# Patient Record
Sex: FEMALE | Race: WHITE | NOT HISPANIC OR LATINO | Employment: OTHER | URBAN - METROPOLITAN AREA
[De-identification: names, ages, dates, MRNs, and addresses within clinical notes are randomized per-mention and may not be internally consistent; named-entity substitution may affect disease eponyms.]

---

## 2017-01-04 ENCOUNTER — THERAPY VISIT (OUTPATIENT)
Dept: PHYSICAL THERAPY | Facility: CLINIC | Age: 82
End: 2017-01-04
Payer: MEDICARE

## 2017-01-04 DIAGNOSIS — G57.01 PIRIFORMIS SYNDROME OF RIGHT SIDE: Primary | ICD-10-CM

## 2017-01-04 PROCEDURE — 97112 NEUROMUSCULAR REEDUCATION: CPT | Mod: GP | Performed by: PHYSICAL THERAPIST

## 2017-01-04 PROCEDURE — 97110 THERAPEUTIC EXERCISES: CPT | Mod: GP | Performed by: PHYSICAL THERAPIST

## 2017-01-13 ENCOUNTER — THERAPY VISIT (OUTPATIENT)
Dept: PHYSICAL THERAPY | Facility: CLINIC | Age: 82
End: 2017-01-13
Payer: MEDICARE

## 2017-01-13 DIAGNOSIS — G57.01 PIRIFORMIS SYNDROME OF RIGHT SIDE: Primary | ICD-10-CM

## 2017-01-13 PROCEDURE — 97112 NEUROMUSCULAR REEDUCATION: CPT | Mod: GP | Performed by: PHYSICAL THERAPIST

## 2017-01-13 PROCEDURE — 97110 THERAPEUTIC EXERCISES: CPT | Mod: GP | Performed by: PHYSICAL THERAPIST

## 2017-01-25 ENCOUNTER — THERAPY VISIT (OUTPATIENT)
Dept: PHYSICAL THERAPY | Facility: CLINIC | Age: 82
End: 2017-01-25
Payer: MEDICARE

## 2017-01-25 DIAGNOSIS — G57.01 PIRIFORMIS SYNDROME OF RIGHT SIDE: Primary | ICD-10-CM

## 2017-01-25 PROCEDURE — G8978 MOBILITY CURRENT STATUS: HCPCS | Mod: GP | Performed by: PHYSICAL THERAPIST

## 2017-01-25 PROCEDURE — 97110 THERAPEUTIC EXERCISES: CPT | Mod: GP | Performed by: PHYSICAL THERAPIST

## 2017-01-25 PROCEDURE — 97112 NEUROMUSCULAR REEDUCATION: CPT | Mod: GP | Performed by: PHYSICAL THERAPIST

## 2017-01-25 PROCEDURE — G8979 MOBILITY GOAL STATUS: HCPCS | Mod: GP | Performed by: PHYSICAL THERAPIST

## 2017-01-25 NOTE — PROGRESS NOTES
Subjective:    HPI                    Objective:    System    Physical Exam    General     ROS    Assessment/Plan:      PROGRESS  REPORT    Progress reporting period is from 9/21/16 to 1/25/17.       SUBJECTIVE  Subjective changes noted by patient:  China reports her hip and leg have been doing really well lately. She has some day when she can feel a low grade ache but other days she has no pain.  She can note occasional soreness in her right upper leg whens he walks or when she sleeps at night, but it goes away quickly.  She feels she can try managing her symptoms on her own with her exercises and will contact PT to discuss progress in 3-4 weeks.    Current pain level is 1/10.     Previous pain level was 6/10.   Changes in function:  Yes, see above.  Adverse reaction to treatment or activity: None    OBJECTIVE  Changes noted in objective findings:  Yes  Right hip PROM grossly WNL, mild right hip flexor tightness.    Right hip strength:  flexion 5/5  abduction 5/5  extension 5-/5  ER 5/5    Right hamstring strength 4+/5     ASSESSMENT/PLAN  Updated problem list and treatment plan: Diagnosis 1:  R hip pain    Pain -  home program  Decreased ROM/flexibility - home program  Decreased strength - home program  Decreased proprioception - home program  STG/LTGs have been met or progress has been made towards goals:  Yes (See Goal flow sheet completed today.)  Assessment of Progress: The patient's condition is improving.  Patient is meeting short term goals and is progressing towards long term goals.  Self Management Plans:  Patient has been instructed in a home treatment program.  Patient  has been instructed in self management of symptoms.    China continues to require the following intervention to meet STG and LTG's:  HEP    Recommendations:  China will continue her home exercise program on her own and contact PT in 3-4 weeks to discuss her progress.  If she is doing well at that time she will continue her home program  and likely be discharged from PT. If she is having continued or worsening pain we will consider resuming PT.    Please refer to the daily flowsheet for treatment today, total treatment time and time spent performing 1:1 timed codes.

## 2017-03-01 ENCOUNTER — OFFICE VISIT (OUTPATIENT)
Dept: OPHTHALMOLOGY | Facility: CLINIC | Age: 82
End: 2017-03-01
Payer: COMMERCIAL

## 2017-03-01 DIAGNOSIS — H52.4 PRESBYOPIA: ICD-10-CM

## 2017-03-01 DIAGNOSIS — H40.053 BORDERLINE GLAUCOMA WITH OCULAR HYPERTENSION, BILATERAL: ICD-10-CM

## 2017-03-01 DIAGNOSIS — H35.30 MACULAR DEGENERATION: Primary | ICD-10-CM

## 2017-03-01 DIAGNOSIS — H43.813 POSTERIOR VITREOUS DETACHMENT, BILATERAL: ICD-10-CM

## 2017-03-01 DIAGNOSIS — Z96.1 PSEUDOPHAKIA: ICD-10-CM

## 2017-03-01 PROCEDURE — 92014 COMPRE OPH EXAM EST PT 1/>: CPT | Performed by: OPHTHALMOLOGY

## 2017-03-01 PROCEDURE — 92134 CPTRZ OPH DX IMG PST SGM RTA: CPT | Performed by: OPHTHALMOLOGY

## 2017-03-01 PROCEDURE — 92015 DETERMINE REFRACTIVE STATE: CPT | Mod: GY | Performed by: OPHTHALMOLOGY

## 2017-03-01 ASSESSMENT — SLIT LAMP EXAM - LIDS
COMMENTS: GOOD CREASE AND COSMESIS
COMMENTS: GOOD CREASE AND COSMESIS, 1+ PTOSIS

## 2017-03-01 ASSESSMENT — VISUAL ACUITY
METHOD: SNELLEN - LINEAR
CORRECTION_TYPE: GLASSES
OS_CC: CF
OD_CC: 20/60+2
OS_CC: <J12

## 2017-03-01 ASSESSMENT — TONOMETRY
OD_IOP_MMHG: 19
OS_IOP_MMHG: 22
IOP_METHOD: APPLANATION

## 2017-03-01 ASSESSMENT — REFRACTION_MANIFEST
OS_CYLINDER: SPHERE
OS_SPHERE: -0.50
OD_CYLINDER: +1.00
OD_ADD: +4.00
OD_SPHERE: -1.50
OD_AXIS: 180
OS_ADD: +4.00

## 2017-03-01 ASSESSMENT — REFRACTION_WEARINGRX
OD_CYLINDER: +1.00
OD_ADD: +3.50
SPECS_TYPE: PAL
OD_SPHERE: -0.75
OD_AXIS: 180
OS_SPHERE: -0.50
OS_CYLINDER: SPHERE

## 2017-03-01 ASSESSMENT — CUP TO DISC RATIO
OD_RATIO: 0.3
OS_RATIO: 0.3

## 2017-03-01 ASSESSMENT — PACHYMETRY
OS_CT(UM): .539
OD_CT(UM): .541

## 2017-03-01 ASSESSMENT — EXTERNAL EXAM - RIGHT EYE: OD_EXAM: NORMAL

## 2017-03-01 ASSESSMENT — EXTERNAL EXAM - LEFT EYE: OS_EXAM: NORMAL

## 2017-03-01 ASSESSMENT — CONF VISUAL FIELD
OS_NORMAL: 1
OD_NORMAL: 1

## 2017-03-01 NOTE — PROGRESS NOTES
" Current Eye Medications:  Latanoprost nightly both eyes, last drops at 9am     Subjective:  Comprehensive eye exam.   Pt reports she is seeing ok with her right eye, has not noticed any changes, vision left is just as poor.     Objective:  See Ophthalmology Exam.       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  Intraocular pressure and discs stable.      ICD-10-CM    1. Macular degeneration, dry, mod, ou H35.30 EYE EXAM (SIMPLE-NONBILLABLE)      COMPUTERIZED OPHTHALMIC IMAGING RETINA   2. Pseudophakia, Yag Caps, ou Z96.1    3. 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 Posterior vitreous detachment, bilateral H43.813    5. Presbyopia H52.4 REFRACTIVE STATUS        Plan: Continue Latanoprost at bedtime both eyes  Take a multiple vitamin or \"eye vitamin\" daily.  Protect your eyes outdoors from ultraviolet rays with sunglasses and/or brimmed hat.  Have spinach (cooked or raw), colorful fruits, walnuts, hazelnuts, almonds in your diet.  Monitor the vision in each eye weekly - call if any sudden persistent changes.   Glasses Rx given (optional)  Return visit 6 months for pressure check, HVF, glaucoma OCT    Srikanth Craig M.D.     "

## 2017-03-01 NOTE — PATIENT INSTRUCTIONS
"Continue Latanoprost at bedtime both eyes  Take a multiple vitamin or \"eye vitamin\" daily.  Protect your eyes outdoors from ultraviolet rays with sunglasses and/or brimmed hat.  Have spinach (cooked or raw), colorful fruits, walnuts, hazelnuts, almonds in your diet.  Monitor the vision in each eye weekly - call if any sudden persistent changes.   Glasses Rx given (optional)  Return visit 6 months for pressure check, HVF, glaucoma OCT    Srikanth Craig M.D.   "

## 2017-03-01 NOTE — MR AVS SNAPSHOT
"              After Visit Summary   3/1/2017    China Guzman    MRN: [id number]           Patient Information     Date Of Birth          4/10/1930        Visit Information        Provider Department      3/1/2017 9:00 AM Srikanth Craig MD [facility]        Today's Diagnoses     Macular degeneration, dry, mod, ou    -  1    Pseudophakia, Yag Caps, ou        Borderline glaucoma with ocular hypertension, bilateral - treated        Posterior vitreous detachment, bilateral        Presbyopia          Care Instructions    Continue Latanoprost at bedtime both eyes  Take a multiple vitamin or \"eye vitamin\" daily.  Protect your eyes outdoors from ultraviolet rays with sunglasses and/or brimmed hat.  Have spinach (cooked or raw), colorful fruits, walnuts, hazelnuts, almonds in your diet.  Monitor the vision in each eye weekly - call if any sudden persistent changes.   Glasses Rx given (optional)  Return visit 6 months for pressure check, HVF, glaucoma OCT    Srikanth Craig M.D.         Follow-ups after your visit        Your next 10 appointments already scheduled     Sep 01, 2017  9:15 AM CDT   Return Visit with Srikanth Craig MD   [facility] ([facility]    [address] [place] [address] [phone]              Who to contact     If you have questions or need follow up information about today's clinic visit or your schedule please contact [facility] directly at [phone].  Normal or non-critical lab and imaging results will be communicated to you by MyChart, letter or phone within 4 business days after the clinic has received the results. If you do not hear from us within 7 days, please contact the clinic through Same Day Serveshart or phone. If you have a critical or abnormal lab result, we will notify you by phone as soon as possible.  Submit refill requests through RaySat or call your pharmacy and they will forward the refill request to us. Please " "allow 3 business days for your refill to be completed.          Additional Information About Your Visit        MyChart Information     CambridgeSofthart lets you send messages to your doctor, view your test results, renew your prescriptions, schedule appointments and more. To sign up, go to [email or web address] . Click on \"Log in\" on the left side of the screen, which will take you to the Welcome page. Then click on \"Sign up Now\" on the right side of the page.     You will be asked to enter the access code listed below, as well as some personal information. Please follow the directions to create your username and password.     Your access code is: [email or web address]  Expires: 2017 10:00 AM     Your access code will  in 90 days. If you need help or a new code, please call your [place] clinic or [phone].        Care EveryWhere ID     This is your Care EveryWhere ID. This could be used by other organizations to access your [place] medical records  [id number]        Your Vitals Were     Last Period                   1980            Blood Pressure from Last 3 Encounters:   16 132/64   10/28/16 142/70   16 142/66    Weight from Last 3 Encounters:   16 53.5 kg (118 lb)   10/28/16 53.5 kg (118 lb)   16 53.5 kg (118 lb)              We Performed the Following     EYE EXAM (SIMPLE-NONBILLABLE)      COMPUTERIZED OPHTHALMIC IMAGING RETINA     REFRACTIVE STATUS        Primary Care Provider Office Phone # Fax #    Colby Trimble [phone]       [facility] [address] [facility] [address]        Thank you!     Thank you for choosing [facility]  for your care. Our goal is always to provide you with excellent care. Hearing back from our patients is one way we can continue to improve our services. Please take a few minutes to complete the written survey that you may receive in the mail after your visit with us. Thank you!             Your " Updated Medication List - Protect others around you: Learn how to safely use, store and throw away your medicines at www.disposemymeds.org.          This list is accurate as of: 3/1/17  9:12 PM.  Always use your most recent med list.                   Brand Name Dispense Instructions for use    aspirin 325 MG EC tablet      Take 325 mg by mouth daily.       atorvastatin 10 MG tablet    LIPITOR    90 tablet    Take 1 tablet (10 mg) by mouth At Bedtime       CALCIUM + D PO      2 tablets daily       diltiazem 120 MG 24 hr capsule    DILACOR XR    90 capsule    Take 1 capsule (120 mg) by mouth daily       hydrochlorothiazide 25 MG tablet    HYDRODIURIL    90 tablet    Take 1 tablet (25 mg) by mouth every morning       latanoprost 0.005 % ophthalmic solution    XALATAN    3 Bottle    Place 1 drop into both eyes At Bedtime       MULTI-VITAMIN PO      None Entered       valsartan 320 MG tablet    DIOVAN    90 tablet    Take 1 tablet (320 mg) by mouth daily

## 2017-03-22 ENCOUNTER — OFFICE VISIT (OUTPATIENT)
Dept: OTOLARYNGOLOGY | Facility: CLINIC | Age: 82
End: 2017-03-22
Payer: COMMERCIAL

## 2017-03-22 VITALS — BODY MASS INDEX: 23.8 KG/M2 | RESPIRATION RATE: 16 BRPM | HEIGHT: 60 IN | WEIGHT: 121.2 LBS

## 2017-03-22 DIAGNOSIS — L57.0 ACTINIC KERATOSIS: ICD-10-CM

## 2017-03-22 DIAGNOSIS — Z85.828 HISTORY OF BASAL CELL CARCINOMA: Primary | ICD-10-CM

## 2017-03-22 PROCEDURE — 11101 ZZHC BIOPSY SKIN/SUBQ/MUC MEM, EACH ADDTL LESION: CPT | Performed by: OTOLARYNGOLOGY

## 2017-03-22 PROCEDURE — 99214 OFFICE O/P EST MOD 30 MIN: CPT | Mod: 25 | Performed by: OTOLARYNGOLOGY

## 2017-03-22 PROCEDURE — 88305 TISSUE EXAM BY PATHOLOGIST: CPT | Performed by: OTOLARYNGOLOGY

## 2017-03-22 PROCEDURE — 11100 HC BIOPSY SKIN/SUBQ/MUC MEM, SINGLE LESION: CPT | Performed by: OTOLARYNGOLOGY

## 2017-03-22 ASSESSMENT — PAIN SCALES - GENERAL: PAINLEVEL: NO PAIN (0)

## 2017-03-22 NOTE — PATIENT INSTRUCTIONS
General Scheduling Information  To schedule your CT/MRI scan, please contact [place] [facility] at [phone]   [address] [place] [address]    To schedule your Surgery, please contact our Specialty Schedulers at [phone]    ENT Clinic Locations Clinic Hours Telephone Number     [facility]  [address] [place] [address]   Tuesday:       8:00am -- 4:00pm    Wednesday:  8:00am - 4:00pm   To schedule an appointment with   Dr. Holland,   please contact our   Specialty Scheduling Department at:     [phone]       [facility]  [address] [place] [address]   Friday:          8:00am - 4:00pm         Urgent Care Locations Clinic Hours Telephone Numbers     [facility]  [address] [place] [address]     Monday-Friday:     11:00pm - 9:00pm    Saturday-Sunday:  9:00am - 5:00pm   [phone]     [facility]  [address] [place] [address]     Monday-Friday:      5:00pm - 9:00pm     Saturday-Sunday:  9:00am - 5:00pm   [phone]

## 2017-03-22 NOTE — PROGRESS NOTES
Chief Complaint - skin lesion    History of Present Illness - China Guzman is a 86 year old female returns with lesions on the nose. The patient has noticed for approximately years on the mid dorsum. Had a BCC resected many years ago. I saw her 1.5 years ago and she had some small AK on the nose. Superiorly in the intercanthal area were her glasses cover up is a lesion that she thinks comes and goes. Unsure how long it has been there. I didn't not it last time. No lumps or swollen glands in the neck.     Past Medical History -   Patient Active Problem List   Diagnosis     Osteoporosis     HL (hearing loss)     Gallstones     Malignant basal cell neoplasm of skin     Hyperlipidemia LDL goal <130     Pseudophakia, Yag Caps, ou     Advanced directives, counseling/discussion     Borderline glaucoma with ocular hypertension, bilateral - treated     Family history of colon cancer     Posterior vitreous detachment, bilateral     Macular degeneration, dry, mod, ou     Piriformis syndrome of right side     Essential hypertension with goal blood pressure less than 140/90     10 year risk of MI or stroke 7.5% or greater, 37% in September 2016 on atorvastatin 10 mg       Current Medications -   Current Outpatient Prescriptions:      latanoprost (XALATAN) 0.005 % ophthalmic solution, Place 1 drop into both eyes At Bedtime, Disp: 3 Bottle, Rfl: 4     diltiazem (DILACOR XR) 120 MG 24 hr ER capsule, Take 1 capsule (120 mg) by mouth daily, Disp: 90 capsule, Rfl: 3     hydrochlorothiazide (HYDRODIURIL) 25 MG tablet, Take 1 tablet (25 mg) by mouth every morning, Disp: 90 tablet, Rfl: 0     atorvastatin (LIPITOR) 10 MG tablet, Take 1 tablet (10 mg) by mouth At Bedtime, Disp: 90 tablet, Rfl: 3     valsartan (DIOVAN) 320 MG tablet, Take 1 tablet (320 mg) by mouth daily, Disp: 90 tablet, Rfl: 3     aspirin 325 MG EC tablet, Take 325 mg by mouth daily., Disp: , Rfl:      CALCIUM + D OR, 2 tablets daily, Disp: , Rfl:      MULTI-VITAMIN OR,  None Entered, Disp: , Rfl:     Allergies - No Known Allergies    Social History -   Social History     Social History     Marital status:      Spouse name: N/A     Number of children: 6     Years of education: N/A     Occupational History      Retired     Social History Main Topics     Smoking status: Never Smoker     Smokeless tobacco: Never Used     Alcohol use No     Drug use: No     Sexual activity: No     Other Topics Concern     None     Social History Narrative       Family History -   Family History   Problem Relation Age of Onset     Cancer - colorectal Mother      C.A.D. Brother      CEREBROVASCULAR DISEASE Brother      HEART DISEASE Brother      Asthma Daughter      Breast Cancer Daughter      Thyroid Disease Daughter        Review of Systems - As per HPI and PMHx, otherwise 7 system review of the head and neck negative.    Physical Exam  General - The patient is in no distress.  Alert and oriented x3, answers questions and cooperates with examination appropriately.   Voice and Breathing - The patient was breathing comfortably without the use of accessory muscles. There was no wheezing, stridor, or stertor.  The patients voice was clear and strong.  Skin - see nasal exam below. The rest of the facial skin and ear skin was without significant worrisome lesions.  Eyes - Extraocular movements intact. Sclera were not icteric or injected, conjunctiva were pink and moist.  Neurologic - Cranial nerves II-XII are grossly intact. Specifically, the facial nerve is intact, House-Brackmann grade 1 of 6.   Nose - intercanthus/superior dorsum has a 3-4 mm raised lesion with central ulceration, likely a BCC. Mid dorsum has a scaly and mostly flat lesion, 2-3 mm, likely an AK, both were biopsied. See below.  Nasal mucosa is pink and moist with no abnormal mucus.  The septum was midline, turbinates are of normal size and position.  No polyps, masses, or purulence.  Neck -  Palpation of the occipital, submental,  submandibular, internal jugular chain, and supraclavicular nodes did not demonstrate any abnormal lymph nodes or masses. The parotid glands were without masses. Palpation of the thyroid was soft and smooth, with no nodules or goiter appreciated.  The trachea was midline.    Procedure:    I explained the risk, benefits, and alteratives to skin biopsy. The patient agreed and wished to proceed. I injected the two lesions (intercanthus area, superior nasal dorsum; and mid nasal dorsum) with 2% lidocaine, 1:100,000 epinephrine. I used a 2 mm punch biopsy on the superior lesion, intercanthus and then transected the biopsy at the base with a scissors. I placed the specimen in formalin (#1). I then used a 15 blade as a shave biopsy of the mid dorsun lesion that looked like an actinic keratosis. Hemostasis was achieved with a small amount of silver nitrate cautery superiorly and pressure inferiorly. Bacitracin was used and the wounds were covered with a bandaid.      A/P - China Guzman is a 86 year old female with a lesions on the nose. It is likely a BCC superiorly at the intercanthal area, and likely an AK on the mid dorsum. I biopsied these today in clinic and will call the patient with the results. This may require a larger resection based on pathology, and I explained this to the patient. They should put antibiotic ointment on the lesions 2-3 times a day until the lesion heals.         Nirav Holland MD  Otolaryngology  [place]

## 2017-03-22 NOTE — MR AVS SNAPSHOT
After Visit Summary   3/22/2017    China Guzman    MRN: [id number]           Patient Information     Date Of Birth          4/10/1930        Visit Information        Provider Department      3/22/2017 8:45 AM Nirav Holland MD [facility]        Today's Diagnoses     History of basal cell carcinoma    -  1    Actinic keratosis          Care Instructions    General Scheduling Information  To schedule your CT/MRI scan, please contact [place] [facility] at [phone] [address] [place] [address]    To schedule your Surgery, please contact our Specialty Schedulers at [phone]    ENT Clinic Locations Clinic Hours Telephone Number     [facility]  [address] [place] [address]   Tuesday:       8:00am -- 4:00pm    Wednesday:  8:00am - 4:00pm   To schedule an appointment with   Dr. Holland,   please contact our   Specialty Scheduling Department at:     [phone]       [facility]  [address]   [place] [address]   Friday:          8:00am - 4:00pm         Urgent Care Locations Clinic Hours Telephone Numbers     [facility] [place]  [address]  [place] [address]     Monday-Friday:     11:00pm - 9:00pm    Saturday-Sunday:  9:00am - 5:00pm   [phone]     [facility]  [address]   [place] [address]     Monday-Friday:      5:00pm - 9:00pm     Saturday-Sunday:  9:00am - 5:00pm   [phone]             Follow-ups after your visit        Your next 10 appointments already scheduled     May 19, 2017  9:00 AM CDT   LAB with AN LAB   [facility]    [address] [facility] [address] [phone]           Patient must bring picture ID.  Patient should be prepared to give a urine specimen  Please do not eat 10-12 hours before your appointment if you are coming in fasting for labs on lipids, cholesterol, or glucose (sugar).  Pregnant women should follow their Care Team  "instructions. Water with medications is okay. Do not drink coffee or other fluids.   If you have concerns about taking  your medications, please ask at office or if scheduling via Yerdle, send a message by clicking on Secure Messaging, Message Your Care Team.            May 26, 2017  9:00 AM CDT   PHYSICAL with Colby Trimble MD   [facility] ([facility]    [address] [facility] [address] [phone]            Sep 01, 2017  9:15 AM CDT   Return Visit with Srikanth Craig MD   [facility]    [address] [facility] [address] [phone]              Who to contact     If you have questions or need follow up information about today's clinic visit or your schedule please contact [facility] directly at [phone].  Normal or non-critical lab and imaging results will be communicated to you by VCVhart, letter or phone within 4 business days after the clinic has received the results. If you do not hear from us within 7 days, please contact the clinic through [email or web address] or phone. If you have a critical or abnormal lab result, we will notify you by phone as soon as possible.  Submit refill requests through Yerdle or call your pharmacy and they will forward the refill request to us. Please allow 3 business days for your refill to be completed.          Additional Information About Your Visit        Yerdle Information     Yerdle lets you send messages to your doctor, view your test results, renew your prescriptions, schedule appointments and more. To sign up, go to [email or web address] . Click on \"Log in\" on the left side of the screen, which will take you to the Welcome page. Then click on \"Sign up Now\" on the right side of the page.     You will be asked to enter the access code listed below, as well as some personal information. Please follow the directions to create your username and password.     Your " "access code is: [email or web address]  Expires: 2017 11:00 AM     Your access code will  in 90 days. If you need help or a new code, please call your [place] clinic or [phone].        Care EveryWhere ID     This is your Care EveryWhere ID. This could be used by other organizations to access your [place] medical records  [id number]        Your Vitals Were     Respirations Height Last Period BMI (Body Mass Index)          16 1.511 m (4' 11.5\") 1980 24.07 kg/m2         Blood Pressure from Last 3 Encounters:   16 132/64   10/28/16 142/70   16 142/66    Weight from Last 3 Encounters:   17 55 kg (121 lb 3.2 oz)   16 53.5 kg (118 lb)   10/28/16 53.5 kg (118 lb)              We Performed the Following     Biopsy Skin     Surgical pathology exam        Primary Care Provider Office Phone # Fax #    Colby Trimble [phone]       [facility] [address] [facility] [address]        Thank you!     Thank you for choosing [facility]  for your care. Our goal is always to provide you with excellent care. Hearing back from our patients is one way we can continue to improve our services. Please take a few minutes to complete the written survey that you may receive in the mail after your visit with us. Thank you!             Your Updated Medication List - Protect others around you: Learn how to safely use, store and throw away your medicines at www.disposemymeds.org.          This list is accurate as of: 3/22/17 12:58 PM.  Always use your most recent med list.                   Brand Name Dispense Instructions for use    aspirin 325 MG EC tablet      Take 325 mg by mouth daily.       atorvastatin 10 MG tablet    LIPITOR    90 tablet    Take 1 tablet (10 mg) by mouth At Bedtime       CALCIUM + D PO      2 tablets daily       diltiazem 120 MG 24 hr capsule    DILACOR XR    90 capsule    Take 1 capsule (120 mg) by mouth daily       " hydrochlorothiazide 25 MG tablet    HYDRODIURIL    90 tablet    Take 1 tablet (25 mg) by mouth every morning       latanoprost 0.005 % ophthalmic solution    XALATAN    3 Bottle    Place 1 drop into both eyes At Bedtime       MULTI-VITAMIN PO      None Entered       valsartan 320 MG tablet    DIOVAN    90 tablet    Take 1 tablet (320 mg) by mouth daily

## 2017-03-22 NOTE — NURSING NOTE
"Chief Complaint   Patient presents with     RECHECK     Skin       Initial Resp 16  Ht 1.511 m (4' 11.5\")  Wt 55 kg (121 lb 3.2 oz)  LMP 12/16/1980  BMI 24.07 kg/m2 Estimated body mass index is 24.07 kg/(m^2) as calculated from the following:    Height as of this encounter: 1.511 m (4' 11.5\").    Weight as of this encounter: 55 kg (121 lb 3.2 oz).  Medication Reconciliation: complete     Nicole Chavarria MA    "

## 2017-03-24 LAB — COPATH REPORT: NORMAL

## 2017-03-27 ENCOUNTER — TELEPHONE (OUTPATIENT)
Dept: OTOLARYNGOLOGY | Facility: CLINIC | Age: 82
End: 2017-03-27

## 2017-03-27 NOTE — TELEPHONE ENCOUNTER
Skin biopsy superiorly between the eyes is a basal cell carcinoma. I recommend removal in clinic. She will call for a 45 minute appointment.

## 2017-05-16 ENCOUNTER — DOCUMENTATION ONLY (OUTPATIENT)
Dept: LAB | Facility: CLINIC | Age: 82
End: 2017-05-16

## 2017-05-16 DIAGNOSIS — Z91.89 10 YEAR RISK OF MI OR STROKE 7.5% OR GREATER: ICD-10-CM

## 2017-05-16 DIAGNOSIS — E78.5 HYPERLIPIDEMIA LDL GOAL <130: Primary | ICD-10-CM

## 2017-05-16 DIAGNOSIS — M81.0 OSTEOPOROSIS: ICD-10-CM

## 2017-05-16 DIAGNOSIS — I10 ESSENTIAL HYPERTENSION WITH GOAL BLOOD PRESSURE LESS THAN 140/90: ICD-10-CM

## 2017-05-19 DIAGNOSIS — Z91.89 10 YEAR RISK OF MI OR STROKE 7.5% OR GREATER: ICD-10-CM

## 2017-05-19 DIAGNOSIS — I10 ESSENTIAL HYPERTENSION WITH GOAL BLOOD PRESSURE LESS THAN 140/90: ICD-10-CM

## 2017-05-19 DIAGNOSIS — M81.0 OSTEOPOROSIS: ICD-10-CM

## 2017-05-19 DIAGNOSIS — E78.5 HYPERLIPIDEMIA LDL GOAL <130: ICD-10-CM

## 2017-05-19 LAB
ALBUMIN SERPL-MCNC: 4.1 G/DL (ref 3.4–5)
ALP SERPL-CCNC: 77 U/L (ref 40–150)
ALT SERPL W P-5'-P-CCNC: 28 U/L (ref 0–50)
ANION GAP SERPL CALCULATED.3IONS-SCNC: 10 MMOL/L (ref 3–14)
AST SERPL W P-5'-P-CCNC: 19 U/L (ref 0–45)
BILIRUB SERPL-MCNC: 0.5 MG/DL (ref 0.2–1.3)
BUN SERPL-MCNC: 39 MG/DL (ref 7–30)
CALCIUM SERPL-MCNC: 11 MG/DL (ref 8.5–10.1)
CHLORIDE SERPL-SCNC: 103 MMOL/L (ref 94–109)
CHOLEST SERPL-MCNC: 172 MG/DL
CO2 SERPL-SCNC: 26 MMOL/L (ref 20–32)
CREAT SERPL-MCNC: 0.99 MG/DL (ref 0.52–1.04)
GFR SERPL CREATININE-BSD FRML MDRD: 53 ML/MIN/1.7M2
GLUCOSE SERPL-MCNC: 109 MG/DL (ref 70–99)
HDLC SERPL-MCNC: 62 MG/DL
LDLC SERPL CALC-MCNC: 80 MG/DL
NONHDLC SERPL-MCNC: 110 MG/DL
POTASSIUM SERPL-SCNC: 3.7 MMOL/L (ref 3.4–5.3)
PROT SERPL-MCNC: 8.1 G/DL (ref 6.8–8.8)
SODIUM SERPL-SCNC: 139 MMOL/L (ref 133–144)
TRIGL SERPL-MCNC: 149 MG/DL

## 2017-05-19 PROCEDURE — 36415 COLL VENOUS BLD VENIPUNCTURE: CPT | Performed by: FAMILY MEDICINE

## 2017-05-19 PROCEDURE — 80061 LIPID PANEL: CPT | Performed by: FAMILY MEDICINE

## 2017-05-19 PROCEDURE — 80053 COMPREHEN METABOLIC PANEL: CPT | Performed by: FAMILY MEDICINE

## 2017-05-25 NOTE — PATIENT INSTRUCTIONS
Preventive Health Recommendations    Female Ages 65 +    Yearly exam:     See your health care provider every year in order to  o Review health changes.   o Discuss preventive care.    o Review your medicines if your doctor has prescribed any.      You no longer need a yearly Pap test unless you've had an abnormal Pap test in the past 10 years. If you have vaginal symptoms, such as bleeding or discharge, be sure to talk with your provider about a Pap test.      Every 1 to 2 years, have a mammogram.  If you are over 69, talk with your health care provider about whether or not you want to continue having screening mammograms.      Every 10 years, have a colonoscopy. Or, have a yearly FIT test (stool test). These exams will check for colon cancer.       Have a cholesterol test every 5 years, or more often if your doctor advises it.       Have a diabetes test (fasting glucose) every three years. If you are at risk for diabetes, you should have this test more often.       At age 65, have a bone density scan (DEXA) to check for osteoporosis (brittle bone disease).    Shots:    Get a flu shot each year.    Get a tetanus shot every 10 years.    Talk to your doctor about your pneumonia vaccines. There are now two you should receive - Pneumovax (PPSV 23) and Prevnar (PCV 13).    Talk to your doctor about the shingles vaccine.    Talk to your doctor about the hepatitis B vaccine.    Nutrition:     Eat at least 5 servings of fruits and vegetables each day.      Eat whole-grain bread, whole-wheat pasta and brown rice instead of white grains and rice.      Talk to your provider about Calcium and Vitamin D.     Lifestyle    Exercise at least 150 minutes a week (30 minutes a day, 5 days a week). This will help you control your weight and prevent disease.      Limit alcohol to one drink per day.      No smoking.       Wear sunscreen to prevent skin cancer.       See your dentist twice a year for an exam and cleaning.      See your  eye doctor every 1 to 2 years to screen for conditions such as glaucoma, macular degeneration and cataracts.  Preventive Health Recommendations  Female Ages 65 +    Yearly exam:   See your health care provider every year in order to  Review health changes.   Discuss preventive care.    Review your medicines if your doctor has prescribed any.    You no longer need a yearly Pap test unless you've had an abnormal Pap test in the past 10 years. If you have vaginal symptoms, such as bleeding or discharge, be sure to talk with your provider about a Pap test.    Every 1 to 2 years, have a mammogram.  If you are over 69, talk with your health care provider about whether or not you want to continue having screening mammograms.    Every 10 years, have a colonoscopy. Or, have a yearly FIT test (stool test). These exams will check for colon cancer.     Have a cholesterol test every 5 years, or more often if your doctor advises it.     Have a diabetes test (fasting glucose) every three years. If you are at risk for diabetes, you should have this test more often.     At age 65, have a bone density scan (DEXA) to check for osteoporosis (brittle bone disease).    Shots:  Get a flu shot each year.  Get a tetanus shot every 10 years.  Talk to your doctor about your pneumonia vaccines. There are now two you should receive - Pneumovax (PPSV 23) and Prevnar (PCV 13).  Talk to your doctor about the shingles vaccine.  Talk to your doctor about the hepatitis B vaccine.    Nutrition:   Eat at least 5 servings of fruits and vegetables each day.    Eat whole-grain bread, whole-wheat pasta and brown rice instead of white grains and rice.    Talk to your provider about Calcium and Vitamin D.     Lifestyle  Exercise at least 150 minutes a week (30 minutes a day, 5 days a week). This will help you control your weight and prevent disease.    Limit alcohol to one drink per day.    No smoking.     Wear sunscreen to prevent skin cancer.     See your  dentist twice a year for an exam and cleaning.    See your eye doctor every 1 to 2 years to screen for conditions such as glaucoma, macular degeneration, cataracts, etc       [facility]: [facility] [phone]   Call for an Aud[facility] appointment(s)       Results for orders placed or performed in visit on 05/19/17   Lipid Profile with reflex to direct LDL   Result Value Ref Range    Cholesterol 172 <200 mg/dL    Triglycerides 149 <150 mg/dL    HDL Cholesterol 62 >49 mg/dL    LDL Cholesterol Calculated 80 <100 mg/dL    Non HDL Cholesterol 110 <130 mg/dL   Comprehensive metabolic panel   Result Value Ref Range    Sodium 139 133 - 144 mmol/L    Potassium 3.7 3.4 - 5.3 mmol/L    Chloride 103 94 - 109 mmol/L    Carbon Dioxide 26 20 - 32 mmol/L    Anion Gap 10 3 - 14 mmol/L    Glucose 109 (H) 70 - 99 mg/dL    Urea Nitrogen 39 (H) 7 - 30 mg/dL    Creatinine 0.99 0.52 - 1.04 mg/dL    GFR Estimate 53 (L) >60 mL/min/1.7m2    GFR Estimate If Black 64 >60 mL/min/1.7m2    Calcium 11.0 (H) 8.5 - 10.1 mg/dL    Bilirubin Total 0.5 0.2 - 1.3 mg/dL    Albumin 4.1 3.4 - 5.0 g/dL    Protein Total 8.1 6.8 - 8.8 g/dL    Alkaline Phosphatase 77 40 - 150 U/L    ALT 28 0 - 50 U/L    AST 19 0 - 45 U/L           Please call our [facility] Scheduling Line at [phone] to schedule your:  Dexa Scan  Mammogram

## 2017-05-25 NOTE — PROGRESS NOTES
SUBJECTIVE:                                                            China Guzman is a 87 year old female who presents for Preventive Visit.    Are you in the first 12 months of your Medicare Part B coverage?  No    Healthy Habits:    Do you get at least three servings of calcium containing foods daily (dairy, green leafy vegetables, etc.)? yes    Amount of exercise or daily activities, outside of work: 3-4 day(s) per week    Problems taking medications regularly No    Medication side effects: No    Have you had an eye exam in the past two years? yes    Do you see a dentist twice per year? No -dentures    Do you have sleep apnea, excessive snoring or daytime drowsiness?no        Reviewed and updated as needed this visit by clinical staff  Allergies  Meds         Reviewed and updated as needed this visit by Provider        Social History   Substance Use Topics     Smoking status: Never Smoker     Smokeless tobacco: Never Used     Alcohol use No       The patient does not drink >3 drinks per day nor >7 drinks per week.    Today's PHQ-2 Score:   PHQ-2 ( 1999 Pfizer) 5/26/2017 10/28/2016   Q1: Little interest or pleasure in doing things 0 0   Q2: Feeling down, depressed or hopeless 0 0   PHQ-2 Score 0 0       Do you feel safe in your environment - Yes    Do you have a Health Care Directive?: No: Advance care planning reviewed with patient; information given to patient to review.    Current providers sharing in care for this patient include:   Patient Care Team:  Colby Trimble MD as PCP - General (Family Practice)      Hearing impairment: Yes, Difficulty following a conversation in a noisy restaurant or crowded room.    Need to ask people to speak up or repeat themselves.    Ability to successfully perform activities of daily living: Yes, no assistance needed     Fall risk:       Home safety:  none identified      The following health maintenance items are reviewed in Epic and correct as of today:  Health  "Maintenance   Topic Date Due     ADVANCE DIRECTIVE PLANNING Q5 YRS  01/04/2017     INFLUENZA VACCINE (SYSTEM ASSIGNED)  09/01/2017     FALL RISK ASSESSMENT  09/28/2017     EYE EXAM Q1 YEAR  03/01/2018     COLONOSCOPY Q5 YR  08/17/2021     TETANUS IMMUNIZATION (SYSTEM ASSIGNED)  03/01/2023     PNEUMOCOCCAL  Completed                ASSESSMENT / PLAN:                                                                ICD-10-CM    1. Routine general medical examination at a health care facility Z00.00    2. Encounter for screening mammogram for breast cancer Z12.31 MA Screening Digital Bilateral   3. Hearing difficulty, unspecified laterality H91.90 AUDIOLOGY ADULT REFERRAL   4. Osteoporosis M81.0 DX Hip/Pelvis/Spine   5. Hypercalcemia E83.52 Calcium ionized     Parathyroid Hormone Intact     Renal panel     25 Hydroxyvitamin D2 and D3     Magnesium     TSH with free T4 reflex     CANCELED: Phosphorus   6. Essential hypertension with goal blood pressure less than 140/90 I10 diltiazem (DILACOR XR) 180 MG 24 hr capsule       End of Life Planning:  Patient currently has an advanced directive: No.  I have verified the patient's ablity to prepare an advanced directive/make health care decisions.  Literature was provided to assist patient in preparing an advanced directive.    COUNSELING:  Reviewed preventive health counseling, as reflected in patient instructions       Regular exercise       Vision screening       Dental care       Osteoporosis Prevention/Bone Health       Colon cancer screening        Estimated body mass index is 23.55 kg/(m^2) as calculated from the following:    Height as of this encounter: 4' 11.75\" (1.518 m).    Weight as of this encounter: 119 lb 9.6 oz (54.3 kg).     reports that she has never smoked. She has never used smokeless tobacco.      Appropriate preventive services were discussed with this patient, including applicable screening as appropriate for cardiovascular disease, diabetes, " osteopenia/osteoporosis, and glaucoma.  As appropriate for age/gender, discussed screening for colorectal cancer, prostate cancer, breast cancer, and cervical cancer. Checklist reviewing preventive services available has been given to the patient.    Reviewed patients plan of care and provided an AVS. The Basic Care Plan (routine screening as documented in Health Maintenance) for [place] meets the Care Plan requirement. This Care Plan has been established and reviewed with the Patient.    Counseling Resources:  ATP IV Guidelines  Pooled Cohorts Equation Calculator  Breast Cancer Risk Calculator  FRAX Risk Assessment  ICSI Preventive Guidelines  Dietary Guidelines for Americans, 2010  [employer]'s MyPlate  ASA Prophylaxis  Lung CA Screening    Colby Trimble MD  [facility]  --------------------------------------------------------------------------------------------------------------------------------------  SUBJECTIVE:  China Guzman is a 87 year old female who presents to the clinic today for a routine physical exam.    The patient's last physical was 5 years ago.     Cholesterol   Date Value Ref Range Status   05/19/2017 172 <200 mg/dL Final   03/08/2016 142 <200 mg/dL Final     HDL Cholesterol   Date Value Ref Range Status   05/19/2017 62 >49 mg/dL Final   03/08/2016 50 >49 mg/dL Final     LDL Cholesterol Calculated   Date Value Ref Range Status   05/19/2017 80 <100 mg/dL Final     Comment:     Desirable:       <100 mg/dl   03/08/2016 57 <100 mg/dL Final     Comment:     Desirable:       <100 mg/dl     Triglycerides   Date Value Ref Range Status   05/19/2017 149 <150 mg/dL Final     Comment:     Fasting specimen   03/08/2016 174 (H) <150 mg/dL Final     Comment:     Borderline high:  150-199 mg/dl   High:             200-499 mg/dl   Very high:       >499 mg/dl   Fasting specimen       Cholesterol/HDL Ratio   Date Value Ref Range Status   03/16/2015 2.6 0.0 - 5.0 Final   03/03/2014 3.4 0.0 - 5.0 Final      The patient's last fasting lipid panel was done 1 weeks ago and the results are listed above.      The ASCVD Risk score ([place] ELMER Jr, et al., 2013) failed to calculate for the following reasons:    The 2013 ASCVD risk score is only valid for ages 40 to 79  39.7%  using age 79    The patient reports that she has been treated for high blood pressure.    The patient reports that she does take a daily aspirin.    No results found for: HCVAB      The patient reports that she has not been screened for Hepatitis C    (Screen all baby boomers once per CDC-- the generation born from 1945 through 1965)      Immunization History   Administered Date(s) Administered     Influenza (High Dose) 3 valent vaccine 08/29/2016     Influenza (IIV3) 10/01/2009, 10/12/2010, 10/20/2011, 10/20/2015     Pneumococcal (PCV 13) 03/18/2015     Pneumococcal 23 valent 05/10/2007, 04/06/2012     TD (ADULT, 7+) 05/10/2007     TDAP Vaccine (Adacel) 03/01/2013     Zoster vaccine, live 12/10/2009     The patient has not started the Gardasil vaccination series.  The patient's believes that her last tetanus shot was given 4 year(s) ago.   The patient believes that she has had a Zostavax in the past  The patient believes that she has had a PPSV23 in the past.  The patient believes that she has had a PCV13 in the past.  The patient believes that she has had a seasonal flu vaccination this fall or winter.  The patient would like to have a no vaccinations today      No results found for this or any previous visit.]   The patient reports a family history of colon cancer in her mother at age 84  The patient reports that she has had a colonoscopy. Her  last colonoscopy was in 2016 and she  report that is was normal. The patient was told that she did not have to have  this repeated.      The patient denies a family history diabetes.    The patient reports that she does performs a self breast exam occasionally.  The patient reports a family history of breast  cancer in her daughter.  The patient does want to have a mammogram done.  The patient does not want to have a Pap smear done.  She reports that she has not had an abnormal pap smear..  The patient reports that she eats or drinks 2 servings of dairy products per day. She does take a 600 calcium supplement 2 times daily..  The patient reports that she has had a bone density scan. Her  last scan was in 2013 and she  report that is was abnormal. The patient was told to have this repeated in 3 years.    The patient reports that she does not have dental appointments as she has full dentures  The patient reports that she  has an eye examination approximately every 1.0 year(s).        Do you currently smoke? No  How many years have you smoked? 0  How many packs per day did you smoke on average? N/A  (if more than 30 pack year history and the patient is age 55-80 consider ordering an annual low dose radiation lung CT to screen for cancer)  (Do not order if patient has quit more than 15 years ago or has a health condition that limits life expectancy or could not tolerate curative lung surgery)  Are you interested having a lung CT to screen for lung cancer? 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     Hearing loss      HTN (hypertension)      Hyperlipidemia      MACULAR DEGENERATION (SENILE) OF RETINA, DRY OU 2/11/2011     Osteoporosis        Past Surgical History:   Procedure Laterality Date     BLEPHAROPLASTY BILATERAL  1/2004    both eyes, upper lids     CATARACT IOL, RT/LT       COLONOSCOPY  12/2010    Q 5 years     FRACTURE TX, ANKLE RT/LT  1997    RIGHT ANKLE ORIF     LASER YAG CAPSULOTOMY  12/2005; 1/2006    right eye; left eye     PHACOEMULSIFICATION WITH STANDARD INTRAOCULAR LENS IMPLANT  8/2003; 10/2003    left eye; right eye       Family History   Problem Relation Age of Onset     Cancer - colorectal Mother   "    DRAKE Brother      CEREBROVASCULAR DISEASE Brother      HEART DISEASE Brother      Asthma Daughter      Breast Cancer Daughter      Thyroid Disease Daughter        Social History     Social History     Marital status:      Spouse name: N/A     Number of children: 6     Years of education: N/A     Occupational History      Retired     Social History Main Topics     Smoking status: Never Smoker     Smokeless tobacco: Never Used     Alcohol use No     Drug use: No     Sexual activity: No     Other Topics Concern     Not on file     Social History Narrative       Current Outpatient Prescriptions   Medication Sig Dispense Refill     diltiazem (DILACOR XR) 180 MG 24 hr capsule Take 1 capsule (180 mg) by mouth daily 30 capsule 1     latanoprost (XALATAN) 0.005 % ophthalmic solution Place 1 drop into both eyes At Bedtime 3 Bottle 4     hydrochlorothiazide (HYDRODIURIL) 25 MG tablet Take 1 tablet (25 mg) by mouth every morning 90 tablet 0     atorvastatin (LIPITOR) 10 MG tablet Take 1 tablet (10 mg) by mouth At Bedtime 90 tablet 3     valsartan (DIOVAN) 320 MG tablet Take 1 tablet (320 mg) by mouth daily 90 tablet 3     aspirin 325 MG EC tablet Take 325 mg by mouth daily.       CALCIUM + D OR 2 tablets daily       MULTI-VITAMIN OR None Entered           PHYSICAL EXAMINATION:  Blood pressure 146/60, pulse 74, temperature 97.6  F (36.4  C), temperature source Oral, height 4' 11.75\" (1.518 m), weight 119 lb 9.6 oz (54.3 kg), last menstrual period 12/16/1980, not currently breastfeeding.  General appearance - healthy, alert and no distress  Skin - Skin color, texture, turgor normal. No rashes or lesions.  Head - Normocephalic. No masses, lesions, tenderness or abnormalities  Eyes - conjunctivae/corneas clear. PERRL, EOM's intact. Fundi benign  Ears - External ears normal. Canals clear. TM's normal.  Nose/Sinuses - Nares normal. Septum midline. Mucosa normal. No drainage or sinus tenderness.  Oropharynx - Lips, " mucosa, and tongue normal. Teeth and gums normal.  Neck - Neck supple. No adenopathy. Thyroid symmetric, normal size,  Lungs - Percussion normal. Good diaphragmatic excursion. Lungs clear  Heart - PMI normal. No lifts, heaves, or thrills. RRR. No murmurs, clicks gallops or rub  Breasts - Breasts normal to inspection and palpation. Axillae negative  Abdomen - Abdomen soft, non-tender. BS normal. No masses, organomegaly  Extremities - Extremities normal. No deformities, edema, or skin discoloration.  Musculoskeletal - Spine ROM normal. Muscular strength intact.  Peripheral pulses - radial=4/4, femoral=4/4, popliteal=4/4, dorsalis pedis=4/4,  Neuro - Gait normal. Reflexes normal and symmetric. Sensation grossly WNL.        Orders Only on 05/19/2017   Component Date Value Ref Range Status     Cholesterol 05/19/2017 172  <200 mg/dL Final     Triglycerides 05/19/2017 149  <150 mg/dL Final    Fasting specimen     HDL Cholesterol 05/19/2017 62  >49 mg/dL Final     LDL Cholesterol Calculated 05/19/2017 80  <100 mg/dL Final    Desirable:       <100 mg/dl     Non HDL Cholesterol 05/19/2017 110  <130 mg/dL Final     Sodium 05/19/2017 139  133 - 144 mmol/L Final     Potassium 05/19/2017 3.7  3.4 - 5.3 mmol/L Final     Chloride 05/19/2017 103  94 - 109 mmol/L Final     Carbon Dioxide 05/19/2017 26  20 - 32 mmol/L Final     Anion Gap 05/19/2017 10  3 - 14 mmol/L Final     Glucose 05/19/2017 109* 70 - 99 mg/dL Final    Fasting specimen     Urea Nitrogen 05/19/2017 39* 7 - 30 mg/dL Final     Creatinine 05/19/2017 0.99  0.52 - 1.04 mg/dL Final     GFR Estimate 05/19/2017 53* >60 mL/min/1.7m2 Final    Non  GFR Calc     GFR Estimate If Black 05/19/2017 64  >60 mL/min/1.7m2 Final    African American GFR Calc     Calcium 05/19/2017 11.0* 8.5 - 10.1 mg/dL Final     Bilirubin Total 05/19/2017 0.5  0.2 - 1.3 mg/dL Final     Albumin 05/19/2017 4.1  3.4 - 5.0 g/dL Final     Protein Total 05/19/2017 8.1  6.8 - 8.8 g/dL Final      Alkaline Phosphatase 05/19/2017 77  40 - 150 U/L Final     ALT 05/19/2017 28  0 - 50 U/L Final     AST 05/19/2017 19  0 - 45 U/L Final       ASSESSMENT:    ICD-10-CM    1. Routine general medical examination at a health care facility Z00.00    2. Encounter for screening mammogram for breast cancer Z12.31 MA Screening Digital Bilateral   3. Hearing difficulty, unspecified laterality H91.90 AUDIOLOGY ADULT REFERRAL   4. Osteoporosis M81.0 DX Hip/Pelvis/Spine   5. Hypercalcemia E83.52 Calcium ionized     Parathyroid Hormone Intact     Renal panel     25 Hydroxyvitamin D2 and D3     Magnesium     TSH with free T4 reflex     CANCELED: Phosphorus   6. Essential hypertension with goal blood pressure less than 140/90 I10 diltiazem (DILACOR XR) 180 MG 24 hr capsule       Well-Adult Physical Exam.  Health Maintenance Due   Topic Date Due     ADVANCE DIRECTIVE PLANNING Q5 YRS  01/04/2017     Health Maintenance   Topic Date Due     ADVANCE DIRECTIVE PLANNING Q5 YRS  01/04/2017     INFLUENZA VACCINE (SYSTEM ASSIGNED)  09/01/2017     FALL RISK ASSESSMENT  09/28/2017     EYE EXAM Q1 YEAR  03/01/2018     COLONOSCOPY Q5 YR  08/17/2021     TETANUS IMMUNIZATION (SYSTEM ASSIGNED)  03/01/2023     PNEUMOCOCCAL  Completed         HEALTH CARE MAINTENENCE: The recommended screening tests and vaccinatons for this patient have been discussed as above.  The appropriate tests and vaccinations  have been ordered or declined by the patient. Please see the orders in EPIC.The patient specifically declines: n/a     Immunization Status:  up to date and documented    Patient Active Problem List   Diagnosis     Osteoporosis     HL (hearing loss)     Gallstones     Malignant basal cell neoplasm of skin     Hyperlipidemia LDL goal <130     Pseudophakia, Yag Caps, ou     Advanced directives, counseling/discussion     Borderline glaucoma with ocular hypertension, bilateral - treated     Family history of colon cancer     Posterior vitreous detachment,  bilateral     Macular degeneration, dry, mod, ou     Piriformis syndrome of right side     Essential hypertension with goal blood pressure less than 140/90     10 year risk of MI or stroke 7.5% or greater, 39.7% in September 2017 on atorvastatin 10 mg        ATP III Guidelines  ICSI Preventive Guidelines    PLAN:  I recommended continuing to take a daily aspirin (81 to 325 mg)  Breast self exam demonstrated and recommended  Mammogram recommended  Pap smear was not recommended per the ACOG guidelines  Bone density scan (DEXA) recommended  Sunscreen use was recommended especially in the area of tatoos  Recommended eye exam every 1-2 years  Follow up in 1 year for the next preventative medical visit        Body mass index is 23.55 kg/(m^2).    HYPERTENSION: increase the diltiazem to 180 and recheck in 1 month(s)     IMPAIRED GLUCOSE TOLERANCE discussed weight loss and exercise         (M81.0) Osteoporosis  Comment:   Plan: DX Hip/Pelvis/Spine            (E83.52) Hypercalcemia  Comment:   Plan: Calcium ionized, Parathyroid Hormone Intact,         Renal panel, 25 Hydroxyvitamin D2 and D3,         Magnesium, TSH with free T4 reflex, CANCELED:         Phosphorus

## 2017-05-25 NOTE — PROGRESS NOTES
"  SUBJECTIVE:                                                            China Guzman is a 87 year old female who presents for Preventive Visit.  {PVP to remind patient that this is not necessarily a physical exam; physical exam may or may not be done:608182::\"click delete button to remove this line now\"}  {PVP to inform patient that additional E&M charge may apply, if additional problems addressed:644801::\"click delete button to remove this line now\"}  Are you in the first 12 months of your Medicare Part B coverage?  {No Yes:122907::\"No\"}    Healthy Habits:    Do you get at least three servings of calcium containing foods daily (dairy, green leafy vegetables, etc.)? {YES/NO, DAIRY INTAKE:986111::\"yes\"}    Amount of exercise or daily activities, outside of work: {AMOUNT EXERCISE:327206}    Problems taking medications regularly {Yes /No default:316565::\"No\"}    Medication side effects: {Yes /No default.:664634::\"No\"}    Have you had an eye exam in the past two years? {YESNOBLANK:165181}    Do you see a dentist twice per year? {YESNOBLANK:001773}    Do you have sleep apnea, excessive snoring or daytime drowsiness?{YESNOBLANK:406197}    {AWV Cognitive Screenin}    {Outside tests to abstract? :465507}    {additional problems to add:950248}    Reviewed and updated as needed this visit by clinical staff         Reviewed and updated as needed this visit by Provider        Social History   Substance Use Topics     Smoking status: Never Smoker     Smokeless tobacco: Never Used     Alcohol use No       {ETOH AUDIT:622249}    Today's PHQ-2 Score:   PHQ-2 (  [employer]) 10/28/2016 2016   Q1: Little interest or pleasure in doing things 0 0   Q2: Feeling down, depressed or hopeless 0 0   PHQ-2 Score 0 0     {PHQ-2 LOOK IN ASSESSMENTS :586079}  Do you feel safe in your environment - {YES/NO/NA:933328}    Do you have a Health Care Directive?: {HEALTHCARE DIRECTIVE STATUS:192386}    Current providers sharing in care " "for this patient include:   Patient Care Team:  Colby Trimbel MD as PCP - General (Family Practice)      Hearing impairment: {NO/YES:568604}    Ability to successfully perform activities of daily living: {YES/NO (MEDICARE):568966::\"Yes, no assistance needed\"}     Fall risk:  {Document Fall Risk in the Assessments Section of the Navigator:745694}    Home safety:  {IPPE SAFETY CONCERNS:004838::\"none identified\"}  {If any of the above assessments are answered yes, consider ordering appropriate referrals:502733::\"click delete button to remove this line now\"}    The following health maintenance items are reviewed in Epic and correct as of today:  Health Maintenance   Topic Date Due     ADVANCE DIRECTIVE PLANNING Q5 YRS  2017     INFLUENZA VACCINE (SYSTEM ASSIGNED)  2017     FALL RISK ASSESSMENT  2017     EYE EXAM Q1 YEAR  2018     COLONOSCOPY Q5 YR  2021     TETANUS IMMUNIZATION (SYSTEM ASSIGNED)  2023     PNEUMOCOCCAL  Completed         {Decision Support:167276}     ROS:  {ROS COMP:856675}    {CHRONICPROBDATA:080967}  OBJECTIVE:                                                            LMP 1980 Estimated body mass index is 24.07 kg/(m^2) as calculated from the following:    Height as of 3/22/17: 4' 11.5\" (1.511 m).    Weight as of 3/22/17: 121 lb 3.2 oz (55 kg).  EXAM:   {Exam :388242}    ASSESSMENT / PLAN:                                                            {Diag Picklist:226116}    End of Life Planning:  Patient currently has an advanced directive: { :168733}    COUNSELING:  {Medicare Counselin}    {Blood Pressure - Adult Preventive:130724}    Estimated body mass index is 24.07 kg/(m^2) as calculated from the following:    Height as of 3/22/17: 4' 11.5\" (1.511 m).    Weight as of 3/22/17: 121 lb 3.2 oz (55 kg).  {Weight Management Plan -- Delete if patient has a normal BMI:293353}   reports that she has never smoked. She has never used smokeless " tobacco.  {Tobacco Cessation -- Delete if patient is a non-smoker:802054}    Appropriate preventive services were discussed with this patient, including applicable screening as appropriate for cardiovascular disease, diabetes, osteopenia/osteoporosis, and glaucoma.  As appropriate for age/gender, discussed screening for colorectal cancer, prostate cancer, breast cancer, and cervical cancer. Checklist reviewing preventive services available has been given to the patient.    Reviewed patients plan of care and provided an AVS. The {CarePlan:688021} for China meets the Care Plan requirement. This Care Plan has been established and reviewed with the {PATIENT, FAMILY MEMBER, CAREGIVER:814757}.    Counseling Resources:  ATP IV Guidelines  Pooled Cohorts Equation Calculator  Breast Cancer Risk Calculator  FRAX Risk Assessment  ICSI Preventive Guidelines  Dietary Guidelines for Americans, 2010  USDA's MyPlate  ASA Prophylaxis  Lung CA Screening    Colby Trimble MD  [facility]

## 2017-05-26 ENCOUNTER — OFFICE VISIT (OUTPATIENT)
Dept: FAMILY MEDICINE | Facility: CLINIC | Age: 82
End: 2017-05-26
Payer: COMMERCIAL

## 2017-05-26 VITALS
WEIGHT: 119.6 LBS | BODY MASS INDEX: 23.48 KG/M2 | TEMPERATURE: 97.6 F | DIASTOLIC BLOOD PRESSURE: 60 MMHG | SYSTOLIC BLOOD PRESSURE: 146 MMHG | HEIGHT: 60 IN | HEART RATE: 74 BPM

## 2017-05-26 DIAGNOSIS — Z12.31 ENCOUNTER FOR SCREENING MAMMOGRAM FOR BREAST CANCER: ICD-10-CM

## 2017-05-26 DIAGNOSIS — E83.52 HYPERCALCEMIA: ICD-10-CM

## 2017-05-26 DIAGNOSIS — I10 ESSENTIAL HYPERTENSION WITH GOAL BLOOD PRESSURE LESS THAN 140/90: ICD-10-CM

## 2017-05-26 DIAGNOSIS — Z00.00 ROUTINE GENERAL MEDICAL EXAMINATION AT A HEALTH CARE FACILITY: Primary | ICD-10-CM

## 2017-05-26 DIAGNOSIS — M81.0 OSTEOPOROSIS: ICD-10-CM

## 2017-05-26 DIAGNOSIS — H91.90 HEARING DIFFICULTY, UNSPECIFIED LATERALITY: ICD-10-CM

## 2017-05-26 LAB
ALBUMIN SERPL-MCNC: 4.4 G/DL (ref 3.4–5)
ANION GAP SERPL CALCULATED.3IONS-SCNC: 10 MMOL/L (ref 3–14)
BUN SERPL-MCNC: 30 MG/DL (ref 7–30)
CA-I SERPL ISE-MCNC: 4.9 MG/DL (ref 4.4–5.2)
CALCIUM SERPL-MCNC: 9.8 MG/DL (ref 8.5–10.1)
CHLORIDE SERPL-SCNC: 105 MMOL/L (ref 94–109)
CO2 SERPL-SCNC: 26 MMOL/L (ref 20–32)
CREAT SERPL-MCNC: 1.06 MG/DL (ref 0.52–1.04)
GFR SERPL CREATININE-BSD FRML MDRD: 49 ML/MIN/1.7M2
GLUCOSE SERPL-MCNC: 117 MG/DL (ref 70–99)
MAGNESIUM SERPL-MCNC: 2.2 MG/DL (ref 1.6–2.3)
PHOSPHATE SERPL-MCNC: 2.7 MG/DL (ref 2.5–4.5)
POTASSIUM SERPL-SCNC: 3.8 MMOL/L (ref 3.4–5.3)
PTH-INTACT SERPL-MCNC: 69 PG/ML (ref 12–72)
SODIUM SERPL-SCNC: 141 MMOL/L (ref 133–144)
TSH SERPL DL<=0.005 MIU/L-ACNC: 1.51 MU/L (ref 0.4–4)

## 2017-05-26 PROCEDURE — 83970 ASSAY OF PARATHORMONE: CPT | Performed by: FAMILY MEDICINE

## 2017-05-26 PROCEDURE — 82330 ASSAY OF CALCIUM: CPT | Performed by: FAMILY MEDICINE

## 2017-05-26 PROCEDURE — 82306 VITAMIN D 25 HYDROXY: CPT | Performed by: FAMILY MEDICINE

## 2017-05-26 PROCEDURE — 84443 ASSAY THYROID STIM HORMONE: CPT | Performed by: FAMILY MEDICINE

## 2017-05-26 PROCEDURE — 36415 COLL VENOUS BLD VENIPUNCTURE: CPT | Performed by: FAMILY MEDICINE

## 2017-05-26 PROCEDURE — 80069 RENAL FUNCTION PANEL: CPT | Performed by: FAMILY MEDICINE

## 2017-05-26 PROCEDURE — G0439 PPPS, SUBSEQ VISIT: HCPCS | Performed by: FAMILY MEDICINE

## 2017-05-26 PROCEDURE — 83735 ASSAY OF MAGNESIUM: CPT | Performed by: FAMILY MEDICINE

## 2017-05-26 RX ORDER — DILTIAZEM HYDROCHLORIDE 180 MG/1
180 CAPSULE, EXTENDED RELEASE ORAL DAILY
Qty: 30 CAPSULE | Refills: 1 | Status: SHIPPED | OUTPATIENT
Start: 2017-05-26 | End: 2017-06-09

## 2017-05-26 NOTE — LETTER
[facility]  [address] [facility] [address] [phone]        May 31, 2017    China Guzman  [address] [place] [address]            Dear China,    I have reviewed the results of the laboratory tests that we recently ordered. All of the lab work performed was normal or considered normal for you.   Sincerely,   Colby Trimble MD/wendie    Results for orders placed or performed in visit on 05/26/17   Calcium ionized   Result Value Ref Range    Calcium Ionized 4.9 4.4 - 5.2 mg/dL   Parathyroid Hormone Intact   Result Value Ref Range    Parathyroid Hormone Intact 69 12 - 72 pg/mL   Renal panel   Result Value Ref Range    Sodium 141 133 - 144 mmol/L    Potassium 3.8 3.4 - 5.3 mmol/L    Chloride 105 94 - 109 mmol/L    Carbon Dioxide 26 20 - 32 mmol/L    Anion Gap 10 3 - 14 mmol/L    Glucose 117 (H) 70 - 99 mg/dL    Urea Nitrogen 30 7 - 30 mg/dL    Creatinine 1.06 (H) 0.52 - 1.04 mg/dL    GFR Estimate 49 (L) >60 mL/min/1.7m2    GFR Estimate If Black 59 (L) >60 mL/min/1.7m2    Calcium 9.8 8.5 - 10.1 mg/dL    Phosphorus 2.7 2.5 - 4.5 mg/dL    Albumin 4.4 3.4 - 5.0 g/dL   25 Hydroxyvitamin D2 and D3   Result Value Ref Range    25 OH Vit D2 <5 ug/L    25 OH Vit D3 50 ug/L    25 OH Vit D total  20 - 75 ug/L     <55  Season, race, dietary intake, and treatment affect the concentration of   25-hydroxy-Vitamin D. Values may decrease during winter months and increase   during summer months. Values 20-29 ug/L may indicate Vitamin D insufficiency   and values <20 ug/L may indicate Vitamin D deficiency.   This test was developed and its performance characteristics determined by the   [facility]  [facility]. It has   not been cleared or approved by the FDA. The laboratory is regulated under CLIA   as qualified to perform high-complexity testing. This test is used for clinical   purposes. It should not be regarded as investigational or for research.      Magnesium   Result Value Ref Range    Magnesium 2.2 1.6 - 2.3 mg/dL   TSH with free T4 reflex   Result Value Ref Range    TSH 1.51 0.40 - 4.00 mU/L

## 2017-05-26 NOTE — NURSING NOTE
"Chief Complaint   Patient presents with     Wellness Visit       Initial /70  Pulse 74  Temp 97.6  F (36.4  C) (Oral)  Ht 4' 11.75\" (1.518 m)  Wt 119 lb 9.6 oz (54.3 kg)  LMP 12/16/1980  BMI 23.55 kg/m2 Estimated body mass index is 23.55 kg/(m^2) as calculated from the following:    Height as of this encounter: 4' 11.75\" (1.518 m).    Weight as of this encounter: 119 lb 9.6 oz (54.3 kg).  Medication Reconciliation: complete   Jennfier Felix CMA      "

## 2017-05-26 NOTE — MR AVS SNAPSHOT
After Visit Summary   5/26/2017    China Guzman    MRN: [id number]           Patient Information     Date Of Birth          4/10/1930        Visit Information        Provider Department      5/26/2017 9:00 AM Colby Trimble MD [facility]        Today's Diagnoses     Routine general medical examination at a health care facility    -  1    Encounter for screening mammogram for breast cancer        Hearing difficulty, unspecified laterality        Osteoporosis        Hypercalcemia        Essential hypertension with goal blood pressure less than 140/90          Care Instructions      Preventive Health Recommendations    Female Ages 65 +    Yearly exam:     See your health care provider every year in order to  o Review health changes.   o Discuss preventive care.    o Review your medicines if your doctor has prescribed any.      You no longer need a yearly Pap test unless you've had an abnormal Pap test in the past 10 years. If you have vaginal symptoms, such as bleeding or discharge, be sure to talk with your provider about a Pap test.      Every 1 to 2 years, have a mammogram.  If you are over 69, talk with your health care provider about whether or not you want to continue having screening mammograms.      Every 10 years, have a colonoscopy. Or, have a yearly FIT test (stool test). These exams will check for colon cancer.       Have a cholesterol test every 5 years, or more often if your doctor advises it.       Have a diabetes test (fasting glucose) every three years. If you are at risk for diabetes, you should have this test more often.       At age 65, have a bone density scan (DEXA) to check for osteoporosis (brittle bone disease).    Shots:    Get a flu shot each year.    Get a tetanus shot every 10 years.    Talk to your doctor about your pneumonia vaccines. There are now two you should receive - Pneumovax (PPSV 23) and Prevnar (PCV 13).    Talk to your doctor about the shingles  vaccine.    Talk to your doctor about the hepatitis B vaccine.    Nutrition:     Eat at least 5 servings of fruits and vegetables each day.      Eat whole-grain bread, whole-wheat pasta and brown rice instead of white grains and rice.      Talk to your provider about Calcium and Vitamin D.     Lifestyle    Exercise at least 150 minutes a week (30 minutes a day, 5 days a week). This will help you control your weight and prevent disease.      Limit alcohol to one drink per day.      No smoking.       Wear sunscreen to prevent skin cancer.       See your dentist twice a year for an exam and cleaning.      See your eye doctor every 1 to 2 years to screen for conditions such as glaucoma, macular degeneration and cataracts.  Preventive Health Recommendations  Female Ages 65 +    Yearly exam:   See your health care provider every year in order to  Review health changes.   Discuss preventive care.    Review your medicines if your doctor has prescribed any.    You no longer need a yearly Pap test unless you've had an abnormal Pap test in the past 10 years. If you have vaginal symptoms, such as bleeding or discharge, be sure to talk with your provider about a Pap test.    Every 1 to 2 years, have a mammogram.  If you are over 69, talk with your health care provider about whether or not you want to continue having screening mammograms.    Every 10 years, have a colonoscopy. Or, have a yearly FIT test (stool test). These exams will check for colon cancer.     Have a cholesterol test every 5 years, or more often if your doctor advises it.     Have a diabetes test (fasting glucose) every three years. If you are at risk for diabetes, you should have this test more often.     At age 65, have a bone density scan (DEXA) to check for osteoporosis (brittle bone disease).    Shots:  Get a flu shot each year.  Get a tetanus shot every 10 years.  Talk to your doctor about your pneumonia vaccines. There are now two you should receive -  Pneumovax (PPSV 23) and Prevnar (PCV 13).  Talk to your doctor about the shingles vaccine.  Talk to your doctor about the hepatitis B vaccine.    Nutrition:   Eat at least 5 servings of fruits and vegetables each day.    Eat whole-grain bread, whole-wheat pasta and brown rice instead of white grains and rice.    Talk to your provider about Calcium and Vitamin D.     Lifestyle  Exercise at least 150 minutes a week (30 minutes a day, 5 days a week). This will help you control your weight and prevent disease.    Limit alcohol to one drink per day.    No smoking.     Wear sunscreen to prevent skin cancer.     See your dentist twice a year for an exam and cleaning.    See your eye doctor every 1 to 2 years to screen for conditions such as glaucoma, macular degeneration, cataracts, etc       [facility]: [facility] [phone]   Call for an Audiiology appointment(s)       Results for orders placed or performed in visit on 05/19/17   Lipid Profile with reflex to direct LDL   Result Value Ref Range    Cholesterol 172 <200 mg/dL    Triglycerides 149 <150 mg/dL    HDL Cholesterol 62 >49 mg/dL    LDL Cholesterol Calculated 80 <100 mg/dL    Non HDL Cholesterol 110 <130 mg/dL   Comprehensive metabolic panel   Result Value Ref Range    Sodium 139 133 - 144 mmol/L    Potassium 3.7 3.4 - 5.3 mmol/L    Chloride 103 94 - 109 mmol/L    Carbon Dioxide 26 20 - 32 mmol/L    Anion Gap 10 3 - 14 mmol/L    Glucose 109 (H) 70 - 99 mg/dL    Urea Nitrogen 39 (H) 7 - 30 mg/dL    Creatinine 0.99 0.52 - 1.04 mg/dL    GFR Estimate 53 (L) >60 mL/min/1.7m2    GFR Estimate If Black 64 >60 mL/min/1.7m2    Calcium 11.0 (H) 8.5 - 10.1 mg/dL    Bilirubin Total 0.5 0.2 - 1.3 mg/dL    Albumin 4.1 3.4 - 5.0 g/dL    Protein Total 8.1 6.8 - 8.8 g/dL    Alkaline Phosphatase 77 40 - 150 U/L    ALT 28 0 - 50 U/L    AST 19 0 - 45 U/L           Please call our [facility] Scheduling Line at [phone] to schedule your:  Dexa  "Scan  Mammogram                    Follow-ups after your visit        Additional Services     AUDIOLOGY ADULT REFERRAL       Your provider has referred you to: FM[facility]: [facility] [phone]   [email or web address]    Specialty Testing:  Hearing aid eval                  Follow-up notes from your care team     Return in about 2 weeks (around 6/9/2017) for BP Recheck.      Your next 10 appointments already scheduled     Sep 01, 2017  9:15 AM CDT   [facility] Craig [facility]   [facility]    [address] [facility] [id number] [phone]              Future tests that were ordered for you today     Open Future Orders        Priority Expected Expires Ordered    MA Screening Digital Bilateral Routine  5/27/2018 5/26/2017    DX Hip/Pelvis/Spine Routine  9/23/2017 5/26/2017            Who to contact     If you have questions or need follow up information about today's clinic visit or your schedule please contact [facility] directly at [phone].  Normal or non-critical lab and imaging results will be communicated to you by [employer], letter or phone within 4 business days after the clinic has received the results. If you do not hear from us within 7 days, please contact the clinic through [employer] or phone. If you have a critical or abnormal lab result, we will notify you by phone as soon as possible.  Submit refill requests through [employer] or call your pharmacy and they will forward the refill request to us. Please allow 3 business days for your refill to be completed.          Additional Information About Your Visit        [employer] Information     [employer] lets you send messages to your doctor, view your test results, renew your prescriptions, schedule appointments and more. To sign up, go to [email or web address] . Click on \"Log in\" on the left side of the screen, which will take you to the Welcome " "page. Then click on \"Sign up Now\" on the right side of the page.     You will be asked to enter the access code listed below, as well as some personal information. Please follow the directions to create your username and password.     Your access code is: [email or web address]  Expires: 2017 11:00 AM     Your access code will  in 90 days. If you need help or a new code, please call your [place] clinic or [phone].        Care EveryWhere ID     This is your Care EveryWhere ID. This could be used by other organizations to access your [place] medical records  [id number]        Your Vitals Were     Pulse Temperature Height Last Period BMI (Body Mass Index)       74 97.6  F (36.4  C) (Oral) 4' 11.75\" (1.518 m) 1980 23.55 kg/m2        Blood Pressure from Last 3 Encounters:   17 146/60   16 132/64   10/28/16 142/70    Weight from Last 3 Encounters:   17 119 lb 9.6 oz (54.3 kg)   17 121 lb 3.2 oz (55 kg)   16 118 lb (53.5 kg)              We Performed the Following     25 Hydroxyvitamin D2 and D3     AUDIOLOGY ADULT REFERRAL     Calcium ionized     Magnesium     Parathyroid Hormone Intact     Renal panel     TSH with free T4 reflex          Today's Medication Changes          These changes are accurate as of: 17  9:56 AM.  If you have any questions, ask your nurse or doctor.               These medicines have changed or have updated prescriptions.        Dose/Directions    diltiazem 180 MG 24 hr capsule   Commonly known as:  DILACOR XR   This may have changed:    - medication strength  - how much to take   Used for:  Essential hypertension with goal blood pressure less than 140/90        Dose:  180 mg   Take 1 capsule (180 mg) by mouth daily   Quantity:  30 capsule   Refills:  1            Where to get your medicines      These medications were sent to [id number] - [place] - [address]  [address], [place] [address]    Hours:  Same info as " Jimmie Reynold Us Phone:  [phone]     diltiazem 180 MG 24 hr capsule                Primary Care Provider Office Phone # Fax #    Colby Trimble [phone]       [facility] [address] [facility] [address]        Thank you!     Thank you for choosing [facility]  for your care. Our goal is always to provide you with excellent care. Hearing back from our patients is one way we can continue to improve our services. Please take a few minutes to complete the written survey that you may receive in the mail after your visit with us. Thank you!             Your Updated Medication List - Protect others around you: Learn how to safely use, store and throw away your medicines at www.disposemymeds.org.          This list is accurate as of: 5/26/17  9:56 AM.  Always use your most recent med list.                   Brand Name Dispense Instructions for use    aspirin 325 MG EC tablet      Take 325 mg by mouth daily.       atorvastatin 10 MG tablet    LIPITOR    90 tablet    Take 1 tablet (10 mg) by mouth At Bedtime       CALCIUM + D PO      2 tablets daily       diltiazem 180 MG 24 hr capsule    DILACOR XR    30 capsule    Take 1 capsule (180 mg) by mouth daily       hydrochlorothiazide 25 MG tablet    HYDRODIURIL    90 tablet    Take 1 tablet (25 mg) by mouth every morning       latanoprost 0.005 % ophthalmic solution    XALATAN    3 Bottle    Place 1 drop into both eyes At Bedtime       MULTI-VITAMIN PO      None Entered       valsartan 320 MG tablet    DIOVAN    90 tablet    Take 1 tablet (320 mg) by mouth daily

## 2017-05-30 LAB
DEPRECATED CALCIDIOL+CALCIFEROL SERPL-MC: NORMAL UG/L (ref 20–75)
VITAMIN D2 SERPL-MCNC: <5 UG/L
VITAMIN D3 SERPL-MCNC: 50 UG/L

## 2017-05-30 NOTE — PROGRESS NOTES
Patient is discharged from PT.  Please refer to progress/discharge note dated 1/25/17 for last known functional status as well as final objective/subjective values.

## 2017-05-31 NOTE — PROGRESS NOTES
China,  I have reviewed the results of the laboratory tests that we recently ordered. All of the lab work performed was normal or considered normal for you.  Sincerely,   Colby Trimble

## 2017-06-05 ENCOUNTER — TELEPHONE (OUTPATIENT)
Dept: FAMILY MEDICINE | Facility: CLINIC | Age: 82
End: 2017-06-05

## 2017-06-05 DIAGNOSIS — I10 ESSENTIAL HYPERTENSION WITH GOAL BLOOD PRESSURE LESS THAN 140/90: ICD-10-CM

## 2017-06-05 RX ORDER — HYDROCHLOROTHIAZIDE 25 MG/1
25 TABLET ORAL EVERY MORNING
Qty: 5 TABLET | Refills: 0 | Status: SHIPPED | OUTPATIENT
Start: 2017-06-05 | End: 2017-06-09

## 2017-06-05 NOTE — TELEPHONE ENCOUNTER
Pt is up in [place] and forgot Hydrodiuril 25 mg,wondering if they can send 5 days worth to [place] pharmacy. : [facility] [place] [address]. Ph : [phone]

## 2017-06-05 NOTE — TELEPHONE ENCOUNTER
Medication refilled per RN protocol.  Informed prescription has been sent to the pharmacy.   Verbalized good understanding.   Stacy Durant RN

## 2017-06-09 ENCOUNTER — OFFICE VISIT (OUTPATIENT)
Dept: FAMILY MEDICINE | Facility: CLINIC | Age: 82
End: 2017-06-09
Payer: COMMERCIAL

## 2017-06-09 VITALS
SYSTOLIC BLOOD PRESSURE: 112 MMHG | DIASTOLIC BLOOD PRESSURE: 70 MMHG | WEIGHT: 121 LBS | BODY MASS INDEX: 23.83 KG/M2 | TEMPERATURE: 98.5 F | HEART RATE: 82 BPM

## 2017-06-09 DIAGNOSIS — I10 ESSENTIAL HYPERTENSION WITH GOAL BLOOD PRESSURE LESS THAN 140/90: Primary | ICD-10-CM

## 2017-06-09 LAB
ANION GAP SERPL CALCULATED.3IONS-SCNC: 9 MMOL/L (ref 3–14)
BUN SERPL-MCNC: 26 MG/DL (ref 7–30)
CALCIUM SERPL-MCNC: 9.3 MG/DL (ref 8.5–10.1)
CHLORIDE SERPL-SCNC: 105 MMOL/L (ref 94–109)
CO2 SERPL-SCNC: 27 MMOL/L (ref 20–32)
CREAT SERPL-MCNC: 0.95 MG/DL (ref 0.52–1.04)
GFR SERPL CREATININE-BSD FRML MDRD: 56 ML/MIN/1.7M2
GLUCOSE SERPL-MCNC: 102 MG/DL (ref 70–99)
POTASSIUM SERPL-SCNC: 3.7 MMOL/L (ref 3.4–5.3)
SODIUM SERPL-SCNC: 141 MMOL/L (ref 133–144)

## 2017-06-09 PROCEDURE — 80048 BASIC METABOLIC PNL TOTAL CA: CPT | Performed by: FAMILY MEDICINE

## 2017-06-09 PROCEDURE — 99213 OFFICE O/P EST LOW 20 MIN: CPT | Performed by: FAMILY MEDICINE

## 2017-06-09 PROCEDURE — 36415 COLL VENOUS BLD VENIPUNCTURE: CPT | Performed by: FAMILY MEDICINE

## 2017-06-09 RX ORDER — DILTIAZEM HYDROCHLORIDE 180 MG/1
180 CAPSULE, EXTENDED RELEASE ORAL DAILY
Qty: 90 CAPSULE | Refills: 3 | Status: SHIPPED | OUTPATIENT
Start: 2017-06-09 | End: 2017-12-14

## 2017-06-09 RX ORDER — HYDROCHLOROTHIAZIDE 25 MG/1
25 TABLET ORAL EVERY MORNING
Qty: 90 TABLET | Refills: 1 | Status: SHIPPED | OUTPATIENT
Start: 2017-06-09 | End: 2017-12-14

## 2017-06-09 RX ORDER — VALSARTAN 320 MG/1
320 TABLET ORAL DAILY
Qty: 90 TABLET | Refills: 3 | Status: SHIPPED | OUTPATIENT
Start: 2017-06-09 | End: 2018-01-05

## 2017-06-09 NOTE — MR AVS SNAPSHOT
"              After Visit Summary   6/9/2017    China Guzman    MRN: [id number]           Patient Information     Date Of Birth          4/10/1930        Visit Information        Provider Department      6/9/2017 2:45 PM Colby Trimble MD [facility]        Today's Diagnoses     Essential hypertension with goal blood pressure less than 140/90    -  1       Follow-ups after your visit        Follow-up notes from your care team     Return in about 6 months (around 12/9/2017) for BP Recheck.      Your next 10 appointments already scheduled     Jun 23, 2017  1:30 PM CDT   [facility] with Zhane Mcadams   [facility]    [address] [facility] [address] [phone]            Sep 01, 2017  9:15 AM CDT   Return Visit with Srikanth Craig MD   [facility]    [address] [place] [id number] [phone]              Who to contact     If you have questions or need follow up information about today's clinic visit or your schedule please contact [facility] directly at [phone].  Normal or non-critical lab and imaging results will be communicated to you by MyChart, letter or phone within 4 business days after the clinic has received the results. If you do not hear from us within 7 days, please contact the clinic through MyChart or phone. If you have a critical or abnormal lab result, we will notify you by phone as soon as possible.  Submit refill requests through [employer] or call your pharmacy and they will forward the refill request to us. Please allow 3 business days for your refill to be completed.          Additional Information About Your Visit        MyChart Information     [employer] lets you send messages to your doctor, view your test results, renew your prescriptions, schedule appointments and more. To sign up, go to [email or web address] . Click on \"Log in\" on " "the left side of the screen, which will take you to the Welcome page. Then click on \"Sign up Now\" on the right side of the page.     You will be asked to enter the access code listed below, as well as some personal information. Please follow the directions to create your username and password.     Your access code is: [email or web address]  Expires: 2017  3:02 PM     Your access code will  in 90 days. If you need help or a new code, please call your [facility] or [phone].        Care EveryWhere ID     This is your Care EveryWhere ID. This could be used by other organizations to access your [facility] medical records  [id number]        Your Vitals Were     Pulse Temperature Last Period BMI (Body Mass Index)          82 98.5  F (36.9  C) (Oral) 1980 23.83 kg/m2         Blood Pressure from Last 3 Encounters:   17 112/70   17 146/60   16 132/64    Weight from Last 3 Encounters:   17 121 lb (54.9 kg)   17 119 lb 9.6 oz (54.3 kg)   17 121 lb 3.2 oz (55 kg)              We Performed the Following     Basic metabolic panel          Where to get your medicines      These medications were sent to [id number] - [place] - [address]  [address], [place] [address]    Hours:  Same info as [facility] Phone:  [phone]     diltiazem 180 MG 24 hr capsule    hydrochlorothiazide 25 MG tablet    valsartan 320 MG tablet          Primary Care Provider Office Phone # Fax #    Colby Trimble [phone]       [facility] [address] [facility] [address]        Thank you!     Thank you for choosing [facility]  for your care. Our goal is always to provide you with excellent care. Hearing back from our patients is one way we can continue to improve our services. Please take a few minutes to complete the written survey that you may receive in the mail after your visit with us. Thank you!           "   Your Updated Medication List - Protect others around you: Learn how to safely use, store and throw away your medicines at www.disposemymeds.org.          This list is accurate as of: 6/9/17  3:02 PM.  Always use your most recent med list.                   Brand Name Dispense Instructions for use    aspirin 325 MG EC tablet      Take 325 mg by mouth daily.       atorvastatin 10 MG tablet    LIPITOR    90 tablet    Take 1 tablet (10 mg) by mouth At Bedtime       CALCIUM + D PO      2 tablets daily       diltiazem 180 MG 24 hr capsule    DILACOR XR    90 capsule    Take 1 capsule (180 mg) by mouth daily       hydrochlorothiazide 25 MG tablet    HYDRODIURIL    90 tablet    Take 1 tablet (25 mg) by mouth every morning       latanoprost 0.005 % ophthalmic solution    XALATAN    3 Bottle    Place 1 drop into both eyes At Bedtime       MULTI-VITAMIN PO      None Entered       valsartan 320 MG tablet    DIOVAN    90 tablet    Take 1 tablet (320 mg) by mouth daily

## 2017-06-09 NOTE — LETTER
[facility]  [address] [facility] [address] [phone]        June 12, 2017    China Guzman  [address] [facility] [address]            Dear China,      I have reviewed the results of the laboratory tests that we recently ordered. All of the lab work performed was normal or considered normal for you.  Sincerely,   Colby Trimble    If you have any questions or concerns, please call myself or my nurse at [phone].    Sincerely,    Colby Trimble/ms      Results for orders placed or performed in visit on 06/09/17   Basic metabolic panel   Result Value Ref Range    Sodium 141 133 - 144 mmol/L    Potassium 3.7 3.4 - 5.3 mmol/L    Chloride 105 94 - 109 mmol/L    Carbon Dioxide 27 20 - 32 mmol/L    Anion Gap 9 3 - 14 mmol/L    Glucose 102 (H) 70 - 99 mg/dL    Urea Nitrogen 26 7 - 30 mg/dL    Creatinine 0.95 0.52 - 1.04 mg/dL    GFR Estimate 56 (L) >60 mL/min/1.7m2    GFR Estimate If Black 67 >60 mL/min/1.7m2    Calcium 9.3 8.5 - 10.1 mg/dL

## 2017-06-09 NOTE — NURSING NOTE
"Chief Complaint   Patient presents with     Hypertension     f/u up on increase in medication       Initial /79  Pulse 82  Temp 98.5  F (36.9  C) (Oral)  Wt 121 lb (54.9 kg)  LMP 12/16/1980  BMI 23.83 kg/m2 Estimated body mass index is 23.83 kg/(m^2) as calculated from the following:    Height as of 5/26/17: 4' 11.75\" (1.518 m).    Weight as of this encounter: 121 lb (54.9 kg).  Medication Reconciliation: complete   Felicity Haywood M.A.      "

## 2017-06-09 NOTE — PROGRESS NOTES
SUBJECTIVE:  China Guzman is an 87 year old female who presents for a follow up evaluation of her hypertension.The patient had the dose of diltiazem raised to 180 mg at the last visit. The patient reports that she IS taking the medication as prescribed. She denies side effects of medication.      Patient Active Problem List   Diagnosis     Osteoporosis     HL (hearing loss)     Gallstones     Malignant basal cell neoplasm of skin     Hyperlipidemia LDL goal <130     Pseudophakia, Yag Caps, ou     Advanced directives, counseling/discussion     Borderline glaucoma with ocular hypertension, bilateral - treated     Family history of colon cancer     Posterior vitreous detachment, bilateral     Macular degeneration, dry, mod, ou     Essential hypertension with goal blood pressure less than 140/90     10 year risk of MI or stroke 7.5% or greater, 39.7% in September 2017 on atorvastatin 10 mg       Is the HYPERTENSION goal on the problem list? Yes      Use of agents associated with hypertension: none  Current Outpatient Prescriptions   Medication     hydrochlorothiazide (HYDRODIURIL) 25 MG tablet     diltiazem (DILACOR XR) 180 MG 24 hr capsule     latanoprost (XALATAN) 0.005 % ophthalmic solution     atorvastatin (LIPITOR) 10 MG tablet     valsartan (DIOVAN) 320 MG tablet     aspirin 325 MG EC tablet     CALCIUM + D OR     MULTI-VITAMIN OR     No current facility-administered medications for this visit.          No Known Allergies    Social History   Substance Use Topics     Smoking status: Never Smoker     Smokeless tobacco: Never Used     Alcohol use No       OBJECTIVE:  /70  Pulse 82  Temp 98.5  F (36.9  C) (Oral)  Wt 121 lb (54.9 kg)  LMP 12/16/1980  BMI 23.83 kg/m2    Heart: negative, PMI normal. No lifts, heaves, or thrills. RRR. No murmurs, clicks gallops or rub  Lower Extremities: 0 edema on right and 0 edema on the left        Results for orders placed or performed in visit on 05/26/17   Calcium  ionized   Result Value Ref Range    Calcium Ionized 4.9 4.4 - 5.2 mg/dL   Parathyroid Hormone Intact   Result Value Ref Range    Parathyroid Hormone Intact 69 12 - 72 pg/mL   Renal panel   Result Value Ref Range    Sodium 141 133 - 144 mmol/L    Potassium 3.8 3.4 - 5.3 mmol/L    Chloride 105 94 - 109 mmol/L    Carbon Dioxide 26 20 - 32 mmol/L    Anion Gap 10 3 - 14 mmol/L    Glucose 117 (H) 70 - 99 mg/dL    Urea Nitrogen 30 7 - 30 mg/dL    Creatinine 1.06 (H) 0.52 - 1.04 mg/dL    GFR Estimate 49 (L) >60 mL/min/1.7m2    GFR Estimate If Black 59 (L) >60 mL/min/1.7m2    Calcium 9.8 8.5 - 10.1 mg/dL    Phosphorus 2.7 2.5 - 4.5 mg/dL    Albumin 4.4 3.4 - 5.0 g/dL   25 Hydroxyvitamin D2 and D3   Result Value Ref Range    25 OH Vit D2 <5 ug/L    25 OH Vit D3 50 ug/L    25 OH Vit D total  20 - 75 ug/L     <55  Season, race, dietary intake, and treatment affect the concentration of   25-hydroxy-Vitamin D. Values may decrease during winter months and increase   during summer months. Values 20-29 ug/L may indicate Vitamin D insufficiency   and values <20 ug/L may indicate Vitamin D deficiency.   This test was developed and its performance characteristics determined by the   [facility],  Special Chemistry Laboratory. It has   not been cleared or approved by the FDA. The laboratory is regulated under CLIA   as qualified to perform high-complexity testing. This test is used for clinical   purposes. It should not be regarded as investigational or for research.     Magnesium   Result Value Ref Range    Magnesium 2.2 1.6 - 2.3 mg/dL   TSH with free T4 reflex   Result Value Ref Range    TSH 1.51 0.40 - 4.00 mU/L       The ASCVD Risk score ([place] DC Jr, et al., 2013) failed to calculate for the following reasons:    The 2013 ASCVD risk score is only valid for ages 40 to 79    ASSESSMENT:  Essential hypertension which is very well controlled.       Plan:  - Medication:continue the current doses of  medication.  The patients antihypertensive medication was filled for 6months.    The patient was advised to do the following therapuetic life style changes  - Dietary sodium restriction and increase potassium and Calcium intake  - Regular aerobic exercise  - Weight loss  - Discontinue smoking if applicable  - Avoid regular NSAID use if applicable  - Avoid regular decongestant use if applicable  - Follow up in clinic in 6 months for a recheck  - Check a basic metabolic panel today    Patient Education: Reviewed risks of hypertension and principles of   Treatment.

## 2017-06-11 PROBLEM — N18.30 CKD (CHRONIC KIDNEY DISEASE) STAGE 3, GFR 30-59 ML/MIN (H): Status: ACTIVE | Noted: 2017-06-11

## 2017-06-23 ENCOUNTER — OFFICE VISIT (OUTPATIENT)
Dept: AUDIOLOGY | Facility: CLINIC | Age: 82
End: 2017-06-23
Payer: COMMERCIAL

## 2017-06-23 DIAGNOSIS — H90.3 SENSORINEURAL HEARING LOSS, ASYMMETRICAL: Primary | ICD-10-CM

## 2017-06-23 PROCEDURE — 92550 TYMPANOMETRY & REFLEX THRESH: CPT | Performed by: AUDIOLOGIST

## 2017-06-23 PROCEDURE — 92557 COMPREHENSIVE HEARING TEST: CPT | Performed by: AUDIOLOGIST

## 2017-06-23 NOTE — MR AVS SNAPSHOT
After Visit Summary   6/23/2017    China Guzman    MRN: [id number]           Patient Information     Date Of Birth          4/10/1930        Visit Information        Provider Department      6/23/2017 1:30 PM Sylvie Nolasco AuD [facility]        Today's Diagnoses     Sensorineural hearing loss, asymmetrical    -  1       Follow-ups after your visit        Your next 10 appointments already scheduled     Jun 30, 2017 12:45 PM CDT   MA SCREENING DIGITAL BILATERAL with ANDMA1   [facility]    [address] [facility] [id number] [phone]           Do not use any powder, lotion or deodorant under your arms or on your breast. If you do, we will ask you to remove it before your exam.  Wear comfortable, two-piece clothing.  If you have any allergies, tell your care team.  Bring any previous mammograms from other facilities or have them mailed to the breast center.            Jul 05, 2017  9:00 AM CDT   DX HIP/PELVIS/SPINE with FKDX1   [facility]    [address] [facility] [address] [phone]           Please do not take any of the following 24 hours prior to the day of your exam: vitamins, calcium tablets, antacids.            Sep 01, 2017  9:15 AM CDT   Return Visit with Srikanth Craig MD   [facility]    [address] [place] [phone]              Who to contact     If you have questions or need follow up information about today's clinic visit or your schedule please contact [facility] directly at [phone].  Normal or non-critical lab and imaging results will be communicated to you by MyChart, letter or phone within 4 business days after the clinic has received the results. If you do not hear from us within 7 days, please contact the clinic through MyChart or phone. If you have a critical  "or abnormal lab result, we will notify you by phone as soon as possible.  Submit refill requests through [employer] or call your pharmacy and they will forward the refill request to us. Please allow 3 business days for your refill to be completed.          Additional Information About Your Visit        [employer] Information     [employer] lets you send messages to your doctor, view your test results, renew your prescriptions, schedule appointments and more. To sign up, go to [email or web address] . Click on \"Log in\" on the left side of the screen, which will take you to the Welcome page. Then click on \"Sign up Now\" on the right side of the page.     You will be asked to enter the access code listed below, as well as some personal information. Please follow the directions to create your username and password.     Your access code is: [email or web address]  Expires: 2017  3:02 PM     Your access code will  in 90 days. If you need help or a new code, please call your [facility] clinic or [phone].        Care EveryWhere ID     This is your Care EveryWhere ID. This could be used by other organizations to access your [facility] medical records  [id number]        Your Vitals Were     Last Period                   1980            Blood Pressure from Last 3 Encounters:   17 112/70   17 146/60   16 132/64    Weight from Last 3 Encounters:   17 121 lb (54.9 kg)   17 119 lb 9.6 oz (54.3 kg)   17 121 lb 3.2 oz (55 kg)              We Performed the Following     AUDIOGRAM/TYMPANOGRAM - INTERFACE     COMPREHENSIVE HEARING TEST     TYMPANOMETRY AND REFLEX THRESHOLD MEASUREMENTS        Primary Care Provider Office Phone # Fax #    Colby Trimble [phone]       [facility] [address] [facility] [address]        Equal Access to Services     KAYLIN CARR AH: Hadii aad ku hadasho Soomaali, waaxda luqadaha, qaybta kaalmaángel pichardo, alan marquis " emanuelzainabsadaf tello ah. So [facility] [phone].    ATENCIÓN: Si raghu montez, tiene a mary disposición servicios gratuitos de asistencia lingüística. Joao avila [phone].    We comply with applicable federal civil rights laws and [place] laws. We do not discriminate on the basis of race, color, national origin, age, disability sex, sexual orientation or gender identity.            Thank you!     [facility]  [facility] your [facility]. Our goal is always to provide you with excellent care. Hearing back from our patients is one way we can continue to improve our services. Please take a few minutes to complete the written survey that you may receive in the mail after your visit with us. Thank you!             Your Updated Medication List - Protect others around you: Learn how to safely use, store and throw away your medicines at www.disposemymeds.org.          This list is accurate as of: 6/23/17  2:47 PM.  Always use your most recent med list.                   Brand Name Dispense Instructions for use Diagnosis    aspirin 325 MG EC tablet      Take 325 mg by mouth daily.        atorvastatin 10 MG tablet    LIPITOR    90 tablet    Take 1 tablet (10 mg) by mouth At Bedtime    Hyperlipidemia LDL goal <130       CALCIUM + D PO      2 tablets daily        diltiazem 180 MG 24 hr capsule    DILACOR XR    90 capsule    Take 1 capsule (180 mg) by mouth daily    Essential hypertension with goal blood pressure less than 140/90       hydrochlorothiazide 25 MG tablet    HYDRODIURIL    90 tablet    Take 1 tablet (25 mg) by mouth every morning    Essential hypertension with goal blood pressure less than 140/90       latanoprost 0.005 % ophthalmic solution    XALATAN    3 Bottle    Place 1 drop into both eyes At Bedtime    Borderline glaucoma with ocular hypertension, bilateral       MULTI-VITAMIN PO      None Entered        valsartan 320 MG tablet    DIOVAN    90 tablet    Take 1 tablet (320 mg) by mouth daily     Essential hypertension with goal blood pressure less than 140/90

## 2017-06-30 ENCOUNTER — RADIANT APPOINTMENT (OUTPATIENT)
Dept: MAMMOGRAPHY | Facility: CLINIC | Age: 82
End: 2017-06-30
Attending: FAMILY MEDICINE
Payer: COMMERCIAL

## 2017-06-30 DIAGNOSIS — Z12.31 ENCOUNTER FOR SCREENING MAMMOGRAM FOR BREAST CANCER: ICD-10-CM

## 2017-06-30 PROCEDURE — G0202 SCR MAMMO BI INCL CAD: HCPCS | Mod: TC

## 2017-07-05 ENCOUNTER — RADIANT APPOINTMENT (OUTPATIENT)
Dept: BONE DENSITY | Facility: CLINIC | Age: 82
End: 2017-07-05
Attending: FAMILY MEDICINE
Payer: COMMERCIAL

## 2017-07-05 DIAGNOSIS — M81.0 OSTEOPOROSIS: ICD-10-CM

## 2017-07-05 PROCEDURE — 77085 DXA BONE DENSITY AXL VRT FX: CPT | Performed by: INTERNAL MEDICINE

## 2017-07-05 NOTE — LETTER
[facility]  [address] [facility]  [place] [address]  [phone]        July 10, 2017    China Guzman  [address] [facility] [address]            Dear China,    I have reviewed the results of the laboratory tests that we recently ordered. All of the lab work performed was normal or considered normal for you.   Sincerely,   Colby Trimbel MD/wendie    Results for orders placed or performed in visit on 17   DX Hip/Pelvis/Spine w Lat Fraction Meaghan    Narrative    BONE DENSITOMETRY  [facility]  [address] [place] [address]  2017      PATIENT: China Guzman  CHART: [id number]   :  4/10/1930  AGE:  87 year old  SEX:  female   REFERRING PROVIDER:  Colby Trimble MD     PROCEDURE:  Bone density scanning was performed using DXA technology of   the lumbar spine and hip.  Scanning was performed on a Lunar Prodigy   scanner.  Reporting is completed in the form of a T-score.  The T-score   represents the standard deviation from peak bone mass based on a young   healthy adult.     REFERENCE T-SCORES:       Normal                -1.0 and greater                                 Osteopenia         Between -1.0 and -2.5                                             Osteoporosis     -2.5 and less                                       RISK FACTORS:  Post-menopausal, Fractures of wrist and ankle, follow up   osteoporosis    CURRENT TREATMENT:  Calcium, Vitamin D, previous Fosamax, stopped      FINDINGS:               Lumbar Spine (L1-L4) T-score:  -1.4, degenerative changes   present               Left Femoral Neck T-score:  -1.4               Right Femoral Neck T-score:  -2.9                              Lumbar (L1-L4) BMD: 1.017            Previous:   0.989                                              Total Hip Mean BMD: 0.881            Previous:   0.867     Comparison is made to other DXAs performed on the same Lunar Prodigy    machine on 2/8/10 and  "3/06/13.      LATERAL VERTEBRAL ASSESSMENT  Procedure:  Vertebral fracture assessment was performed in the lateral   decubitus position using a [employer] Prodigy  densitometer.  Indications for VFA: T-score of -1.0 or worse and age (female>69)  Confounding factors for VFA: None.  The LVA scan is interpretable from T7   to L4.    VFA Findings: Using the semi-quantitative analysis of Sally there was   evidence of no spinal deformity  VFA Impression: China Guzman has no vertebral fractures identified on the   VFA.        IMPRESSION  Osteoporosis., Degenerative changes of the lumbar spine which may falsely   elevate results.    There has been no significant change in bone density of the lumbar spine   compared to 2013. There has been significant increase in bone density of   the liumbar spine compared to 2010. There has been no significant change   in bone density of the hip(s).    Recommendations include ensuring adequate Calcium and Vitamin D.    The current NOF Guidelines recommend treatment for patients with prior hip   or vertebral fracture, T-score -2.5 or below, or 10 year risk of any major   osteoporotic fracture >20% or 10 year risk of hip fracture >3%, as   calculated using the FRAX calculator (www.shef.ac.uk/FRAX or you can   google \"FRAX\").      Based on these guidelines, treatment (in addition to calcium and vitamin   D) is recommended for this patient, after ruling out other causes of   osteoporosis.  This is meant as an aid to clinical decision making; one must still use   clinical judgement.      Follow up can be considered in 2 years.   ___________________  Maura Holm M.D.  Electronically signed              "

## 2017-07-10 NOTE — PROGRESS NOTES
China,  I have reviewed the results of the laboratory tests that we recently ordered. All of the lab work performed was normal or considered normal for you.  Sincerely,   Colby Trimble    [address] [address]  [phone]          July 9, 2017    China Guzman  [address] [facility] [address]              Dear China,    The result of your follow up DEXA (bone density) scan is back.     There has been no significant change in bone density of the lumbar spine compared to 2013. There has been significant increase in bone density of the liumbar spine compared to 2010. There has been no significant change in bone density of the hip(s).     Recommendations include ensuring adequate Calcium and Vitamin D.      I recommend that you continue the same therapy that you are doing now. We should recheck your Dexa scan in 2 years.    Sincerely,      [email or web address] MD

## 2017-07-12 ENCOUNTER — OFFICE VISIT (OUTPATIENT)
Dept: OTOLARYNGOLOGY | Facility: CLINIC | Age: 82
End: 2017-07-12
Payer: COMMERCIAL

## 2017-07-12 ENCOUNTER — OFFICE VISIT (OUTPATIENT)
Dept: AUDIOLOGY | Facility: CLINIC | Age: 82
End: 2017-07-12

## 2017-07-12 VITALS — WEIGHT: 120 LBS | BODY MASS INDEX: 24.19 KG/M2 | HEIGHT: 59 IN | RESPIRATION RATE: 12 BRPM

## 2017-07-12 DIAGNOSIS — Z85.828 HISTORY OF BASAL CELL CARCINOMA: ICD-10-CM

## 2017-07-12 DIAGNOSIS — H90.3 SNHL (SENSORY-NEURAL HEARING LOSS), ASYMMETRICAL: Primary | ICD-10-CM

## 2017-07-12 DIAGNOSIS — Z53.9 ERRONEOUS ENCOUNTER--DISREGARD: Primary | ICD-10-CM

## 2017-07-12 PROCEDURE — 99214 OFFICE O/P EST MOD 30 MIN: CPT | Performed by: OTOLARYNGOLOGY

## 2017-07-12 NOTE — MR AVS SNAPSHOT
After Visit Summary   7/12/2017    China Guzman    MRN: [id number]           Patient Information     Date Of Birth          4/10/1930        Visit Information        Provider Department      7/12/2017 11:00 AM Nirav Holland MD [facility]        Today's Diagnoses     SNHL (sensory-neural hearing loss), asymmetrical    -  1    History of basal cell carcinoma          Care Instructions    General Scheduling Information  To schedule your CT/MRI scan, please contact [place] at [phone] [address]  [place] [address]    To schedule your Surgery, please contact our Specialty Schedulers at [phone]    ENT Clinic Locations Clinic Hours Telephone Number     [place]  [address]  [place] [address]   Tuesday:       8:00am -- 4:00pm    Wednesday:  8:00am - 4:00pm   To schedule an appointment with   Dr. Holland,   please contact our   Specialty Scheduling Department at:     [phone]       [place]ne[place]  [address] [facility] [place] [address]   Friday:          8:00am - 4:00pm         Urgent Care Locations Clinic Hours Telephone Numbers     [place] Escobar  [address] [place] [address]     Monday-Friday:     11:00pm - 9:00pm    Saturday-Sunday:  9:00am - 5:00pm   [phone]     [place] Mcneill  [address] [facility] [place] [address]     Monday-Friday:      5:00pm - 9:00pm     Saturday-Sunday:  9:00am - 5:00pm   [phone]               Follow-ups after your visit        Your next 10 appointments already scheduled     Jul 18, 2017 12:30 PM CDT   [facility]   [facility]    [address] [facility]  [place] [id number] [phone]            Sep 01, 2017  9:15 AM CDT   [facility] Visit with [facility]rikanth Craig [facility]   [facility]    [address] [place] [id number] [phone]              Who to  "contact     If you have questions or need follow up information about today's clinic visit or your schedule please contact [facility] directly at [phone].  Normal or non-critical lab and imaging results will be communicated to you by MyChart, letter or phone within 4 business days after the clinic has received the results. If you do not hear from us within 7 days, please contact the clinic through MyChart or phone. If you have a critical or abnormal lab result, we will notify you by phone as soon as possible.  Submit refill requests through [employer] or call your pharmacy and they will forward the refill request to us. Please allow 3 business days for your refill to be completed.          Additional Information About Your Visit        O-film Information     [employer] lets you send messages to your doctor, view your test results, renew your prescriptions, schedule appointments and more. To sign up, go to [email or web address] . Click on \"Log in\" on the left side of the screen, which will take you to the Welcome page. Then click on \"Sign up Now\" on the right side of the page.     You will be asked to enter the access code listed below, as well as some personal information. Please follow the directions to create your username and password.     Your access code is: [email or web address]  Expires: 2017  3:02 PM     Your access code will  in 90 days. If you need help or a new code, please call your [place] clinic or [phone].        Care EveryWhere ID     This is your Care EveryWhere ID. This could be used by other organizations to access your [place] medical records  [id number]        Your Vitals Were     Respirations Height Last Period BMI (Body Mass Index)          12 1.499 m (4' 11\") 1980 24.24 kg/m2         Blood Pressure from Last 3 Encounters:   17 112/70   17 146/60   16 132/64    Weight from Last 3 Encounters:   17 54.4 kg (120 lb)   17 54.9 kg (121 " lb)   05/26/17 54.3 kg (119 lb 9.6 oz)              Today, you had the following     No orders found for display       Primary Care Provider Office Phone # Fax #    Colby Trimble [phone]       [facility] [address] [facility] [address]        Equal Access to Services     [facility] : Hadii aad ku hadasho Soomaali, waaxda luqadaha, qaybta kaalmada adeegyada, waxay idiin hayaan adeeg kharash larhean . So [facility] [phone].    ATENCIÓN: Si habla español, tiene a mary disposición servicios gratuitos de asistencia lingüística. Llame al [phone].    We comply with applicable federal civil rights laws and [place] laws. We do not discriminate on the basis of race, color, national origin, age, disability sex, sexual orientation or gender identity.            Thank you!     Thank you for choosing [facility]  for your care. Our goal is always to provide you with excellent care. Hearing back from our patients is one way we can continue to improve our services. Please take a few minutes to complete the written survey that you may receive in the mail after your visit with us. Thank you!             Your Updated Medication List - Protect others around you: Learn how to safely use, store and throw away your medicines at www.disposemymeds.org.          This list is accurate as of: 7/12/17  4:59 PM.  Always use your most recent med list.                   Brand Name Dispense Instructions for use Diagnosis    aspirin 325 MG EC tablet      Take 325 mg by mouth daily.        atorvastatin 10 MG tablet    LIPITOR    90 tablet    Take 1 tablet (10 mg) by mouth At Bedtime    Hyperlipidemia LDL goal <130       CALCIUM + D PO      2 tablets daily        diltiazem 180 MG 24 hr capsule    DILACOR XR    90 capsule    Take 1 capsule (180 mg) by mouth daily    Essential hypertension with goal blood pressure less than 140/90       hydrochlorothiazide 25 MG tablet    HYDRODIURIL    90  tablet    Take 1 tablet (25 mg) by mouth every morning    Essential hypertension with goal blood pressure less than 140/90       latanoprost 0.005 % ophthalmic solution    XALATAN    3 Bottle    Place 1 drop into both eyes At Bedtime    Borderline glaucoma with ocular hypertension, bilateral       MULTI-VITAMIN PO      None Entered        valsartan 320 MG tablet    DIOVAN    90 tablet    Take 1 tablet (320 mg) by mouth daily    Essential hypertension with goal blood pressure less than 140/90

## 2017-07-12 NOTE — PROGRESS NOTES
Chief Complaint - Hearing loss    History of Present Illness - China Guzman is a 87 year old female who presents to me today with hearing loss in both ears, but right ear is worse.  It has been present and noticeable for approximately many years (10 or more).  It was gradual in onset.  The patient has noticed increased difficulty hearing certain sounds and difficulty in understanding others, especially in noisy or crowded situations.  There is no history of chronic ear disease or ear surgery.  With regards to recreational, , and work-related noise exposure she has lots in the past. Some family history of hearing loss at a young age. The patient denies otorrhea, otalgia.     Had a BCC biopsied intercanthal area. Hasn't changed size, but she didn't have it removed. Has a nasal dorsum Ak as well.    Past Medical History -   Patient Active Problem List   Diagnosis     Osteoporosis     HL (hearing loss)     Gallstones     Malignant basal cell neoplasm of skin     Hyperlipidemia LDL goal <130     Pseudophakia, Yag Caps, ou     Advanced directives, counseling/discussion     Borderline glaucoma with ocular hypertension, bilateral - treated     Family history of colon cancer     Posterior vitreous detachment, bilateral     Macular degeneration, dry, mod, ou     Essential hypertension with goal blood pressure less than 140/90     10 year risk of MI or stroke 7.5% or greater, 39.7% in September 2017 on atorvastatin 10 mg     CKD (chronic kidney disease) stage 3, GFR 30-59 ml/min       Current Medications -   Current Outpatient Prescriptions:      hydrochlorothiazide (HYDRODIURIL) 25 MG tablet, Take 1 tablet (25 mg) by mouth every morning, Disp: 90 tablet, Rfl: 1     diltiazem (DILACOR XR) 180 MG 24 hr capsule, Take 1 capsule (180 mg) by mouth daily, Disp: 90 capsule, Rfl: 3     valsartan (DIOVAN) 320 MG tablet, Take 1 tablet (320 mg) by mouth daily, Disp: 90 tablet, Rfl: 3     latanoprost (XALATAN) 0.005 % ophthalmic  "solution, Place 1 drop into both eyes At Bedtime, Disp: 3 Bottle, Rfl: 4     atorvastatin (LIPITOR) 10 MG tablet, Take 1 tablet (10 mg) by mouth At Bedtime, Disp: 90 tablet, Rfl: 3     aspirin 325 MG EC tablet, Take 325 mg by mouth daily., Disp: , Rfl:      CALCIUM + D OR, 2 tablets daily, Disp: , Rfl:      MULTI-VITAMIN OR, None Entered, Disp: , Rfl:     Allergies - No Known Allergies    Social History -   Social History     Social History     Marital status:      Spouse name: N/A     Number of children: 6     Years of education: N/A     Occupational History      Retired     Social History Main Topics     Smoking status: Never Smoker     Smokeless tobacco: Never Used     Alcohol use No     Drug use: No     Sexual activity: No     Other Topics Concern     None     Social History Narrative       Family History -   Family History   Problem Relation Age of Onset     Cancer - colorectal Mother      C.A.D. Brother      CEREBROVASCULAR DISEASE Brother      HEART DISEASE Brother      Asthma Daughter      Breast Cancer Daughter      Thyroid Disease Daughter        Review of Systems - As per HPI and PMHx, otherwise 7 system review of the head and neck negative.    Physical Exam  Resp 12  Ht 1.499 m (4' 11\")  Wt 54.4 kg (120 lb)  LMP 12/16/1980  BMI 24.24 kg/m2  General - The patient is in no distress.  Alert and oriented to person and place, answers questions and cooperates with examination appropriately.   Voice and Breathing - The patient was breathing comfortably without the use of accessory muscles. There was no wheezing, stridor, or stertor.  The patients voice was clear and strong.  Ears - The auricles are normal. The tympanic membranes are normal in appearance, bony landmarks are intact.  No retraction, perforation, or masses.  No fluid or purulence was seen in the external canal or the middle ear. No evidence of infection of the middle ear or external canal, cerumen was normal in appearance.  Eyes - " Extraocular movements intact, but the pupils were unequal, L>R 4 vs 3 mm. She has macular degeneration left and had cataract removed.  Sclera were not icteric or injected.  Neck - Palpation of the occipital, submental, submandibular, internal jugular chain, and supraclavicular nodes did not demonstrate any abnormal lymph nodes or masses. Parotid glands had no masses. Palpation of the thyroid was soft and smooth, with no nodules or goiter appreciated.  The trachea was mobile and midline.  Neurological - Cranial nerves 2 through 12 were grossly intact. House-Brackmann grade 1 out of 6 bilaterally.   Skin - intercanthal BCC is still about 3-4 mm. Dorsum AK looks like a 2 mm crust.    Audiologic Studies - An audiogram and tympanogram were performed 6/23/2017 as part of the evaluation and personally reviewed. The tympanogram shows a normal Type A curve, with normal canal volume and middle ear pressure.  There is no sign of eustachian tube dysfunction or middle ear effusion.  The audiogram showed a significant down-sloping high frequency sensorineural hearing loss, left and an asymmetric flat right sensorineural hearing loss.     Assessment and Plan - China Guzman is a 87 year old female who presents to me today with hearing loss.  This is most consistent with asymmetric sensorineural hearing loss, unsure the etiology. We discussed possible retrocochlear pathology, but will repeat audiogram in 6 months.     The patient will follow up as necessary for worsening symptoms or changes in symptoms. I have also recommended consideration of a hearing aid evaluation.    She has a intercanthal BCC that I biopsied in 3/2017. She hasn't had it removed. Needs to return for 30 minute appointment.     Nirav Holland MD  Otolaryngology  [facility]

## 2017-07-12 NOTE — MR AVS SNAPSHOT
"              After Visit Summary   7/12/2017    China Guzman    MRN: [id number]           Patient Information     Date Of Birth          4/10/1930        Visit Information        Provider Department      7/12/2017 10:30 AM Merlin Salazar AuD [facility]        Today's Diagnoses     ERRONEOUS ENCOUNTER--DISREGARD    -  1       Follow-ups after your visit        Your next 10 appointments already scheduled     Jul 18, 2017 12:30 PM CDT   New Visit with Zhane Ingram   [facility]    [address] [facility] [address]   [phone]            Sep 01, 2017  9:15 AM CDT   Return Visit with Srikanth Craig MD   [facility]    [address] [facility] [address] [phone]              Who to contact     If you have questions or need follow up information about today's clinic visit or your schedule please contact [facility] directly at [phone].  Normal or non-critical lab and imaging results will be communicated to you by [employer], letter or phone within 4 business days after the clinic has received the results. If you do not hear from us within 7 days, please contact the clinic through [employer] or phone. If you have a critical or abnormal lab result, we will notify you by phone as soon as possible.  Submit refill requests through [employer] or call your pharmacy and they will forward the refill request to us. Please allow 3 business days for your refill to be completed.          Additional Information About Your Visit        Proximal Data Information     [employer] lets you send messages to your doctor, view your test results, renew your prescriptions, schedule appointments and more. To sign up, go to [email or web address] . Click on \"Log in\" on the left side of the screen, which will take you to the Welcome page. Then click on \"Sign up Now\" on the right side of the page. "     You will be asked to enter the access code listed below, as well as some personal information. Please follow the directions to create your username and password.     Your access code is: [email or web address]  Expires: 2017  3:02 PM     Your access code will  in 90 days. If you need help or a new code, please call your [place] clinic or [phone].        Care EveryWhere ID     This is your Care EveryWhere ID. This could be used by other organizations to access your [place] medical records  [id number]        Your Vitals Were     Last Period                   1980            Blood Pressure from Last 3 Encounters:   17 112/70   17 146/60   16 132/64    Weight from Last 3 Encounters:   17 120 lb (54.4 kg)   17 121 lb (54.9 kg)   17 119 lb 9.6 oz (54.3 kg)              Today, you had the following     No orders found for display       Primary Care Provider Office Phone # Fax #    Colby Trimble [phone]       [facility] [address] [place] [address]        Equal Access to Services     KAYLIN CARR : Hadii richie rosa hadasho Soomaali, waaxda luqadaha, qaybta kaalmada adeegyada, alan vegas. So [facility] [phone].    ATENCIÓN: Si habla español, tiene a mayr disposición servicios gratuitos de asistencia lingüística. Llame al [phone].    We comply with applicable federal civil rights laws and [place] laws. We do not discriminate on the basis of race, color, national origin, age, disability sex, sexual orientation or gender identity.            Thank you!     Thank you for choosing [facility] [place]  for your care. Our goal is always to provide you with excellent care. Hearing back from our patients is one way we can continue to improve our services. Please take a few minutes to complete the written survey that you may receive in the mail after your visit with us. Thank you!             Your  Updated Medication List - Protect others around you: Learn how to safely use, store and throw away your medicines at www.disposemymeds.org.          This list is accurate as of: 7/12/17 11:59 PM.  Always use your most recent med list.                   Brand Name Dispense Instructions for use Diagnosis    aspirin 325 MG EC tablet      Take 325 mg by mouth daily.        atorvastatin 10 MG tablet    LIPITOR    90 tablet    Take 1 tablet (10 mg) by mouth At Bedtime    Hyperlipidemia LDL goal <130       CALCIUM + D PO      2 tablets daily        diltiazem 180 MG 24 hr capsule    DILACOR XR    90 capsule    Take 1 capsule (180 mg) by mouth daily    Essential hypertension with goal blood pressure less than 140/90       hydrochlorothiazide 25 MG tablet    HYDRODIURIL    90 tablet    Take 1 tablet (25 mg) by mouth every morning    Essential hypertension with goal blood pressure less than 140/90       latanoprost 0.005 % ophthalmic solution    XALATAN    3 Bottle    Place 1 drop into both eyes At Bedtime    Borderline glaucoma with ocular hypertension, bilateral       MULTI-VITAMIN PO      None Entered        valsartan 320 MG tablet    DIOVAN    90 tablet    Take 1 tablet (320 mg) by mouth daily    Essential hypertension with goal blood pressure less than 140/90

## 2017-07-12 NOTE — NURSING NOTE
"Chief Complaint   Patient presents with     Hearing Problem     Hearing aid clearance       Initial Resp 12  Ht 1.499 m (4' 11\")  Wt 54.4 kg (120 lb)  LMP 12/16/1980  BMI 24.24 kg/m2 Estimated body mass index is 24.24 kg/(m^2) as calculated from the following:    Height as of this encounter: 1.499 m (4' 11\").    Weight as of this encounter: 54.4 kg (120 lb).  Medication Reconciliation: complete     Lio Boyd CMA      "

## 2017-07-12 NOTE — PATIENT INSTRUCTIONS
General Scheduling Information  To schedule your CT/MRI scan, please contact [place] [facility] at [phone]   [address] [place] [address]    To schedule your Surgery, please contact our Specialty Schedulers at [phone]    ENT Clinic Locations Clinic Hours Telephone Number     [facility]  [address] [place] [address]   Tuesday:       8:00am -- 4:00pm    Wednesday:  8:00am - 4:00pm   To schedule an appointment with   Dr. Holland,   please contact our   Specialty Scheduling Department at:     [phone]       Pr[facility] Mcneill  [address] [place] [address]   Friday:          8:00am - 4:00pm         Urgent Care Locations Clinic Hours Telephone Numbers     Princess Escobar  [address] [place] [address]     Monday-Friday:     11:00pm - 9:00pm    Saturday-Sunday:  9:00am - 5:00pm   [phone]     Princess Mcneill  [address] [place] [address]     Monday-Friday:      5:00pm - 9:00pm     Saturday-Sunday:  9:00am - 5:00pm   [phone]

## 2017-07-18 ENCOUNTER — OFFICE VISIT (OUTPATIENT)
Dept: AUDIOLOGY | Facility: CLINIC | Age: 82
End: 2017-07-18
Payer: COMMERCIAL

## 2017-07-18 DIAGNOSIS — H90.3 SENSORINEURAL HEARING LOSS, ASYMMETRICAL: Primary | ICD-10-CM

## 2017-07-18 PROCEDURE — 92591 HC HEARING AID EXAM BINAURAL: CPT | Performed by: AUDIOLOGIST

## 2017-07-18 NOTE — PROGRESS NOTES
AUDIOLOGY REPORT    SUBJECTIVE: China Guzman is a 87 year old female was seen in the Audiology Clinic at  [facility] on 7/18/17 to discuss concerns with hearing and functional communication difficulties. The patient was accompanied by their daughter, Pat. China has been seen previously on 6/23/2017, and results revealed a asymmetric sensorineural hearing loss.  The patient was medically evaluated and determined to be cleared for binaural hearing aids by Dr. Holland. China notes difficulty with communication in a variety of listening situations.    OBJECTIVE:  Patient is a hearing aid candidate. Patient would like to move forward with a hearing aid evaluation today. Therefore, the patient was presented with different options for amplification to help aid in communication. Discussed styles, levels of technology and monaural vs. binaural fitting.     The hearing aid(s) mutually chosen were:  Binaural: Oticon OPN3  COLOR: Silver Grey  BATTERY SIZE: 312  EARMOLD/TIPS: micromolds with canal lock  CANAL/ LENGTH: 3    Otoscopy revealed ears are clear of cerumen bilaterally. Bilateral earmolds were taken without incident.    ASSESSMENT:   Asymmetric sensorineural hearing loss      Reviewed purchase information and warranty information with patient. The 45 day trial period was explained to patient. The patient was given a copy of the [place] Department of Health consumer brochure on purchasing hearing instruments. Patient risk factors have been provided to the patient in writing prior to the sale of the hearing aid per FDA regulation. The risk factors are also available in the User Instructional Booklet to be presented on the day of the hearing aid fitting. Hearing aid(s) ordered. Hearing aid evaluation completed.    PLAN: China is scheduled to return in 2-3 weeks for a hearing aid fitting and programming. Purchase agreement will be completed on that date. Please contact this clinic with any questions  or concerns.      Merlin Cedeño CCC-A  Licensed Audiologist #[id number]  7/18/2017

## 2017-07-18 NOTE — MR AVS SNAPSHOT
"              After Visit Summary   7/18/2017    China Guzman    MRN: [id number]           Patient Information     Date Of Birth          4/10/1930        Visit Information        Provider Department      7/18/2017 12:30 PM Merlin Salazar AuD [facility]        Today's Diagnoses     Sensorineural hearing loss, asymmetrical    -  1       Follow-ups after your visit        Your next 10 appointments already scheduled     Aug 08, 2017  8:00 AM CDT   [facility] Zhane Ingram   [facility]    [address] [address] [phone]            Sep 01, 2017  9:15 AM CDT   Return Visit with Srikanth Craig MD   [facility]    [address] [address] [phone]              Who to contact     If you have questions or need follow up information about today's clinic visit or your schedule please contact [facility] directly at [phone].  Normal or non-critical lab and imaging results will be communicated to you by [employer], letter or phone within 4 business days after the clinic has received the results. If you do not hear from us within 7 days, please contact the clinic through [employer] or phone. If you have a critical or abnormal lab result, we will notify you by phone as soon as possible.  Submit refill requests through ClarityRay or call your pharmacy and they will forward the refill request to us. Please allow 3 business days for your refill to be completed.          Additional Information About Your Visit        ClarityRay Information     ClarityRay lets you send messages to your doctor, view your test results, renew your prescriptions, schedule appointments and more. To sign up, go to [email or web address] . Click on \"Log in\" on the left side of the screen, which will take you to the Welcome page. Then click on \"Sign up Now\" on the right side of the " page.     You will be asked to enter the access code listed below, as well as some personal information. Please follow the directions to create your username and password.     Your access code is: [email or web address]  Expires: 2017  3:02 PM     Your access code will  in 90 days. If you need help or a new code, please call your [facility] clinic or [phone].        Care EveryWhere ID     This is your Care EveryWhere ID. This could be used by other organizations to access your [facility] medical records  [id number]        Your Vitals Were     Last Period                   1980            Blood Pressure from Last 3 Encounters:   17 112/70   17 146/60   16 132/64    Weight from Last 3 Encounters:   17 120 lb (54.4 kg)   17 121 lb (54.9 kg)   17 119 lb 9.6 oz (54.3 kg)              We Performed the Following     HEARING AID EXAM BINAURAL        Primary Care Provider Office Phone # Fax #    Colby Trimble [phone]       [facility] [id number] [place] [address]        Equal Access to Services     KAYLIN CARR : Hadii aad ku hadasho Soomaali, waaxda luqadaha, qaybta kaalmada adeegyada, alan romon kandis vegas. So [facility] [phone].    ATENCIÓN: Si habla español, tiene a mary disposición servicios gratuitos de asistencia lingüística. Llame al [phone].    We comply with applicable federal civil rights laws and [place] laws. We do not discriminate on the basis of race, color, national origin, age, disability sex, sexual orientation or gender identity.            Thank you!     Thank you for choosing [facility]  for your care. Our goal is always to provide you with excellent care. Hearing back from our patients is one way we can continue to improve our services. Please take a few minutes to complete the written survey that you may receive in the mail after your visit with us. Thank you!              Your Updated Medication List - Protect others around you: Learn how to safely use, store and throw away your medicines at www.disposemymeds.org.          This list is accurate as of: 7/18/17  1:06 PM.  Always use your most recent med list.                   Brand Name Dispense Instructions for use Diagnosis    aspirin 325 MG EC tablet      Take 325 mg by mouth daily.        atorvastatin 10 MG tablet    LIPITOR    90 tablet    Take 1 tablet (10 mg) by mouth At Bedtime    Hyperlipidemia LDL goal <130       CALCIUM + D PO      2 tablets daily        diltiazem 180 MG 24 hr capsule    DILACOR XR    90 capsule    Take 1 capsule (180 mg) by mouth daily    Essential hypertension with goal blood pressure less than 140/90       hydrochlorothiazide 25 MG tablet    HYDRODIURIL    90 tablet    Take 1 tablet (25 mg) by mouth every morning    Essential hypertension with goal blood pressure less than 140/90       latanoprost 0.005 % ophthalmic solution    XALATAN    3 Bottle    Place 1 drop into both eyes At Bedtime    Borderline glaucoma with ocular hypertension, bilateral       MULTI-VITAMIN PO      None Entered        valsartan 320 MG tablet    DIOVAN    90 tablet    Take 1 tablet (320 mg) by mouth daily    Essential hypertension with goal blood pressure less than 140/90

## 2017-08-03 ENCOUNTER — RADIANT APPOINTMENT (OUTPATIENT)
Dept: ULTRASOUND IMAGING | Facility: CLINIC | Age: 82
End: 2017-08-03
Attending: FAMILY MEDICINE
Payer: COMMERCIAL

## 2017-08-03 ENCOUNTER — RADIANT APPOINTMENT (OUTPATIENT)
Dept: GENERAL RADIOLOGY | Facility: CLINIC | Age: 82
End: 2017-08-03
Attending: FAMILY MEDICINE
Payer: COMMERCIAL

## 2017-08-03 ENCOUNTER — OFFICE VISIT (OUTPATIENT)
Dept: FAMILY MEDICINE | Facility: CLINIC | Age: 82
End: 2017-08-03
Payer: COMMERCIAL

## 2017-08-03 VITALS
BODY MASS INDEX: 24.24 KG/M2 | HEART RATE: 88 BPM | OXYGEN SATURATION: 97 % | RESPIRATION RATE: 16 BRPM | SYSTOLIC BLOOD PRESSURE: 138 MMHG | DIASTOLIC BLOOD PRESSURE: 68 MMHG | WEIGHT: 120 LBS | TEMPERATURE: 98 F

## 2017-08-03 DIAGNOSIS — M25.562 ACUTE PAIN OF LEFT KNEE: ICD-10-CM

## 2017-08-03 DIAGNOSIS — M71.22 BAKER'S CYST OF KNEE, LEFT: ICD-10-CM

## 2017-08-03 DIAGNOSIS — M79.605 PAIN OF LEFT LOWER EXTREMITY: Primary | ICD-10-CM

## 2017-08-03 PROCEDURE — 93971 EXTREMITY STUDY: CPT | Mod: LT

## 2017-08-03 PROCEDURE — 73562 X-RAY EXAM OF KNEE 3: CPT | Mod: LT

## 2017-08-03 PROCEDURE — 99214 OFFICE O/P EST MOD 30 MIN: CPT | Performed by: FAMILY MEDICINE

## 2017-08-03 NOTE — PROGRESS NOTES
"  SUBJECTIVE:                                                    China Guzman is a 87 year old female who presents to clinic today for the following health issues:      Musculoskeletal problem/pain      Duration: 1 week    Description  Location: left leg and knee    Intensity:  moderate    Accompanying signs and symptoms: none    History  Previous similar problem: no   Previous evaluation:  none    Precipitating or alleviating factors:  Trauma or overuse: no   Aggravating factors include: overuse    Therapies tried and outcome: heat      Started off with a little pain \"kink\" behind her knee  That would be off and on    Past week consistent and last night really worse    Last night though has gotten worse to the point having hard time walking.  This morning seems to be fine    Pain is worse with getting up from a chair.    Patient is usually walk in the [facility] and rides the bike everyday.    No back pain  No loss of control of bowel or bladder, no numbness or weakness down the legs      Problem list and histories reviewed & adjusted, as indicated.  Additional history: as documented    Problem list, Medication list, Allergies, and Medical/Social/Surgical histories reviewed in EPIC and updated as appropriate.    ROS:  Constitutional, HEENT, cardiovascular, pulmonary, gi and gu systems are negative, except as otherwise noted.    OBJECTIVE:                                                    /68  Pulse 88  Temp 98  F (36.7  C) (Oral)  Resp 16  Wt 120 lb (54.4 kg)  LMP 12/16/1980  SpO2 97%  Breastfeeding? No  BMI 24.24 kg/m2  Body mass index is 24.24 kg/(m^2).  GENERAL: healthy, alert and no distress  EYES: Eyes grossly normal to inspection, PERRL and conjunctivae and sclerae normal  RESP: lungs clear to auscultation - no rales, rhonchi or wheezes  CV: regular rate and rhythm, normal S1 S2, no S3 or S4, no murmur, click or rub, no peripheral edema and peripheral pulses strong  MS: Affected extremity " neurovascularly intact, no cyanosis or edema, good pulses and capillary refill.   Swelling of left knee tender lateral joint line  Cruciate and collateral ligamentes intact negative praveena  Tender posterior knee area/popliteal area and some tenderness over the calf  However calf appears well perfused. No numbness or tingling. No pain with passive toe dorsiflexion  SKIN: no suspicious lesions or rashes  NEURO: Normal strength and tone, mentation intact and speech normal  PSYCH: mentation appears normal, affect normal/bright    Diagnostic Test Results:  Results for orders placed or performed in visit on 08/03/17 (from the past 24 hour(s))   US Lower Extremity Venous Duplex Left    Narrative    VENOUS ULTRASOUND LEFT LEG  8/3/2017 12:24 PM     HISTORY: Pain in left leg. Pain in left knee.    COMPARISON: None.    FINDINGS:  Examination of the deep veins with graded compression and  color flow Doppler with spectral wave form analysis shows no evidence  of thrombus in the common femoral vein, femoral vein, popliteal vein  or calf veins. There is a fluid collection in the left popliteal fossa  measuring 3.5 x 1.5 x 3.2 cm, likely a Baker's cyst.       Impression    IMPRESSION:   1. No DVT in the left lower extremity.  2. Anechoic fluid collection in the left popliteal fossa measuring 3.5  x 1.5 x 3.2 cm, likely a Baker's cyst.     ARASELI HARDIN, DO   XR Knee Left 3 Views    Narrative    XR KNEE LT 3 VW 8/3/2017 12:33 PM    HISTORY: Left knee pain.    COMPARISON: None.    FINDINGS: Mild narrowing of medial tibiofemoral compartment joint  space. Complete loss of lateral patellofemoral compartment joint  space. Small marginal osteophytes. No joint effusion. No fracture or  malalignment.      Impression    IMPRESSION: Marked patellofemoral degenerative change.    ARSENIO MASON MD        ASSESSMENT/PLAN:                                                        ICD-10-CM    1. Pain of left lower extremity M79.605 US Lower  Extremity Venous Duplex Left   2. Acute pain of left knee M25.562 US Lower Extremity Venous Duplex Left     XR Knee Left 3 Views     ORTHO  REFERRAL     order for DME   3. Baker's cyst of knee, left M71.22 ORTHO  REFERRAL     order for DME       Doppler US negative for DVT but positive for Baker's cyst  See patient instructions  xrays show degenerative change  Knee sleeve.   Recommend walker with weight bearing as tolerated  Alarm signs or symptoms discussed, if present recommend go to ER   Signs or symptoms of compartment syndrome discussed if concerns go to ER asap.  Recommend follow up with orthopedics.   Adverse reactions of medications discussed.  Over the counter medications discussed.   Aware to come back in if with worsening symptoms or if no relief despite treatment plan  Patient voiced understanding and had no further questions.     MD Radha Nicholas MD  [facility]

## 2017-08-03 NOTE — MR AVS SNAPSHOT
After Visit Summary   8/3/2017    China Guzman    MRN: [id number]           Patient Information     Date Of Birth          4/10/1930        Visit Information        Provider Department      8/3/2017 11:20 AM Radha Bradford MD [facility]        Today's Diagnoses     Pain of left lower extremity    -  1    Acute pain of left knee        Baker's cyst of knee, left          Care Instructions      Gupta s Cyst    You have a Baker s cyst. This is a lump or bulge in the back of your knee. It is caused when extra joint fluid flows into a small sac behind the knee. The extra fluid occurs because arthritis or a torn cartilage irritates the knee joint.  A small Gupta s cyst often has no symptoms. A larger cyst can cause knee pain or a feeling of pressure behind your knee when you try to fully straighten or bend that joint. A Baker s cyst can leak, leading fluid to move down into your lower leg. This causes swelling, pain, and redness.  Treatment involves draining the extra fluid. Or medicine may be injected to reduce redness and swelling. If the extra fluid is caused by a torn cartilage, then surgery to repair the cartilage may be the best treatment option. If arthritis is the cause, and it does not get better with treatment, surgery can be done to remove the cyst.  Home care    If you have knee pain, stay off the affected leg as much as possible until the pain eases.    Apply an ice pack to the painful area for no more than 20 minutes. Do this every 3 to 6 hours for the first 24 to 48 hours. Keep using ice packs 3 to 4 times a day for the next few days, as needed for pain. To make an ice pack, put ice cubes in a sealed zip-lock plastic bag. Wrap the bag in a clean, thin towel or cloth. Never put ice or an ice pack directly on the skin.    If you were given a hook-and-loop knee brace, you may open the brace to apply ice. Unless told otherwise, you may remove the brace to bathe and  sleep.    You may use over-the-counter pain medicine to control pain, unless another medicine was prescribed. Talk with your provider before using these medicines if you have chronic liver or kidney disease, or have ever had a stomach ulcer or GI (gastrointestinal) bleeding.       If crutches or a walker have been recommended, don t bear full weight on your injured leg until you can do so without pain. Check with your provider before returning to sports or full work duties.  Follow-up care  Follow up with your healthcare provider within 1 to 2 weeks, or as advised.  If X-rays were taken, you will be notified of any new findings that may affect your care.  When to seek medical advice  Call your healthcare provider right away if any of these occur:    Toes or foot become swollen, cold, blue, numb or tingly    Pain or swelling increases    Warmth or redness appears over the knee    Redness, swelling or pain occurs in the calf or lower leg  Date Last Reviewed: 11/20/2015 2000-2017 [employer]. [address] [place] [address]. All rights reserved. This information is not intended as a substitute for professional medical care. Always follow your healthcare professional's instructions.        Treatment for Baker s Cyst (Popliteal Cyst)  A Baker s cyst (popliteal cyst) is a fluid-filled sac that forms behind the knee.  Types of treatment  You likely won t need any treatment if you don t have any symptoms from your Baker s cyst. Some Baker s cysts go away without any treatment. If your cyst starts causing symptoms, you might need treatment at that time.  If you do have symptoms, you may be treated depending on the cause of your cyst. For example, you may need medicine for rheumatoid arthritis. Or you may need physical therapy for osteoarthritis.  Other treatments for a Baker s cyst can include:    Over-the-counter pain medicines    Arthrocentesis to remove extra fluid from the joint  space    Steroid injection into the joint to reduce cyst size    Surgery to remove the cyst  Possible complications of a Baker s cyst  In rare cases, a Baker s cyst may cause complications. The cyst may get larger, which may cause redness and swelling. The cyst may also rupture, causing warmth, redness, and pain in your calf.  The symptoms may be the same as a blood clot in the veins of the legs. Your health care provider may need imaging tests of your leg to make sure you don t have a clot. Rupture can also lead to its own complications, such as:    Trapping of a tibial nerve. This cases calf pain and numbness behind the leg. It can be treated with arthrocentesis and steroid injections.    Blockage of the popliteal artery. This causes pain and lack of blood flow to the leg. It can also be treated with arthrocentesis and steroid injections.    Compartment syndrome. This causes intense pain and problems moving the foot or toes. Compartment syndrome is a medical emergency. It needs immediate surgery. It can lead to permanent muscle damage if not treated right away.  When to call the health care provider  If your cyst starts causing mild symptoms, plan to see your health care provider soon. See him or her right away if you have symptoms such as redness and swelling of your leg. These symptoms may mean your Baker s cyst has ruptured.      Date Last Reviewed: 7/21/2015 2000-2017 [employer]. [address] [place] [address]. All rights reserved. This information is not intended as a substitute for professional medical care. Always follow your healthcare professional's instructions.                Follow-ups after your visit        Additional Services     ORTHO  REFERRAL       [facility] is referring you to the Orthopedic  Services at [facility].       The  Representative will assist you in the coordination of your Orthopedic and  Musculoskeletal Care as prescribed by your physician.    The  Representative will call you within 1 business day to help schedule your appointment, or you may contact the  Representative at:    All areas ~ [phone]     Type of Referral : Non Surgical       Timeframe requested: Routine    Coverage of these services is subject to the terms and limitations of your health insurance plan.  Please call member services at your health plan with any benefit or coverage questions.      If X-rays, CT or MRI's have been performed, please contact the facility where they were done to arrange for , prior to your scheduled appointment.  Please bring this referral request to your appointment and present it to your specialist.                  Your next 10 appointments already scheduled     Aug 08, 2017  8:00 AM CDT   New Visit with Zhane Ingram   [facility]    [address] [place] [id number] [phone]            Sep 01, 2017  9:15 AM CDT   Return Visit with Srikanth Craig MD   [facility] ([facility])    [address] [place] [id number] [phone]              Who to contact     If you have questions or need follow up information about today's clinic visit or your schedule please contact [facility] directly at [phone].  Normal or non-critical lab and imaging results will be communicated to you by MyChart, letter or phone within 4 business days after the clinic has received the results. If you do not hear from us within 7 days, please contact the clinic through MyChart or phone. If you have a critical or abnormal lab result, we will notify you by phone as soon as possible.  Submit refill requests through Ahaali or call your pharmacy and they will forward the refill request to us. Please allow 3 business days for your refill to be completed.          Additional  "Information About Your Visit        MyChart Information     YourNextLeap lets you send messages to your doctor, view your test results, renew your prescriptions, schedule appointments and more. To sign up, go to [email or web address] . Click on \"Log in\" on the left side of the screen, which will take you to the Welcome page. Then click on \"Sign up Now\" on the right side of the page.     You will be asked to enter the access code listed below, as well as some personal information. Please follow the directions to create your username and password.     Your access code is: [email or web address]  Expires: 2017  3:02 PM     Your access code will  in 90 days. If you need help or a new code, please call your [facility] clinic or [phone].        Care EveryWhere ID     This is your Care EveryWhere ID. This could be used by other organizations to access your [facility] medical records  [id number]        Your Vitals Were     Pulse Temperature Respirations Last Period Pulse Oximetry Breastfeeding?    88 98  F (36.7  C) (Oral) 16 1980 97% No    BMI (Body Mass Index)                   24.24 kg/m2            Blood Pressure from Last 3 Encounters:   17 138/68   17 112/70   17 146/60    Weight from Last 3 Encounters:   17 120 lb (54.4 kg)   17 120 lb (54.4 kg)   17 121 lb (54.9 kg)              We Performed the Following     ORTHO  REFERRAL     US Lower Extremity Venous Duplex Left     XR Knee Left 3 Views        Primary Care Provider Office Phone # Fax #    Colby Trimble [phone]       [facility] [address] [address] [facility] [address]        Equal Access to Services     KAYLIN CARR : Beth Lang, patrick argueta, yomaira kaalmaángel pichardo, alan vegas. So [facility] [phone].    ATENCIÓN: Si habla español, tiene a mary disposición servicios gratuitos de asistencia lingüística. Llame al " [phone].    We comply with applicable federal civil rights laws and [place] laws. We do not discriminate on the basis of race, color, national origin, age, disability sex, sexual orientation or gender identity.            Thank you!     Thank you for choosing [facility]  for your care. Our goal is always to provide you with excellent care. Hearing back from our patients is one way we can continue to improve our services. Please take a few minutes to complete the written survey that you may receive in the mail after your visit with us. Thank you!             Your Updated Medication List - Protect others around you: Learn how to safely use, store and throw away your medicines at www.disposemymeds.org.          This list is accurate as of: 8/3/17 12:35 PM.  Always use your most recent med list.                   Brand Name Dispense Instructions for use Diagnosis    aspirin 325 MG EC tablet      Take 325 mg by mouth daily.        atorvastatin 10 MG tablet    LIPITOR    90 tablet    Take 1 tablet (10 mg) by mouth At Bedtime    Hyperlipidemia LDL goal <130       CALCIUM + D PO      2 tablets daily        diltiazem 180 MG 24 hr capsule    DILACOR XR    90 capsule    Take 1 capsule (180 mg) by mouth daily    Essential hypertension with goal blood pressure less than 140/90       hydrochlorothiazide 25 MG tablet    HYDRODIURIL    90 tablet    Take 1 tablet (25 mg) by mouth every morning    Essential hypertension with goal blood pressure less than 140/90       latanoprost 0.005 % ophthalmic solution    XALATAN    3 Bottle    Place 1 drop into both eyes At Bedtime    Borderline glaucoma with ocular hypertension, bilateral       MULTI-VITAMIN PO      None Entered        valsartan 320 MG tablet    DIOVAN    90 tablet    Take 1 tablet (320 mg) by mouth daily    Essential hypertension with goal blood pressure less than 140/90

## 2017-08-03 NOTE — PATIENT INSTRUCTIONS
Gupta s Cyst    You have a Baker s cyst. This is a lump or bulge in the back of your knee. It is caused when extra joint fluid flows into a small sac behind the knee. The extra fluid occurs because arthritis or a torn cartilage irritates the knee joint.  A small Gupta s cyst often has no symptoms. A larger cyst can cause knee pain or a feeling of pressure behind your knee when you try to fully straighten or bend that joint. A Baker s cyst can leak, leading fluid to move down into your lower leg. This causes swelling, pain, and redness.  Treatment involves draining the extra fluid. Or medicine may be injected to reduce redness and swelling. If the extra fluid is caused by a torn cartilage, then surgery to repair the cartilage may be the best treatment option. If arthritis is the cause, and it does not get better with treatment, surgery can be done to remove the cyst.  Home care    If you have knee pain, stay off the affected leg as much as possible until the pain eases.    Apply an ice pack to the painful area for no more than 20 minutes. Do this every 3 to 6 hours for the first 24 to 48 hours. Keep using ice packs 3 to 4 times a day for the next few days, as needed for pain. To make an ice pack, put ice cubes in a sealed zip-lock plastic bag. Wrap the bag in a clean, thin towel or cloth. Never put ice or an ice pack directly on the skin.    If you were given a hook-and-loop knee brace, you may open the brace to apply ice. Unless told otherwise, you may remove the brace to bathe and sleep.    You may use over-the-counter pain medicine to control pain, unless another medicine was prescribed. Talk with your provider before using these medicines if you have chronic liver or kidney disease, or have ever had a stomach ulcer or GI (gastrointestinal) bleeding.       If crutches or a walker have been recommended, don t bear full weight on your injured leg until you can do so without pain. Check with your provider before  returning to sports or full work duties.  Follow-up care  Follow up with your healthcare provider within 1 to 2 weeks, or as advised.  If X-rays were taken, you will be notified of any new findings that may affect your care.  When to seek medical advice  Call your healthcare provider right away if any of these occur:    Toes or foot become swollen, cold, blue, numb or tingly    Pain or swelling increases    Warmth or redness appears over the knee    Redness, swelling or pain occurs in the calf or lower leg  Date Last Reviewed: 11/20/2015 2000-2017 [employer]. [address] [place] [address]. All rights reserved. This information is not intended as a substitute for professional medical care. Always follow your healthcare professional's instructions.        Treatment for Baker s Cyst (Popliteal Cyst)  A Baker s cyst (popliteal cyst) is a fluid-filled sac that forms behind the knee.  Types of treatment  You likely won t need any treatment if you don t have any symptoms from your Baker s cyst. Some Baker s cysts go away without any treatment. If your cyst starts causing symptoms, you might need treatment at that time.  If you do have symptoms, you may be treated depending on the cause of your cyst. For example, you may need medicine for rheumatoid arthritis. Or you may need physical therapy for osteoarthritis.  Other treatments for a Baker s cyst can include:    Over-the-counter pain medicines    Arthrocentesis to remove extra fluid from the joint space    Steroid injection into the joint to reduce cyst size    Surgery to remove the cyst  Possible complications of a Baker s cyst  In rare cases, a Baker s cyst may cause complications. The cyst may get larger, which may cause redness and swelling. The cyst may also rupture, causing warmth, redness, and pain in your calf.  The symptoms may be the same as a blood clot in the veins of the legs. Your health care provider may need imaging tests of  your leg to make sure you don t have a clot. Rupture can also lead to its own complications, such as:    Trapping of a tibial nerve. This cases calf pain and numbness behind the leg. It can be treated with arthrocentesis and steroid injections.    Blockage of the popliteal artery. This causes pain and lack of blood flow to the leg. It can also be treated with arthrocentesis and steroid injections.    Compartment syndrome. This causes intense pain and problems moving the foot or toes. Compartment syndrome is a medical emergency. It needs immediate surgery. It can lead to permanent muscle damage if not treated right away.  When to call the health care provider  If your cyst starts causing mild symptoms, plan to see your health care provider soon. See him or her right away if you have symptoms such as redness and swelling of your leg. These symptoms may mean your Baker s cyst has ruptured.      Date Last Reviewed: 7/21/2015 2000-2017 [employer]. [address] [place] [address], [place] [address]. All rights reserved. This information is not intended as a substitute for professional medical care. Always follow your healthcare professional's instructions.

## 2017-08-08 ENCOUNTER — OFFICE VISIT (OUTPATIENT)
Dept: AUDIOLOGY | Facility: CLINIC | Age: 82
End: 2017-08-08
Payer: COMMERCIAL

## 2017-08-08 DIAGNOSIS — H90.3 SENSORINEURAL HEARING LOSS, ASYMMETRICAL: Primary | ICD-10-CM

## 2017-08-08 PROCEDURE — V5020 CONFORMITY EVALUATION: HCPCS | Performed by: AUDIOLOGIST

## 2017-08-08 PROCEDURE — V5264 EAR MOLD/INSERT: HCPCS | Performed by: AUDIOLOGIST

## 2017-08-08 PROCEDURE — V5261 HEARING AID, DIGIT, BIN, BTE: HCPCS | Performed by: AUDIOLOGIST

## 2017-08-08 PROCEDURE — V5160 DISPENSING FEE BINAURAL: HCPCS | Performed by: AUDIOLOGIST

## 2017-08-08 PROCEDURE — V5011 HEARING AID FITTING/CHECKING: HCPCS | Performed by: AUDIOLOGIST

## 2017-08-08 PROCEDURE — 92593 HC HEARING AID CHECK, BINAURAL: CPT | Performed by: AUDIOLOGIST

## 2017-08-08 NOTE — MR AVS SNAPSHOT
After Visit Summary   8/8/2017    China Guzman    MRN: [id number]           Patient Information     Date Of Birth          4/10/1930        Visit Information        Provider Department      8/8/2017 8:00 AM Merlin Salazar [facility]        Today's Diagnoses     Sensorineural hearing loss, asymmetrical    -  1       Follow-ups after your visit        Your next 10 appointments already scheduled     Aug 11, 2017  9:20 AM CDT   New Visit with Terrie Laguerre MD   [facility] [place] ([facility] [place])    [address]  [place] [address]   [phone]            Aug 23, 2017  1:30 PM CDT   [facility] Visit with Zhane Ingram   [facility]    [address] [facility] [address]   [phone]            Sep 01, 2017  9:15 AM CDT   [facility] Visit with Srikanth Craig MD   [facility]    [address] [facility] [address] [phone]              Who to contact     If you have questions or need follow up information about today's clinic visit or your schedule please contact [facility] directly at [phone].  Normal or non-critical lab and imaging results will be communicated to you by "[employer]hart, letter or phone within 4 business days after the clinic has received the results. If you do not hear from us within 7 days, please contact the clinic through "[employer]hart or phone. If you have a critical or abnormal lab result, we will notify you by phone as soon as possible.  Submit refill requests through [employer] or call your pharmacy and they will forward the refill request to us. Please allow 3 business days for your refill to be completed.          Additional Information About Your Visit        "[employer]hart Information     [employer] lets you send messages to your doctor, view your test results, renew your  "prescriptions, schedule appointments and more. To sign up, go to [email or web address] . Click on \"Log in\" on the left side of the screen, which will take you to the Welcome page. Then click on \"Sign up Now\" on the right side of the page.     You will be asked to enter the access code listed below, as well as some personal information. Please follow the directions to create your username and password.     Your access code is: [email or web address]  Expires: 2017  3:02 PM     Your access code will  in 90 days. If you need help or a new code, please call your [facility] clinic or [phone].        Care EveryWhere ID     This is your Care EveryWhere ID. This could be used by other organizations to access your [facility] medical records  [id number]        Your Vitals Were     Last Period                   1980            Blood Pressure from Last 3 Encounters:   17 138/68   17 112/70   17 146/60    Weight from Last 3 Encounters:   17 120 lb (54.4 kg)   17 120 lb (54.4 kg)   17 121 lb (54.9 kg)              We Performed the Following     DISPENSING FEE, BINAURAL HEARING AID     EAR MOLD/INSERT, NONDISPOSABLE, ANY TYPE     HEARING AID BTE DIGITAL, BINAURAL     HEARING AID CHECK, BINAURAL     HEARING AID CONFORMITY EVALUATION     HEARING AID FIT/ORIENTATION/CHECK        Primary Care Provider Office Phone # Fax #    Colby Trimble [phone]       [facility] [address] [facility] [address]        Equal Access to Services     [facility] : Hadii richie Lang, waaxda luqadaha, qaybta radhaalalan brooks. So [facility] [phone].    ATENCIÓN: Si habla español, tiene a mary disposición servicios gratuitos de asistencia lingüística. Joao al [phone].    We comply with applicable federal civil rights laws and [place] laws. We do not discriminate on the basis of race, color, national " origin, age, disability sex, sexual orientation or gender identity.            Thank you!     Thank you for choosing [facility]  for your care. Our goal is always to provide you with excellent care. Hearing back from our patients is one way we can continue to improve our services. Please take a few minutes to complete the written survey that you may receive in the mail after your visit with us. Thank you!             Your Updated Medication List - Protect others around you: Learn how to safely use, store and throw away your medicines at www.disposemymeds.org.          This list is accurate as of: 8/8/17  9:24 AM.  Always use your most recent med list.                   Brand Name Dispense Instructions for use Diagnosis    aspirin 325 MG EC tablet      Take 325 mg by mouth daily.        atorvastatin 10 MG tablet    LIPITOR    90 tablet    Take 1 tablet (10 mg) by mouth At Bedtime    Hyperlipidemia LDL goal <130       CALCIUM + D PO      2 tablets daily        diltiazem 180 MG 24 hr capsule    DILACOR XR    90 capsule    Take 1 capsule (180 mg) by mouth daily    Essential hypertension with goal blood pressure less than 140/90       hydrochlorothiazide 25 MG tablet    HYDRODIURIL    90 tablet    Take 1 tablet (25 mg) by mouth every morning    Essential hypertension with goal blood pressure less than 140/90       latanoprost 0.005 % ophthalmic solution    XALATAN    3 Bottle    Place 1 drop into both eyes At Bedtime    Borderline glaucoma with ocular hypertension, bilateral       MULTI-VITAMIN PO      None Entered        order for DME     1 Device    Walker. Expected duration of use: 3 months.    Baker's cyst of knee, left, Acute pain of left knee       valsartan 320 MG tablet    DIOVAN    90 tablet    Take 1 tablet (320 mg) by mouth daily    Essential hypertension with goal blood pressure less than 140/90

## 2017-08-08 NOTE — PROGRESS NOTES
AUDIOLOGY REPORT    SUBJECTIVE: China Guzman is a 87 year old female who was seen in the Audiology Clinic at the [facility] for a fitting of binaural hearing aids. Previous results have revealed a bilateral asymmetric sensorineural hearing loss. The patient was given medical clearance to pursue amplification by  Dr. Skyler MD. Patient was accompanied to today's appointment by her friend.     OBJECTIVE: Prior to fitting, a hearing aid check was performed to ensure devise functionality.The hearing aid conformity evaluation was completed.The hearing aids were placed and they provided a good fit. Real-ear-probe-microphone measurements were completed on the [employer] system and were a good match to NAL-NL2 target with soft sounds audible, moderate sounds comfortable, and loud sounds below discomfort. UCLs are verified through maximum power output measures and demonstrate appropriate limiting of loud inputs. China was oriented to proper hearing aid use, care, cleaning (no water, dry brush), batteries (size 312, insertion/removal, toxicity, low-battery signal), aid insertion/removal, user booklet, warranty information, storage cases, and other hearing aid details. The patient confirmed understanding of hearing aid use and care, and showed proper insertion of hearing aid and batteries while in the office today.China reported good volume and sound quality today.   Hearing aids were programmed as follows:  Program 1: All Around    ASSESSMENT: Binaural hearing aid(s) were fit today. Verification measures were performed.   EAR(S) FIT: Binaural  HEARING AID MODEL NAME:  Oticon OPN 3  HEARING AID STYLE: RITE  EARMOLDS/TIP/ LINK: [employer] with canal locks  SERIAL NUMBERS: Right: [id number]; Left: [id number]  WARRANTY END DATE: 8/24/2020  China signed the Hearing Aid Purchase Agreement and was given a copy, as well as details on her hearing aids.    PLAN:China will return for follow-up in 2-3 weeks for a hearing  aid review appointment. Please call this clinic with questions regarding today s appointment.    CHARGES:    89946 Hearing Aid Check ($81),  Dispensing Fee ($500),  x2 Fit/Orientation ($162),  x2 HA Conformity Eval ($87),  x2 Earmolds ($80),  Digital BTE Hearing Aid ($3521). Total: $4760. Bill to [employer] then balance to patient.     Merlin Cedeño CCC-A  Licensed Audiologist #[id number]  8/8/2017

## 2017-08-11 ENCOUNTER — OFFICE VISIT (OUTPATIENT)
Dept: ORTHOPEDICS | Facility: CLINIC | Age: 82
End: 2017-08-11
Payer: COMMERCIAL

## 2017-08-11 VITALS
BODY MASS INDEX: 24.19 KG/M2 | HEART RATE: 84 BPM | SYSTOLIC BLOOD PRESSURE: 187 MMHG | WEIGHT: 120 LBS | HEIGHT: 59 IN | DIASTOLIC BLOOD PRESSURE: 83 MMHG

## 2017-08-11 DIAGNOSIS — S76.312A LEFT HAMSTRING MUSCLE STRAIN, INITIAL ENCOUNTER: Primary | ICD-10-CM

## 2017-08-11 DIAGNOSIS — G89.29 CHRONIC PAIN OF LEFT KNEE: ICD-10-CM

## 2017-08-11 DIAGNOSIS — M25.562 CHRONIC PAIN OF LEFT KNEE: ICD-10-CM

## 2017-08-11 PROCEDURE — 99203 OFFICE O/P NEW LOW 30 MIN: CPT | Performed by: PHYSICAL MEDICINE & REHABILITATION

## 2017-08-11 NOTE — PATIENT INSTRUCTIONS
Today's Plan of Care:  -Ibuprofen (Advil) maximum of 800mg every 4-6 hours with food as needed for pain.  -Continue using the brace  -Use walker as needed  -Rehab: Home Exercise Program. Please do 5-6 days of exercises per week     Follow Up: as needed

## 2017-08-11 NOTE — PROGRESS NOTES
Sports Medicine Clinic Visit    PCP: Colby Trimble    CC: Patient presents with:  Knee left      HPI:  China Guzman is a 87 year old female who is seen in consultation at the request of Radha Bradford MD. She notes pain that began 1 week ago with insidious onset. She feels better than she did when she saw Dr Bradford.  Pain is located on the left posterior knee and distal hamstring.  She rates the pain at a  3/10 at its worst and a 3/10 currently.  Symptoms are relieved with bracing, ibuprofen, and using a walker and worsened by sitting and getting up from a seated position, extension and walking. She endorses pulling sensation and denies swelling, bruising, popping, grinding, catching, locking, instability, numbness, tingling, weakness, pain in other joints and fever, chills.  Other treatment has included nothing. She notes difficulty with sitting and standing from a seated position.  She says she is doing much better than she was a week ago.        Review of Systems:  Musculoskeletal: as above  Remainder of review of systems is negative including constitutional, eyes, ENT, CV, pulmonary, GI, , endocrine, skin, hematologic, and neurologic except as noted in HPI or medical history.    History reviewed. No pertinent past surgical/medical/family/social history.    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 Brother      CEREBROVASCULAR DISEASE Brother      HEART DISEASE Brother      Asthma Daughter      Breast Cancer Daughter      Thyroid Disease Daughter      Social History     Social History     Marital status:      Spouse name: N/A     Number of children: 6     Years of education: N/A     Occupational History      Retired     Social History Main Topics     Smoking status: Never Smoker     Smokeless tobacco: Never Used     Alcohol use No     Drug use: No     Sexual activity: No     Other Topics Concern     Not on file     Social History Narrative       She is retired and lives with her daughter  At baseline, she does not need assistance with ambulation      Current Outpatient Prescriptions   Medication     order for DME     hydrochlorothiazide (HYDRODIURIL) 25 MG tablet     diltiazem (DILACOR XR) 180 MG 24 hr capsule     valsartan (DIOVAN) 320 MG tablet     latanoprost (XALATAN) 0.005 % ophthalmic solution     atorvastatin (LIPITOR) 10 MG tablet     aspirin 325 MG EC tablet     CALCIUM + D OR     MULTI-VITAMIN OR     No current facility-administered medications for this visit.      No Known Allergies      Objective:  /83  Pulse 84  Ht 4' 11\" (1.499 m)  Wt 120 lb (54.4 kg)  LMP 12/16/1980  BMI 24.24 kg/m2    General: Alert and in no distress    Head: Normocephalic, atraumatic  Eyes: no scleral icterus or conjunctival erythema   Oropharynx:  Mucous membranes moist  Skin: no erythema, ecchymosis, petechiae, or jaundice  CV: regular rhythm by palpation, 2+ distal pulses  Resp: normal respiratory effort without conversational dyspnea   Psych: normal mood and affect    Gait: Antalgic, using a walker for ambulation  Neuro: Motor strength and sensation as noted below    Musculoskeletal:    Bilateral Knee exam    Inspection:  No erythema, ecchymosis, warmth, or edema    Palpation: Mildly tender " over the left posterior knee    Knee ROM: Left knee flexion is reduced and painful     Patellar Motion:      Normal patellar tracking noted through range of motion    Strength:  5/5 Hip flexion/abduction/adduction, knee extension, ankle plantarflexion/dorsiflexion, great toe extension, toe flexion.  Left knee flexion 4/5, right knee flexin 5/5.    Sensation:  Intact to light touch in the bilateral lower extremities      Radiology:  Independent visualization of images performed.    Recent Results (from the past 744 hour(s))   US Lower Extremity Venous Duplex Left    Narrative    VENOUS ULTRASOUND LEFT LEG  8/3/2017 12:24 PM     HISTORY: Pain in left leg. Pain in left knee.    COMPARISON: None.    FINDINGS:  Examination of the deep veins with graded compression and  color flow Doppler with spectral wave form analysis shows no evidence  of thrombus in the common femoral vein, femoral vein, popliteal vein  or calf veins. There is a fluid collection in the left popliteal fossa  measuring 3.5 x 1.5 x 3.2 cm, likely a Baker's cyst.       Impression    IMPRESSION:   1. No DVT in the left lower extremity.  2. Anechoic fluid collection in the left popliteal fossa measuring 3.5  x 1.5 x 3.2 cm, likely a Baker's cyst.     ARASELI HARDIN, DO   XR Knee Left 3 Views    Narrative    XR KNEE LT 3 VW 8/3/2017 12:33 PM    HISTORY: Left knee pain.    COMPARISON: None.    FINDINGS: Mild narrowing of medial tibiofemoral compartment joint  space. Complete loss of lateral patellofemoral compartment joint  space. Small marginal osteophytes. No joint effusion. No fracture or  malalignment.      Impression    IMPRESSION: Marked patellofemoral degenerative change.    ARSENIO MASON MD       Assessment:  1. Left hamstring muscle strain, initial encounter    2. Chronic pain of left knee        Plan:  Discussed the assessment with the patient. We discussed the following treatment options: symptom treatment, activity modification/rest, imaging  and rehab. Following discussion, plan:  -China is doing better now than one week ago.  Recommend continuing the soft knee brace as desired.  Ice for 15-20 minutes as needed for soreness.  For more significant pain, she can take ibuprofen or Tylenol.  Please take ibuprofen with food.  Use the walker as needed for support and safety; wean off when feeling comfortable.  We also provided her with home range of motion and gentle strengthening exercises.  Follow up as needed if symptoms do not continue to improve.  Please call with questions or concerns,        Crissy Laguerre MD, CAQ  [facility]

## 2017-08-11 NOTE — MR AVS SNAPSHOT
After Visit Summary   8/11/2017    China Guzman    MRN: [id number]           Patient Information     Date Of Birth          4/10/1930        Visit Information        Provider Department      8/11/2017 9:20 AM Terrie Laguerre MD [facility]        Care Instructions    Today's Plan of Care:  -Ibuprofen (Advil) maximum of 800mg every 4-6 hours with food as needed for pain.  -Continue using the brace  -Use walker as needed  -Rehab: Home Exercise Program. Please do 5-6 days of exercises per week     Follow Up: as needed            Follow-ups after your visit        Your next 10 appointments already scheduled     Aug 23, 2017  1:30 PM CDT   [facility]    [address] [place] [address]   [phone]            Sep 01, 2017  9:15 AM CDT   [facility] Craig [facility]   [facility]    [address] [place] [id number] [phone]              Who to contact     If you have questions or need follow up information about today's clinic visit or your schedule please contact [facility] directly at [phone].  Normal or non-critical lab and imaging results will be communicated to you by [email or web address], letter or phone within 4 business days after the clinic has received the results. If you do not hear from us within 7 days, please contact the clinic through [email or web address] or phone. If you have a critical or abnormal lab result, we will notify you by phone as soon as possible.  Submit refill requests through [employer]Co or call your pharmacy and they will forward the refill request to us. Please allow 3 business days for your refill to be completed.          Additional Information About Your Visit        True&Co Information     True[employer]Co lets you send messages to your doctor, view your test  "results, renew your prescriptions, schedule appointments and more. To sign up, go to [email or web address] . Click on \"Log in\" on the left side of the screen, which will take you to the Welcome page. Then click on \"Sign up Now\" on the right side of the page.     You will be asked to enter the access code listed below, as well as some personal information. Please follow the directions to create your username and password.     Your access code is: [email or web address]  Expires: 2017  3:02 PM     Your access code will  in 90 days. If you need help or a new code, please call your [place] clinic or [phone].        Care EveryWhere ID     This is your Care EveryWhere ID. This could be used by other organizations to access your [place] medical records  [id number]        Your Vitals Were     Pulse Height Last Period BMI (Body Mass Index)          84 4' 11\" (1.499 m) 1980 24.24 kg/m2         Blood Pressure from Last 3 Encounters:   17 187/83   17 138/68   17 112/70    Weight from Last 3 Encounters:   17 120 lb (54.4 kg)   17 120 lb (54.4 kg)   17 120 lb (54.4 kg)              Today, you had the following     No orders found for display       Primary Care Provider Office Phone # Fax #    Colby Trimble [phone] [address] [facility] [address]        Equal Access to Services     [facility] : Hadii richie rosa hadasho Soomaali, waaxda luqadaha, qaybta kaalmada kylee, alan tello . So [facility] [phone].    ATENCIÓN: Si habla español, tiene a mary disposición servicios gratuitos de asistencia lingüística. Llame al [phone].    We comply with applicable federal civil rights laws and [place] laws. We do not discriminate on the basis of race, color, national origin, age, disability sex, sexual orientation or gender identity.            Thank you!     Thank you for choosing [facility] " [facility]  for your care. Our goal is always to provide you with excellent care. Hearing back from our patients is one way we can continue to improve our services. Please take a few minutes to complete the written survey that you may receive in the mail after your visit with us. Thank you!             Your Updated Medication List - Protect others around you: Learn how to safely use, store and throw away your medicines at www.disposemymeds.org.          This list is accurate as of: 8/11/17  9:48 AM.  Always use your most recent med list.                   Brand Name Dispense Instructions for use Diagnosis    aspirin 325 MG EC tablet      Take 325 mg by mouth daily.        atorvastatin 10 MG tablet    LIPITOR    90 tablet    Take 1 tablet (10 mg) by mouth At Bedtime    Hyperlipidemia LDL goal <130       CALCIUM + D PO      2 tablets daily        diltiazem 180 MG 24 hr capsule    DILACOR XR    90 capsule    Take 1 capsule (180 mg) by mouth daily    Essential hypertension with goal blood pressure less than 140/90       hydrochlorothiazide 25 MG tablet    HYDRODIURIL    90 tablet    Take 1 tablet (25 mg) by mouth every morning    Essential hypertension with goal blood pressure less than 140/90       latanoprost 0.005 % ophthalmic solution    XALATAN    3 Bottle    Place 1 drop into both eyes At Bedtime    Borderline glaucoma with ocular hypertension, bilateral       MULTI-VITAMIN PO      None Entered        order for DME     1 Device    Walker. Expected duration of use: 3 months.    Baker's cyst of knee, left, Acute pain of left knee       valsartan 320 MG tablet    DIOVAN    90 tablet    Take 1 tablet (320 mg) by mouth daily    Essential hypertension with goal blood pressure less than 140/90

## 2017-08-11 NOTE — NURSING NOTE
"Chief Complaint   Patient presents with     Knee left       Initial /83  Pulse 84  Ht 4' 11\" (1.499 m)  Wt 120 lb (54.4 kg)  LMP 12/16/1980  BMI 24.24 kg/m2 Estimated body mass index is 24.24 kg/(m^2) as calculated from the following:    Height as of this encounter: 4' 11\" (1.499 m).    Weight as of this encounter: 120 lb (54.4 kg).  Medication Reconciliation: complete     Kym Aden M.Ed., ATR, ATC      "

## 2017-08-23 ENCOUNTER — OFFICE VISIT (OUTPATIENT)
Dept: AUDIOLOGY | Facility: CLINIC | Age: 82
End: 2017-08-23
Payer: COMMERCIAL

## 2017-08-23 DIAGNOSIS — H90.3 SENSORINEURAL HEARING LOSS, ASYMMETRICAL: Primary | ICD-10-CM

## 2017-08-23 PROCEDURE — V5299 HEARING SERVICE: HCPCS | Performed by: AUDIOLOGIST

## 2017-08-23 PROCEDURE — 99207 ZZC NO CHARGE LOS: CPT | Performed by: AUDIOLOGIST

## 2017-08-23 NOTE — MR AVS SNAPSHOT
"              After Visit Summary   8/23/2017    China Guzman    MRN: [id number]           Patient Information     Date Of Birth          4/10/1930        Visit Information        Provider Department      8/23/2017 1:30 PM Merlin Salazar AuD [facility]        Today's Diagnoses     Sensorineural hearing loss, asymmetrical    -  1       Follow-ups after your visit        Your next 10 appointments already scheduled     Sep 01, 2017  9:15 AM CDT   Return Visit with Srikanth Craig MD   [facility] ([address] [address] [phone]              Who to contact     If you have questions or need follow up information about today's clinic visit or your schedule please contact [facility] directly at [phone].  Normal or non-critical lab and imaging results will be communicated to you by MyChart, letter or phone within 4 business days after the clinic has received the results. If you do not hear from us within 7 days, please contact the clinic through MyChart or phone. If you have a critical or abnormal lab result, we will notify you by phone as soon as possible.  Submit refill requests through [employer] or call your pharmacy and they will forward the refill request to us. Please allow 3 business days for your refill to be completed.          Additional Information About Your Visit        MyChart Information     Spinomix lets you send messages to your doctor, view your test results, renew your prescriptions, schedule appointments and more. To sign up, go to [email or web address] . Click on \"Log in\" on the left side of the screen, which will take you to the Welcome page. Then click on \"Sign up Now\" on the right side of the page.     You will be asked to enter the access code listed below, as well as some personal information. Please follow the directions to create your username and password.     Your access code is: " [email or web address]  Expires: 2017  3:02 PM     Your access code will  in 90 days. If you need help or a new code, please call your [place] clinic or [phone].        Care EveryWhere ID     This is your Care EveryWhere ID. This could be used by other organizations to access your [place] medical records  [id number]        Your Vitals Were     Last Period                   1980            Blood Pressure from Last 3 Encounters:   17 187/83   17 138/68   17 112/70    Weight from Last 3 Encounters:   17 120 lb (54.4 kg)   17 120 lb (54.4 kg)   17 120 lb (54.4 kg)              We Performed the Following     HEARING AID CHECK/NO CHARGE        Primary Care Provider Office Phone # Fax #    Colby Trimble [phone] [address] [facility] [address]        Equal Access to Services     [facility] : Hadii aad ku hadasho Soomaali, waaxda luqadaha, qaybta kaalmada adeegyada, waxay idiin hayaan mendezeg dirk tello . So [facility] [phone].    ATENCIÓN: Si habla español, tiene a mary disposición servicios gratuitos de asistencia lingüística. Llame al [phone].    We comply with applicable federal civil rights laws and [place] laws. We do not discriminate on the basis of race, color, national origin, age, disability sex, sexual orientation or gender identity.            Thank you!     Thank you for choosing [facility]  for your care. Our goal is always to provide you with excellent care. Hearing back from our patients is one way we can continue to improve our services. Please take a few minutes to complete the written survey that you may receive in the mail after your visit with us. Thank you!             Your Updated Medication List - Protect others around you: Learn how to safely use, store and throw away your medicines at www.disposemymeds.org.          This list is accurate as of: 17  1:55 PM.  Always use your most recent  med list.                   Brand Name Dispense Instructions for use Diagnosis    aspirin 325 MG EC tablet      Take 325 mg by mouth daily.        atorvastatin 10 MG tablet    LIPITOR    90 tablet    Take 1 tablet (10 mg) by mouth At Bedtime    Hyperlipidemia LDL goal <130       CALCIUM + D PO      2 tablets daily        diltiazem 180 MG 24 hr capsule    DILACOR XR    90 capsule    Take 1 capsule (180 mg) by mouth daily    Essential hypertension with goal blood pressure less than 140/90       hydrochlorothiazide 25 MG tablet    HYDRODIURIL    90 tablet    Take 1 tablet (25 mg) by mouth every morning    Essential hypertension with goal blood pressure less than 140/90       latanoprost 0.005 % ophthalmic solution    XALATAN    3 Bottle    Place 1 drop into both eyes At Bedtime    Borderline glaucoma with ocular hypertension, bilateral       MULTI-VITAMIN PO      None Entered        order for DME     1 Device    Walker. Expected duration of use: 3 months.    Baker's cyst of knee, left, Acute pain of left knee       valsartan 320 MG tablet    DIOVAN    90 tablet    Take 1 tablet (320 mg) by mouth daily    Essential hypertension with goal blood pressure less than 140/90

## 2017-08-23 NOTE — PROGRESS NOTES
Audiology HA Trial Follow-up    Background/Symptoms: China Guzman  was seen today for follow up of her recent fitting of Binaural Oticon OPN3 RITEs.    Findings/Procedures Performed:  Ms. Guzman has been able to insert and remove the left hearing aid without difficulty, as well as change the batteries. The patient reports good sound quality with the hearing aid(s) and increased wear time with the heaing aids. Today we worked on insertion of the right ear hearing aid and by the end of the appointment she was able to insert it with very little difficulty.       Reviewed general cleaning and changing waxguards, which were performed without difficulty by China's daughter who will be helping China with her hearing aid needs.       Recommendation: Return to clinic in 3 weeks.    NO CHARGE VISIT    Merlin Cedeño CCC-A  Licensed Audiologist #[id number]  8/23/2017

## 2017-09-01 ENCOUNTER — OFFICE VISIT (OUTPATIENT)
Dept: OPHTHALMOLOGY | Facility: CLINIC | Age: 82
End: 2017-09-01
Payer: COMMERCIAL

## 2017-09-01 DIAGNOSIS — H40.053 BORDERLINE GLAUCOMA WITH OCULAR HYPERTENSION, BILATERAL: Primary | ICD-10-CM

## 2017-09-01 PROCEDURE — 92083 EXTENDED VISUAL FIELD XM: CPT | Performed by: OPHTHALMOLOGY

## 2017-09-01 PROCEDURE — 92133 CPTRZD OPH DX IMG PST SGM ON: CPT | Performed by: OPHTHALMOLOGY

## 2017-09-01 PROCEDURE — 92012 INTRM OPH EXAM EST PATIENT: CPT | Performed by: OPHTHALMOLOGY

## 2017-09-01 ASSESSMENT — VISUAL ACUITY
OD_CC: 20/60
OD_CC+: -2
METHOD: SNELLEN - LINEAR
CORRECTION_TYPE: GLASSES

## 2017-09-01 ASSESSMENT — TONOMETRY
IOP_METHOD: APPLANATION
OD_IOP_MMHG: 18
OS_IOP_MMHG: 19

## 2017-09-01 ASSESSMENT — SLIT LAMP EXAM - LIDS
COMMENTS: GOOD CREASE AND COSMESIS, 1+ PTOSIS
COMMENTS: GOOD CREASE AND COSMESIS

## 2017-09-01 ASSESSMENT — EXTERNAL EXAM - LEFT EYE: OS_EXAM: NORMAL

## 2017-09-01 ASSESSMENT — EXTERNAL EXAM - RIGHT EYE: OD_EXAM: NORMAL

## 2017-09-01 NOTE — PROGRESS NOTES
Current Eye Medications:  Latanoprost both eyes every evening.  Last drops:  8pm     Subjective:  Follow up borderline glaucoma with Ocular Hypertension.  Patient is here for a pressure check, OCT, and visual field.  No vision changes or concerns.      Objective:  See Ophthalmology Exam.       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      Plan:  Continue same medication.  Return visit 6 months for a complete exam.      Srikanth Craig M.D.

## 2017-09-03 ASSESSMENT — PACHYMETRY
OD_CT(UM): .541
OS_CT(UM): .539

## 2017-09-14 ENCOUNTER — OFFICE VISIT (OUTPATIENT)
Dept: AUDIOLOGY | Facility: CLINIC | Age: 82
End: 2017-09-14
Payer: COMMERCIAL

## 2017-09-14 DIAGNOSIS — H90.3 SENSORINEURAL HEARING LOSS, ASYMMETRICAL: Primary | ICD-10-CM

## 2017-09-14 PROCEDURE — V5299 HEARING SERVICE: HCPCS | Performed by: AUDIOLOGIST

## 2017-09-14 PROCEDURE — 99207 ZZC NO CHARGE LOS: CPT | Performed by: AUDIOLOGIST

## 2017-09-14 NOTE — PROGRESS NOTES
Audiology HA Trial Follow-up    Background/Symptoms: China Guzman  was seen today for follow up of her recent fitting of Binaural Oticon OPN 3 RITEs. Patient was accompanied to today's appointment by her friend.     Findings/Procedures Performed:  Ms. Guzman has been able to insert and remove the hearing aids without difficulty, as well as change the batteries. The patient reports good sound quality with the hearing aid(s) and increased wear time with the heaing aids.       IOI-HA. Outcomes assessed. Performance improved in most established listening environments.      Hearing aid reprogram and adjustments Right; reduced overall gain to acclimatization level 1 as patient reports the right ear is a bit louder than she would like. Ms. Guzman indicated she was pleased with the resulting sound quality.      Reviewed changing waxguards which was performed without difficulty.      Recommendation: Return to clinic as needed.    NO CHARGE VISIT    Merlin Cedeño CCC-MARY  Licensed Audiologist #[id number]  9/14/2017

## 2017-09-14 NOTE — MR AVS SNAPSHOT
"              After Visit Summary   9/14/2017    China Guzman    MRN: [id number]           Patient Information     Date Of Birth          4/10/1930        Visit Information        Provider Department      9/14/2017 12:00 PM Merlin Salazar AuD [facility]        Today's Diagnoses     Sensorineural hearing loss, asymmetrical    -  1       Follow-ups after your visit        Your next 10 appointments already scheduled     Mar 02, 2018  9:00 AM CST   New Visit with Srikanth Craig MD   [facility] ([address] [address] [phone]              Who to contact     If you have questions or need follow up information about today's clinic visit or your schedule please contact [facility] directly at [phone].  Normal or non-critical lab and imaging results will be communicated to you by MyChart, letter or phone within 4 business days after the clinic has received the results. If you do not hear from us within 7 days, please contact the clinic through MyChart or phone. If you have a critical or abnormal lab result, we will notify you by phone as soon as possible.  Submit refill requests through EdgeSpring or call your pharmacy and they will forward the refill request to us. Please allow 3 business days for your refill to be completed.          Additional Information About Your Visit        MyChart Information     EdgeSpring lets you send messages to your doctor, view your test results, renew your prescriptions, schedule appointments and more. To sign up, go to [email or web address] . Click on \"Log in\" on the left side of the screen, which will take you to the Welcome page. Then click on \"Sign up Now\" on the right side of the page.     You will be asked to enter the access code listed below, as well as some personal information. Please follow the directions to create your username and password.     Your access code is: " [email or web address]  Expires: 2017 12:11 PM     Your access code will  in 90 days. If you need help or a new code, please call your [place] clinic or [phone].        Care EveryWhere ID     This is your Care EveryWhere ID. This could be used by other organizations to access your [place] medical records  [id number]        Your Vitals Were     Last Period                   1980            Blood Pressure from Last 3 Encounters:   17 187/83   17 138/68   17 112/70    Weight from Last 3 Encounters:   17 120 lb (54.4 kg)   17 120 lb (54.4 kg)   17 120 lb (54.4 kg)              We Performed the Following     HEARING AID CHECK/NO CHARGE        Primary Care Provider Office Phone # Fax #    Colby Trimble [phone] [address] [facility] [address]        Equal Access to Services     [facility] : Hadii aad ku hadasho Soomaali, waaxda luqadaha, qaybta kaalmada adeegyada, waxay idiin hayaan mendezeg dirk tello . So [facility] [phone].    ATENCIÓN: Si habla español, tiene a mary disposición servicios gratuitos de asistencia lingüística. Llame al [phone].    We comply with applicable federal civil rights laws and [place] laws. We do not discriminate on the basis of race, color, national origin, age, disability sex, sexual orientation or gender identity.            Thank you!     Thank you for choosing [facility]  for your care. Our goal is always to provide you with excellent care. Hearing back from our patients is one way we can continue to improve our services. Please take a few minutes to complete the written survey that you may receive in the mail after your visit with us. Thank you!             Your Updated Medication List - Protect others around you: Learn how to safely use, store and throw away your medicines at www.disposemymeds.org.          This list is accurate as of: 17 12:11 PM.  Always use your most  recent med list.                   Brand Name Dispense Instructions for use Diagnosis    aspirin 325 MG EC tablet      Take 325 mg by mouth daily.        atorvastatin 10 MG tablet    LIPITOR    90 tablet    Take 1 tablet (10 mg) by mouth At Bedtime    Hyperlipidemia LDL goal <130       CALCIUM + D PO      2 tablets daily        diltiazem 180 MG 24 hr capsule    DILACOR XR    90 capsule    Take 1 capsule (180 mg) by mouth daily    Essential hypertension with goal blood pressure less than 140/90       hydrochlorothiazide 25 MG tablet    HYDRODIURIL    90 tablet    Take 1 tablet (25 mg) by mouth every morning    Essential hypertension with goal blood pressure less than 140/90       latanoprost 0.005 % ophthalmic solution    XALATAN    3 Bottle    Place 1 drop into both eyes At Bedtime    Borderline glaucoma with ocular hypertension, bilateral       MULTI-VITAMIN PO      None Entered        order for DME     1 Device    Walker. Expected duration of use: 3 months.    Baker's cyst of knee, left, Acute pain of left knee       valsartan 320 MG tablet    DIOVAN    90 tablet    Take 1 tablet (320 mg) by mouth daily    Essential hypertension with goal blood pressure less than 140/90

## 2017-11-25 DIAGNOSIS — E78.5 HYPERLIPIDEMIA LDL GOAL <130: ICD-10-CM

## 2017-11-27 RX ORDER — ATORVASTATIN CALCIUM 10 MG/1
TABLET, FILM COATED ORAL
Qty: 90 TABLET | Refills: 1 | Status: SHIPPED | OUTPATIENT
Start: 2017-11-27 | End: 2018-01-05

## 2017-12-14 ENCOUNTER — OFFICE VISIT (OUTPATIENT)
Dept: FAMILY MEDICINE | Facility: CLINIC | Age: 82
End: 2017-12-14
Payer: COMMERCIAL

## 2017-12-14 VITALS
HEART RATE: 95 BPM | WEIGHT: 118 LBS | BODY MASS INDEX: 23.83 KG/M2 | SYSTOLIC BLOOD PRESSURE: 157 MMHG | DIASTOLIC BLOOD PRESSURE: 73 MMHG | OXYGEN SATURATION: 96 % | TEMPERATURE: 98.2 F

## 2017-12-14 DIAGNOSIS — E78.5 HYPERLIPIDEMIA LDL GOAL <130: ICD-10-CM

## 2017-12-14 DIAGNOSIS — Z71.89 ADVANCED DIRECTIVES, COUNSELING/DISCUSSION: ICD-10-CM

## 2017-12-14 DIAGNOSIS — I10 ESSENTIAL HYPERTENSION WITH GOAL BLOOD PRESSURE LESS THAN 140/90: ICD-10-CM

## 2017-12-14 PROCEDURE — 99213 OFFICE O/P EST LOW 20 MIN: CPT | Performed by: FAMILY MEDICINE

## 2017-12-14 RX ORDER — ATORVASTATIN CALCIUM 10 MG/1
10 TABLET, FILM COATED ORAL
Qty: 90 TABLET | Refills: 1 | Status: CANCELLED | OUTPATIENT
Start: 2017-12-14

## 2017-12-14 RX ORDER — DILTIAZEM HYDROCHLORIDE 240 MG/1
240 CAPSULE, EXTENDED RELEASE ORAL DAILY
Qty: 30 CAPSULE | Refills: 1 | Status: SHIPPED | OUTPATIENT
Start: 2017-12-14 | End: 2018-01-05

## 2017-12-14 RX ORDER — HYDROCHLOROTHIAZIDE 25 MG/1
25 TABLET ORAL EVERY MORNING
Qty: 90 TABLET | Refills: 1 | Status: SHIPPED | OUTPATIENT
Start: 2017-12-14 | End: 2018-01-05

## 2017-12-14 RX ORDER — VALSARTAN 320 MG/1
320 TABLET ORAL DAILY
Qty: 90 TABLET | Refills: 3 | Status: CANCELLED | OUTPATIENT
Start: 2017-12-14

## 2017-12-14 NOTE — PROGRESS NOTES
SUBJECTIVE:  China Guzman is an 87 year old female who presents for a follow up evaluation of her hypertension.The patient was kept on the same medications at the last visit. The patient reports that she IS taking the medication as prescribed. She denies side effects of medication.      Patient Active Problem List   Diagnosis     Osteoporosis     HL (hearing loss)     Gallstones     Malignant basal cell neoplasm of skin     Hyperlipidemia LDL goal <130     Pseudophakia, Yag Caps, ou     Advanced directives, counseling/discussion     Borderline glaucoma with ocular hypertension, bilateral - treated     Family history of colon cancer     Posterior vitreous detachment, bilateral     Macular degeneration, dry, mod, ou     Essential hypertension with goal blood pressure less than 140/90     10 year risk of MI or stroke 7.5% or greater, 39.7% in September 2017 on atorvastatin 10 mg     CKD (chronic kidney disease) stage 3, GFR 30-59 ml/min       Is the HYPERTENSION goal on the problem list? Yes      Use of agents associated with hypertension: none  Current Outpatient Prescriptions   Medication     atorvastatin (LIPITOR) 10 MG tablet     order for DME     hydrochlorothiazide (HYDRODIURIL) 25 MG tablet     diltiazem (DILACOR XR) 180 MG 24 hr capsule     valsartan (DIOVAN) 320 MG tablet     latanoprost (XALATAN) 0.005 % ophthalmic solution     aspirin 325 MG EC tablet     CALCIUM + D OR     MULTI-VITAMIN OR     No current facility-administered medications for this visit.          No Known Allergies    Social History   Substance Use Topics     Smoking status: Never Smoker     Smokeless tobacco: Never Used     Alcohol use No       OBJECTIVE:  /73  Pulse 95  Temp 98.2  F (36.8  C) (Oral)  Wt 118 lb (53.5 kg)  LMP 12/16/1980  SpO2 96%  BMI 23.83 kg/m2    Heart: negative, PMI normal. No lifts, heaves, or thrills. RRR. No murmurs, clicks gallops or rub  Lower Extremities: 0 edema on right and 0 edema on the  left        Results for orders placed or performed in visit on 08/03/17   US Lower Extremity Venous Duplex Left    Narrative    VENOUS ULTRASOUND LEFT LEG  8/3/2017 12:24 PM     HISTORY: Pain in left leg. Pain in left knee.    COMPARISON: None.    FINDINGS:  Examination of the deep veins with graded compression and  color flow Doppler with spectral wave form analysis shows no evidence  of thrombus in the common femoral vein, femoral vein, popliteal vein  or calf veins. There is a fluid collection in the left popliteal fossa  measuring 3.5 x 1.5 x 3.2 cm, likely a Baker's cyst.       Impression    IMPRESSION:   1. No DVT in the left lower extremity.  2. Anechoic fluid collection in the left popliteal fossa measuring 3.5  x 1.5 x 3.2 cm, likely a Baker's cyst.     ARASELI HARDIN, DO   XR Knee Left 3 Views    Narrative    XR KNEE LT 3 VW 8/3/2017 12:33 PM    HISTORY: Left knee pain.    COMPARISON: None.    FINDINGS: Mild narrowing of medial tibiofemoral compartment joint  space. Complete loss of lateral patellofemoral compartment joint  space. Small marginal osteophytes. No joint effusion. No fracture or  malalignment.      Impression    IMPRESSION: Marked patellofemoral degenerative change.    ARSENIO MASON MD       The ASCVD Risk score ([facility], et al., 2013) failed to calculate for the following reasons:    The 2013 ASCVD risk score is only valid for ages 40 to 79    ASSESSMENT:  Essential hypertension which is poorly controlled.       Plan:  - Medication:increase the dose of verapamil to 240 mg.    The patient was advised to do the following therapuetic life style changes  - Dietary sodium restriction and increase potassium and Calcium intake  - Regular aerobic exercise  - Weight loss  - Discontinue smoking if applicable  - Avoid regular NSAID use if applicable  - Avoid regular decongestant use if applicable  - Follow up in clinic in 3 weeks for a recheck  - Check a basic metabolic panel today    Patient  Education: Reviewed risks of hypertension and principles of   Treatment.

## 2017-12-14 NOTE — MR AVS SNAPSHOT
"              After Visit Summary   12/14/2017    China Guzman    MRN: [id number]           Patient Information     Date Of Birth          4/10/1930        Visit Information        Provider Department      12/14/2017 2:15 PM Colby Trimble MD [facility]        Today's Diagnoses     Essential hypertension with goal blood pressure less than 140/90        Hyperlipidemia LDL goal <130        Advanced directives, counseling/discussion           Follow-ups after your visit        Follow-up notes from your care team     Return in about 3 weeks (around 1/4/2018) for BP Recheck.      Your next 10 appointments already scheduled     Mar 02, 2018  9:00 AM CST   New Visit with Srikanth Craig MD   [facility]    [address] [address] [phone]              Who to contact     If you have questions or need follow up information about today's clinic visit or your schedule please contact [facility] directly at [phone].  Normal or non-critical lab and imaging results will be communicated to you by BEZ Systemshart, letter or phone within 4 business days after the clinic has received the results. If you do not hear from us within 7 days, please contact the clinic through BEZ Systemshart or phone. If you have a critical or abnormal lab result, we will notify you by phone as soon as possible.  Submit refill requests through Bunkr or call your pharmacy and they will forward the refill request to us. Please allow 3 business days for your refill to be completed.          Additional Information About Your Visit        MyChart Information     Bunkr lets you send messages to your doctor, view your test results, renew your prescriptions, schedule appointments and more. To sign up, go to [email or web address] . Click on \"Log in\" on the left side of the screen, which will take you to the Welcome page. Then click on \"Sign up Now\" on the right side of " the page.     You will be asked to enter the access code listed below, as well as some personal information. Please follow the directions to create your username and password.     Your access code is: [email or web address]  Expires: 3/14/2018  2:50 PM     Your access code will  in 90 days. If you need help or a new code, please call your [place] clinic or [phone].        Care EveryWhere ID     This is your Care EveryWhere ID. This could be used by other organizations to access your [place] medical records  [id number]        Your Vitals Were     Pulse Temperature Last Period Pulse Oximetry BMI (Body Mass Index)       95 98.2  F (36.8  C) (Oral) 1980 96% 23.83 kg/m2        Blood Pressure from Last 3 Encounters:   17 157/73   17 187/83   17 138/68    Weight from Last 3 Encounters:   17 118 lb (53.5 kg)   17 120 lb (54.4 kg)   17 120 lb (54.4 kg)              Today, you had the following     No orders found for display         Today's Medication Changes          These changes are accurate as of: 17  2:50 PM.  If you have any questions, ask your nurse or doctor.               These medicines have changed or have updated prescriptions.        Dose/Directions    diltiazem 240 MG 24 hr capsule   This may have changed:    - medication strength  - how much to take   Used for:  Essential hypertension with goal blood pressure less than 140/90   Changed by:  Colby Trimble MD        Dose:  240 mg   Take 1 capsule (240 mg) by mouth daily   Quantity:  30 capsule   Refills:  1            Where to get your medicines      These medications were sent to [id number] - [place] - [address]  [address] [address]    Hours:  Same info as [facility] - [place] Phone:  [phone]     diltiazem 240 MG 24 hr capsule    hydrochlorothiazide 25 MG tablet                Primary Care Provider Office Phone # Fax #    Colby Trimble [phone]  [phone]       [address] [facility] [address]        Equal Access to Services     KAYLIN CARR : Hadii richie rosa hadfredrick Soalana, waaxda luqadaha, qaybta kaalmada kylee, alan dickin hayaasalome simmscinthya cavazos omer vegas. So [facility] [phone].    ATENCIÓN: Si habla español, tiene a mary disposición servicios gratuitos de asistencia lingüística. Llame al [phone].    We comply with applicable federal civil rights laws and [place] laws. We do not discriminate on the basis of race, color, national origin, age, disability, sex, sexual orientation, or gender identity.            Thank you!     Thank you for choosing [facility]  for your care. Our goal is always to provide you with excellent care. Hearing back from our patients is one way we can continue to improve our services. Please take a few minutes to complete the written survey that you may receive in the mail after your visit with us. Thank you!             Your Updated Medication List - Protect others around you: Learn how to safely use, store and throw away your medicines at www.disposemymeds.org.          This list is accurate as of: 12/14/17  2:50 PM.  Always use your most recent med list.                   Brand Name Dispense Instructions for use Diagnosis    aspirin 325 MG EC tablet      Take 325 mg by mouth daily.        atorvastatin 10 MG tablet    LIPITOR    90 tablet    TAKE ONE TABLET BY MOUTH AT BEDTIME    Hyperlipidemia LDL goal <130       CALCIUM + D PO      2 tablets daily        diltiazem 240 MG 24 hr capsule     30 capsule    Take 1 capsule (240 mg) by mouth daily    Essential hypertension with goal blood pressure less than 140/90       hydrochlorothiazide 25 MG tablet    HYDRODIURIL    90 tablet    Take 1 tablet (25 mg) by mouth every morning    Essential hypertension with goal blood pressure less than 140/90       latanoprost 0.005 % ophthalmic solution    XALATAN    3 Bottle    Place 1 drop into both eyes At Bedtime     Borderline glaucoma with ocular hypertension, bilateral       MULTI-VITAMIN PO      None Entered        order for DME     1 Device    Walker. Expected duration of use: 3 months.    Baker's cyst of knee, left, Acute pain of left knee       valsartan 320 MG tablet    DIOVAN    90 tablet    Take 1 tablet (320 mg) by mouth daily    Essential hypertension with goal blood pressure less than 140/90

## 2018-01-05 ENCOUNTER — OFFICE VISIT (OUTPATIENT)
Dept: FAMILY MEDICINE | Facility: CLINIC | Age: 83
End: 2018-01-05
Payer: COMMERCIAL

## 2018-01-05 VITALS
HEIGHT: 59 IN | OXYGEN SATURATION: 96 % | WEIGHT: 114 LBS | BODY MASS INDEX: 22.98 KG/M2 | DIASTOLIC BLOOD PRESSURE: 58 MMHG | SYSTOLIC BLOOD PRESSURE: 132 MMHG | TEMPERATURE: 98 F | HEART RATE: 100 BPM

## 2018-01-05 DIAGNOSIS — I10 ESSENTIAL HYPERTENSION WITH GOAL BLOOD PRESSURE LESS THAN 140/90: Primary | ICD-10-CM

## 2018-01-05 DIAGNOSIS — E78.5 HYPERLIPIDEMIA LDL GOAL <130: ICD-10-CM

## 2018-01-05 LAB
ANION GAP SERPL CALCULATED.3IONS-SCNC: 11 MMOL/L (ref 3–14)
BUN SERPL-MCNC: 43 MG/DL (ref 7–30)
CALCIUM SERPL-MCNC: 10.9 MG/DL (ref 8.5–10.1)
CHLORIDE SERPL-SCNC: 99 MMOL/L (ref 94–109)
CO2 SERPL-SCNC: 28 MMOL/L (ref 20–32)
CREAT SERPL-MCNC: 1.23 MG/DL (ref 0.52–1.04)
GFR SERPL CREATININE-BSD FRML MDRD: 41 ML/MIN/1.7M2
GLUCOSE SERPL-MCNC: 113 MG/DL (ref 70–99)
POTASSIUM SERPL-SCNC: 4.1 MMOL/L (ref 3.4–5.3)
SODIUM SERPL-SCNC: 138 MMOL/L (ref 133–144)

## 2018-01-05 PROCEDURE — 36415 COLL VENOUS BLD VENIPUNCTURE: CPT | Performed by: FAMILY MEDICINE

## 2018-01-05 PROCEDURE — 99213 OFFICE O/P EST LOW 20 MIN: CPT | Performed by: FAMILY MEDICINE

## 2018-01-05 PROCEDURE — 80048 BASIC METABOLIC PNL TOTAL CA: CPT | Performed by: FAMILY MEDICINE

## 2018-01-05 RX ORDER — VALSARTAN 320 MG/1
320 TABLET ORAL DAILY
Qty: 90 TABLET | Refills: 1 | Status: SHIPPED | OUTPATIENT
Start: 2018-01-05 | End: 2018-08-24

## 2018-01-05 RX ORDER — ATORVASTATIN CALCIUM 10 MG/1
TABLET, FILM COATED ORAL
Qty: 90 TABLET | Refills: 1 | Status: SHIPPED | OUTPATIENT
Start: 2018-01-05 | End: 2018-11-19

## 2018-01-05 RX ORDER — DILTIAZEM HYDROCHLORIDE 240 MG/1
240 CAPSULE, EXTENDED RELEASE ORAL DAILY
Qty: 90 CAPSULE | Refills: 1 | Status: SHIPPED | OUTPATIENT
Start: 2018-01-05 | End: 2018-07-04

## 2018-01-05 RX ORDER — HYDROCHLOROTHIAZIDE 25 MG/1
25 TABLET ORAL EVERY MORNING
Qty: 90 TABLET | Refills: 1 | Status: SHIPPED | OUTPATIENT
Start: 2018-01-05 | End: 2018-07-16

## 2018-01-05 NOTE — PROGRESS NOTES
"  SUBJECTIVE:  China Guzman is an 87 year old female who presents for a follow up evaluation of her hypertension.The patient had the dose of verapamil raised to 240 mg at the last visit. The patient reports that she IS taking the medication as prescribed. She denies side effects of medication.      Patient Active Problem List   Diagnosis     Osteoporosis     HL (hearing loss)     Gallstones     Malignant basal cell neoplasm of skin     Hyperlipidemia LDL goal <130     Pseudophakia, Yag Caps, ou     Advanced directives, counseling/discussion     Borderline glaucoma with ocular hypertension, bilateral - treated     Family history of colon cancer     Posterior vitreous detachment, bilateral     Macular degeneration, dry, mod, ou     Essential hypertension with goal blood pressure less than 140/90     10 year risk of MI or stroke 7.5% or greater, 39.7% in September 2017 on atorvastatin 10 mg     CKD (chronic kidney disease) stage 3, GFR 30-59 ml/min       Is the HYPERTENSION goal on the problem list? Yes      Use of agents associated with hypertension: none  Current Outpatient Prescriptions   Medication     hydrochlorothiazide (HYDRODIURIL) 25 MG tablet     diltiazem 240 MG 24 hr capsule     atorvastatin (LIPITOR) 10 MG tablet     order for DME     valsartan (DIOVAN) 320 MG tablet     latanoprost (XALATAN) 0.005 % ophthalmic solution     aspirin 325 MG EC tablet     CALCIUM + D OR     MULTI-VITAMIN OR     No current facility-administered medications for this visit.          No Known Allergies    Social History   Substance Use Topics     Smoking status: Never Smoker     Smokeless tobacco: Never Used     Alcohol use No       OBJECTIVE:  /58  Pulse 100  Temp 98  F (36.7  C) (Oral)  Ht 4' 11\" (1.499 m)  Wt 114 lb (51.7 kg)  LMP 12/16/1980  SpO2 96%  BMI 23.03 kg/m2    Heart: negative, PMI normal. No lifts, heaves, or thrills. RRR. No murmurs, clicks gallops or rub  Lower Extremities: 0 edema on right and 0 " edema on the left        Results for orders placed or performed in visit on 08/03/17   US Lower Extremity Venous Duplex Left    Narrative    VENOUS ULTRASOUND LEFT LEG  8/3/2017 12:24 PM     HISTORY: Pain in left leg. Pain in left knee.    COMPARISON: None.    FINDINGS:  Examination of the deep veins with graded compression and  color flow Doppler with spectral wave form analysis shows no evidence  of thrombus in the common femoral vein, femoral vein, popliteal vein  or calf veins. There is a fluid collection in the left popliteal fossa  measuring 3.5 x 1.5 x 3.2 cm, likely a Baker's cyst.       Impression    IMPRESSION:   1. No DVT in the left lower extremity.  2. Anechoic fluid collection in the left popliteal fossa measuring 3.5  x 1.5 x 3.2 cm, likely a Baker's cyst.     ARASELI HARDIN, DO   XR Knee Left 3 Views    Narrative    XR KNEE LT 3 VW 8/3/2017 12:33 PM    HISTORY: Left knee pain.    COMPARISON: None.    FINDINGS: Mild narrowing of medial tibiofemoral compartment joint  space. Complete loss of lateral patellofemoral compartment joint  space. Small marginal osteophytes. No joint effusion. No fracture or  malalignment.      Impression    IMPRESSION: Marked patellofemoral degenerative change.    ARSENIO MASON MD       The ASCVD Risk score (Hayden DC Jr, et al., 2013) failed to calculate for the following reasons:    The 2013 ASCVD risk score is only valid for ages 40 to 79    ASSESSMENT:  Essential hypertension which is adequately controlled.       Plan:  - Medication:continue the current doses of medication.  The patients antihypertensive medication was filled for 6 months.    The patient was advised to do the following therapuetic life style changes  - Dietary sodium restriction and increase potassium and Calcium intake  - Regular aerobic exercise  - Weight loss  - Discontinue smoking if applicable  - Avoid regular NSAID use if applicable  - Avoid regular decongestant use if applicable  - Follow up in  clinic in 6 months for a complete physical exam   - Check a basic metabolic panel today    Patient Education: Reviewed risks of hypertension and principles of   Treatment.

## 2018-01-05 NOTE — PATIENT INSTRUCTIONS
I recommended that she return to clinic for an appointment in 6 month(s)  for her yearly complete physical exam and we will get all of her preventative medical needs taken care of at that time. I also recommended that he have a laboratory appointment 1 week prior to that to do his fasting laboratory work.

## 2018-01-05 NOTE — NURSING NOTE
"Chief Complaint   Patient presents with     Hypertension       Initial /65  Pulse 100  Temp 98  F (36.7  C) (Oral)  Ht 4' 11\" (1.499 m)  Wt 114 lb (51.7 kg)  LMP 12/16/1980  SpO2 96%  BMI 23.03 kg/m2 Estimated body mass index is 23.03 kg/(m^2) as calculated from the following:    Height as of this encounter: 4' 11\" (1.499 m).    Weight as of this encounter: 114 lb (51.7 kg).  Medication Reconciliation: complete     Marilu Corona, [facility]    "

## 2018-01-05 NOTE — LETTER
January 8, 2018    China Guzman  [address] [facility] [address]            Dear China,    I have reviewed the results of the laboratory tests that we recently ordered. All of the lab work performed was normal or considered normal for you.  Below is a copy of the results.  It was a pleasure to see you at your last appointment.    If you have any questions or concerns, please call myself or my nurse at [phone].    Sincerely,    Colby Trimble MD / clint    Results for orders placed or performed in visit on 01/05/18   Basic metabolic panel   Result Value Ref Range    Sodium 138 133 - 144 mmol/L    Potassium 4.1 3.4 - 5.3 mmol/L    Chloride 99 94 - 109 mmol/L    Carbon Dioxide 28 20 - 32 mmol/L    Anion Gap 11 3 - 14 mmol/L    Glucose 113 (H) 70 - 99 mg/dL    Urea Nitrogen 43 (H) 7 - 30 mg/dL    Creatinine 1.23 (H) 0.52 - 1.04 mg/dL    GFR Estimate 41 (L) >60 mL/min/1.7m2    GFR Estimate If Black 50 (L) >60 mL/min/1.7m2    Calcium 10.9 (H) 8.5 - 10.1 mg/dL       .

## 2018-01-05 NOTE — MR AVS SNAPSHOT
After Visit Summary   1/5/2018    China Guzman    MRN: [id number]           Patient Information     Date Of Birth          4/10/1930        Visit Information        Provider Department      1/5/2018 9:15 AM Colby Trimble MD [facility]        Today's Diagnoses     Essential hypertension with goal blood pressure less than 140/90    -  1    Hyperlipidemia LDL goal <130          Care Instructions    I recommended that she return to clinic for an appointment in 6 month(s)  for her yearly complete physical exam and we will get all of her preventative medical needs taken care of at that time. I also recommended that he have a laboratory appointment 1 week prior to that to do his fasting laboratory work.            Follow-ups after your visit        Follow-up notes from your care team     Return in about 6 months (around 7/5/2018) for Physical Exam.      Your next 10 appointments already scheduled     Mar 02, 2018  9:00 AM CST   New Visit with Srikanth Craig MD   [facility] ([address] [address] [phone]              Who to contact     If you have questions or need follow up information about today's clinic visit or your schedule please contact [facility] directly at [phone].  Normal or non-critical lab and imaging results will be communicated to you by MyChart, letter or phone within 4 business days after the clinic has received the results. If you do not hear from us within 7 days, please contact the clinic through Sokolinhart or phone. If you have a critical or abnormal lab result, we will notify you by phone as soon as possible.  Submit refill requests through Innofidei or call your pharmacy and they will forward the refill request to us. Please allow 3 business days for your refill to be completed.          Additional Information About Your Visit        MyChart Information     Innofidei lets you send  "messages to your doctor, view your test results, renew your prescriptions, schedule appointments and more. To sign up, go to [email or web address] . Click on \"Log in\" on the left side of the screen, which will take you to the Welcome page. Then click on \"Sign up Now\" on the right side of the page.     You will be asked to enter the access code listed below, as well as some personal information. Please follow the directions to create your username and password.     Your access code is: [email or web address]  Expires: 3/14/2018  2:50 PM     Your access code will  in 90 days. If you need help or a new code, please call your [place] clinic or [phone].        Care EveryWhere ID     This is your Care EveryWhere ID. This could be used by other organizations to access your [place] medical records  [id number]        Your Vitals Were     Pulse Temperature Height Last Period Pulse Oximetry BMI (Body Mass Index)    100 98  F (36.7  C) (Oral) 4' 11\" (1.499 m) 1980 96% 23.03 kg/m2       Blood Pressure from Last 3 Encounters:   18 132/58   17 157/73   17 187/83    Weight from Last 3 Encounters:   18 114 lb (51.7 kg)   17 118 lb (53.5 kg)   17 120 lb (54.4 kg)              We Performed the Following     Basic metabolic panel          Today's Medication Changes          These changes are accurate as of: 18  9:48 AM.  If you have any questions, ask your nurse or doctor.               These medicines have changed or have updated prescriptions.        Dose/Directions    atorvastatin 10 MG tablet   Commonly known as:  LIPITOR   This may have changed:  See the new instructions.   Used for:  Hyperlipidemia LDL goal <130   Changed by:  Colby Trimble MD        TAKE ONE TABLET BY MOUTH AT BEDTIME   Quantity:  90 tablet   Refills:  1            Where to get your medicines      These medications were sent to [id number] - [place], [place] - [address]  [address] [place] [address]    Hours:  Same info as [facility] Phone:  [phone]     atorvastatin 10 MG tablet    diltiazem 240 MG 24 hr capsule    hydrochlorothiazide 25 MG tablet    valsartan 320 MG tablet                Primary Care Provider Office Phone # Fax #    Colby Trimble [phone] [address] [facility] [address]        Equal Access to Services     [facility] : Hadii aad ku hadasho Soomaali, waaxda luqadaha, qaybta kaalmada adeegyada, waxay idiin hayaan adeeg khtripp la'aan . So [facility] [phone].    ATENCIÓN: Si habla español, tiene a mary disposición servicios gratuitos de asistencia lingüística. Llame al [phone].    We comply with applicable federal civil rights laws and [place] laws. We do not discriminate on the basis of race, color, national origin, age, disability, sex, sexual orientation, or gender identity.            Thank you!     Thank you for choosing [facility]  for your care. Our goal is always to provide you with excellent care. Hearing back from our patients is one way we can continue to improve our services. Please take a few minutes to complete the written survey that you may receive in the mail after your visit with us. Thank you!             Your Updated Medication List - Protect others around you: Learn how to safely use, store and throw away your medicines at www.disposemymeds.org.          This list is accurate as of: 1/5/18  9:48 AM.  Always use your most recent med list.                   Brand Name Dispense Instructions for use Diagnosis    aspirin 325 MG EC tablet      Take 325 mg by mouth daily.        atorvastatin 10 MG tablet    LIPITOR    90 tablet    TAKE ONE TABLET BY MOUTH AT BEDTIME    Hyperlipidemia LDL goal <130       CALCIUM + D PO      2 tablets daily        diltiazem 240 MG 24 hr capsule     90 capsule    Take 1 capsule (240 mg) by mouth daily    Essential hypertension with goal blood pressure less than 140/90        hydrochlorothiazide 25 MG tablet    HYDRODIURIL    90 tablet    Take 1 tablet (25 mg) by mouth every morning    Essential hypertension with goal blood pressure less than 140/90       latanoprost 0.005 % ophthalmic solution    XALATAN    3 Bottle    Place 1 drop into both eyes At Bedtime    Borderline glaucoma with ocular hypertension, bilateral       MULTI-VITAMIN PO      None Entered        order for DME     1 Device    Walker. Expected duration of use: 3 months.    Baker's cyst of knee, left, Acute pain of left knee       valsartan 320 MG tablet    DIOVAN    90 tablet    Take 1 tablet (320 mg) by mouth daily    Essential hypertension with goal blood pressure less than 140/90

## 2018-01-09 ENCOUNTER — TELEPHONE (OUTPATIENT)
Dept: AUDIOLOGY | Facility: CLINIC | Age: 83
End: 2018-01-09

## 2018-01-09 NOTE — TELEPHONE ENCOUNTER
Per patient request 2 packs of waxguards were mailed to the patient's home address.     -Merlin RUBALCAVA

## 2018-01-09 NOTE — TELEPHONE ENCOUNTER
Reason for Call:  Requesting for cleaning packets for her hearing aids to be mailed to her.    Detailed comments: Please call    Phone Number Patient can be reached at: Home number on file [phone] (home)    Best Time: any    Can we leave a detailed message on this number? YES    Call taken on 1/9/2018 at 8:35 AM by Kimberlee David

## 2018-01-22 DIAGNOSIS — H40.053 BORDERLINE GLAUCOMA WITH OCULAR HYPERTENSION, BILATERAL: ICD-10-CM

## 2018-01-22 RX ORDER — LATANOPROST 50 UG/ML
1 SOLUTION/ DROPS OPHTHALMIC AT BEDTIME
Qty: 3 BOTTLE | Refills: 4 | Status: SHIPPED | OUTPATIENT
Start: 2018-01-22 | End: 2019-02-11

## 2018-02-07 ENCOUNTER — TELEPHONE (OUTPATIENT)
Dept: AUDIOLOGY | Facility: CLINIC | Age: 83
End: 2018-02-07

## 2018-02-07 NOTE — TELEPHONE ENCOUNTER
Reason for Call:  Other appointment    Detailed comments: Patient is scheduled to be seen on 2-20-18, but would like to know if she can get in at an earlier date. Please call.    Phone Number Patient can be reached at: Home number on file [phone] (home)    Best Time: any    Can we leave a detailed message on this number? YES    Call taken on 2/7/2018 at 3:14 PM by Kimberlee David

## 2018-02-08 NOTE — TELEPHONE ENCOUNTER
This was sent to scheduling to schedule a new sooner appointment. Permission was given to use non-ears ENT spot.     -Merlin Cedeño CCC-A

## 2018-03-02 ENCOUNTER — OFFICE VISIT (OUTPATIENT)
Dept: OPHTHALMOLOGY | Facility: CLINIC | Age: 83
End: 2018-03-02
Payer: COMMERCIAL

## 2018-03-02 DIAGNOSIS — H52.4 PRESBYOPIA: ICD-10-CM

## 2018-03-02 DIAGNOSIS — H40.053 BORDERLINE GLAUCOMA WITH OCULAR HYPERTENSION, BILATERAL: Primary | ICD-10-CM

## 2018-03-02 DIAGNOSIS — H35.30 MACULAR DEGENERATION: ICD-10-CM

## 2018-03-02 DIAGNOSIS — Z96.1 PSEUDOPHAKIA: ICD-10-CM

## 2018-03-02 DIAGNOSIS — H43.813 POSTERIOR VITREOUS DETACHMENT, BILATERAL: ICD-10-CM

## 2018-03-02 PROCEDURE — 92015 DETERMINE REFRACTIVE STATE: CPT | Performed by: OPHTHALMOLOGY

## 2018-03-02 PROCEDURE — 92014 COMPRE OPH EXAM EST PT 1/>: CPT | Performed by: OPHTHALMOLOGY

## 2018-03-02 ASSESSMENT — TONOMETRY
IOP_METHOD: APPLANATION
OS_IOP_MMHG: 20
OD_IOP_MMHG: 18

## 2018-03-02 ASSESSMENT — VISUAL ACUITY
OD_CC: 20/50-2
OD_CC: J2
METHOD: SNELLEN - LINEAR
OS_CC: CF

## 2018-03-02 ASSESSMENT — REFRACTION_MANIFEST
OD_ADD: +3.50
OD_AXIS: 180
OD_CYLINDER: +0.75
OD_SPHERE: -0.75

## 2018-03-02 ASSESSMENT — REFRACTION_WEARINGRX
OD_ADD: +3.50
SPECS_TYPE: PAL
OD_SPHERE: -0.75
OD_AXIS: 170
OS_CYLINDER: SPHERE
OS_SPHERE: -0.50
OD_CYLINDER: +1.00

## 2018-03-02 ASSESSMENT — SLIT LAMP EXAM - LIDS
COMMENTS: GOOD CREASE AND COSMESIS
COMMENTS: GOOD CREASE AND COSMESIS, 1+ PTOSIS

## 2018-03-02 ASSESSMENT — CONF VISUAL FIELD
OD_NORMAL: 1
OS_NORMAL: 1

## 2018-03-02 ASSESSMENT — CUP TO DISC RATIO
OS_RATIO: 0.3
OD_RATIO: 0.3

## 2018-03-02 ASSESSMENT — EXTERNAL EXAM - RIGHT EYE: OD_EXAM: NORMAL

## 2018-03-02 ASSESSMENT — EXTERNAL EXAM - LEFT EYE: OS_EXAM: NORMAL

## 2018-03-02 NOTE — PROGRESS NOTES
" Current Eye Medications:  Latanoprost nightly ou     Subjective:  Comprehensive eye exam.   Pt reports is seeing pretty good, vision may be a little worse.     Objective:  See Ophthalmology Exam.       Assessment:  Stable intraocular pressure and discs in patient with treated ocular hypertension.  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      ICD-10-CM    1. 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 Macular degeneration, dry, mod, ou H35.30    3. Pseudophakia, Yag Caps, ou Z96.1    4. Posterior vitreous detachment, bilateral H43.813    5. Presbyopia H52.4 REFRACTIVE STATUS        Plan:  Continue Latanoprost at bedtime both eyes.  Glasses Rx given - optional.   Take a multiple vitamin or \"eye vitamin\" daily.  Protect your eyes outdoors from ultraviolet rays with sunglasses and/or brimmed hat.  Have spinach (cooked or raw), colorful fruits, walnuts, hazelnuts, almonds in your diet.  Monitor the vision in each eye weekly - call if any sudden persistent changes.   Return visit 6 months for intraocular pressure check, glaucoma OCT and [place] Visual [facility].    Srikanth Craig M.D.         "

## 2018-03-02 NOTE — MR AVS SNAPSHOT
"              After Visit Summary   3/2/2018    China Guzman    MRN: [id number]           Patient Information     Date Of Birth          4/10/1930        Visit Information        Provider Department      3/2/2018 9:00 AM Srikanth Craig MD [facility]        Today's Diagnoses     Borderline glaucoma with ocular hypertension, bilateral - treated    -  1    Macular degeneration, dry, mod, ou        Posterior vitreous detachment, bilateral        Pseudophakia, Yag Caps, ou        Presbyopia          Care Instructions    Continue Latanoprost at bedtime both eyes.  Glasses Rx given - optional.   Take a multiple vitamin or \"eye vitamin\" daily.  Protect your eyes outdoors from ultraviolet rays with sunglasses and/or brimmed hat.  Have spinach (cooked or raw), colorful fruits, walnuts, hazelnuts, almonds in your diet.  Monitor the vision in each eye weekly - call if any sudden persistent changes.   Return visit 6 months for intraocular pressure check, glaucoma OCT and Escoto Visual Field.    Srikanth Craig M.D.            Follow-ups after your visit        Who to contact     If you have questions or need follow up information about today's clinic visit or your schedule please contact [facility] directly at [phone].  Normal or non-critical lab and imaging results will be communicated to you by [employer], letter or phone within 4 business days after the clinic has received the results. If you do not hear from us within 7 days, please contact the clinic through [employer]Ithart or phone. If you have a critical or abnormal lab result, we will notify you by phone as soon as possible.  Submit refill requests through [employer] or call your pharmacy and they will forward the refill request to us. Please allow 3 business days for your refill to be completed.          Additional Information About Your Visit        [employer] Information     [employer] lets you send messages to your doctor, view your test results, " "renew your prescriptions, schedule appointments and more. To sign up, go to [email or web address] . Click on \"Log in\" on the left side of the screen, which will take you to the Welcome page. Then click on \"Sign up Now\" on the right side of the page.     You will be asked to enter the access code listed below, as well as some personal information. Please follow the directions to create your username and password.     Your access code is: [email or web address]  Expires: 3/14/2018  2:50 PM     Your access code will  in 90 days. If you need help or a new code, please call your [place] clinic or [phone].        Care EveryWhere ID     This is your Care EveryWhere ID. This could be used by other organizations to access your [place] medical records  [id number]        Your Vitals Were     Last Period                   1980            Blood Pressure from Last 3 Encounters:   18 132/58   17 157/73   17 187/83    Weight from Last 3 Encounters:   18 51.7 kg (114 lb)   17 53.5 kg (118 lb)   17 54.4 kg (120 lb)              We Performed the Following     EYE EXAM (SIMPLE-NONBILLABLE)     REFRACTIVE STATUS        Primary Care Provider Office Phone # Fax #    Colby Trimble [phone] [address] [facility] [address]        Equal Access to Services     [facility] : Hadii richie ku hadasho Soomaali, waaxda luqadaha, qaybta kaalmada kylee, alan tello . So [facility] [phone].    ATENCIÓN: Si habla melida, tiene a mary disposición servicios gratuitos de asistencia lingüística. Llame al [phone].    We comply with applicable federal civil rights laws and [place] laws. We do not discriminate on the basis of race, color, national origin, age, disability, sex, sexual orientation, or gender identity.            Thank you!     Thank you for choosing [facility]  for your care. Our goal is always to provide you " with excellent care. Hearing back from our patients is one way we can continue to improve our services. Please take a few minutes to complete the written survey that you may receive in the mail after your visit with us. Thank you!             Your Updated Medication List - Protect others around you: Learn how to safely use, store and throw away your medicines at www.disposemymeds.org.          This list is accurate as of 3/2/18  9:32 AM.  Always use your most recent med list.                   Brand Name Dispense Instructions for use Diagnosis    aspirin 325 MG EC tablet      Take 325 mg by mouth daily.        atorvastatin 10 MG tablet    LIPITOR    90 tablet    TAKE ONE TABLET BY MOUTH AT BEDTIME    Hyperlipidemia LDL goal <130       CALCIUM + D PO      2 tablets daily        diltiazem 240 MG 24 hr capsule     90 capsule    Take 1 capsule (240 mg) by mouth daily    Essential hypertension with goal blood pressure less than 140/90       hydrochlorothiazide 25 MG tablet    HYDRODIURIL    90 tablet    Take 1 tablet (25 mg) by mouth every morning    Essential hypertension with goal blood pressure less than 140/90       latanoprost 0.005 % ophthalmic solution    XALATAN    3 Bottle    Place 1 drop into both eyes At Bedtime    Borderline glaucoma with ocular hypertension, bilateral       MULTI-VITAMIN PO      None Entered        order for DME     1 Device    Walker. Expected duration of use: 3 months.    Baker's cyst of knee, left, Acute pain of left knee       valsartan 320 MG tablet    DIOVAN    90 tablet    Take 1 tablet (320 mg) by mouth daily    Essential hypertension with goal blood pressure less than 140/90

## 2018-03-02 NOTE — PATIENT INSTRUCTIONS
"Continue Latanoprost at bedtime both eyes.  Glasses Rx given - optional.   Take a multiple vitamin or \"eye vitamin\" daily.  Protect your eyes outdoors from ultraviolet rays with sunglasses and/or brimmed hat.  Have spinach (cooked or raw), colorful fruits, walnuts, hazelnuts, almonds in your diet.  Monitor the vision in each eye weekly - call if any sudden persistent changes.   Return visit 6 months for intraocular pressure check, glaucoma OCT and Escoto Visual Field.    Srikanth Craig M.D.    "

## 2018-05-18 ENCOUNTER — DOCUMENTATION ONLY (OUTPATIENT)
Dept: LAB | Facility: CLINIC | Age: 83
End: 2018-05-18

## 2018-05-18 DIAGNOSIS — I10 ESSENTIAL HYPERTENSION WITH GOAL BLOOD PRESSURE LESS THAN 140/90: ICD-10-CM

## 2018-05-18 DIAGNOSIS — E78.5 HYPERLIPIDEMIA LDL GOAL <130: Primary | ICD-10-CM

## 2018-05-18 DIAGNOSIS — Z91.89 10 YEAR RISK OF MI OR STROKE 7.5% OR GREATER: ICD-10-CM

## 2018-05-18 DIAGNOSIS — N18.30 CKD (CHRONIC KIDNEY DISEASE) STAGE 3, GFR 30-59 ML/MIN (H): ICD-10-CM

## 2018-05-18 NOTE — PROGRESS NOTES
Please review and sign Pending Pre-visit Labs in [facility].Labs 05/22/18 and physical 05/29/18   Maura CORNELIUS

## 2018-05-18 NOTE — PROGRESS NOTES
Patient has an upcoming previsit appointment on 5/22/2018. Please review pended orders and add additional orders if needed.     Thank you,   Amina Freeman

## 2018-05-22 DIAGNOSIS — N18.30 CKD (CHRONIC KIDNEY DISEASE) STAGE 3, GFR 30-59 ML/MIN (H): ICD-10-CM

## 2018-05-22 DIAGNOSIS — Z91.89 10 YEAR RISK OF MI OR STROKE 7.5% OR GREATER: ICD-10-CM

## 2018-05-22 DIAGNOSIS — I10 ESSENTIAL HYPERTENSION WITH GOAL BLOOD PRESSURE LESS THAN 140/90: ICD-10-CM

## 2018-05-22 DIAGNOSIS — E78.5 HYPERLIPIDEMIA LDL GOAL <130: ICD-10-CM

## 2018-05-22 LAB
ALBUMIN SERPL-MCNC: 4.2 G/DL (ref 3.4–5)
ALP SERPL-CCNC: 66 U/L (ref 40–150)
ALT SERPL W P-5'-P-CCNC: 21 U/L (ref 0–50)
ANION GAP SERPL CALCULATED.3IONS-SCNC: 7 MMOL/L (ref 3–14)
AST SERPL W P-5'-P-CCNC: 14 U/L (ref 0–45)
BILIRUB SERPL-MCNC: 0.4 MG/DL (ref 0.2–1.3)
BUN SERPL-MCNC: 47 MG/DL (ref 7–30)
CALCIUM SERPL-MCNC: 10.5 MG/DL (ref 8.5–10.1)
CHLORIDE SERPL-SCNC: 104 MMOL/L (ref 94–109)
CHOLEST SERPL-MCNC: 185 MG/DL
CO2 SERPL-SCNC: 28 MMOL/L (ref 20–32)
CREAT SERPL-MCNC: 1.35 MG/DL (ref 0.52–1.04)
CREAT UR-MCNC: 83 MG/DL
GFR SERPL CREATININE-BSD FRML MDRD: 37 ML/MIN/1.7M2
GLUCOSE SERPL-MCNC: 111 MG/DL (ref 70–99)
HDLC SERPL-MCNC: 50 MG/DL
HGB BLD-MCNC: 11 G/DL (ref 11.7–15.7)
LDLC SERPL CALC-MCNC: 87 MG/DL
MICROALBUMIN UR-MCNC: 8 MG/L
MICROALBUMIN/CREAT UR: 9.47 MG/G CR (ref 0–25)
NONHDLC SERPL-MCNC: 135 MG/DL
POTASSIUM SERPL-SCNC: 4.2 MMOL/L (ref 3.4–5.3)
PROT SERPL-MCNC: 8.1 G/DL (ref 6.8–8.8)
PTH-INTACT SERPL-MCNC: 60 PG/ML (ref 18–80)
SODIUM SERPL-SCNC: 139 MMOL/L (ref 133–144)
TRIGL SERPL-MCNC: 241 MG/DL

## 2018-05-22 PROCEDURE — 85018 HEMOGLOBIN: CPT | Performed by: FAMILY MEDICINE

## 2018-05-22 PROCEDURE — 36415 COLL VENOUS BLD VENIPUNCTURE: CPT | Performed by: FAMILY MEDICINE

## 2018-05-22 PROCEDURE — 80061 LIPID PANEL: CPT | Performed by: FAMILY MEDICINE

## 2018-05-22 PROCEDURE — 83970 ASSAY OF PARATHORMONE: CPT | Performed by: FAMILY MEDICINE

## 2018-05-22 PROCEDURE — 82043 UR ALBUMIN QUANTITATIVE: CPT | Performed by: FAMILY MEDICINE

## 2018-05-22 PROCEDURE — 80053 COMPREHEN METABOLIC PANEL: CPT | Performed by: FAMILY MEDICINE

## 2018-05-29 ENCOUNTER — OFFICE VISIT (OUTPATIENT)
Dept: FAMILY MEDICINE | Facility: CLINIC | Age: 83
End: 2018-05-29
Payer: COMMERCIAL

## 2018-05-29 VITALS
OXYGEN SATURATION: 97 % | SYSTOLIC BLOOD PRESSURE: 132 MMHG | RESPIRATION RATE: 16 BRPM | HEART RATE: 88 BPM | DIASTOLIC BLOOD PRESSURE: 58 MMHG | WEIGHT: 113 LBS | BODY MASS INDEX: 22.82 KG/M2 | TEMPERATURE: 98.3 F

## 2018-05-29 DIAGNOSIS — I10 ESSENTIAL HYPERTENSION WITH GOAL BLOOD PRESSURE LESS THAN 140/90: ICD-10-CM

## 2018-05-29 DIAGNOSIS — R73.02 IGT (IMPAIRED GLUCOSE TOLERANCE): ICD-10-CM

## 2018-05-29 DIAGNOSIS — E83.52 HYPERCALCEMIA: ICD-10-CM

## 2018-05-29 DIAGNOSIS — N18.30 CKD (CHRONIC KIDNEY DISEASE) STAGE 3, GFR 30-59 ML/MIN (H): ICD-10-CM

## 2018-05-29 DIAGNOSIS — E78.1 HIGH TRIGLYCERIDES: ICD-10-CM

## 2018-05-29 DIAGNOSIS — Z00.00 ROUTINE GENERAL MEDICAL EXAMINATION AT A HEALTH CARE FACILITY: Primary | ICD-10-CM

## 2018-05-29 PROCEDURE — 99397 PER PM REEVAL EST PAT 65+ YR: CPT | Performed by: FAMILY MEDICINE

## 2018-05-29 ASSESSMENT — PAIN SCALES - GENERAL: PAINLEVEL: NO PAIN (0)

## 2018-05-29 NOTE — MR AVS SNAPSHOT
"              After Visit Summary   5/29/2018    China Guzman    MRN: [id number]           Patient Information     Date Of Birth          4/10/1930        Visit Information        Provider Department      5/29/2018 10:00 AM Colby Trimble MD [facility]        Today's Diagnoses     Routine general medical examination at a health care facility    -  1    IGT (impaired glucose tolerance)        Hypercalcemia        CKD (chronic kidney disease) stage 3, GFR 30-59 ml/min          Care Instructions      Triglycerides  Does this test have other names?  Lipid panel, fasting lipoprotein panel  What is this test?  This test measures the amount of triglycerides in your blood.  Triglycerides are one of several types of fats in your blood. Other kinds are LDL (\"bad\") cholesterol and HDL (\"good\") cholesterol.  Knowing your triglyceride level is important, especially if you have diabetes, are overweight or a smoker, or are mostly inactive. High triglyceride levels may put you at greater risk for a heart attack or stroke.    This test is part of a group of cholesterol and blood fat tests called a fasting lipoprotein panel, or lipid panel. This panel is recommended for all adults at least once every 5 years, or as recommended by your healthcare provider.  Knowing your triglyceride level helps your healthcare provider suggest healthy changes to your diet or lifestyle. If you have triglycerides that are high to very high, your provider is more likely to prescribe medicines to lower your triglycerides or your LDL cholesterol.  Why do I need this test?  You may need this test as part of a routine checkup. You may also need this test if you're overweight, drink too much alcohol, rarely exercise, or have other conditions like high blood pressure or diabetes.  If you are on cholesterol-lowering medicines, you may have this test to see how well your treatment is working.  What other tests might I have along with this " test?  Your healthcare provider will order screening tests for LDL, HDL, and total cholesterol.  What do my test results mean?  Test results may vary depending on your age, gender, health history, the method used for the test, and other things. Your test results may not mean you have a problem. Ask your healthcare provider what your test results mean for you.   Results are given in milligrams per deciliter (mg/dL). Normal levels of triglycerides are less than 150 mg/dL.  Here are how higher numbers are classified:    150 to 199 mg/dL: Borderline high    200 to 499 mg/dL: High    500 mg/dL and above: Very high  If you have a high triglyceride level, you have a greater risk for heart attack and stroke.  A triglyceride level above 150 mg/dL also means that you may have an increased risk for metabolic syndrome. This is a cluster of symptoms including high blood pressure, high blood sugar, and high body fat around the waist. These symptoms have been linked to increased risk for diabetes, heart disease, and stroke.  High triglycerides levels can also be caused by certain diseases or inherited conditions.  If your triglyceride level is above 200 mg/dL, your healthcare provider may recommend that you:    Lose weight    Limit high-fat foods containing saturated fats. These are animal fats found in meat, butter, and whole milk.    Limit trans fats, which are found in many processed foods like chips and store-bought cookies    Cut back on drinks with added sugars, such as soda    Limit your alcohol intake    Stop smoking    Control your blood pressure    Exercise for at least 30 minutes a day, 5 days a week    Limit the calories from fat in your diet to 25% to 35% of your total intake  If your triglycerides are extremely high--above 500 mg/dL--you may have an added risk for problems with your pancreas. You will likely need medicine to lower your levels along with recommended changes in diet and lifestyle.  How is this test  done?  The test is done with a blood sample. A needle is used to draw blood from a vein in your arm or hand.   Does this test pose any risks?  Having a blood test with a needle carries some risks. These include bleeding, infection, bruising, and feeling lightheaded. When the needle pricks your arm or hand, you may feel a slight sting or pain. Afterward, the site may be sore.   What might affect my test results?  Not fasting for the required length of time before the test can affect your results. Certain medicines can affect your results, as can drinking alcohol.  How do I prepare for the test?  If you have this test as part of a cholesterol screening, you will need to not eat or drink anything but water for 9 to 14 hours before the test. Be sure your healthcare provider knows about all medicines, herbs, vitamins, and supplements you are taking. This includes medicines that don't need a prescription and any illicit drugs you may use.    7434-7488 [employer]. [address] [place] [address]. All rights reserved. This information is not intended as a substitute for professional medical care. Always follow your healthcare professional's instructions.      Results for orders placed or performed in visit on 05/22/18   Lipid panel reflex to direct LDL Fasting   Result Value Ref Range    Cholesterol 185 <200 mg/dL    Triglycerides 241 (H) <150 mg/dL    HDL Cholesterol 50 >49 mg/dL    LDL Cholesterol Calculated 87 <100 mg/dL    Non HDL Cholesterol 135 (H) <130 mg/dL   Comprehensive metabolic panel   Result Value Ref Range    Sodium 139 133 - 144 mmol/L    Potassium 4.2 3.4 - 5.3 mmol/L    Chloride 104 94 - 109 mmol/L    Carbon Dioxide 28 20 - 32 mmol/L    Anion Gap 7 3 - 14 mmol/L    Glucose 111 (H) 70 - 99 mg/dL    Urea Nitrogen 47 (H) 7 - 30 mg/dL    Creatinine 1.35 (H) 0.52 - 1.04 mg/dL    GFR Estimate 37 (L) >60 mL/min/1.7m2    GFR Estimate If Black 45 (L) >60 mL/min/1.7m2    Calcium 10.5 (H) 8.5  - 10.1 mg/dL    Bilirubin Total 0.4 0.2 - 1.3 mg/dL    Albumin 4.2 3.4 - 5.0 g/dL    Protein Total 8.1 6.8 - 8.8 g/dL    Alkaline Phosphatase 66 40 - 150 U/L    ALT 21 0 - 50 U/L    AST 14 0 - 45 U/L   Hemoglobin   Result Value Ref Range    Hemoglobin 11.0 (L) 11.7 - 15.7 g/dL   Albumin Random Urine Quantitative with Creat Ratio   Result Value Ref Range    Creatinine Urine 83 mg/dL    Albumin Urine mg/L 8 mg/L    Albumin Urine mg/g Cr 9.47 0 - 25 mg/g Cr   Parathyroid Hormone Intact   Result Value Ref Range    Parathyroid Hormone Intact 60 18 - 80 pg/mL       You should call specialty scheduling at [phone] to schedule the kidney specialist appointment.            Follow-ups after your visit        Additional Services     NEPHROLOGY ADULT REFERRAL       Your provider has referred you to: [facility]: Kidney (Nephrology) Clinic - [place] [phone]   [email or web address]    Please be aware that coverage of these services is subject to the terms and limitations of your health insurance plan.  Call member services at your health plan with any benefit or coverage questions.      Reason for referral:  Hypercalcemia and CHRONIC KIDNEY DISEASE 3  Please bring the following to your appointment:    >>   Any x-rays, CTs or MRIs which have been performed.  Contact the facility where they were done to arrange for  prior to your scheduled appointment.   >>   List of current medications   >>   This referral request   >>   Any documents/labs given to you for this referral                  Your next 10 appointments already scheduled     Sep 06, 2018  9:15 AM CDT   Return Visit with MD Jimbo [place] [facility] ([facility] [place])    [address] [facility]  [place] [address] [phone]              Who to contact     If you have questions or need follow up information about today's clinic visit or your schedule please contact [place] [facility] [place]  directly at [phone].  Normal or non-critical lab and imaging results will be communicated to you by MyChart, letter or phone within 4 business days after the clinic has received the results. If you do not hear from us within 7 days, please contact the clinic through MyChart or phone. If you have a critical or abnormal lab result, we will notify you by phone as soon as possible.  Submit refill requests through [employer] or call your pharmacy and they will forward the refill request to us. Please allow 3 business days for your refill to be completed.          Additional Information About Your Visit        Care EveryWhere ID     This is your Care EveryWhere ID. This could be used by other organizations to access your [place] medical records  [id number]        Your Vitals Were     Pulse Temperature Respirations Last Period Pulse Oximetry BMI (Body Mass Index)    88 98.3  F (36.8  C) (Oral) 16 12/16/1980 97% 22.82 kg/m2       Blood Pressure from Last 3 Encounters:   05/29/18 132/58   01/05/18 132/58   12/14/17 157/73    Weight from Last 3 Encounters:   05/29/18 113 lb (51.3 kg)   01/05/18 114 lb (51.7 kg)   12/14/17 118 lb (53.5 kg)              We Performed the Following     NEPHROLOGY ADULT REFERRAL        Primary Care Provider Office Phone # Fax #    Colby Trimble [phone] [address] [facility] [address]        Equal Access to Services     [facility] : Hadii richie ku rocíoo Soalana, waaxda luqadaha, qaybta kaalmada kylee, alan vegas. So [facility] [phone].    ATENCIÓN: Si habla español, tiene a mary disposición servicios gratuitos de asistencia lingüística. Llame al [phone].    We comply with applicable federal civil rights laws and [place] laws. We do not discriminate on the basis of race, color, national origin, age, disability, sex, sexual orientation, or gender identity.            Thank you!     Thank you for choosing [employer] [facility]  for your care. Our goal is always to provide you with excellent care. Hearing back from our patients is one way we can continue to improve our services. Please take a few minutes to complete the written survey that you may receive in the mail after your visit with us. Thank you!             Your Updated Medication List - Protect others around you: Learn how to safely use, store and throw away your medicines at www.disposemymeds.org.          This list is accurate as of 5/29/18 10:47 AM.  Always use your most recent med list.                   Brand Name Dispense Instructions for use Diagnosis    aspirin 325 MG EC tablet      Take 325 mg by mouth daily.        atorvastatin 10 MG tablet    LIPITOR    90 tablet    TAKE ONE TABLET BY MOUTH AT BEDTIME    Hyperlipidemia LDL goal <130       CALCIUM + D PO      1 tablets daily        diltiazem 240 MG 24 hr capsule     90 capsule    Take 1 capsule (240 mg) by mouth daily    Essential hypertension with goal blood pressure less than 140/90       hydrochlorothiazide 25 MG tablet    HYDRODIURIL    90 tablet    Take 1 tablet (25 mg) by mouth every morning    Essential hypertension with goal blood pressure less than 140/90       latanoprost 0.005 % ophthalmic solution    XALATAN    3 Bottle    Place 1 drop into both eyes At Bedtime    Borderline glaucoma with ocular hypertension, bilateral       MULTI-VITAMIN PO      None Entered        order for DME     1 Device    Walker. Expected duration of use: 3 months.    Baker's cyst of knee, left, Acute pain of left knee       valsartan 320 MG tablet    DIOVAN    90 tablet    Take 1 tablet (320 mg) by mouth daily    Essential hypertension with goal blood pressure less than 140/90

## 2018-05-29 NOTE — PATIENT INSTRUCTIONS
"  Triglycerides  Does this test have other names?  Lipid panel, fasting lipoprotein panel  What is this test?  This test measures the amount of triglycerides in your blood.  Triglycerides are one of several types of fats in your blood. Other kinds are LDL (\"bad\") cholesterol and HDL (\"good\") cholesterol.  Knowing your triglyceride level is important, especially if you have diabetes, are overweight or a smoker, or are mostly inactive. High triglyceride levels may put you at greater risk for a heart attack or stroke.    This test is part of a group of cholesterol and blood fat tests called a fasting lipoprotein panel, or lipid panel. This panel is recommended for all adults at least once every 5 years, or as recommended by your healthcare provider.  Knowing your triglyceride level helps your healthcare provider suggest healthy changes to your diet or lifestyle. If you have triglycerides that are high to very high, your provider is more likely to prescribe medicines to lower your triglycerides or your LDL cholesterol.  Why do I need this test?  You may need this test as part of a routine checkup. You may also need this test if you're overweight, drink too much alcohol, rarely exercise, or have other conditions like high blood pressure or diabetes.  If you are on cholesterol-lowering medicines, you may have this test to see how well your treatment is working.  What other tests might I have along with this test?  Your healthcare provider will order screening tests for LDL, HDL, and total cholesterol.  What do my test results mean?  Test results may vary depending on your age, gender, health history, the method used for the test, and other things. Your test results may not mean you have a problem. Ask your healthcare provider what your test results mean for you.   Results are given in milligrams per deciliter (mg/dL). Normal levels of triglycerides are less than 150 mg/dL.  Here are how higher numbers are " classified:    150 to 199 mg/dL: Borderline high    200 to 499 mg/dL: High    500 mg/dL and above: Very high  If you have a high triglyceride level, you have a greater risk for heart attack and stroke.  A triglyceride level above 150 mg/dL also means that you may have an increased risk for metabolic syndrome. This is a cluster of symptoms including high blood pressure, high blood sugar, and high body fat around the waist. These symptoms have been linked to increased risk for diabetes, heart disease, and stroke.  High triglycerides levels can also be caused by certain diseases or inherited conditions.  If your triglyceride level is above 200 mg/dL, your healthcare provider may recommend that you:    Lose weight    Limit high-fat foods containing saturated fats. These are animal fats found in meat, butter, and whole milk.    Limit trans fats, which are found in many processed foods like chips and store-bought cookies    Cut back on drinks with added sugars, such as soda    Limit your alcohol intake    Stop smoking    Control your blood pressure    Exercise for at least 30 minutes a day, 5 days a week    Limit the calories from fat in your diet to 25% to 35% of your total intake  If your triglycerides are extremely high--above 500 mg/dL--you may have an added risk for problems with your pancreas. You will likely need medicine to lower your levels along with recommended changes in diet and lifestyle.  How is this test done?  The test is done with a blood sample. A needle is used to draw blood from a vein in your arm or hand.   Does this test pose any risks?  Having a blood test with a needle carries some risks. These include bleeding, infection, bruising, and feeling lightheaded. When the needle pricks your arm or hand, you may feel a slight sting or pain. Afterward, the site may be sore.   What might affect my test results?  Not fasting for the required length of time before the test can affect your results. Certain  medicines can affect your results, as can drinking alcohol.  How do I prepare for the test?  If you have this test as part of a cholesterol screening, you will need to not eat or drink anything but water for 9 to 14 hours before the test. Be sure your healthcare provider knows about all medicines, herbs, vitamins, and supplements you are taking. This includes medicines that don't need a prescription and any illicit drugs you may use.    8732-9688 [employer]. [address] [place] [address]. All rights reserved. This information is not intended as a substitute for professional medical care. Always follow your healthcare professional's instructions.      Results for orders placed or performed in visit on 05/22/18   Lipid panel reflex to direct LDL Fasting   Result Value Ref Range    Cholesterol 185 <200 mg/dL    Triglycerides 241 (H) <150 mg/dL    HDL Cholesterol 50 >49 mg/dL    LDL Cholesterol Calculated 87 <100 mg/dL    Non HDL Cholesterol 135 (H) <130 mg/dL   Comprehensive metabolic panel   Result Value Ref Range    Sodium 139 133 - 144 mmol/L    Potassium 4.2 3.4 - 5.3 mmol/L    Chloride 104 94 - 109 mmol/L    Carbon Dioxide 28 20 - 32 mmol/L    Anion Gap 7 3 - 14 mmol/L    Glucose 111 (H) 70 - 99 mg/dL    Urea Nitrogen 47 (H) 7 - 30 mg/dL    Creatinine 1.35 (H) 0.52 - 1.04 mg/dL    GFR Estimate 37 (L) >60 mL/min/1.7m2    GFR Estimate If Black 45 (L) >60 mL/min/1.7m2    Calcium 10.5 (H) 8.5 - 10.1 mg/dL    Bilirubin Total 0.4 0.2 - 1.3 mg/dL    Albumin 4.2 3.4 - 5.0 g/dL    Protein Total 8.1 6.8 - 8.8 g/dL    Alkaline Phosphatase 66 40 - 150 U/L    ALT 21 0 - 50 U/L    AST 14 0 - 45 U/L   Hemoglobin   Result Value Ref Range    Hemoglobin 11.0 (L) 11.7 - 15.7 g/dL   Albumin Random Urine Quantitative with Creat Ratio   Result Value Ref Range    Creatinine Urine 83 mg/dL    Albumin Urine mg/L 8 mg/L    Albumin Urine mg/g Cr 9.47 0 - 25 mg/g Cr   Parathyroid Hormone Intact   Result Value Ref  Range    Parathyroid Hormone Intact 60 18 - 80 pg/mL       You should call specialty scheduling at [phone] to schedule the kidney specialist appointment.

## 2018-05-29 NOTE — PROGRESS NOTES
SUBJECTIVE:   China Guzman is a 88 year old female who presents for Preventive Visit.      Are you in the first 12 months of your Medicare Part B coverage?  No    Healthy Habits:    Do you get at least three servings of calcium containing foods daily (dairy, green leafy vegetables, etc.)? yes    Amount of exercise or daily activities, outside of work: 3 day(s) per week    Problems taking medications regularly No    Medication side effects: No    Have you had an eye exam in the past two years? yes    Do you see a dentist twice per year? no    Do you have sleep apnea, excessive snoring or daytime drowsiness?no      Ability to successfully perform activities of daily living: Yes, no assistance needed    Home safety:  none identified     Hearing impairment: Yes, hearing aids    Fall risk:  Fallen 2 or more times in the past year?: No  Any fall with injury in the past year?: No        COGNITIVE SCREEN  1) Repeat 3 items (Banana, Sunrise, Chair)    2) Clock draw: NORMAL  3) 3 item recall: Recalls 3 objects  Results: 3 items recalled: COGNITIVE IMPAIRMENT LESS LIKELY    Mini-CogTM Copyright S [employer]. Licensed by the author for use in [facility]; reprinted with permission (ankur@[facility]). All rights reserved.                Reviewed and updated as needed this visit by clinical staff  Tobacco  Allergies  Meds  Problems  Med Hx  Surg Hx  Fam Hx  Soc Hx          Reviewed and updated as needed this visit by Provider  Allergies  Meds  Problems        Social History   Substance Use Topics     Smoking status: Never Smoker     Smokeless tobacco: Never Used     Alcohol use No       If you drink alcohol do you typically have >3 drinks per day or >7 drinks per week? No                        Today's PHQ-2 Score:   PHQ-2 ( 1999 [employer]) 5/29/2018 1/5/2018   Q1: Little interest or pleasure in doing things 0 0   Q2: Feeling down, depressed or hopeless 0 0   PHQ-2 Score 0 0       Do you feel safe in your  "environment - Yes    Do you have a Health Care Directive?: No: Advance care planning reviewed with patient; information given to patient to review.    Current providers sharing in care for this patient include:   Patient Care Team:  Colby Trimble MD as PCP - General (Family Practice)    The following health maintenance items are reviewed in Epic and correct as of today:  Health Maintenance   Topic Date Due     FALL RISK ASSESSMENT  01/05/2019     EYE EXAM Q1 YEAR  03/02/2019     BMP Q1 YR  05/22/2019     HEMOGLOBIN Q1 YR  05/22/2019     LIPID MONITORING Q1 YEAR  05/22/2019     MICROALBUMIN Q1 YEAR  05/22/2019     DEXA Q2 YR  07/05/2019     COLONOSCOPY Q5 YR  08/17/2021     ADVANCE DIRECTIVE PLANNING Q5 YRS  12/14/2022     TETANUS IMMUNIZATION (SYSTEM ASSIGNED)  03/01/2023     PNEUMOCOCCAL  Completed     INFLUENZA VACCINE  Completed             ROS:      OBJECTIVE:   /58  Pulse 88  Temp 98.3  F (36.8  C) (Oral)  Resp 16  Wt 113 lb (51.3 kg)  LMP 12/16/1980  SpO2 97%  BMI 22.82 kg/m2 Estimated body mass index is 22.82 kg/(m^2) as calculated from the following:    Height as of 1/5/18: 4' 11\" (1.499 m).    Weight as of this encounter: 113 lb (51.3 kg).  EXAM:       ASSESSMENT / PLAN:       ICD-10-CM    1. Routine general medical examination at a health care facility Z00.00    2. IGT (impaired glucose tolerance) R73.02    3. Hypercalcemia E83.52 NEPHROLOGY ADULT REFERRAL   4. CKD (chronic kidney disease) stage 3, GFR 30-59 ml/min N18.3 NEPHROLOGY ADULT REFERRAL   5. Essential hypertension with goal blood pressure less than 140/90 I10    6. High triglycerides E78.1        End of Life Planning:  Patient currently has an advanced directive: No.  I have verified the patient's ablity to prepare an advanced directive/make health care decisions.  Literature was provided to assist patient in preparing an advanced directive.    COUNSELING:  Reviewed preventive health counseling, as reflected in patient " "instructions       Regular exercise       Vision screening       Dental care       Osteoporosis Prevention/Bone Health        Estimated body mass index is 22.82 kg/(m^2) as calculated from the following:    Height as of 1/5/18: 4' 11\" (1.499 m).    Weight as of this encounter: 113 lb (51.3 kg).       reports that she has never smoked. She has never used smokeless tobacco.      Appropriate preventive services were discussed with this patient, including applicable screening as appropriate for cardiovascular disease, diabetes, osteopenia/osteoporosis, and glaucoma.  As appropriate for age/gender, discussed screening for colorectal cancer, prostate cancer, breast cancer, and cervical cancer. Checklist reviewing preventive services available has been given to the patient.    Reviewed patients plan of care and provided an AVS. The Basic Care Plan (routine screening as documented in Health Maintenance) for [place] meets the Care Plan requirement. This Care Plan has been established and reviewed with the Patient.    Counseling Resources:  ATP IV Guidelines  Pooled Cohorts Equation Calculator  Breast Cancer Risk Calculator  FRAX Risk Assessment  ICSI Preventive Guidelines  Dietary Guidelines for Americans, 2010  [employer] AID's MyPlate  ASA Prophylaxis  Lung CA Screening    Colby Trimble MD  [facility]  --------------------------------------------------------------------------------------------------------------------------------------    SUBJECTIVE:  China Guzman is a 88 year old female who presents to the clinic today for a routine physical exam.    The patient's last physical was 1 years ago.     Cholesterol   Date Value Ref Range Status   05/22/2018 185 <200 mg/dL Final   05/19/2017 172 <200 mg/dL Final     HDL Cholesterol   Date Value Ref Range Status   05/22/2018 50 >49 mg/dL Final   05/19/2017 62 >49 mg/dL Final     LDL Cholesterol Calculated   Date Value Ref Range Status   05/22/2018 87 <100 mg/dL Final     " Comment:     Desirable:       <100 mg/dl   05/19/2017 80 <100 mg/dL Final     Comment:     Desirable:       <100 mg/dl     Triglycerides   Date Value Ref Range Status   05/22/2018 241 (H) <150 mg/dL Final     Comment:     Borderline high:  150-199 mg/dl  High:             200-499 mg/dl  Very high:       >499 mg/dl  Fasting specimen     05/19/2017 149 <150 mg/dL Final     Comment:     Fasting specimen     Cholesterol/HDL Ratio   Date Value Ref Range Status   03/16/2015 2.6 0.0 - 5.0 Final   03/03/2014 3.4 0.0 - 5.0 Final     The patient's last fasting lipid panel was done 1 weeks ago and the results are listed above.      The ASCVD Risk score (Bernvilledima PAYNE Jr, et al., 2013) failed to calculate for the following reasons:    The 2013 ASCVD risk score is only valid for ages 40 to 79      The patient reports that she has been treated for high blood pressure.    The patient reports that she does take a daily aspirin.    No results found for: HCVAB      The patient reports that she has not been screened for Hepatitis C    (Screen all baby boomers once per CDC-- the generation born from 1945 through 1965)      Immunization History   Administered Date(s) Administered     Influenza (High Dose) 3 valent vaccine 08/29/2016, 09/26/2017     Influenza (IIV3) PF 10/01/2009, 10/12/2010, 10/20/2011, 10/20/2015     Pneumo Conj 13-V (2010&after) 03/18/2015     Pneumococcal 23 valent 05/10/2007, 04/06/2012     TD (ADULT, 7+) 05/10/2007     TDAP Vaccine (Adacel) 03/01/2013     Zoster vaccine, live 12/10/2009     The patient has not started the Gardasil vaccination series.  The patient's believes that her last tetanus shot was given 5 year(s) ago.   The patient believes that she has not had a Shingrix in the past  The patient believes that she has had a PPSV23 in the past.  The patient believes that she has had a PCV13 in the past.  The patient believes that she has had a seasonal flu vaccination this fall or winter.  The patient would like to  have a Shingrix      No results found for this or any previous visit.]   The patient reports a family history of colon cancer in her mother.  The patient reports that she has had a colonoscopy. Her  last colonoscopy was in 2016 and she  report that is was normal. The patient was told not to have this repeated.    The patient denies a family history diabetes.    The patient reports that she does performs a self breast exam monthly.  The patient reports a family history of breast cancer in her daughter at age 49.  The patient does not want to have a mammogram done. She wants to go every other year.  The patient does not want to have a Pap smear done.  She reports that she has not had an abnormal pap smear in 20  years.    The patient is not interested in hormone replacement therapy.  The patient reports that she eats or drinks 2 servings of dairy products per day. She does take a 600 calcium supplement once daily..  The patient reports that she has had a bone density scan. Her  last scan was in 2017 and she  report that is was abnormal. The patient was told to have this repeated in 2 years.    (screen women 65+ or 50+ with risk factors)     The patient reports that she has dental appointments only as needed as she has full dentures  The patient reports that she  has an eye examination approximately every 0.5 year(s).        Do you currently smoke? No  How many years have you smoked? 0  How many packs per day did you smoke on average? N/A  (if more than 30 pack year history and the patient is age 55-80 consider ordering an annual low dose radiation lung CT to screen for cancer)  (Do not order if patient has quit more than 15 years ago or has a health condition that limits life expectancy or could not tolerate curative lung surgery)  Are you interested having a lung CT to screen for lung cancer? 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     Hearing loss      HTN (hypertension)      Hyperlipidemia      MACULAR DEGENERATION (SENILE) OF RETINA, DRY OU 2/11/2011     Osteoporosis        Past Surgical History:   Procedure Laterality Date     BLEPHAROPLASTY BILATERAL  1/2004    both eyes, upper lids     CATARACT IOL, RT/LT       COLONOSCOPY  12/2010    Q 5 years     FRACTURE TX, ANKLE RT/LT  1997    RIGHT ANKLE ORIF     LASER YAG CAPSULOTOMY  12/2005; 1/2006    right eye; left eye     PHACOEMULSIFICATION WITH STANDARD INTRAOCULAR LENS IMPLANT  8/2003; 10/2003    left eye; right eye       Family History   Problem Relation Age of Onset     Cancer - colorectal Mother      C.A.D. Brother      CEREBROVASCULAR DISEASE Brother      HEART DISEASE Brother      Asthma Daughter      Breast Cancer Daughter      Thyroid Disease Daughter        Social History     Social History     Marital status:      Spouse name: N/A     Number of children: 6     Years of education: N/A     Occupational History      Retired     Social History Main Topics     Smoking status: Never Smoker     Smokeless tobacco: Never Used     Alcohol use No     Drug use: No     Sexual activity: No     Other Topics Concern     Not on file     Social History Narrative       Current Outpatient Prescriptions   Medication Sig Dispense Refill     aspirin 325 MG EC tablet Take 325 mg by mouth daily.       atorvastatin (LIPITOR) 10 MG tablet TAKE ONE TABLET BY MOUTH AT BEDTIME 90 tablet 1     CALCIUM + D OR 1 tablets daily       diltiazem 240 MG 24 hr capsule Take 1 capsule (240 mg) by mouth daily 90 capsule 1     hydrochlorothiazide (HYDRODIURIL) 25 MG tablet Take 1 tablet (25 mg) by mouth every morning 90 tablet 1     latanoprost (XALATAN) 0.005 % ophthalmic solution Place 1 drop into both eyes At Bedtime 3 Bottle 4     MULTI-VITAMIN OR None Entered       order for DME Walker. Expected duration of use: 3 months. 1 Device 0     valsartan (DIOVAN) 320 MG tablet Take 1  tablet (320 mg) by mouth daily 90 tablet 1       PHYSICAL EXAMINATION:  Blood pressure 132/58, pulse 88, temperature 98.3  F (36.8  C), temperature source Oral, resp. rate 16, weight 113 lb (51.3 kg), last menstrual period 12/16/1980, SpO2 97 %, not currently breastfeeding.  General appearance - healthy, alert and no distress  Skin - Skin color, texture, turgor normal. No rashes or lesions.  Head - Normocephalic. No masses, lesions, tenderness or abnormalities  Eyes - conjunctivae/corneas clear. PERRL, EOM's intact. Fundi benign  Ears - External ears normal. Canals clear. TM's normal.  Nose/Sinuses - Nares normal. Septum midline. Mucosa normal. No drainage or sinus tenderness.  Oropharynx - Lips, mucosa, and tongue normal. Teeth and gums normal.  Neck - Neck supple. No adenopathy. Thyroid symmetric, normal size,  Lungs - Percussion normal. Good diaphragmatic excursion. Lungs clear  Heart - PMI normal. No lifts, heaves, or thrills. RRR. No murmurs, clicks gallops or rub  Breasts - Breasts normal to inspection and palpation. Axillae negative  Abdomen - Abdomen soft, non-tender. BS normal. No masses, organomegaly  Extremities - Extremities normal. No deformities, edema, or skin discoloration.  Musculoskeletal - Spine ROM normal. Muscular strength intact.  Peripheral pulses - radial=4/4, femoral=4/4, popliteal=4/4, dorsalis pedis=4/4,  Neuro - Gait normal. Reflexes normal and symmetric. Sensation grossly WNL.          Orders Only on 05/22/2018   Component Date Value Ref Range Status     Cholesterol 05/22/2018 185  <200 mg/dL Final     Triglycerides 05/22/2018 241* <150 mg/dL Final    Comment: Borderline high:  150-199 mg/dl  High:             200-499 mg/dl  Very high:       >499 mg/dl  Fasting specimen       HDL Cholesterol 05/22/2018 50  >49 mg/dL Final     LDL Cholesterol Calculated 05/22/2018 87  <100 mg/dL Final    Desirable:       <100 mg/dl     Non HDL Cholesterol 05/22/2018 135* <130 mg/dL Final    Comment: Above  Desirable:  130-159 mg/dl  Borderline high:  160-189 mg/dl  High:             190-219 mg/dl  Very high:       >219 mg/dl       Sodium 05/22/2018 139  133 - 144 mmol/L Final     Potassium 05/22/2018 4.2  3.4 - 5.3 mmol/L Final     Chloride 05/22/2018 104  94 - 109 mmol/L Final     Carbon Dioxide 05/22/2018 28  20 - 32 mmol/L Final     Anion Gap 05/22/2018 7  3 - 14 mmol/L Final     Glucose 05/22/2018 111* 70 - 99 mg/dL Final    Fasting specimen     Urea Nitrogen 05/22/2018 47* 7 - 30 mg/dL Final     Creatinine 05/22/2018 1.35* 0.52 - 1.04 mg/dL Final     GFR Estimate 05/22/2018 37* >60 mL/min/1.7m2 Final    Non  GFR Calc     GFR Estimate If Black 05/22/2018 45* >60 mL/min/1.7m2 Final    African American GFR Calc     Calcium 05/22/2018 10.5* 8.5 - 10.1 mg/dL Final     Bilirubin Total 05/22/2018 0.4  0.2 - 1.3 mg/dL Final     Albumin 05/22/2018 4.2  3.4 - 5.0 g/dL Final     Protein Total 05/22/2018 8.1  6.8 - 8.8 g/dL Final     Alkaline Phosphatase 05/22/2018 66  40 - 150 U/L Final     ALT 05/22/2018 21  0 - 50 U/L Final     AST 05/22/2018 14  0 - 45 U/L Final     Hemoglobin 05/22/2018 11.0* 11.7 - 15.7 g/dL Final     Creatinine Urine 05/22/2018 83  mg/dL Final     Albumin Urine mg/L 05/22/2018 8  mg/L Final     Albumin Urine mg/g Cr 05/22/2018 9.47  0 - 25 mg/g Cr Final     Parathyroid Hormone Intact 05/22/2018 60  18 - 80 pg/mL Final       ASSESSMENT:    ICD-10-CM    1. Routine general medical examination at a health care facility Z00.00    2. IGT (impaired glucose tolerance) R73.02    3. Hypercalcemia E83.52 NEPHROLOGY ADULT REFERRAL   4. CKD (chronic kidney disease) stage 3, GFR 30-59 ml/min N18.3 NEPHROLOGY ADULT REFERRAL   5. Essential hypertension with goal blood pressure less than 140/90 I10    6. High triglycerides E78.1        Well-Adult Physical Exam.  There are no preventive care reminders to display for this patient.  Health Maintenance   Topic Date Due     FALL RISK ASSESSMENT  01/05/2019      EYE EXAM Q1 YEAR  03/02/2019     BMP Q1 YR  05/22/2019     HEMOGLOBIN Q1 YR  05/22/2019     LIPID MONITORING Q1 YEAR  05/22/2019     MICROALBUMIN Q1 YEAR  05/22/2019     DEXA Q2 YR  07/05/2019     COLONOSCOPY Q5 YR  08/17/2021     ADVANCE DIRECTIVE PLANNING Q5 YRS  12/14/2022     TETANUS IMMUNIZATION (SYSTEM ASSIGNED)  03/01/2023     PNEUMOCOCCAL  Completed     INFLUENZA VACCINE  Completed         HEALTH CARE MAINTENENCE: The recommended screening tests and vaccinatons for this patient have been discussed as above.  The appropriate tests and vaccinations  have been ordered or declined by the patient. Please see the orders in EPIC.The patient specifically declines: n/a     Immunization Status:  up to date and documented except for Shingrix    Patient Active Problem List   Diagnosis     Osteoporosis     HL (hearing loss)     Gallstones     Malignant basal cell neoplasm of skin     Hyperlipidemia LDL goal <130     Pseudophakia, Yag Caps, ou     Advanced directives, counseling/discussion     Borderline glaucoma with ocular hypertension, bilateral - treated     Family history of colon cancer     Posterior vitreous detachment, bilateral     Macular degeneration, dry, mod, ou     Essential hypertension with goal blood pressure less than 140/90     10 year risk of MI or stroke 7.5% or greater, 39.7% in September 2017 on atorvastatin 10 mg     CKD (chronic kidney disease) stage 3, GFR 30-59 ml/min     IGT (impaired glucose tolerance)     High triglycerides        ATP III Guidelines  ICSI Preventive Guidelines    PLAN:  We discussed the elevated triglycerides and an educational reference regarding this subject was printed from [employer] and given to the patient.      I recommended continuing to take a daily aspirin (81 to 325 mg)  Shingrix recommended  Breast self exam demonstrated and recommended  Mammogram recommended  Pap smear was not recommended per the ACOG guidelines  Discussed calcium intake, vitamins and supplements.  Recommended 1800 mg of calcium daily  Bone density scan (DEXA) recommended in 1 year   Sunscreen use was recommended especially in the area of tatoos  Recommended dental exams every 6 months  Recommended eye exam every 1-2 years  Follow up in 1 year for the next preventative medical visit    Osteoporosis [place] indications:  Post menopausal women with a hip or vertebral fracture  Any T score less than or equal to -2.5  Any T score between -1.0 and -2.5 and a 10 year hip fracture probability > or = to 3%  Any T score between -1.0 and -2.5 and a 10 year probability or a major osteoporosis-related fracture  > or = 20% based on FRAX score  [email or web address]      Hypercalcemia with a normal  parathyroid hormone in the setting if CKD 3. I am going to have patient seen by renal.   Body mass index is 22.82 kg/(m^2).

## 2018-05-29 NOTE — NURSING NOTE
"Chief Complaint   Patient presents with     Wellness Visit     Health Maintenance     up to date       Initial /69  Pulse 88  Temp 98.3  F (36.8  C) (Oral)  Resp 16  Wt 113 lb (51.3 kg)  LMP 12/16/1980  SpO2 97%  BMI 22.82 kg/m2 Estimated body mass index is 22.82 kg/(m^2) as calculated from the following:    Height as of 1/5/18: 4' 11\" (1.499 m).    Weight as of this encounter: 113 lb (51.3 kg).  Medication Reconciliation: complete  Marilu Corona, CHRIS  "

## 2018-07-04 DIAGNOSIS — I10 ESSENTIAL HYPERTENSION WITH GOAL BLOOD PRESSURE LESS THAN 140/90: ICD-10-CM

## 2018-07-06 RX ORDER — DILTIAZEM HYDROCHLORIDE 240 MG/1
CAPSULE, EXTENDED RELEASE ORAL
Qty: 90 CAPSULE | Refills: 3 | Status: SHIPPED | OUTPATIENT
Start: 2018-07-06 | End: 2019-04-08

## 2018-07-16 ENCOUNTER — OFFICE VISIT (OUTPATIENT)
Dept: NEPHROLOGY | Facility: CLINIC | Age: 83
End: 2018-07-16
Payer: COMMERCIAL

## 2018-07-16 VITALS
SYSTOLIC BLOOD PRESSURE: 142 MMHG | HEART RATE: 78 BPM | WEIGHT: 112 LBS | DIASTOLIC BLOOD PRESSURE: 62 MMHG | BODY MASS INDEX: 22.62 KG/M2

## 2018-07-16 DIAGNOSIS — E83.52 HYPERCALCEMIA: ICD-10-CM

## 2018-07-16 DIAGNOSIS — R79.89 ELEVATED SERUM CREATININE: ICD-10-CM

## 2018-07-16 DIAGNOSIS — I10 ESSENTIAL HYPERTENSION WITH GOAL BLOOD PRESSURE LESS THAN 140/90: ICD-10-CM

## 2018-07-16 DIAGNOSIS — N18.30 CKD (CHRONIC KIDNEY DISEASE) STAGE 3, GFR 30-59 ML/MIN (H): Primary | ICD-10-CM

## 2018-07-16 DIAGNOSIS — E83.52 HYPERCALCEMIA: Primary | ICD-10-CM

## 2018-07-16 DIAGNOSIS — D64.9 ANEMIA: ICD-10-CM

## 2018-07-16 LAB
ALBUMIN SERPL-MCNC: 4.3 G/DL (ref 3.4–5)
ALBUMIN UR-MCNC: NEGATIVE MG/DL
ANION GAP SERPL CALCULATED.3IONS-SCNC: 9 MMOL/L (ref 3–14)
APPEARANCE UR: CLEAR
BILIRUB UR QL STRIP: NEGATIVE
BUN SERPL-MCNC: 38 MG/DL (ref 7–30)
CALCIUM SERPL-MCNC: 9.7 MG/DL (ref 8.5–10.1)
CHLORIDE SERPL-SCNC: 103 MMOL/L (ref 94–109)
CO2 SERPL-SCNC: 26 MMOL/L (ref 20–32)
COLOR UR AUTO: YELLOW
CREAT SERPL-MCNC: 1.06 MG/DL (ref 0.52–1.04)
CREAT UR-MCNC: 104 MG/DL
ERYTHROCYTE [DISTWIDTH] IN BLOOD BY AUTOMATED COUNT: 13.4 % (ref 10–15)
FERRITIN SERPL-MCNC: 78 NG/ML (ref 8–252)
GFR SERPL CREATININE-BSD FRML MDRD: 49 ML/MIN/1.7M2
GLUCOSE SERPL-MCNC: 97 MG/DL (ref 70–99)
GLUCOSE UR STRIP-MCNC: NEGATIVE MG/DL
HCT VFR BLD AUTO: 32.4 % (ref 35–47)
HGB BLD-MCNC: 10.9 G/DL (ref 11.7–15.7)
HGB UR QL STRIP: NEGATIVE
HYALINE CASTS #/AREA URNS LPF: ABNORMAL /LPF
IRON SATN MFR SERPL: 16 % (ref 15–46)
IRON SERPL-MCNC: 46 UG/DL (ref 35–180)
KETONES UR STRIP-MCNC: NEGATIVE MG/DL
LEUKOCYTE ESTERASE UR QL STRIP: ABNORMAL
MCH RBC QN AUTO: 31.3 PG (ref 26.5–33)
MCHC RBC AUTO-ENTMCNC: 33.6 G/DL (ref 31.5–36.5)
MCV RBC AUTO: 93 FL (ref 78–100)
NITRATE UR QL: NEGATIVE
PH UR STRIP: 6 PH (ref 5–7)
PHOSPHATE SERPL-MCNC: 3.5 MG/DL (ref 2.5–4.5)
PLATELET # BLD AUTO: 298 10E9/L (ref 150–450)
POTASSIUM SERPL-SCNC: 4.1 MMOL/L (ref 3.4–5.3)
PROT UR-MCNC: 0.12 G/L
PROT/CREAT 24H UR: 0.11 G/G CR (ref 0–0.2)
RBC # BLD AUTO: 3.48 10E12/L (ref 3.8–5.2)
RBC #/AREA URNS AUTO: ABNORMAL /HPF
SODIUM SERPL-SCNC: 138 MMOL/L (ref 133–144)
SOURCE: ABNORMAL
SP GR UR STRIP: 1.01 (ref 1–1.03)
TIBC SERPL-MCNC: 281 UG/DL (ref 240–430)
TRANSFERRIN SERPL-MCNC: 226 MG/DL (ref 210–360)
UROBILINOGEN UR STRIP-ACNC: 0.2 EU/DL (ref 0.2–1)
WBC # BLD AUTO: 7.6 10E9/L (ref 4–11)
WBC #/AREA URNS AUTO: ABNORMAL /HPF

## 2018-07-16 PROCEDURE — 82728 ASSAY OF FERRITIN: CPT | Performed by: INTERNAL MEDICINE

## 2018-07-16 PROCEDURE — 86334 IMMUNOFIX E-PHORESIS SERUM: CPT | Performed by: INTERNAL MEDICINE

## 2018-07-16 PROCEDURE — 85027 COMPLETE CBC AUTOMATED: CPT | Performed by: INTERNAL MEDICINE

## 2018-07-16 PROCEDURE — 82784 ASSAY IGA/IGD/IGG/IGM EACH: CPT | Performed by: INTERNAL MEDICINE

## 2018-07-16 PROCEDURE — 80069 RENAL FUNCTION PANEL: CPT | Performed by: INTERNAL MEDICINE

## 2018-07-16 PROCEDURE — 83540 ASSAY OF IRON: CPT | Performed by: INTERNAL MEDICINE

## 2018-07-16 PROCEDURE — 84466 ASSAY OF TRANSFERRIN: CPT | Performed by: INTERNAL MEDICINE

## 2018-07-16 PROCEDURE — 36415 COLL VENOUS BLD VENIPUNCTURE: CPT | Performed by: INTERNAL MEDICINE

## 2018-07-16 PROCEDURE — 84156 ASSAY OF PROTEIN URINE: CPT | Performed by: INTERNAL MEDICINE

## 2018-07-16 PROCEDURE — 81001 URINALYSIS AUTO W/SCOPE: CPT | Performed by: INTERNAL MEDICINE

## 2018-07-16 RX ORDER — HYDROCHLOROTHIAZIDE 12.5 MG/1
12.5 TABLET ORAL EVERY MORNING
Qty: 30 TABLET | Refills: 11 | Status: SHIPPED | OUTPATIENT
Start: 2018-07-16 | End: 2018-09-07

## 2018-07-16 NOTE — PROGRESS NOTES
Assessment and Plan:   88 year old female with hisotry of hypertension, osteoporosis, anemia, who presents for evaluation of hypercalcemia. Her Scr is typically 0.9mg/dL but recently up to 1.3 in setting of hypercalcemia, calcium of 10.4-10.9  1. Hypercalcemia- this has been present in the recent years, being up to 11 last year at tone point. I suspect this is related to HCTZ mediated calcium reabsorption, though mild  volume depletion and other factors possible.Will lower HCTZ, have her hydrate a little better but if serum calcium is still higher than 10, will stop HCTZ and start loop diuretic  She has mild anemia, thus will check serum immunofixation, but suspicion is low  2. CKD stage 3- Her Scr was 0.9 up until the last year or so, with Scr now up to 1.3. Suspect that hypercalcemia is playing a role, thus will try to lower calcium and monitor renal funciton  - hope to see Scr back under 1 in setting of normal calcium  3. Hypertension- on HCTZ 25mg and valsartan. Lower HCTZ to 12.5mg , repeat labs in 6 weeks and if caclcum still high, will switch HCTZ to furosemide 40mg bid  4. Anemia- mild anemia, hgb 11, does not eat a lot of meat, check iron labs, check immunofixation  5. Bone- PTH 60 , in setting of hypercalcemia, but some CKD thus ?appropriate.    - continue vitamin D and calcium 600mg for now    Assessment and plan was discussed with patient and she voiced her understanding and agreement.    Consult:  China Guzman was seen in consultation at the request of Dr. Trimble for hypercalcemia.    Reason for Visit:  China Guzman is a 88 year old female with hypercalcemia and worsening renal function, who presents for evaluation.    HPI:  She is a delightful female with history of hypertension, hyperlipidemia, osteoporosis, who presents for evaluation of high calcium. It has been noted multiple times in the past, and has been up to 11 mg/dL but usually between 10.4-10.9  She denies any symptoms of high calcium as  she overall feels well. She hydrates fairly well with 2-3 water cups a day. She denies muscle weakness, abdominal pain or other symptoms.   She is on HCTZ and valsartan for her blood pressure. She takes calcium and vitamin D and has lowered vitamin to once a day instead of twice a day.  She is also on diltiazem for blood pressure.  She states her blood pressure has been fairly well controlled.   A PTH was done and was normal at 60, but in setting of high calcium.   She denies weight loss though seems to have lost a few pounds in recent months.  She denies family history of kidney disease. Interestingly, her daughter has high calcium but also has sarcoidosis and lupus and has a lot of medical problems.           Kidney Disease and Medical Hx:       h/o HTN: Yes   Usual BP: 140       h/o DM: No       h/o Protein in Urine: No       h/o Blood in Urine: No       h/o Kidney Stones:No       h/o UTI  No       h/o Chronic NSAID Use: No         Previous Transplant Hx:      No         Uremic Symptoms:       No    ROS:   A comprehensive review of systems was obtained and negative, except as noted in the HPI or PMH.    Active Medical Problems:  Patient Active Problem List   Diagnosis     Osteoporosis     HL (hearing loss)     Gallstones     Malignant basal cell neoplasm of skin     Hyperlipidemia LDL goal <130     Pseudophakia, Yag Caps, ou     Advanced directives, counseling/discussion     Borderline glaucoma with ocular hypertension, bilateral - treated     Family history of colon cancer     Posterior vitreous detachment, bilateral     Macular degeneration, dry, mod, ou     Essential hypertension with goal blood pressure less than 140/90     10 year risk of MI or stroke 7.5% or greater, 39.7% in September 2017 on atorvastatin 10 mg     CKD (chronic kidney disease) stage 3, GFR 30-59 ml/min     IGT (impaired glucose tolerance)     High triglycerides     PMH:   Medical record was reviewed and PMH was discussed with patient and  noted below.  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     Hearing loss      HTN (hypertension)      Hyperlipidemia      MACULAR DEGENERATION (SENILE) OF RETINA, DRY OU 2/11/2011     Osteoporosis      PSH:   Past Surgical History:   Procedure Laterality Date     BLEPHAROPLASTY BILATERAL  1/2004    both eyes, upper lids     CATARACT IOL, RT/LT       COLONOSCOPY  12/2010    Q 5 years     FRACTURE TX, ANKLE RT/LT  1997    RIGHT ANKLE ORIF     LASER YAG CAPSULOTOMY  12/2005; 1/2006    right eye; left eye     PHACOEMULSIFICATION WITH STANDARD INTRAOCULAR LENS IMPLANT  8/2003; 10/2003    left eye; right eye       Family Hx:   Family History   Problem Relation Age of Onset     Cancer - colorectal Mother      C.A.D. Brother      Cerebrovascular Disease Brother      HEART DISEASE Brother      Asthma Daughter      Breast Cancer Daughter      Thyroid Disease Daughter      Personal Hx:   Social History     Social History     Marital status:      Spouse name: N/A     Number of children: 6     Years of education: N/A     Occupational History      Retired     Social History Main Topics     Smoking status: Never Smoker     Smokeless tobacco: Never Used     Alcohol use No     Drug use: No     Sexual activity: No     Other Topics Concern     Not on file     Social History Narrative       Allergies:  No Known Allergies    Medications:  Prior to Admission medications    Medication Sig Start Date End Date Taking? Authorizing Provider   aspirin 325 MG EC tablet Take 325 mg by mouth daily.   Yes Reported, Patient   atorvastatin (LIPITOR) 10 MG tablet TAKE ONE TABLET BY MOUTH AT BEDTIME 1/5/18  Yes Colby Trimble MD   CALCIUM + D OR 1 tablets daily   Yes Reported, Patient   DILT- MG 24 hr capsule TAKE ONE CAPSULE BY MOUTH ONE TIME DAILY  7/6/18  Yes Colby Trimble MD   hydrochlorothiazide (HYDRODIURIL) 25 MG tablet Take 1 tablet  (25 mg) by mouth every morning 1/5/18  Yes Colby Trimble MD   latanoprost (XALATAN) 0.005 % ophthalmic solution Place 1 drop into both eyes At Bedtime 1/22/18  Yes Srikanth Craig MD   MULTI-VITAMIN OR None Entered   Yes Reported, Patient   order for DME Walker. Expected duration of use: 3 months. 8/3/17  Yes Radha Bradford MD   valsartan (DIOVAN) 320 MG tablet Take 1 tablet (320 mg) by mouth daily 1/5/18  Yes Colby Trimble MD     Vitals:  /76 (BP Location: Right arm, Patient Position: Sitting)  Pulse 84  Wt 112 lb (50.8 kg)  LMP 12/16/1980  BMI 22.62 kg/m2    Exam:  GENERAL APPEARANCE: alert and no distress  HENT: mouth without ulcers or lesions  LYMPHATICS: no cervical or supraclavicular nodes  RESP: lungs clear to auscultation - no rales, rhonchi or wheezes  CV: regular rhythm, normal rate, no rub, no murmur  EDEMA: no LE edema bilaterally  ABDOMEN: soft, nondistended, nontender, bowel sounds normal  MS: extremities normal - no gross deformities noted, no evidence of inflammation in joints, no muscle tenderness  SKIN: no rash      Results:  No results found for this or any previous visit (from the past 336 hour(s)).

## 2018-07-16 NOTE — MR AVS SNAPSHOT
After Visit Summary   7/16/2018    China Guzman    MRN: [id number]           Patient Information     Date Of Birth          4/10/1930        Visit Information        Provider Department      7/16/2018 12:30 PM Romi Corrigan MD [facility] [place] [facility]        Today's Diagnoses     Essential hypertension with goal blood pressure less than 140/90          Care Instructions    Please change hydrochlorothiazide to 12.5 mg a day (half a pill)  Labs today  Labs in September at time of ophthalmology appointment    appointment in October           Follow-ups after your visit        Follow-up notes from your care team     Return in about 3 months (around 10/16/2018).      Your next 10 appointments already scheduled     Sep 06, 2018  9:15 AM CDT   Return Visit with Srikanth Craig MD   [facility]    [address] [place] [address]  [place] [id number] [phone]              Who to contact     Please call your clinic at [phone] to:    Ask questions about your health    Make or cancel appointments    Discuss your medicines    Learn about your test results    Speak to your doctor            Additional Information About Your Visit        Care EveryWhere ID     This is your Care EveryWhere ID. This could be used by other organizations to access your [place] medical records  [id number]        Your Vitals Were     Pulse Last Period BMI (Body Mass Index)             84 12/16/1980 22.62 kg/m2          Blood Pressure from Last 3 Encounters:   07/16/18 148/76   05/29/18 132/58   01/05/18 132/58    Weight from Last 3 Encounters:   07/16/18 112 lb (50.8 kg)   05/29/18 113 lb (51.3 kg)   01/05/18 114 lb (51.7 kg)              Today, you had the following     No orders found for display         Today's Medication Changes          These changes are accurate as of 7/16/18  1:16 PM.  If you have any questions, ask your nurse or doctor.               These medicines have changed  or have updated prescriptions.        Dose/Directions    hydrochlorothiazide 12.5 MG Tabs tablet   This may have changed:    - medication strength  - how much to take   Used for:  Essential hypertension with goal blood pressure less than 140/90   Changed by:  Romi Corrigan MD        Dose:  12.5 mg   Take 1 tablet (12.5 mg) by mouth every morning   Quantity:  30 tablet   Refills:  11            Where to get your medicines      These medications were sent to [id number] - [place] - [address]  [address] [address]    Hours:  Same info as [facility] [place] Phone:  [phone]     hydrochlorothiazide 12.5 MG Tabs tablet                Primary Care Provider Office Phone # Fax #    Colby Trimble [phone] [address] [facility] [address]        Equal Access to Services     [facility]: Hadii richie rosa hadasho Soomaali, waaxda luqadaha, qaybta kaalmada adeegyada, alan jenningsin hayshawn tello . So [facility] [phone].    ATENCIÓN: Si habla español, tiene a mary disposición servicios gratuitos de asistencia lingüística. Llame al [phone].    We comply with applicable federal civil rights laws and [place] laws. We do not discriminate on the basis of race, color, national origin, age, disability, sex, sexual orientation, or gender identity.            Thank you!     Thank you for choosing [facility][note 3]  for your care. Our goal is always to provide you with excellent care. Hearing back from our patients is one way we can continue to improve our services. Please take a few minutes to complete the written survey that you may receive in the mail after your visit with us. Thank you!             Your Updated Medication List - Protect others around you: Learn how to safely use, store and throw away your medicines at www.disposemymeds.org.          This list is accurate as of 7/16/18  1:16 PM.  Always use your most recent med list.                    Brand Name Dispense Instructions for use Diagnosis    aspirin 325 MG EC tablet      Take 325 mg by mouth daily.        atorvastatin 10 MG tablet    LIPITOR    90 tablet    TAKE ONE TABLET BY MOUTH AT BEDTIME    Hyperlipidemia LDL goal <130       CALCIUM + D PO      1 tablets daily        DILT- MG 24 hr capsule   Generic drug:  diltiazem     90 capsule    TAKE ONE CAPSULE BY MOUTH ONE TIME DAILY    Essential hypertension with goal blood pressure less than 140/90       hydrochlorothiazide 12.5 MG Tabs tablet     30 tablet    Take 1 tablet (12.5 mg) by mouth every morning    Essential hypertension with goal blood pressure less than 140/90       latanoprost 0.005 % ophthalmic solution    XALATAN    3 Bottle    Place 1 drop into both eyes At Bedtime    Borderline glaucoma with ocular hypertension, bilateral       MULTI-VITAMIN PO      None Entered        order for DME     1 Device    Walker. Expected duration of use: 3 months.    Baker's cyst of knee, left, Acute pain of left knee       valsartan 320 MG tablet    DIOVAN    90 tablet    Take 1 tablet (320 mg) by mouth daily    Essential hypertension with goal blood pressure less than 140/90

## 2018-07-16 NOTE — PATIENT INSTRUCTIONS
Please change hydrochlorothiazide to 12.5 mg a day (half a pill)  Labs today  Labs in September at time of ophthalmology appointment    appointment in October

## 2018-07-16 NOTE — LETTER
7/16/2018       RE: China Guzman  [address] [facility] [address]     Dear Colleague,    Thank you for referring your patient, China Guzman, [facility]. Please see a copy of my visit note below.    Assessment and Plan:   88 year old female with hisotry of hypertension, osteoporosis, anemia, who presents for evaluation of hypercalcemia. Her Scr is typically 0.9mg/dL but recently up to 1.3 in setting of hypercalcemia, calcium of 10.4-10.9  1. Hypercalcemia- this has been present in the recent years, being up to 11 last year at tone point. I suspect this is related to HCTZ mediated calcium reabsorption, though mild  volume depletion and other factors possible.Will lower HCTZ, have her hydrate a little better but if serum calcium is still higher than 10, will stop HCTZ and start loop diuretic  She has mild anemia, thus will check serum immunofixation, but suspicion is low  2. CKD stage 3- Her Scr was 0.9 up until the last year or so, with Scr now up to 1.3. Suspect that hypercalcemia is playing a role, thus will try to lower calcium and monitor renal funciton  - hope to see Scr back under 1 in setting of normal calcium  3. Hypertension- on HCTZ 25mg and valsartan. Lower HCTZ to 12.5mg , repeat labs in 6 weeks and if caclcum still high, will switch HCTZ to furosemide 40mg bid  4. Anemia- mild anemia, hgb 11, does not eat a lot of meat, check iron labs, check immunofixation  5. Bone- PTH 60 , in setting of hypercalcemia, but some CKD thus ?appropriate.    - continue vitamin D and calcium 600mg for now    Assessment and plan was discussed with patient and she voiced her understanding and agreement.    Consult:  China Guzman was seen in consultation at the request of Dr. Trimble for hypercalcemia.    Reason for Visit:  China Guzman is a 88 year old female with hypercalcemia and worsening renal function, who presents for evaluation.    HPI:  She is a  delightful female with history of hypertension, hyperlipidemia, osteoporosis, who presents for evaluation of high calcium. It has been noted multiple times in the past, and has been up to 11 mg/dL but usually between 10.4-10.9  She denies any symptoms of high calcium as she overall feels well. She hydrates fairly well with 2-3 water cups a day. She denies muscle weakness, abdominal pain or other symptoms.   She is on HCTZ and valsartan for her blood pressure. She takes calcium and vitamin D and has lowered vitamin to once a day instead of twice a day.  She is also on diltiazem for blood pressure.  She states her blood pressure has been fairly well controlled.   A PTH was done and was normal at 60, but in setting of high calcium.   She denies weight loss though seems to have lost a few pounds in recent months.  She denies family history of kidney disease. Interestingly, her daughter has high calcium but also has sarcoidosis and lupus and has a lot of medical problems.           Kidney Disease and Medical Hx:       h/o HTN: Yes   Usual BP: 140       h/o DM: No       h/o Protein in Urine: No       h/o Blood in Urine: No       h/o Kidney Stones:No       h/o UTI  No       h/o Chronic NSAID Use: No         Previous Transplant Hx:      No         Uremic Symptoms:       No    ROS:   A comprehensive review of systems was obtained and negative, except as noted in the HPI or PMH.    Active Medical Problems:  Patient Active Problem List   Diagnosis     Osteoporosis     HL (hearing loss)     Gallstones     Malignant basal cell neoplasm of skin     Hyperlipidemia LDL goal <130     Pseudophakia, Yag Caps, ou     Advanced directives, counseling/discussion     Borderline glaucoma with ocular hypertension, bilateral - treated     Family history of colon cancer     Posterior vitreous detachment, bilateral     Macular degeneration, dry, mod, ou     Essential hypertension with goal blood pressure less than 140/90     10 year risk of MI  or stroke 7.5% or greater, 39.7% in September 2017 on atorvastatin 10 mg     CKD (chronic kidney disease) stage 3, GFR 30-59 ml/min     IGT (impaired glucose tolerance)     High triglycerides     PMH:   Medical record was reviewed and PMH was discussed with patient and noted below.  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     Hearing loss      HTN (hypertension)      Hyperlipidemia      MACULAR DEGENERATION (SENILE) OF RETINA, DRY OU 2/11/2011     Osteoporosis      PSH:   Past Surgical History:   Procedure Laterality Date     BLEPHAROPLASTY BILATERAL  1/2004    both eyes, upper lids     CATARACT IOL, RT/LT       COLONOSCOPY  12/2010    Q 5 years     FRACTURE TX, ANKLE RT/LT  1997    RIGHT ANKLE ORIF     LASER YAG CAPSULOTOMY  12/2005; 1/2006    right eye; left eye     PHACOEMULSIFICATION WITH STANDARD INTRAOCULAR LENS IMPLANT  8/2003; 10/2003    left eye; right eye       Family Hx:   Family History   Problem Relation Age of Onset     Cancer - colorectal Mother      C.A.D. Brother      Cerebrovascular Disease Brother      HEART DISEASE Brother      Asthma Daughter      Breast Cancer Daughter      Thyroid Disease Daughter      Personal Hx:   Social History     Social History     Marital status:      Spouse name: N/A     Number of children: 6     Years of education: N/A     Occupational History      Retired     Social History Main Topics     Smoking status: Never Smoker     Smokeless tobacco: Never Used     Alcohol use No     Drug use: No     Sexual activity: No     Other Topics Concern     Not on file     Social History Narrative       Allergies:  No Known Allergies    Medications:  Prior to Admission medications    Medication Sig Start Date End Date Taking? Authorizing Provider   aspirin 325 MG EC tablet Take 325 mg by mouth daily.   Yes Reported, Patient   atorvastatin (LIPITOR) 10 MG tablet TAKE ONE TABLET BY MOUTH  AT BEDTIME 1/5/18  Yes Colby Trimble MD   CALCIUM + D OR 1 tablets daily   Yes Reported, Patient   DILT- MG 24 hr capsule TAKE ONE CAPSULE BY MOUTH ONE TIME DAILY  7/6/18  Yes Colby Trimble MD   hydrochlorothiazide (HYDRODIURIL) 25 MG tablet Take 1 tablet (25 mg) by mouth every morning 1/5/18  Yes Colby Trimble MD   latanoprost (XALATAN) 0.005 % ophthalmic solution Place 1 drop into both eyes At Bedtime 1/22/18  Yes Srikanth Craig MD   MULTI-VITAMIN OR None Entered   Yes Reported, Patient   order for DME Walker. Expected duration of use: 3 months. 8/3/17  Yes Radha Bradford MD   valsartan (DIOVAN) 320 MG tablet Take 1 tablet (320 mg) by mouth daily 1/5/18  Yes Colby Trimble MD     Vitals:  /76 (BP Location: Right arm, Patient Position: Sitting)  Pulse 84  Wt 112 lb (50.8 kg)  LMP 12/16/1980  BMI 22.62 kg/m2    Exam:  GENERAL APPEARANCE: alert and no distress  HENT: mouth without ulcers or lesions  LYMPHATICS: no cervical or supraclavicular nodes  RESP: lungs clear to auscultation - no rales, rhonchi or wheezes  CV: regular rhythm, normal rate, no rub, no murmur  EDEMA: no LE edema bilaterally  ABDOMEN: soft, nondistended, nontender, bowel sounds normal  MS: extremities normal - no gross deformities noted, no evidence of inflammation in joints, no muscle tenderness  SKIN: no rash      Results:  No results found for this or any previous visit (from the past 336 hour(s)).      Again, thank you for allowing me to participate in the care of your patient.      Sincerely,    Romi Jose Corrigan MD

## 2018-07-17 LAB
IGA SERPL-MCNC: 146 MG/DL (ref 70–380)
IGG SERPL-MCNC: 1250 MG/DL (ref 695–1620)
IGM SERPL-MCNC: 134 MG/DL (ref 60–265)
PROT PATTERN SERPL IFE-IMP: NORMAL

## 2018-08-24 ENCOUNTER — TELEPHONE (OUTPATIENT)
Dept: FAMILY MEDICINE | Facility: CLINIC | Age: 83
End: 2018-08-24

## 2018-08-24 DIAGNOSIS — I10 ESSENTIAL HYPERTENSION WITH GOAL BLOOD PRESSURE LESS THAN 140/90: ICD-10-CM

## 2018-08-24 RX ORDER — LOSARTAN POTASSIUM 100 MG/1
100 TABLET ORAL DAILY
Qty: 90 TABLET | Refills: 3 | Status: SHIPPED | OUTPATIENT
Start: 2018-08-24 | End: 2019-04-08

## 2018-08-24 NOTE — TELEPHONE ENCOUNTER
Pharmacy stating need a alternative for Valsartan, do to the recall. Please advise.  [facility] [address]

## 2018-08-28 ENCOUNTER — TELEPHONE (OUTPATIENT)
Dept: FAMILY MEDICINE | Facility: CLINIC | Age: 83
End: 2018-08-28

## 2018-08-28 NOTE — TELEPHONE ENCOUNTER
Due to a recall pharmacy has given patient a different med called Lasantan Potassium. Is this ok to take with her other meds? Ok to leave a message.

## 2018-08-28 NOTE — TELEPHONE ENCOUNTER
Information below is reviewed with Dr Trimble, Verbal Orders yes, this was an intended change of medication.   Verbalized good understanding.     Per protocol, will route encounter to be cosigned by provider for Verbal Orders.  Stacy Durant RN

## 2018-08-30 NOTE — TELEPHONE ENCOUNTER
I have discussed this patient's issue with the RN involved and we have come up with this plan.  Colby Trimble MD

## 2018-09-06 ENCOUNTER — OFFICE VISIT (OUTPATIENT)
Dept: OPHTHALMOLOGY | Facility: CLINIC | Age: 83
End: 2018-09-06
Payer: COMMERCIAL

## 2018-09-06 DIAGNOSIS — N18.30 CKD (CHRONIC KIDNEY DISEASE) STAGE 3, GFR 30-59 ML/MIN (H): ICD-10-CM

## 2018-09-06 DIAGNOSIS — E83.52 HYPERCALCEMIA: ICD-10-CM

## 2018-09-06 DIAGNOSIS — H40.053 BORDERLINE GLAUCOMA WITH OCULAR HYPERTENSION, BILATERAL: Primary | ICD-10-CM

## 2018-09-06 DIAGNOSIS — I10 ESSENTIAL HYPERTENSION WITH GOAL BLOOD PRESSURE LESS THAN 140/90: ICD-10-CM

## 2018-09-06 LAB
ALBUMIN SERPL-MCNC: 4.3 G/DL (ref 3.4–5)
ANION GAP SERPL CALCULATED.3IONS-SCNC: 7 MMOL/L (ref 3–14)
BUN SERPL-MCNC: 31 MG/DL (ref 7–30)
CALCIUM SERPL-MCNC: 10 MG/DL (ref 8.5–10.1)
CHLORIDE SERPL-SCNC: 97 MMOL/L (ref 94–109)
CO2 SERPL-SCNC: 27 MMOL/L (ref 20–32)
CREAT SERPL-MCNC: 0.97 MG/DL (ref 0.52–1.04)
GFR SERPL CREATININE-BSD FRML MDRD: 54 ML/MIN/1.7M2
GLUCOSE SERPL-MCNC: 106 MG/DL (ref 70–99)
PHOSPHATE SERPL-MCNC: 3.6 MG/DL (ref 2.5–4.5)
POTASSIUM SERPL-SCNC: 4.2 MMOL/L (ref 3.4–5.3)
SODIUM SERPL-SCNC: 131 MMOL/L (ref 133–144)

## 2018-09-06 PROCEDURE — 80069 RENAL FUNCTION PANEL: CPT | Performed by: INTERNAL MEDICINE

## 2018-09-06 PROCEDURE — 36415 COLL VENOUS BLD VENIPUNCTURE: CPT | Performed by: INTERNAL MEDICINE

## 2018-09-06 PROCEDURE — 92083 EXTENDED VISUAL FIELD XM: CPT | Performed by: OPHTHALMOLOGY

## 2018-09-06 PROCEDURE — 92133 CPTRZD OPH DX IMG PST SGM ON: CPT | Performed by: OPHTHALMOLOGY

## 2018-09-06 PROCEDURE — 92012 INTRM OPH EXAM EST PATIENT: CPT | Performed by: OPHTHALMOLOGY

## 2018-09-06 RX ORDER — TIMOLOL MALEATE 5 MG/ML
1 SOLUTION/ DROPS OPHTHALMIC DAILY
Qty: 1 BOTTLE | Refills: 12 | Status: SHIPPED | OUTPATIENT
Start: 2018-09-06 | End: 2019-09-19

## 2018-09-06 ASSESSMENT — VISUAL ACUITY
OD_CC+: -1+2
METHOD: SNELLEN - LINEAR
CORRECTION_TYPE: GLASSES
OD_CC: 20/60
OS_CC: CF@2

## 2018-09-06 ASSESSMENT — TONOMETRY
IOP_METHOD: APPLANATION
OD_IOP_MMHG: 21
OS_IOP_MMHG: 24

## 2018-09-06 ASSESSMENT — EXTERNAL EXAM - LEFT EYE: OS_EXAM: NORMAL

## 2018-09-06 ASSESSMENT — EXTERNAL EXAM - RIGHT EYE: OD_EXAM: NORMAL

## 2018-09-06 ASSESSMENT — SLIT LAMP EXAM - LIDS
COMMENTS: GOOD CREASE AND COSMESIS, 1+ PTOSIS
COMMENTS: GOOD CREASE AND COSMESIS

## 2018-09-06 NOTE — PROGRESS NOTES
Current Eye Medications: Latanoprost at night both eyes     Subjective: here for IOP HVF and OCT today. Vision hasn't changed too much.  Getting the eye drops in     Objective:  See Ophthalmology Exam.       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      Plan:  Continue using Latanoprost (green top) both eyes at bedtime.   Start Timolol (yellow cap) both eyes daily in the morning.   Return visit in 6 weeks for intraocular pressure check.     Srikanth Craig M.D.  [phone]

## 2018-09-06 NOTE — LETTER
9/6/2018         RE: China Guzman  [address] [facility] [address]        Dear Colleague,    Thank you for referring your patient, China Guzman, to the [facility]. Please see a copy of my visit note below.     Current Eye Medications: Latanoprost at night both eyes     Subjective: here for IOP HVF and OCT today. Vision hasn't changed too much.  Getting the eye drops in     Objective:  See Ophthalmology Exam.       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      Plan:  Continue using Latanoprost (green top) both eyes at bedtime.   Start Timolol (yellow cap) both eyes daily in the morning.   Return visit in 6 weeks for intraocular pressure check.     Srikanth Craig M.D.  [phone]         Again, thank you for allowing me to participate in the care of your patient.        Sincerely,        Srikanth Craig MD

## 2018-09-06 NOTE — PATIENT INSTRUCTIONS
Continue using Latanoprost (green top) both eyes at bedtime.   Start Timolol (yellow cap) both eyes daily in the morning.   Return visit in 6 weeks for intraocular pressure check.     Srikanth Craig M.D.  [phone]

## 2018-09-06 NOTE — MR AVS SNAPSHOT
After Visit Summary   9/6/2018    China Guzman    MRN: [id number]           Patient Information     Date Of Birth          4/10/1930        Visit Information        Provider Department      9/6/2018 9:15 AM Srikanth Craig MD [facility]        Today's Diagnoses     Borderline glaucoma with ocular hypertension, bilateral - treated    -  1      Care Instructions    Continue using Latanoprost (green top) both eyes at bedtime.   Start Timolol (yellow cap) both eyes daily in the morning.   Return visit in 6 weeks for intraocular pressure check.     Srikanth Craig M.D.  [phone]            Follow-ups after your visit        Your next 10 appointments already scheduled     Oct 08, 2018  1:30 PM CDT   Return Visit with Romi Corrigan MD   [facility] ([facility])    [address] [place] [id number] [phone]              Who to contact     If you have questions or need follow up information about today's clinic visit or your schedule please contact [facility] directly at [phone].  Normal or non-critical lab and imaging results will be communicated to you by MyChart, letter or phone within 4 business days after the clinic has received the results. If you do not hear from us within 7 days, please contact the clinic through MyChart or phone. If you have a critical or abnormal lab result, we will notify you by phone as soon as possible.  Submit refill requests through [email or web address] or call your pharmacy and they will forward the refill request to us. Please allow 3 business days for your refill to be completed.          Additional Information About Your Visit        Care EveryWhere ID     This is your Care EveryWhere ID. This could be used by other organizations to access your [facility] medical records  [id number]        Your Vitals Were     Last Period                   12/16/1980            Blood Pressure from Last 3 Encounters:    07/16/18 142/62   05/29/18 132/58   01/05/18 132/58    Weight from Last 3 Encounters:   07/16/18 50.8 kg (112 lb)   05/29/18 51.3 kg (113 lb)   01/05/18 51.7 kg (114 lb)              We Performed the Following     HC COMPUTERIZED OPHTHALMIC IMAGING OPTIC NERVE     VISUAL FIELDS EXAM (EXTENDED)          Today's Medication Changes          These changes are accurate as of 9/6/18 10:10 AM.  If you have any questions, ask your nurse or doctor.               Start taking these medicines.        Dose/Directions    timolol 0.5 % ophthalmic solution   Commonly known as:  TIMOPTIC   Used for:  Borderline glaucoma with ocular hypertension, bilateral   Started by:  Srikanth Craig MD        Dose:  1 drop   Place 1 drop into both eyes daily   Quantity:  1 Bottle   Refills:  12            Where to get your medicines      These medications were sent to [id number] - [place] - [address]  [address], [place] [address]    Hours:  Same info as [facility] Phone:  [phone]     timolol 0.5 % ophthalmic solution                Primary Care Provider Office Phone # Fax #    Colby Trimble [phone] [address] [address] [facility] [address]        Equal Access to Services     KAYLIN CARR AH: Hadii richie rosa hadasho Soomaali, waaxda luqadaha, qaybta kaalmada adeegyada, waxay dickin hayshawn vegas. So [facility] [phone].    ATENCIÓN: Si habla español, tiene a mary disposición servicios gratuitos de asistencia lingüística. Llame al [phone].    We comply with applicable federal civil rights laws and [place] laws. We do not discriminate on the basis of race, color, national origin, age, disability, sex, sexual orientation, or gender identity.            Thank you!     Thank you for choosing [facility]  for your care. Our goal is always to provide you with excellent care. Hearing back from our patients is one way we can continue to improve our  services. Please take a few minutes to complete the written survey that you may receive in the mail after your visit with us. Thank you!             Your Updated Medication List - Protect others around you: Learn how to safely use, store and throw away your medicines at www.disposemymeds.org.          This list is accurate as of 9/6/18 10:10 AM.  Always use your most recent med list.                   Brand Name Dispense Instructions for use Diagnosis    aspirin 325 MG EC tablet      Take 325 mg by mouth daily.        atorvastatin 10 MG tablet    LIPITOR    90 tablet    TAKE ONE TABLET BY MOUTH AT BEDTIME    Hyperlipidemia LDL goal <130       CALCIUM + D PO      1 tablets daily        DILT- MG 24 hr capsule   Generic drug:  diltiazem     90 capsule    TAKE ONE CAPSULE BY MOUTH ONE TIME DAILY    Essential hypertension with goal blood pressure less than 140/90       hydrochlorothiazide 12.5 MG Tabs tablet     30 tablet    Take 1 tablet (12.5 mg) by mouth every morning    Essential hypertension with goal blood pressure less than 140/90       latanoprost 0.005 % ophthalmic solution    XALATAN    3 Bottle    Place 1 drop into both eyes At Bedtime    Borderline glaucoma with ocular hypertension, bilateral       losartan 100 MG tablet    COZAAR    90 tablet    Take 1 tablet (100 mg) by mouth daily    Essential hypertension with goal blood pressure less than 140/90       MULTI-VITAMIN PO      None Entered        order for DME     1 Device    Walker. Expected duration of use: 3 months.    Baker's cyst of knee, left, Acute pain of left knee       timolol 0.5 % ophthalmic solution    TIMOPTIC    1 Bottle    Place 1 drop into both eyes daily    Borderline glaucoma with ocular hypertension, bilateral

## 2018-09-07 RX ORDER — FUROSEMIDE 40 MG
40 TABLET ORAL 2 TIMES DAILY
Qty: 60 TABLET | Refills: 3 | Status: SHIPPED | OUTPATIENT
Start: 2018-09-07 | End: 2018-11-28

## 2018-09-10 ASSESSMENT — PACHYMETRY
OD_CT(UM): .541
OS_CT(UM): .539

## 2018-09-12 ENCOUNTER — TELEPHONE (OUTPATIENT)
Dept: NEPHROLOGY | Facility: CLINIC | Age: 83
End: 2018-09-12

## 2018-09-12 DIAGNOSIS — N18.30 CKD (CHRONIC KIDNEY DISEASE) STAGE 3, GFR 30-59 ML/MIN (H): Primary | ICD-10-CM

## 2018-09-12 NOTE — TELEPHONE ENCOUNTER
Spoke to patient regarding result note per Dr. Corrigan. Patient verbalized an understanding and will stop hydrochlorothiazide and start lasix 40 mg PO BID. Patient aware that Dr. Corrigan wanted to repeat renal panel in 6 weeks as well and will call clinic with any questions or concerns.  Karol Whaley LPN  Nephrology  [phone]

## 2018-10-08 ENCOUNTER — OFFICE VISIT (OUTPATIENT)
Dept: NEPHROLOGY | Facility: CLINIC | Age: 83
End: 2018-10-08
Payer: COMMERCIAL

## 2018-10-08 VITALS
DIASTOLIC BLOOD PRESSURE: 68 MMHG | SYSTOLIC BLOOD PRESSURE: 142 MMHG | HEART RATE: 78 BPM | BODY MASS INDEX: 21.61 KG/M2 | WEIGHT: 107 LBS

## 2018-10-08 DIAGNOSIS — E83.52 HYPERCALCEMIA: ICD-10-CM

## 2018-10-08 DIAGNOSIS — I10 ESSENTIAL HYPERTENSION WITH GOAL BLOOD PRESSURE LESS THAN 140/90: Primary | ICD-10-CM

## 2018-10-08 DIAGNOSIS — N18.30 CKD (CHRONIC KIDNEY DISEASE) STAGE 3, GFR 30-59 ML/MIN (H): Primary | ICD-10-CM

## 2018-10-08 DIAGNOSIS — I10 ESSENTIAL HYPERTENSION WITH GOAL BLOOD PRESSURE LESS THAN 140/90: ICD-10-CM

## 2018-10-08 DIAGNOSIS — E87.0 HYPERNATREMIA: ICD-10-CM

## 2018-10-08 DIAGNOSIS — N18.30 CKD (CHRONIC KIDNEY DISEASE) STAGE 3, GFR 30-59 ML/MIN (H): ICD-10-CM

## 2018-10-08 DIAGNOSIS — E87.1 HYPONATREMIA: ICD-10-CM

## 2018-10-08 LAB
ALBUMIN SERPL-MCNC: 4.1 G/DL (ref 3.4–5)
ANION GAP SERPL CALCULATED.3IONS-SCNC: 12 MMOL/L (ref 3–14)
BUN SERPL-MCNC: 54 MG/DL (ref 7–30)
CALCIUM SERPL-MCNC: 9.9 MG/DL (ref 8.5–10.1)
CHLORIDE SERPL-SCNC: 100 MMOL/L (ref 94–109)
CO2 SERPL-SCNC: 28 MMOL/L (ref 20–32)
CREAT SERPL-MCNC: 1.14 MG/DL (ref 0.52–1.04)
GFR SERPL CREATININE-BSD FRML MDRD: 45 ML/MIN/1.7M2
GLUCOSE SERPL-MCNC: 113 MG/DL (ref 70–99)
OSMOLALITY SERPL: 307 MMOL/KG (ref 280–301)
OSMOLALITY UR: 366 MMOL/KG (ref 100–1200)
PHOSPHATE SERPL-MCNC: 4.3 MG/DL (ref 2.5–4.5)
POTASSIUM SERPL-SCNC: 3.7 MMOL/L (ref 3.4–5.3)
PTH-INTACT SERPL-MCNC: 90 PG/ML (ref 18–80)
SODIUM SERPL-SCNC: 140 MMOL/L (ref 133–144)
SODIUM UR-SCNC: 53 MMOL/L
URATE SERPL-MCNC: 7.9 MG/DL (ref 2.6–6)

## 2018-10-08 PROCEDURE — 80069 RENAL FUNCTION PANEL: CPT | Performed by: INTERNAL MEDICINE

## 2018-10-08 PROCEDURE — 36415 COLL VENOUS BLD VENIPUNCTURE: CPT | Performed by: INTERNAL MEDICINE

## 2018-10-08 PROCEDURE — 84300 ASSAY OF URINE SODIUM: CPT | Performed by: INTERNAL MEDICINE

## 2018-10-08 PROCEDURE — 83930 ASSAY OF BLOOD OSMOLALITY: CPT | Performed by: INTERNAL MEDICINE

## 2018-10-08 PROCEDURE — 83970 ASSAY OF PARATHORMONE: CPT | Performed by: INTERNAL MEDICINE

## 2018-10-08 PROCEDURE — 83935 ASSAY OF URINE OSMOLALITY: CPT | Performed by: INTERNAL MEDICINE

## 2018-10-08 PROCEDURE — 84550 ASSAY OF BLOOD/URIC ACID: CPT | Performed by: INTERNAL MEDICINE

## 2018-10-08 NOTE — MR AVS SNAPSHOT
After Visit Summary   10/8/2018    China Guzman    MRN: [id number]           Patient Information     Date Of Birth          4/10/1930        Visit Information        Provider Department      10/8/2018 1:30 PM Romi Corrigan MD [facility]        Care Instructions    Monitor blood pressure and call if higher 150-155 for top number (Karol [phone])            Follow-ups after your visit        Follow-up notes from your care team     Return in about 6 months (around 4/8/2019).      Your next 10 appointments already scheduled     Oct 18, 2018  8:45 AM CDT   Return Visit with Srikanth Craig MD   [facility] ([facility])    [address] [place] [address] [phone]              Who to contact     Please call your clinic at [phone] to:    Ask questions about your health    Make or cancel appointments    Discuss your medicines    Learn about your test results    Speak to your doctor            Additional Information About Your Visit        Care EveryWhere ID     This is your Care EveryWhere ID. This could be used by other organizations to access your [place] medical records  [id number]        Your Vitals Were     Pulse Last Period BMI (Body Mass Index)             78 12/16/1980 21.61 kg/m2          Blood Pressure from Last 3 Encounters:   10/08/18 142/68   07/16/18 142/62   05/29/18 132/58    Weight from Last 3 Encounters:   10/08/18 48.5 kg (107 lb)   07/16/18 50.8 kg (112 lb)   05/29/18 51.3 kg (113 lb)              Today, you had the following     No orders found for display       Primary Care Provider Office Phone # Fax #    Colby Trimble [phone] [address] [facility] [address]        Equal Access to Services     KAYLIN CARR : Beth Lang, patrick argueta, alan bautista. So [facility] [phone].    ATENCIÓN: Kelsey krishnamurthy  español, tiene a mary disposición servicios gratuitos de asistencia lingüística. Joao avila [phone].    We comply with applicable federal civil rights laws and [place] laws. We do not discriminate on the basis of race, color, national origin, age, disability, sex, sexual orientation, or gender identity.            Thank you!     Thank you for choosing [facility]  for your care. Our goal is always to provide you with excellent care. Hearing back from our patients is one way we can continue to improve our services. Please take a few minutes to complete the written survey that you may receive in the mail after your visit with us. Thank you!             Your Updated Medication List - Protect others around you: Learn how to safely use, store and throw away your medicines at www.disposemymeds.org.          This list is accurate as of 10/8/18  2:07 PM.  Always use your most recent med list.                   Brand Name Dispense Instructions for use Diagnosis    aspirin 325 MG EC tablet      Take 325 mg by mouth daily.        atorvastatin 10 MG tablet    LIPITOR    90 tablet    TAKE ONE TABLET BY MOUTH AT BEDTIME    Hyperlipidemia LDL goal <130       CALCIUM + D PO      1 tablets daily        DILT- MG 24 hr capsule   Generic drug:  diltiazem     90 capsule    TAKE ONE CAPSULE BY MOUTH ONE TIME DAILY    Essential hypertension with goal blood pressure less than 140/90       furosemide 40 MG tablet    LASIX    60 tablet    Take 1 tablet (40 mg) by mouth 2 times daily    CKD (chronic kidney disease) stage 3, GFR 30-59 ml/min (H), Hypercalcemia, Essential hypertension with goal blood pressure less than 140/90       latanoprost 0.005 % ophthalmic solution    XALATAN    3 Bottle    Place 1 drop into both eyes At Bedtime    Borderline glaucoma with ocular hypertension, bilateral       losartan 100 MG tablet    COZAAR    90 tablet    Take 1 tablet (100 mg) by mouth daily    Essential hypertension with goal blood  pressure less than 140/90       MULTI-VITAMIN PO      None Entered        order for DME     1 Device    Walker. Expected duration of use: 3 months.    Baker's cyst of knee, left, Acute pain of left knee       timolol 0.5 % ophthalmic solution    TIMOPTIC    1 Bottle    Place 1 drop into both eyes daily    Borderline glaucoma with ocular hypertension, bilateral

## 2018-10-08 NOTE — PROGRESS NOTES
Assessment and Plan:   88 year old female with hisotry of hypertension, osteoporosis, anemia, who presents for followup of hypercalcemia and increased creatinine. Her Scr is typically 0.9mg/dL but was up to 1.3 in setting of hypercalcemia, calcium of 10.4-10.9  1. Hypercalcemia- improved, after better hydration and stopping hydrochlorothide.  It had been present in the recent years, being up to 11 last year at one point.   - given hypercalcemia and hyponatremia, I elected to stop her hydrochlorothiazide  - furosemide 40mg bid started  - labs today  2. CKD stage 3- Her Scr was 0.9 up until the last year or so, with Scr up to 1.3 but now improved back to 1.0 in September, with normal / high normal calcium.  3. Hypertension- now on furosemide (stopped hydrochlorothiazide), losartan and diltiazem  4. Anemia- mild anemia, hgb 10.9, iron sat 16% and ferritin 78  check immunofixation- negative  - recheck iron in early 2019  5. Bone- PTH 60 , in setting of hypercalcemia, but some CKD thus ?appropriate.    - continue vitamin D and calcium 600mg for now  - recheck in early 2019    Assessment and plan was discussed with patient and she voiced her understanding and agreement.    Reason for Visit:  China Guzman is a 88 year old female with hypercalcemia and worsening renal function, who presents for followup    HPI:  She is a delightful female with history of hypertension, hyperlipidemia, osteoporosis, who presented in July 2018 for evaluation of high calcium and worsened kidney function.  Her calcium was noted to be elevated on multiple occasions, up to 11 mg/dL but usually between 10.4-10.9  She denies any symptoms of high calcium as she overall feels well.     Her PTH was within normal, which is mildly inappropriately high in setting of hypercalcemia. She was on hydrochlorothiazide which at first we lowered, but with hyponatremia noted on recheck, I switched her to furosemide in early September.  She is also on losartan and  diltiazem for her blood pressure. She takes calcium and vitamin D and has lowered vitamin to once a day instead of twice a day  On followup today, she feels well. Her blood pressure is a little higher than goal, and she is tolerating furosemide. She is drinking about 5 glasses of water a day at this time.      ROS:   A comprehensive review of systems was obtained and negative, except as noted in the HPI or PMH.    Active Medical Problems:  Patient Active Problem List   Diagnosis     Osteoporosis     HL (hearing loss)     Gallstones     Malignant basal cell neoplasm of skin     Hyperlipidemia LDL goal <130     Pseudophakia, Yag Caps, ou     Advanced directives, counseling/discussion     Borderline glaucoma with ocular hypertension, bilateral - treated     Family history of colon cancer     Posterior vitreous detachment, bilateral     Macular degeneration, dry, mod, ou     Essential hypertension with goal blood pressure less than 140/90     10 year risk of MI or stroke 7.5% or greater, 39.7% in September 2017 on atorvastatin 10 mg     CKD (chronic kidney disease) stage 3, GFR 30-59 ml/min (H)     IGT (impaired glucose tolerance)     High triglycerides     PMH:   Medical record was reviewed and PMH was discussed with patient and noted below.  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     Hearing loss      HTN (hypertension)      Hyperlipidemia      MACULAR DEGENERATION (SENILE) OF RETINA, DRY OU 2/11/2011     Osteoporosis      PSH:   Past Surgical History:   Procedure Laterality Date     BLEPHAROPLASTY BILATERAL  1/2004    both eyes, upper lids     CATARACT IOL, RT/LT       COLONOSCOPY  12/2010    Q 5 years     FRACTURE TX, ANKLE RT/LT  1997    RIGHT ANKLE ORIF     LASER YAG CAPSULOTOMY  12/2005; 1/2006    right eye; left eye     PHACOEMULSIFICATION WITH STANDARD INTRAOCULAR LENS IMPLANT  8/2003; 10/2003    left eye; right eye      REPAIR PTOSIS         Family Hx:   Family History   Problem Relation Age of Onset     Cancer - colorectal Mother      C.A.D. Brother      Cerebrovascular Disease Brother      HEART DISEASE Brother      Asthma Daughter      Breast Cancer Daughter      Thyroid Disease Daughter      Personal Hx:   Social History     Social History     Marital status:      Spouse name: N/A     Number of children: 6     Years of education: N/A     Occupational History      Retired     Social History Main Topics     Smoking status: Never Smoker     Smokeless tobacco: Never Used     Alcohol use No     Drug use: No     Sexual activity: No     Other Topics Concern     Not on file     Social History Narrative       Allergies:  No Known Allergies    Medications:  Prior to Admission medications    Medication Sig Start Date End Date Taking? Authorizing Provider   aspirin 325 MG EC tablet Take 325 mg by mouth daily.   Yes Reported, Patient   atorvastatin (LIPITOR) 10 MG tablet TAKE ONE TABLET BY MOUTH AT BEDTIME 1/5/18  Yes Colby Trimble MD   CALCIUM + D OR 1 tablets daily   Yes Reported, Patient   DILT- MG 24 hr capsule TAKE ONE CAPSULE BY MOUTH ONE TIME DAILY  7/6/18  Yes Colby Trimble MD   hydrochlorothiazide (HYDRODIURIL) 25 MG tablet Take 1 tablet (25 mg) by mouth every morning 1/5/18  Yes Colby Trimble MD   latanoprost (XALATAN) 0.005 % ophthalmic solution Place 1 drop into both eyes At Bedtime 1/22/18  Yes Srikanth Craig MD   MULTI-VITAMIN OR None Entered   Yes Reported, Patient   order for DME Walker. Expected duration of use: 3 months. 8/3/17  Yes Radha Bradford MD   valsartan (DIOVAN) 320 MG tablet Take 1 tablet (320 mg) by mouth daily 1/5/18  Yes Colby Trimble MD     Vitals:  /88 (BP Location: Left arm)  Pulse 90  Wt 48.5 kg (107 lb)  LMP 12/16/1980  BMI 21.61 kg/m2    Exam:  GENERAL APPEARANCE: alert and no distress  HENT: mouth without ulcers or lesions  LYMPHATICS: no  cervical or supraclavicular nodes  RESP: lungs clear to auscultation - no rales, rhonchi or wheezes  CV: regular rhythm, normal rate, no rub, no murmur  EDEMA: no LE edema bilaterally  ABDOMEN: soft, nondistended, nontender, bowel sounds normal  MS: extremities normal - no gross deformities noted, no evidence of inflammation in joints, no muscle tenderness  SKIN: no rash      Results:  No results found for this or any previous visit (from the past 336 hour(s)).  Last Comprehensive Metabolic Panel:  Sodium   Date Value Ref Range Status   09/06/2018 131 (L) 133 - 144 mmol/L Final     Potassium   Date Value Ref Range Status   09/06/2018 4.2 3.4 - 5.3 mmol/L Final     Chloride   Date Value Ref Range Status   09/06/2018 97 94 - 109 mmol/L Final     Carbon Dioxide   Date Value Ref Range Status   09/06/2018 27 20 - 32 mmol/L Final     Anion Gap   Date Value Ref Range Status   09/06/2018 7 3 - 14 mmol/L Final     Glucose   Date Value Ref Range Status   09/06/2018 106 (H) 70 - 99 mg/dL Final     Urea Nitrogen   Date Value Ref Range Status   09/06/2018 31 (H) 7 - 30 mg/dL Final     Creatinine   Date Value Ref Range Status   09/06/2018 0.97 0.52 - 1.04 mg/dL Final     GFR Estimate   Date Value Ref Range Status   09/06/2018 54 (L) >60 mL/min/1.7m2 Final     Comment:     Non  GFR Calc     Calcium   Date Value Ref Range Status   09/06/2018 10.0 8.5 - 10.1 mg/dL Final

## 2018-10-08 NOTE — LETTER
10/8/2018       RE: China Guzman  [address] [facility] [address]     Dear Colleague,    Thank you for referring your patient, China Guzman, to the [facility] at [facility]. Please see a copy of my visit note below.    Assessment and Plan:   88 year old female with hisotry of hypertension, osteoporosis, anemia, who presents for followup of hypercalcemia and increased creatinine. Her Scr is typically 0.9mg/dL but was up to 1.3 in setting of hypercalcemia, calcium of 10.4-10.9  1. Hypercalcemia- improved, after better hydration and stopping hydrochlorothide.  It had been present in the recent years, being up to 11 last year at one point.   - given hypercalcemia and hyponatremia, I elected to stop her hydrochlorothiazide  - furosemide 40mg bid started  - labs today  2. CKD stage 3- Her Scr was 0.9 up until the last year or so, with Scr up to 1.3 but now improved back to 1.0 in September, with normal / high normal calcium.  3. Hypertension- now on furosemide (stopped hydrochlorothiazide), losartan and diltiazem  4. Anemia- mild anemia, hgb 10.9, iron sat 16% and ferritin 78  check immunofixation- negative  - recheck iron in early 2019  5. Bone- PTH 60 , in setting of hypercalcemia, but some CKD thus ?appropriate.    - continue vitamin D and calcium 600mg for now  - recheck in early 2019    Assessment and plan was discussed with patient and she voiced her understanding and agreement.    Reason for Visit:  China Guzman is a 88 year old female with hypercalcemia and worsening renal function, who presents for followup    HPI:  She is a delightful female with history of hypertension, hyperlipidemia, osteoporosis, who presented in July 2018 for evaluation of high calcium and worsened kidney function.  Her calcium was noted to be elevated on multiple occasions, up to 11 mg/dL but usually between 10.4-10.9  She denies any symptoms of high calcium as she overall feels well.      Her PTH was within normal, which is mildly inappropriately high in setting of hypercalcemia. She was on hydrochlorothiazide which at first we lowered, but with hyponatremia noted on recheck, I switched her to furosemide in early September.  She is also on losartan and diltiazem for her blood pressure. She takes calcium and vitamin D and has lowered vitamin to once a day instead of twice a day  On followup today, she feels well. Her blood pressure is a little higher than goal, and she is tolerating furosemide. She is drinking about 5 glasses of water a day at this time.      ROS:   A comprehensive review of systems was obtained and negative, except as noted in the HPI or PMH.    Active Medical Problems:  Patient Active Problem List   Diagnosis     Osteoporosis     HL (hearing loss)     Gallstones     Malignant basal cell neoplasm of skin     Hyperlipidemia LDL goal <130     Pseudophakia, Yag Caps, ou     Advanced directives, counseling/discussion     Borderline glaucoma with ocular hypertension, bilateral - treated     Family history of colon cancer     Posterior vitreous detachment, bilateral     Macular degeneration, dry, mod, ou     Essential hypertension with goal blood pressure less than 140/90     10 year risk of MI or stroke 7.5% or greater, 39.7% in September 2017 on atorvastatin 10 mg     CKD (chronic kidney disease) stage 3, GFR 30-59 ml/min (H)     IGT (impaired glucose tolerance)     High triglycerides     PMH:   Medical record was reviewed and PMH was discussed with patient and noted below.  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     Hearing loss      HTN (hypertension)      Hyperlipidemia      MACULAR DEGENERATION (SENILE) OF RETINA, DRY OU 2/11/2011     Osteoporosis      PSH:   Past Surgical History:   Procedure Laterality Date     BLEPHAROPLASTY BILATERAL  1/2004    both eyes, upper lids     CATARACT IOL,  RT/LT       COLONOSCOPY  12/2010    Q 5 years     FRACTURE TX, ANKLE RT/LT  1997    RIGHT ANKLE ORIF     LASER YAG CAPSULOTOMY  12/2005; 1/2006    right eye; left eye     PHACOEMULSIFICATION WITH STANDARD INTRAOCULAR LENS IMPLANT  8/2003; 10/2003    left eye; right eye     REPAIR PTOSIS         Family Hx:   Family History   Problem Relation Age of Onset     Cancer - colorectal Mother      C.A.D. Brother      Cerebrovascular Disease Brother      HEART DISEASE Brother      Asthma Daughter      Breast Cancer Daughter      Thyroid Disease Daughter      Personal Hx:   Social History     Social History     Marital status:      Spouse name: N/A     Number of children: 6     Years of education: N/A     Occupational History      Retired     Social History Main Topics     Smoking status: Never Smoker     Smokeless tobacco: Never Used     Alcohol use No     Drug use: No     Sexual activity: No     Other Topics Concern     Not on file     Social History Narrative       Allergies:  No Known Allergies    Medications:  Prior to Admission medications    Medication Sig Start Date End Date Taking? Authorizing Provider   aspirin 325 MG EC tablet Take 325 mg by mouth daily.   Yes Reported, Patient   atorvastatin (LIPITOR) 10 MG tablet TAKE ONE TABLET BY MOUTH AT BEDTIME 1/5/18  Yes Colby Trimble MD   CALCIUM + D OR 1 tablets daily   Yes Reported, Patient   DILT- MG 24 hr capsule TAKE ONE CAPSULE BY MOUTH ONE TIME DAILY  7/6/18  Yes Colby Trimble MD   hydrochlorothiazide (HYDRODIURIL) 25 MG tablet Take 1 tablet (25 mg) by mouth every morning 1/5/18  Yes Colby Trimble MD   latanoprost (XALATAN) 0.005 % ophthalmic solution Place 1 drop into both eyes At Bedtime 1/22/18  Yes Srikanth Craig MD   MULTI-VITAMIN OR None Entered   Yes Reported, Patient   order for DME Walker. Expected duration of use: 3 months. 8/3/17  Yes Radha Bradford MD   valsartan (DIOVAN) 320 MG tablet Take 1 tablet (320 mg) by  mouth daily 1/5/18  Yes Colby Trimble MD     Vitals:  /88 (BP Location: Left arm)  Pulse 90  Wt 48.5 kg (107 lb)  LMP 12/16/1980  BMI 21.61 kg/m2    Exam:  GENERAL APPEARANCE: alert and no distress  HENT: mouth without ulcers or lesions  LYMPHATICS: no cervical or supraclavicular nodes  RESP: lungs clear to auscultation - no rales, rhonchi or wheezes  CV: regular rhythm, normal rate, no rub, no murmur  EDEMA: no LE edema bilaterally  ABDOMEN: soft, nondistended, nontender, bowel sounds normal  MS: extremities normal - no gross deformities noted, no evidence of inflammation in joints, no muscle tenderness  SKIN: no rash      Results:  No results found for this or any previous visit (from the past 336 hour(s)).  Last Comprehensive Metabolic Panel:  Sodium   Date Value Ref Range Status   09/06/2018 131 (L) 133 - 144 mmol/L Final     Potassium   Date Value Ref Range Status   09/06/2018 4.2 3.4 - 5.3 mmol/L Final     Chloride   Date Value Ref Range Status   09/06/2018 97 94 - 109 mmol/L Final     Carbon Dioxide   Date Value Ref Range Status   09/06/2018 27 20 - 32 mmol/L Final     Anion Gap   Date Value Ref Range Status   09/06/2018 7 3 - 14 mmol/L Final     Glucose   Date Value Ref Range Status   09/06/2018 106 (H) 70 - 99 mg/dL Final     Urea Nitrogen   Date Value Ref Range Status   09/06/2018 31 (H) 7 - 30 mg/dL Final     Creatinine   Date Value Ref Range Status   09/06/2018 0.97 0.52 - 1.04 mg/dL Final     GFR Estimate   Date Value Ref Range Status   09/06/2018 54 (L) >60 mL/min/1.7m2 Final     Comment:     Non  GFR Calc     Calcium   Date Value Ref Range Status   09/06/2018 10.0 8.5 - 10.1 mg/dL Final       Again, thank you for allowing me to participate in the care of your patient.      Sincerely,    Romi Jose Corrigan MD

## 2018-10-18 ENCOUNTER — OFFICE VISIT (OUTPATIENT)
Dept: OPHTHALMOLOGY | Facility: CLINIC | Age: 83
End: 2018-10-18
Payer: COMMERCIAL

## 2018-10-18 DIAGNOSIS — H40.053 BORDERLINE GLAUCOMA WITH OCULAR HYPERTENSION, BILATERAL: Primary | ICD-10-CM

## 2018-10-18 PROCEDURE — 92012 INTRM OPH EXAM EST PATIENT: CPT | Performed by: OPHTHALMOLOGY

## 2018-10-18 ASSESSMENT — TONOMETRY
IOP_METHOD: APPLANATION
OS_IOP_MMHG: 16
IOP_METHOD: APPLANATION
OD_IOP_MMHG: 17
OD_IOP_MMHG: 17
OS_IOP_MMHG: 17

## 2018-10-18 ASSESSMENT — VISUAL ACUITY
OS_CC: CF@4
METHOD: SNELLEN - LINEAR
OD_CC+: -1
CORRECTION_TYPE: GLASSES
OD_CC: 20/60

## 2018-10-18 NOTE — LETTER
10/18/2018         RE: China Guzman  [address] [facility] [address]        Dear Colleague,    Thank you for referring your patient, China Guzman, to the [facility]. Please see a copy of my visit note below.     Current Eye Medications:  Latanoprost (green top) both eyes at bedtime, Timolol (yellow cap) both eyes daily in the morning.      Subjective: here for IOP check today.  Slight sting, but otherwise doing ok with the new drops.      Objective:  See Ophthalmology Exam.       Assessment:  Intraocular pressure improved both eyes with addition of Timolol.      Plan:  Continue using Timolol (yellow top) both eyes daily in the morning,  And Latanoprost (green top) both eyes at bedtime.   Return visit in 5 months for complete exam.     Srikanth Craig M.D.  [phone]           Again, thank you for allowing me to participate in the care of your patient.        Sincerely,        Srikanth Craig MD

## 2018-10-18 NOTE — MR AVS SNAPSHOT
After Visit Summary   10/18/2018    China Guzman    MRN: [id number]           Patient Information     Date Of Birth          4/10/1930        Visit Information        Provider Department      10/18/2018 8:45 AM Srikanth Craig MD [facility]        Today's Diagnoses     Borderline glaucoma with ocular hypertension, bilateral - treated    -  1      Care Instructions    Continue using Timolol (yellow top) both eyes daily in the morning,  And Latanoprost (green top) both eyes at bedtime.   Return visit in 5 months for complete exam.     Srikanth Craig M.D.  [phone]            Follow-ups after your visit        Your next 10 appointments already scheduled     Nov 28, 2018  9:00 AM CST   Office Visit with Colby Trimble MD   [facility] ([facility])    [address] [facility] [address] [phone]           Bring a current list of meds and any records pertaining to this visit. For Physicals, please bring immunization records and any forms needing to be filled out. Please arrive 10 minutes early to complete paperwork.              Who to contact     If you have questions or need follow up information about today's clinic visit or your schedule please contact [facility] directly at [phone].  Normal or non-critical lab and imaging results will be communicated to you by MyChart, letter or phone within 4 business days after the clinic has received the results. If you do not hear from us within 7 days, please contact the clinic through MyChart or phone. If you have a critical or abnormal lab result, we will notify you by phone as soon as possible.  Submit refill requests through [employer] or call your pharmacy and they will forward the refill request to us. Please allow 3 business days for your refill to be completed.          Additional Information About Your Visit        Care EveryWhere ID     This is your Care EveryWhere ID.  This could be used by other organizations to access your [place] medical records  [id number]        Your Vitals Were     Last Period                   12/16/1980            Blood Pressure from Last 3 Encounters:   10/08/18 142/68   07/16/18 142/62   05/29/18 132/58    Weight from Last 3 Encounters:   10/08/18 48.5 kg (107 lb)   07/16/18 50.8 kg (112 lb)   05/29/18 51.3 kg (113 lb)              Today, you had the following     No orders found for display       Primary Care Provider Office Phone # Fax #    Colby Trimble [phone] [address] [place] [place] [address]        Equal Access to Services     KAYLIN CARR : Hadii richie alfordo Rickey, waaxda luqadaha, qaybta kaalmada adecinthyayaángel, alan vegas. So [facility] [phone].    ATENCIÓN: Si habla español, tiene a mary disposición servicios gratuitos de asistencia lingüística. LlSelect [facility], [facility] [phone].    We comply with applicable federal civil rights laws and [place] laws. We do not discriminate on the basis of race, color, national origin, age, disability, sex, sexual orientation, or gender identity.            Thank you!     Thank you for choosing [facility]  for your care. Our goal is always to provide you with excellent care. Hearing back from our patients is one way we can continue to improve our services. Please take a few minutes to complete the written survey that you may receive in the mail after your visit with us. Thank you!             Your Updated Medication List - Protect others around you: Learn how to safely use, store and throw away your medicines at www.disposemymeds.org.          This list is accurate as of 10/18/18  9:38 AM.  Always use your most recent med list.                   Brand Name Dispense Instructions for use Diagnosis    aspirin 325 MG EC tablet      Take 325 mg by mouth daily.        atorvastatin 10 MG tablet    LIPITOR    90 tablet    TAKE ONE TABLET BY MOUTH  AT BEDTIME    Hyperlipidemia LDL goal <130       CALCIUM + D PO      1 tablets daily        DILT- MG 24 hr capsule   Generic drug:  diltiazem     90 capsule    TAKE ONE CAPSULE BY MOUTH ONE TIME DAILY    Essential hypertension with goal blood pressure less than 140/90       furosemide 40 MG tablet    LASIX    60 tablet    Take 1 tablet (40 mg) by mouth 2 times daily    CKD (chronic kidney disease) stage 3, GFR 30-59 ml/min (H), Hypercalcemia, Essential hypertension with goal blood pressure less than 140/90       latanoprost 0.005 % ophthalmic solution    XALATAN    3 Bottle    Place 1 drop into both eyes At Bedtime    Borderline glaucoma with ocular hypertension, bilateral       losartan 100 MG tablet    COZAAR    90 tablet    Take 1 tablet (100 mg) by mouth daily    Essential hypertension with goal blood pressure less than 140/90       MULTI-VITAMIN PO      None Entered        order for DME     1 Device    Walker. Expected duration of use: 3 months.    Baker's cyst of knee, left, Acute pain of left knee       timolol 0.5 % ophthalmic solution    TIMOPTIC    1 Bottle    Place 1 drop into both eyes daily    Borderline glaucoma with ocular hypertension, bilateral

## 2018-10-18 NOTE — PROGRESS NOTES
Current Eye Medications:  Latanoprost (green top) both eyes at bedtime, Timolol (yellow cap) both eyes daily in the morning.      Subjective: here for IOP check today.  Slight sting, but otherwise doing ok with the new drops.      Objective:  See Ophthalmology Exam.       Assessment:  Intraocular pressure improved both eyes with addition of Timolol.      Plan:  Continue using Timolol (yellow top) both eyes daily in the morning,  And Latanoprost (green top) both eyes at bedtime.  Wants [facility] again - will refer.   Return visit in 5 months for complete exam.     Srikanth Craig M.D.  [phone]

## 2018-10-18 NOTE — PATIENT INSTRUCTIONS
Continue using Timolol (yellow top) both eyes daily in the morning,  And Latanoprost (green top) both eyes at bedtime.   Return visit in 5 months for complete exam.     Srikanth Craig M.D.  [phone]

## 2018-10-19 ASSESSMENT — EXTERNAL EXAM - LEFT EYE: OS_EXAM: NORMAL

## 2018-10-19 ASSESSMENT — SLIT LAMP EXAM - LIDS
COMMENTS: GOOD CREASE AND COSMESIS
COMMENTS: GOOD CREASE AND COSMESIS, 1+ PTOSIS

## 2018-10-19 ASSESSMENT — EXTERNAL EXAM - RIGHT EYE: OD_EXAM: NORMAL

## 2018-11-19 DIAGNOSIS — E78.5 HYPERLIPIDEMIA LDL GOAL <130: ICD-10-CM

## 2018-11-19 RX ORDER — ATORVASTATIN CALCIUM 10 MG/1
TABLET, FILM COATED ORAL
Qty: 90 TABLET | Refills: 1 | Status: SHIPPED | OUTPATIENT
Start: 2018-11-19 | End: 2019-04-08

## 2018-11-28 ENCOUNTER — OFFICE VISIT (OUTPATIENT)
Dept: FAMILY MEDICINE | Facility: CLINIC | Age: 83
End: 2018-11-28
Payer: COMMERCIAL

## 2018-11-28 VITALS
SYSTOLIC BLOOD PRESSURE: 125 MMHG | WEIGHT: 105 LBS | BODY MASS INDEX: 21.21 KG/M2 | HEART RATE: 74 BPM | TEMPERATURE: 97.8 F | OXYGEN SATURATION: 99 % | DIASTOLIC BLOOD PRESSURE: 66 MMHG

## 2018-11-28 DIAGNOSIS — E83.52 HYPERCALCEMIA: ICD-10-CM

## 2018-11-28 DIAGNOSIS — I10 ESSENTIAL HYPERTENSION WITH GOAL BLOOD PRESSURE LESS THAN 140/90: ICD-10-CM

## 2018-11-28 DIAGNOSIS — N18.30 CKD (CHRONIC KIDNEY DISEASE) STAGE 3, GFR 30-59 ML/MIN (H): ICD-10-CM

## 2018-11-28 LAB
ALBUMIN SERPL-MCNC: 4.4 G/DL (ref 3.4–5)
ANION GAP SERPL CALCULATED.3IONS-SCNC: 6 MMOL/L (ref 3–14)
BUN SERPL-MCNC: 46 MG/DL (ref 7–30)
CALCIUM SERPL-MCNC: 10.4 MG/DL (ref 8.5–10.1)
CHLORIDE SERPL-SCNC: 100 MMOL/L (ref 94–109)
CO2 SERPL-SCNC: 32 MMOL/L (ref 20–32)
CREAT SERPL-MCNC: 1.15 MG/DL (ref 0.52–1.04)
GFR SERPL CREATININE-BSD FRML MDRD: 44 ML/MIN/1.7M2
GLUCOSE SERPL-MCNC: 102 MG/DL (ref 70–99)
PHOSPHATE SERPL-MCNC: 3.9 MG/DL (ref 2.5–4.5)
POTASSIUM SERPL-SCNC: 4 MMOL/L (ref 3.4–5.3)
PTH-INTACT SERPL-MCNC: 76 PG/ML (ref 18–80)
SODIUM SERPL-SCNC: 138 MMOL/L (ref 133–144)

## 2018-11-28 PROCEDURE — 83970 ASSAY OF PARATHORMONE: CPT | Performed by: FAMILY MEDICINE

## 2018-11-28 PROCEDURE — 82306 VITAMIN D 25 HYDROXY: CPT | Performed by: FAMILY MEDICINE

## 2018-11-28 PROCEDURE — 80069 RENAL FUNCTION PANEL: CPT | Performed by: FAMILY MEDICINE

## 2018-11-28 PROCEDURE — 36415 COLL VENOUS BLD VENIPUNCTURE: CPT | Performed by: FAMILY MEDICINE

## 2018-11-28 PROCEDURE — 99213 OFFICE O/P EST LOW 20 MIN: CPT | Performed by: FAMILY MEDICINE

## 2018-11-28 RX ORDER — FUROSEMIDE 40 MG
40 TABLET ORAL 2 TIMES DAILY
Qty: 180 TABLET | Refills: 1 | Status: SHIPPED | OUTPATIENT
Start: 2018-11-28 | End: 2019-04-08

## 2018-11-28 NOTE — PROGRESS NOTES
SUBJECTIVE:  China Guzman is an 88 year old female who presents for a follow up evaluation of her hypertension.The patient was started on  40 mg of lasix bid at the last visit. and had  the HCTZ discontinued by dr Corrigan. The patient reports that she IS taking the medication as prescribed. She denies side effects of medication.        She denies ever having a gout     Patient Active Problem List   Diagnosis     Osteoporosis     HL (hearing loss)     Gallstones     Malignant basal cell neoplasm of skin     Hyperlipidemia LDL goal <130     Pseudophakia, Yag Caps, ou     Advanced directives, counseling/discussion     Borderline glaucoma with ocular hypertension, bilateral - treated     Family history of colon cancer     Posterior vitreous detachment, bilateral     Macular degeneration, dry, mod, ou     Essential hypertension with goal blood pressure less than 140/90     10 year risk of MI or stroke 7.5% or greater, 39.7% in September 2017 on atorvastatin 10 mg     CKD (chronic kidney disease) stage 3, GFR 30-59 ml/min (H)     IGT (impaired glucose tolerance)     High triglycerides       Is the HYPERTENSION goal on the problem list? Yes      Use of agents associated with hypertension: none  Current Outpatient Prescriptions   Medication     aspirin 325 MG EC tablet     atorvastatin (LIPITOR) 10 MG tablet     CALCIUM + D OR     DILT- MG 24 hr capsule     furosemide (LASIX) 40 MG tablet     latanoprost (XALATAN) 0.005 % ophthalmic solution     losartan (COZAAR) 100 MG tablet     MULTI-VITAMIN OR     timolol (TIMOPTIC) 0.5 % ophthalmic solution     No current facility-administered medications for this visit.          No Known Allergies    Social History   Substance Use Topics     Smoking status: Never Smoker     Smokeless tobacco: Never Used     Alcohol use No       OBJECTIVE:  /66  Pulse 74  Temp 97.8  F (36.6  C) (Oral)  Wt 105 lb (47.6 kg)  LMP 12/16/1980  SpO2 99%  BMI 21.21 kg/m2    Heart:  negative, PMI normal. No lifts, heaves, or thrills. RRR. No murmurs, clicks gallops or rub  Lower Extremities:No edema on right and No  edema on the left        Results for orders placed or performed in visit on 10/08/18   Renal panel   Result Value Ref Range    Sodium 140 133 - 144 mmol/L    Potassium 3.7 3.4 - 5.3 mmol/L    Chloride 100 94 - 109 mmol/L    Carbon Dioxide 28 20 - 32 mmol/L    Anion Gap 12 3 - 14 mmol/L    Glucose 113 (H) 70 - 99 mg/dL    Urea Nitrogen 54 (H) 7 - 30 mg/dL    Creatinine 1.14 (H) 0.52 - 1.04 mg/dL    GFR Estimate 45 (L) >60 mL/min/1.7m2    GFR Estimate If Black 54 (L) >60 mL/min/1.7m2    Calcium 9.9 8.5 - 10.1 mg/dL    Phosphorus 4.3 2.5 - 4.5 mg/dL    Albumin 4.1 3.4 - 5.0 g/dL   Uric acid   Result Value Ref Range    Uric Acid 7.9 (H) 2.6 - 6.0 mg/dL   Parathyroid Hormone Intact   Result Value Ref Range    Parathyroid Hormone Intact 90 (H) 18 - 80 pg/mL   Sodium random urine   Result Value Ref Range    Sodium Urine mmol/L 53 mmol/L   Osmolality urine   Result Value Ref Range    Urine Osmolality 366 100 - 1200 mmol/kg   Osmolality   Result Value Ref Range    Osmolality 307 (H) 280 - 301 mmol/kg       The ASCVD Risk score ([place] DC Jr, et al., 2013) failed to calculate for the following reasons:    The 2013 ASCVD risk score is only valid for ages 40 to 79    ASSESSMENT:  Essential hypertension which is well controlled.       Plan:  - Medication:continue the current doses of medication.  The patients antihypertensive medication was filled for 6 months.  The patient was advised to do the following therapuetic life style changes  - Dietary sodium restriction and increase potassium and Calcium intake  - Regular aerobic exercise  - Weight loss  - Discontinue smoking if applicable  - Avoid regular NSAID use if applicable  - Avoid regular decongestant use if applicable  - Follow up in clinic in 6 months for a recheck and complete physical exam   - Check a basic metabolic panel today    Patient  Education: Reviewed risks of hypertension and principles of   Treatment.

## 2018-11-28 NOTE — LETTER
November 30, 2018    China Guzman  [address] [facility] [address]            China,  I have reviewed the results of the laboratory tests that we recently ordered. All of the lab work performed was normal or considered normal for you. Please follow up with Dr Corrigan as planned.     If you have any questions or concerns, please call myself or my nurse at [phone].    Sincerely,   Colby Trimble/michi    Results for orders placed or performed in visit on 11/28/18   Renal panel   Result Value Ref Range    Sodium 138 133 - 144 mmol/L    Potassium 4.0 3.4 - 5.3 mmol/L    Chloride 100 94 - 109 mmol/L    Carbon Dioxide 32 20 - 32 mmol/L    Anion Gap 6 3 - 14 mmol/L    Glucose 102 (H) 70 - 99 mg/dL    Urea Nitrogen 46 (H) 7 - 30 mg/dL    Creatinine 1.15 (H) 0.52 - 1.04 mg/dL    GFR Estimate 44 (L) >60 mL/min/1.7m2    GFR Estimate If Black 54 (L) >60 mL/min/1.7m2    Calcium 10.4 (H) 8.5 - 10.1 mg/dL    Phosphorus 3.9 2.5 - 4.5 mg/dL    Albumin 4.4 3.4 - 5.0 g/dL   Parathyroid Hormone Intact   Result Value Ref Range    Parathyroid Hormone Intact 76 18 - 80 pg/mL   25 Hydroxyvitamin D2 and D3   Result Value Ref Range    25 OH Vit D2 <5 ug/L    25 OH Vit D3 55 ug/L    25 OH Vit D total <60 20 - 75 ug/L

## 2018-11-28 NOTE — MR AVS SNAPSHOT
After Visit Summary   11/28/2018    China Guzman    MRN: [id number]           Patient Information     Date Of Birth          4/10/1930        Visit Information        Provider Department      11/28/2018 9:00 AM Colby Trimble MD [facility]        Today's Diagnoses     CKD (chronic kidney disease) stage 3, GFR 30-59 ml/min (H)        Hypercalcemia        Essential hypertension with goal blood pressure less than 140/90           Follow-ups after your visit        Follow-up notes from your care team     Return in about 6 months (around 5/28/2019) for Physical Exam.      Your next 10 appointments already scheduled     Mar 04, 2019  9:00 AM CST   [facility] S[facility]anth Craig MD   [facility]    [address] [place] [address] [phone]            Apr 08, 2019  2:15 PM CDT   Return Visit with Romi Corrigan MD   [facility] ([facility])    [address] [facility] [address] [phone]              Who to contact     If you have questions or need follow up information about today's clinic visit or your schedule please contact [facility] directly at [phone].  Normal or non-critical lab and imaging results will be communicated to you by MyChart, letter or phone within 4 business days after the clinic has received the results. If you do not hear from us within 7 days, please contact the clinic through MyChart or phone. If you have a critical or abnormal lab result, we will notify you by phone as soon as possible.  Submit refill requests through [employer] or call your pharmacy and they will forward the refill request to us. Please allow 3 business days for your refill to be completed.          Additional Information About Your Visit        Care EveryWhere ID     This is your Care EveryWhere ID. This could be used by other organizations to access your [facility]  records  [id number]        Your Vitals Were     Pulse Temperature Last Period Pulse Oximetry BMI (Body Mass Index)       74 97.8  F (36.6  C) (Oral) 12/16/1980 99% 21.21 kg/m2        Blood Pressure from Last 3 Encounters:   11/28/18 125/66   10/08/18 142/68   07/16/18 142/62    Weight from Last 3 Encounters:   11/28/18 105 lb (47.6 kg)   10/08/18 107 lb (48.5 kg)   07/16/18 112 lb (50.8 kg)              We Performed the Following     25 Hydroxyvitamin D2 and D3     Parathyroid Hormone Intact     Renal panel          Where to get your medicines      These medications were sent to [id number] - [place] - [address]  [address] [place] [address]    Hours:  Same info as [facility] Reynold Saenzka Phone:  [phone]     furosemide 40 MG tablet          Primary Care Provider Office Phone # Fax #    Colby Trimble [phone] [address] [address] [facility] [address]        Equal Access to Services     [facility]: Hadii aad ku hadasho Soomaali, waaxda luqadaha, qaybta kaalmada adediomedes, alan tello . [facility] [phone].    ATENCIÓN: Si habla español, tiene a mary disposición servicios gratuitos de asistencia lingüística. [facility] [phone].    We comply with applicable federal civil rights laws and [place] laws. We do not discriminate on the basis of race, color, national origin, age, disability, sex, sexual orientation, or gender identity.            Thank you!     Thank you for choosing [facility]  for your care. Our goal is always to provide you with excellent care. Hearing back from our patients is one way we can continue to improve our services. Please take a few minutes to complete the written survey that you may receive in the mail after your visit with us. Thank you!             Your Updated Medication List - Protect others around you: Learn how to safely use, store and throw away your medicines at  www.disposemymeds.org.          This list is accurate as of 11/28/18  9:21 AM.  Always use your most recent med list.                   Brand Name Dispense Instructions for use Diagnosis    aspirin 325 MG EC tablet    ASA     Take 325 mg by mouth daily.        atorvastatin 10 MG tablet    LIPITOR    90 tablet    TAKE ONE TABLET BY MOUTH AT BEDTIME    Hyperlipidemia LDL goal <130       CALCIUM + D PO      1 tablets daily        DILT- MG 24 hr capsule   Generic drug:  diltiazem     90 capsule    TAKE ONE CAPSULE BY MOUTH ONE TIME DAILY    Essential hypertension with goal blood pressure less than 140/90       furosemide 40 MG tablet    LASIX    180 tablet    Take 1 tablet (40 mg) by mouth 2 times daily    CKD (chronic kidney disease) stage 3, GFR 30-59 ml/min (H), Hypercalcemia, Essential hypertension with goal blood pressure less than 140/90       latanoprost 0.005 % ophthalmic solution    XALATAN    3 Bottle    Place 1 drop into both eyes At Bedtime    Borderline glaucoma with ocular hypertension, bilateral       losartan 100 MG tablet    COZAAR    90 tablet    Take 1 tablet (100 mg) by mouth daily    Essential hypertension with goal blood pressure less than 140/90       MULTI-VITAMIN PO      None Entered        timolol maleate 0.5 % ophthalmic solution    TIMOPTIC    1 Bottle    Place 1 drop into both eyes daily    Borderline glaucoma with ocular hypertension, bilateral

## 2018-11-29 LAB
DEPRECATED CALCIDIOL+CALCIFEROL SERPL-MC: <60 UG/L (ref 20–75)
VITAMIN D2 SERPL-MCNC: <5 UG/L
VITAMIN D3 SERPL-MCNC: 55 UG/L

## 2018-11-30 NOTE — PROGRESS NOTES
China,  I have reviewed the results of the laboratory tests that we recently ordered. All of the lab work performed was normal or considered normal for you. Please follow up with Dr Corrigan as planned.   Sincerely,   Colby Trimble

## 2019-02-11 DIAGNOSIS — H40.053 BORDERLINE GLAUCOMA WITH OCULAR HYPERTENSION, BILATERAL: ICD-10-CM

## 2019-02-11 RX ORDER — LATANOPROST 50 UG/ML
1 SOLUTION/ DROPS OPHTHALMIC AT BEDTIME
Qty: 3 BOTTLE | Refills: 4 | Status: SHIPPED | OUTPATIENT
Start: 2019-02-11 | End: 2019-02-13

## 2019-02-11 NOTE — TELEPHONE ENCOUNTER
Fax received from pharmacy requesting refill of  latanoprost (XALATAN) 0.005 % ophthalmic solution.

## 2019-02-12 DIAGNOSIS — H40.053 BORDERLINE GLAUCOMA WITH OCULAR HYPERTENSION, BILATERAL: ICD-10-CM

## 2019-02-13 RX ORDER — LATANOPROST 50 UG/ML
1 SOLUTION/ DROPS OPHTHALMIC AT BEDTIME
Qty: 3 BOTTLE | Refills: 4 | Status: SHIPPED | OUTPATIENT
Start: 2019-02-13 | End: 2020-03-05

## 2019-03-04 ENCOUNTER — OFFICE VISIT (OUTPATIENT)
Dept: OPHTHALMOLOGY | Facility: CLINIC | Age: 84
End: 2019-03-04
Payer: MEDICARE

## 2019-03-04 DIAGNOSIS — H40.053 BORDERLINE GLAUCOMA WITH OCULAR HYPERTENSION, BILATERAL: ICD-10-CM

## 2019-03-04 DIAGNOSIS — Z96.1 PSEUDOPHAKIA: Primary | ICD-10-CM

## 2019-03-04 DIAGNOSIS — H43.813 POSTERIOR VITREOUS DETACHMENT, BILATERAL: ICD-10-CM

## 2019-03-04 DIAGNOSIS — H35.3134 ADVANCED ATROPHIC NONEXUDATIVE AGE-RELATED MACULAR DEGENERATION OF BOTH EYES WITH SUBFOVEAL INVOLVEMENT: ICD-10-CM

## 2019-03-04 DIAGNOSIS — H52.4 PRESBYOPIA: ICD-10-CM

## 2019-03-04 PROCEDURE — 92015 DETERMINE REFRACTIVE STATE: CPT | Mod: GY | Performed by: OPHTHALMOLOGY

## 2019-03-04 PROCEDURE — 92014 COMPRE OPH EXAM EST PT 1/>: CPT | Performed by: OPHTHALMOLOGY

## 2019-03-04 ASSESSMENT — CUP TO DISC RATIO
OD_RATIO: 0.3
OS_RATIO: 0.3

## 2019-03-04 ASSESSMENT — REFRACTION_MANIFEST
OD_AXIS: 005
OS_SPHERE: -0.50
OS_CYLINDER: SPHERE
OD_CYLINDER: +0.75
OD_SPHERE: -0.75
OS_ADD: +3.00
OD_ADD: +3.00

## 2019-03-04 ASSESSMENT — VISUAL ACUITY
OD_CC+: -2
OD_CC: 20/60
CORRECTION_TYPE: GLASSES
OS_CC: CF@2'
METHOD: SNELLEN - LINEAR

## 2019-03-04 ASSESSMENT — CONF VISUAL FIELD
METHOD: COUNTING FINGERS
OD_NORMAL: 1
OS_NORMAL: 1

## 2019-03-04 ASSESSMENT — REFRACTION_WEARINGRX
OD_CYLINDER: +1.00
SPECS_TYPE: PAL
OD_SPHERE: -0.75
OD_ADD: +3.50
OS_SPHERE: -0.50
OD_AXIS: 170
OS_CYLINDER: SPHERE

## 2019-03-04 ASSESSMENT — TONOMETRY
OD_IOP_MMHG: 15
OS_IOP_MMHG: 15
IOP_METHOD: TONOPEN

## 2019-03-04 ASSESSMENT — EXTERNAL EXAM - LEFT EYE: OS_EXAM: NORMAL

## 2019-03-04 ASSESSMENT — SLIT LAMP EXAM - LIDS
COMMENTS: GOOD CREASE AND COSMESIS, 1+ PTOSIS
COMMENTS: GOOD CREASE AND COSMESIS

## 2019-03-04 ASSESSMENT — EXTERNAL EXAM - RIGHT EYE: OD_EXAM: NORMAL

## 2019-03-04 NOTE — PROGRESS NOTES
" Current Eye Medications:  Timolol every morning both eyes, last took at 7 am. Latanoprost at bedtime both eyes, last took at 8 pm.     Subjective:  Complete eye exam. Vision is blurry in distance and near both eyes. No eye pain or discomfort in either eye.      Objective:  See Ophthalmology Exam.       Assessment:  Stable intraocular pressure and discs in patient who is a treated glaucoma suspect.  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      ICD-10-CM    1. Pseudophakia, Yag Caps, ou Z96.1 EYE EXAM (SIMPLE-NONBILLABLE)   2. 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 Posterior vitreous detachment, bilateral H43.813    5. Presbyopia H52.4 REFRACTION        Plan:  Glasses Rx given - optional.  Continue using Timolol (yellow top) both eyes daily in the morning,   And Latanoprost (green top) both eyes at bedtime.   Take a multiple vitamin or \"eye vitamin\" daily (AREDS2).  Protect your eyes outdoors from ultraviolet rays with sunglasses and/or brimmed hat.  Have spinach (cooked or raw), colorful fruits, walnuts, hazelnuts, almonds in your diet.  Monitor the vision in each eye weekly - call if any sudden persistent changes.  Return visit in 6 months for intraocular pressure check, glaucoma OCT and Escoto Visual Field.     Srikanth Craig M.D.  [phone]             "

## 2019-03-04 NOTE — PATIENT INSTRUCTIONS
"Glasses Rx given - optional.  Continue using Timolol (yellow top) both eyes daily in the morning,   And Latanoprost (green top) both eyes at bedtime.   Take a multiple vitamin or \"eye vitamin\" daily (AREDS2).  Protect your eyes outdoors from ultraviolet rays with sunglasses and/or brimmed hat.  Have spinach (cooked or raw), colorful fruits, walnuts, hazelnuts, almonds in your diet.  Monitor the vision in each eye weekly - call if any sudden persistent changes.  Return visit in 6 months for intraocular pressure check, glaucoma OCT and Escoto Visual Field.     Srikanth Craig M.D.  [phone]    "

## 2019-03-04 NOTE — LETTER
"    3/4/2019         RE: China Guzman  [address] [facility] [address]        Dear Colleague,    Thank you for referring your patient, China Guzman, to the [facility]. Please see a copy of my visit note below.     Current Eye Medications:  Timolol every morning both eyes, last took at 7 am. Latanoprost at bedtime both eyes, last took at 8 pm.     Subjective:  Complete eye exam. Vision is blurry in distance and near both eyes. No eye pain or discomfort in either eye.      Objective:  See Ophthalmology Exam.       Assessment:  Stable intraocular pressure and discs in patient who is a treated glaucoma suspect.  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      ICD-10-CM    1. Pseudophakia, Yag Caps, ou Z96.1 EYE EXAM (SIMPLE-NONBILLABLE)   2. 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 Posterior vitreous detachment, bilateral H43.813    5. Presbyopia H52.4 REFRACTION        Plan:  Glasses Rx given - optional.  Continue using Timolol (yellow top) both eyes daily in the morning,   And Latanoprost (green top) both eyes at bedtime.   Take a multiple vitamin or \"eye vitamin\" daily (AREDS2).  Protect your eyes outdoors from ultraviolet rays with sunglasses and/or brimmed hat.  Have spinach (cooked or raw), colorful fruits, walnuts, hazelnuts, almonds in your diet.  Monitor the vision in each eye weekly - call if any sudden persistent changes.  Return visit in 6 months for intraocular pressure check, glaucoma OCT and Escoto Visual Field.     Srikanth Craig M.D.  [phone]               Again, thank you for allowing me to participate in the care of your patient.        Sincerely,        Srikanth Craig MD    "

## 2019-03-09 PROBLEM — H35.3134 ADVANCED ATROPHIC NONEXUDATIVE AGE-RELATED MACULAR DEGENERATION OF BOTH EYES WITH SUBFOVEAL INVOLVEMENT: Status: ACTIVE | Noted: 2019-03-09

## 2019-04-08 ENCOUNTER — OFFICE VISIT (OUTPATIENT)
Dept: NEPHROLOGY | Facility: CLINIC | Age: 84
End: 2019-04-08
Payer: MEDICARE

## 2019-04-08 VITALS
WEIGHT: 102 LBS | DIASTOLIC BLOOD PRESSURE: 62 MMHG | HEART RATE: 74 BPM | BODY MASS INDEX: 20.6 KG/M2 | SYSTOLIC BLOOD PRESSURE: 148 MMHG

## 2019-04-08 DIAGNOSIS — I10 ESSENTIAL HYPERTENSION WITH GOAL BLOOD PRESSURE LESS THAN 140/90: ICD-10-CM

## 2019-04-08 DIAGNOSIS — D64.9 ANEMIA: ICD-10-CM

## 2019-04-08 DIAGNOSIS — E78.5 HYPERLIPIDEMIA LDL GOAL <130: ICD-10-CM

## 2019-04-08 DIAGNOSIS — N18.30 CKD (CHRONIC KIDNEY DISEASE) STAGE 3, GFR 30-59 ML/MIN (H): Primary | ICD-10-CM

## 2019-04-08 DIAGNOSIS — E83.52 HYPERCALCEMIA: ICD-10-CM

## 2019-04-08 DIAGNOSIS — N18.30 CKD (CHRONIC KIDNEY DISEASE) STAGE 3, GFR 30-59 ML/MIN (H): ICD-10-CM

## 2019-04-08 LAB
ALBUMIN SERPL-MCNC: 4.2 G/DL (ref 3.4–5)
ANION GAP SERPL CALCULATED.3IONS-SCNC: 11 MMOL/L (ref 3–14)
BUN SERPL-MCNC: 54 MG/DL (ref 7–30)
CA-I SERPL ISE-MCNC: 4.8 MG/DL (ref 4.4–5.2)
CALCIUM SERPL-MCNC: 9.7 MG/DL (ref 8.5–10.1)
CHLORIDE SERPL-SCNC: 100 MMOL/L (ref 94–109)
CO2 SERPL-SCNC: 25 MMOL/L (ref 20–32)
CREAT SERPL-MCNC: 1 MG/DL (ref 0.52–1.04)
ERYTHROCYTE [DISTWIDTH] IN BLOOD BY AUTOMATED COUNT: 13 % (ref 10–15)
FERRITIN SERPL-MCNC: 67 NG/ML (ref 8–252)
GFR SERPL CREATININE-BSD FRML MDRD: 50 ML/MIN/{1.73_M2}
GLUCOSE SERPL-MCNC: 93 MG/DL (ref 70–99)
HCT VFR BLD AUTO: 32.4 % (ref 35–47)
HGB BLD-MCNC: 10.8 G/DL (ref 11.7–15.7)
IRON SATN MFR SERPL: 27 % (ref 15–46)
IRON SERPL-MCNC: 81 UG/DL (ref 35–180)
MCH RBC QN AUTO: 30.9 PG (ref 26.5–33)
MCHC RBC AUTO-ENTMCNC: 33.3 G/DL (ref 31.5–36.5)
MCV RBC AUTO: 93 FL (ref 78–100)
PHOSPHATE SERPL-MCNC: 3.7 MG/DL (ref 2.5–4.5)
PLATELET # BLD AUTO: 285 10E9/L (ref 150–450)
POTASSIUM SERPL-SCNC: 4.2 MMOL/L (ref 3.4–5.3)
PTH-INTACT SERPL-MCNC: 107 PG/ML (ref 18–80)
RBC # BLD AUTO: 3.49 10E12/L (ref 3.8–5.2)
SODIUM SERPL-SCNC: 136 MMOL/L (ref 133–144)
TIBC SERPL-MCNC: 303 UG/DL (ref 240–430)
WBC # BLD AUTO: 7 10E9/L (ref 4–11)

## 2019-04-08 PROCEDURE — 80069 RENAL FUNCTION PANEL: CPT | Performed by: INTERNAL MEDICINE

## 2019-04-08 PROCEDURE — 82728 ASSAY OF FERRITIN: CPT | Performed by: INTERNAL MEDICINE

## 2019-04-08 PROCEDURE — 83540 ASSAY OF IRON: CPT | Performed by: INTERNAL MEDICINE

## 2019-04-08 PROCEDURE — 83970 ASSAY OF PARATHORMONE: CPT | Performed by: INTERNAL MEDICINE

## 2019-04-08 PROCEDURE — 36415 COLL VENOUS BLD VENIPUNCTURE: CPT | Performed by: INTERNAL MEDICINE

## 2019-04-08 PROCEDURE — 82330 ASSAY OF CALCIUM: CPT | Performed by: INTERNAL MEDICINE

## 2019-04-08 PROCEDURE — 85027 COMPLETE CBC AUTOMATED: CPT | Performed by: INTERNAL MEDICINE

## 2019-04-08 PROCEDURE — 83550 IRON BINDING TEST: CPT | Performed by: INTERNAL MEDICINE

## 2019-04-08 RX ORDER — FUROSEMIDE 40 MG
40 TABLET ORAL 2 TIMES DAILY
Qty: 180 TABLET | Refills: 3 | Status: SHIPPED | OUTPATIENT
Start: 2019-04-08 | End: 2020-04-06

## 2019-04-08 RX ORDER — DILTIAZEM HYDROCHLORIDE 240 MG/1
240 CAPSULE, EXTENDED RELEASE ORAL DAILY
Qty: 90 CAPSULE | Refills: 3 | Status: SHIPPED | OUTPATIENT
Start: 2019-04-08 | End: 2019-06-29

## 2019-04-08 RX ORDER — ATORVASTATIN CALCIUM 10 MG/1
TABLET, FILM COATED ORAL
Qty: 90 TABLET | Refills: 3 | Status: SHIPPED | OUTPATIENT
Start: 2019-04-08 | End: 2020-04-06

## 2019-04-08 RX ORDER — LOSARTAN POTASSIUM 100 MG/1
100 TABLET ORAL DAILY
Qty: 90 TABLET | Refills: 3 | Status: SHIPPED | OUTPATIENT
Start: 2019-04-08 | End: 2020-04-06

## 2019-04-08 NOTE — PROGRESS NOTES
Assessment and Plan:   88 year old female with history of hypertension, osteoporosis, anemia, who presents for followup of hypercalcemia and increased creatinine. Her Scr is typically 0.9mg/dL but was up to 1.3 in setting of hypercalcemia, calcium of 10.4-10.9 in 2018  1. Hypercalcemia- improved, after better hydration and stopping hydrochlorothiazide and starting furosemide instead.  It had been present in the recent years, being up to 11at one point.   - given hypercalcemia and hyponatremia, I elected to stop her hydrochlorothiazide  - furosemide 40mg bid started  - labs today  2. CKD stage 3- Her Scr was 0.9 up until the last year or so, with Scr up to 1.3 but improved back to 1.0 in September, 1.15 in November with normal / high normal calcium.  - recheck today, monitor every 4-6 months   3. Hypertension- now on furosemide (stopped hydrochlorothiazide), losartan and diltiazem  - refilled meds today  - BP in office elevated,repeat, she will monitor at home  4. Anemia- mild anemia, hgb 10.9, iron sat 16% and ferritin 78 last year  - recheck   - immunofixation- negative    5. Bone- PTH 60 , in setting of hypercalcemia, but some CKD thus ?appropriate.    - continue vitamin D and calcium 600mg for now  - recheck today    Assessment and plan was discussed with patient and she voiced her understanding and agreement.    Reason for Visit:  China Guzman is a 88 year old female with hypercalcemia and worsening renal function, who presents for followup    HPI:  She is a delightful female with history of hypertension, hyperlipidemia, osteoporosis, who presented in July 2018 for evaluation of high calcium and worsened kidney function.  Her calcium was noted to be elevated on multiple occasions, up to 11 mg/dL but usually between 10.4-10.9  She denies any symptoms of high calcium as she overall feels well.     Her PTH was within normal, which is mildly inappropriately high in setting of hypercalcemia. She was on  hydrochlorothiazide which at first we lowered, but with hyponatremia noted on recheck, I switched her to furosemide in early September.  She is also on losartan and diltiazem for her blood pressure. She takes calcium and vitamin D and has lowered vitamin to once a day instead of twice a day  On followup today, she feels well. Her blood pressure is a little higher than goal, and she is tolerating furosemide. She is drinking about 5 glasses of water a day at this time. She wakes up once at night, typically.  She lives with her daughter who is a  and has a special needs daughter (her granddaughter).  She has no complaints and feels wen that she has had good health and continues to do well  She denies urinary or GI symptoms.      ROS:   A comprehensive review of systems was obtained and negative, except as noted in the HPI or PMH.    Active Medical Problems:  Patient Active Problem List   Diagnosis     Osteoporosis     HL (hearing loss)     Gallstones     Malignant basal cell neoplasm of skin     Hyperlipidemia LDL goal <130     Pseudophakia, Yag Caps, ou     Advanced directives, counseling/discussion     Borderline glaucoma with ocular hypertension, bilateral - treated     Family history of colon cancer     Posterior vitreous detachment, bilateral     Essential hypertension with goal blood pressure less than 140/90     10 year risk of MI or stroke 7.5% or greater, 39.7% in September 2017 on atorvastatin 10 mg     CKD (chronic kidney disease) stage 3, GFR 30-59 ml/min (H)     IGT (impaired glucose tolerance)     High triglycerides     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     Hearing loss      HTN (hypertension)       Hyperlipidemia      MACULAR DEGENERATION (SENILE) OF RETINA, DRY OU 2/11/2011     Macular degeneration, dry, mod, ou 8/31/2016     Osteoporosis      PSH:   Past Surgical History:   Procedure Laterality Date     BLEPHAROPLASTY BILATERAL  1/2004    both eyes, upper lids     CATARACT IOL, RT/LT       COLONOSCOPY  12/2010    Q 5 years     FRACTURE TX, ANKLE RT/LT  1997    RIGHT ANKLE ORIF     LASER YAG CAPSULOTOMY  12/2005; 1/2006    right eye; left eye     PHACOEMULSIFICATION WITH STANDARD INTRAOCULAR LENS IMPLANT  8/2003; 10/2003    left eye; right eye     REPAIR PTOSIS         Family Hx:   Family History   Problem Relation Age of Onset     Cancer - colorectal Mother      C.A.D. Brother      Cerebrovascular Disease Brother      Heart Disease Brother      Asthma Daughter      Breast Cancer Daughter      Thyroid Disease Daughter      Glaucoma No family hx of      Macular Degeneration No family hx of      Personal Hx:   Social History     Socioeconomic History     Marital status:      Spouse name: Not on file     Number of children: 6     Years of education: Not on file     Highest education level: Not on file   Occupational History     Employer: RETIRED   Social Needs     Financial resource strain: Not on file     Food insecurity:     Worry: Not on file     Inability: Not on file     Transportation needs:     Medical: Not on file     Non-medical: Not on file   Tobacco Use     Smoking status: Never Smoker     Smokeless tobacco: Never Used   Substance and Sexual Activity     Alcohol use: No     Alcohol/week: 0.0 oz     Drug use: No     Sexual activity: Never   Lifestyle     Physical activity:     Days per week: Not on file     Minutes per session: Not on file     Stress: Not on file   Relationships     Social connections:     Talks on phone: Not on file     Gets together: Not on file     Attends Sabianism service: Not on file     Active member of club or organization: Not on file     Attends meetings of clubs or  organizations: Not on file     Relationship status: Not on file     Intimate partner violence:     Fear of current or ex partner: Not on file     Emotionally abused: Not on file     Physically abused: Not on file     Forced sexual activity: Not on file   Other Topics Concern     Parent/sibling w/ CABG, MI or angioplasty before 65F 55M? Not Asked   Social History Narrative     Not on file       Current Outpatient Medications   Medication     aspirin 325 MG EC tablet     atorvastatin (LIPITOR) 10 MG tablet     CALCIUM + D OR     diltiazem ER (DILT-XR) 240 MG 24 hr ER beaded capsule     furosemide (LASIX) 40 MG tablet     latanoprost (XALATAN) 0.005 % ophthalmic solution     losartan (COZAAR) 100 MG tablet     MULTI-VITAMIN OR     timolol (TIMOPTIC) 0.5 % ophthalmic solution     No current facility-administered medications for this visit.        Allergies:  No Known Allergies  Vitals:  /74 (BP Location: Right arm)   Pulse 76   Wt 46.3 kg (102 lb)   LMP 12/16/1980   BMI 20.60 kg/m      Exam:  GENERAL APPEARANCE: alert and no distress  HENT: mouth without ulcers or lesions  LYMPHATICS: no cervical or supraclavicular nodes  RESP: lungs clear to auscultation - no rales, rhonchi or wheezes  CV: regular rhythm, normal rate, no rub, no murmur  EDEMA: no LE edema bilaterally  ABDOMEN: soft, nondistended, nontender, bowel sounds normal  MS: extremities normal - no gross deformities noted, no evidence of inflammation in joints, no muscle tenderness  SKIN: no rash      Results:  No results found for this or any previous visit (from the past 336 hour(s)).  Last Comprehensive Metabolic Panel:  Sodium   Date Value Ref Range Status   11/28/2018 138 133 - 144 mmol/L Final     Potassium   Date Value Ref Range Status   11/28/2018 4.0 3.4 - 5.3 mmol/L Final     Chloride   Date Value Ref Range Status   11/28/2018 100 94 - 109 mmol/L Final     Carbon Dioxide   Date Value Ref Range Status   11/28/2018 32 20 - 32 mmol/L Final      Anion Gap   Date Value Ref Range Status   11/28/2018 6 3 - 14 mmol/L Final     Glucose   Date Value Ref Range Status   11/28/2018 102 (H) 70 - 99 mg/dL Final     Comment:     Non Fasting     Urea Nitrogen   Date Value Ref Range Status   11/28/2018 46 (H) 7 - 30 mg/dL Final     Creatinine   Date Value Ref Range Status   11/28/2018 1.15 (H) 0.52 - 1.04 mg/dL Final     GFR Estimate   Date Value Ref Range Status   11/28/2018 44 (L) >60 mL/min/1.7m2 Final     Comment:     Non  GFR Calc     Calcium   Date Value Ref Range Status   11/28/2018 10.4 (H) 8.5 - 10.1 mg/dL Final     Romi MD [facility]   of Medicine  Department of Nephrology  [facility]

## 2019-04-08 NOTE — LETTER
4/8/2019      RE: China Guzman  [address] [facility] [address]       Assessment and Plan:   88 year old female with history of hypertension, osteoporosis, anemia, who presents for followup of hypercalcemia and increased creatinine. Her Scr is typically 0.9mg/dL but was up to 1.3 in setting of hypercalcemia, calcium of 10.4-10.9 in 2018  1. Hypercalcemia- improved, after better hydration and stopping hydrochlorothiazide and starting furosemide instead.  It had been present in the recent years, being up to 11at one point.   - given hypercalcemia and hyponatremia, I elected to stop her hydrochlorothiazide  - furosemide 40mg bid started  - labs today  2. CKD stage 3- Her Scr was 0.9 up until the last year or so, with Scr up to 1.3 but improved back to 1.0 in September, 1.15 in November with normal / high normal calcium.  - recheck today, monitor every 4-6 months   3. Hypertension- now on furosemide (stopped hydrochlorothiazide), losartan and diltiazem  - refilled meds today  - BP in office elevated,repeat, she will monitor at home  4. Anemia- mild anemia, hgb 10.9, iron sat 16% and ferritin 78 last year  - recheck   - immunofixation- negative    5. Bone- PTH 60 , in setting of hypercalcemia, but some CKD thus ?appropriate.    - continue vitamin D and calcium 600mg for now  - recheck today    Assessment and plan was discussed with patient and she voiced her understanding and agreement.    Reason for Visit:  China Guzman is a 88 year old female with hypercalcemia and worsening renal function, who presents for followup    HPI:  She is a delightful female with history of hypertension, hyperlipidemia, osteoporosis, who presented in July 2018 for evaluation of high calcium and worsened kidney function.  Her calcium was noted to be elevated on multiple occasions, up to 11 mg/dL but usually between 10.4-10.9  She denies any symptoms of high calcium as she overall feels well.     Her PTH was within normal, which is  mildly inappropriately high in setting of hypercalcemia. She was on hydrochlorothiazide which at first we lowered, but with hyponatremia noted on recheck, I switched her to furosemide in early September.  She is also on losartan and diltiazem for her blood pressure. She takes calcium and vitamin D and has lowered vitamin to once a day instead of twice a day  On followup today, she feels well. Her blood pressure is a little higher than goal, and she is tolerating furosemide. She is drinking about 5 glasses of water a day at this time. She wakes up once at night, typically.  She lives with her daughter who is a  and has a special needs daughter (her granddaughter).  She has no complaints and feels wen that she has had good health and continues to do well  She denies urinary or GI symptoms.      ROS:   A comprehensive review of systems was obtained and negative, except as noted in the HPI or PMH.    Active Medical Problems:  Patient Active Problem List   Diagnosis     Osteoporosis     HL (hearing loss)     Gallstones     Malignant basal cell neoplasm of skin     Hyperlipidemia LDL goal <130     Pseudophakia, Yag Caps, ou     Advanced directives, counseling/discussion     Borderline glaucoma with ocular hypertension, bilateral - treated     Family history of colon cancer     Posterior vitreous detachment, bilateral     Essential hypertension with goal blood pressure less than 140/90     10 year risk of MI or stroke 7.5% or greater, 39.7% in September 2017 on atorvastatin 10 mg     CKD (chronic kidney disease) stage 3, GFR 30-59 ml/min (H)     IGT (impaired glucose tolerance)     High triglycerides     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  Past Medical History:   Diagnosis Date     Basal cell cancer 2000    NOSE     Borderline glaucoma with ocular  hypertension 2/11/2011     Hearing loss      HTN (hypertension)      Hyperlipidemia      MACULAR DEGENERATION (SENILE) OF RETINA, DRY OU 2/11/2011     Macular degeneration, dry, mod, ou 8/31/2016     Osteoporosis      PSH:   Past Surgical History:   Procedure Laterality Date     BLEPHAROPLASTY BILATERAL  1/2004    both eyes, upper lids     CATARACT IOL, RT/LT       COLONOSCOPY  12/2010    Q 5 years     FRACTURE TX, ANKLE RT/LT  1997    RIGHT ANKLE ORIF     LASER YAG CAPSULOTOMY  12/2005; 1/2006    right eye; left eye     PHACOEMULSIFICATION WITH STANDARD INTRAOCULAR LENS IMPLANT  8/2003; 10/2003    left eye; right eye     REPAIR PTOSIS         Family Hx:   Family History   Problem Relation Age of Onset     Cancer - colorectal Mother      C.A.D. Brother      Cerebrovascular Disease Brother      Heart Disease Brother      Asthma Daughter      Breast Cancer Daughter      Thyroid Disease Daughter      Glaucoma No family hx of      Macular Degeneration No family hx of      Personal Hx:   Social History     Socioeconomic History     Marital status:      Spouse name: Not on file     Number of children: 6     Years of education: Not on file     Highest education level: Not on file   Occupational History     Employer: RETIRED   Social Needs     Financial resource strain: Not on file     Food insecurity:     Worry: Not on file     Inability: Not on file     Transportation needs:     Medical: Not on file     Non-medical: Not on file   Tobacco Use     Smoking status: Never Smoker     Smokeless tobacco: Never Used   Substance and Sexual Activity     Alcohol use: No     Alcohol/week: 0.0 oz     Drug use: No     Sexual activity: Never   Lifestyle     Physical activity:     Days per week: Not on file     Minutes per session: Not on file     Stress: Not on file   Relationships     Social connections:     Talks on phone: Not on file     Gets together: Not on file     Attends Rest[employer] service: Not on file     Active member of  club or organization: Not on file     Attends meetings of clubs or organizations: Not on file     Relationship status: Not on file     Intimate partner violence:     Fear of current or ex partner: Not on file     Emotionally abused: Not on file     Physically abused: Not on file     Forced sexual activity: Not on file   Other Topics Concern     Parent/sibling w/ CABG, MI or angioplasty before 65F 55M? Not Asked   Social History Narrative     Not on file       Current Outpatient Medications   Medication     aspirin 325 MG EC tablet     atorvastatin (LIPITOR) 10 MG tablet     CALCIUM + D OR     diltiazem ER (DILT-XR) 240 MG 24 hr ER beaded capsule     furosemide (LASIX) 40 MG tablet     latanoprost (XALATAN) 0.005 % ophthalmic solution     losartan (COZAAR) 100 MG tablet     MULTI-VITAMIN OR     timolol (TIMOPTIC) 0.5 % ophthalmic solution     No current facility-administered medications for this visit.        Allergies:  No Known Allergies  Vitals:  /74 (BP Location: Right arm)   Pulse 76   Wt 46.3 kg (102 lb)   LMP 12/16/1980   BMI 20.60 kg/m       Exam:  GENERAL APPEARANCE: alert and no distress  HENT: mouth without ulcers or lesions  LYMPHATICS: no cervical or supraclavicular nodes  RESP: lungs clear to auscultation - no rales, rhonchi or wheezes  CV: regular rhythm, normal rate, no rub, no murmur  EDEMA: no LE edema bilaterally  ABDOMEN: soft, nondistended, nontender, bowel sounds normal  MS: extremities normal - no gross deformities noted, no evidence of inflammation in joints, no muscle tenderness  SKIN: no rash      Results:  No results found for this or any previous visit (from the past 336 hour(s)).  Last Comprehensive Metabolic Panel:  Sodium   Date Value Ref Range Status   11/28/2018 138 133 - 144 mmol/L Final     Potassium   Date Value Ref Range Status   11/28/2018 4.0 3.4 - 5.3 mmol/L Final     Chloride   Date Value Ref Range Status   11/28/2018 100 94 - 109 mmol/L Final     Carbon Dioxide    Date Value Ref Range Status   11/28/2018 32 20 - 32 mmol/L Final     Anion Gap   Date Value Ref Range Status   11/28/2018 6 3 - 14 mmol/L Final     Glucose   Date Value Ref Range Status   11/28/2018 102 (H) 70 - 99 mg/dL Final     Comment:     Non Fasting     Urea Nitrogen   Date Value Ref Range Status   11/28/2018 46 (H) 7 - 30 mg/dL Final     Creatinine   Date Value Ref Range Status   11/28/2018 1.15 (H) 0.52 - 1.04 mg/dL Final     GFR Estimate   Date Value Ref Range Status   11/28/2018 44 (L) >60 mL/min/1.7m2 Final     Comment:     Non  GFR Calc     Calcium   Date Value Ref Range Status   11/28/2018 10.4 (H) 8.5 - 10.1 mg/dL Final     Romi MD [facility]  Department of Neph[facility]

## 2019-05-16 ENCOUNTER — DOCUMENTATION ONLY (OUTPATIENT)
Dept: FAMILY MEDICINE | Facility: CLINIC | Age: 84
End: 2019-05-16

## 2019-05-16 DIAGNOSIS — E78.5 HYPERLIPIDEMIA LDL GOAL <130: Primary | ICD-10-CM

## 2019-05-16 DIAGNOSIS — R73.02 IGT (IMPAIRED GLUCOSE TOLERANCE): ICD-10-CM

## 2019-05-16 DIAGNOSIS — Z91.89 10 YEAR RISK OF MI OR STROKE 7.5% OR GREATER: ICD-10-CM

## 2019-05-16 DIAGNOSIS — I10 ESSENTIAL HYPERTENSION WITH GOAL BLOOD PRESSURE LESS THAN 140/90: ICD-10-CM

## 2019-05-16 DIAGNOSIS — N18.30 CKD (CHRONIC KIDNEY DISEASE) STAGE 3, GFR 30-59 ML/MIN (H): ICD-10-CM

## 2019-05-16 DIAGNOSIS — E78.1 HIGH TRIGLYCERIDES: ICD-10-CM

## 2019-05-24 ENCOUNTER — DOCUMENTATION ONLY (OUTPATIENT)
Dept: LAB | Facility: CLINIC | Age: 84
End: 2019-05-24

## 2019-05-24 DIAGNOSIS — Z91.89 10 YEAR RISK OF MI OR STROKE 7.5% OR GREATER: ICD-10-CM

## 2019-05-24 DIAGNOSIS — N18.30 CKD (CHRONIC KIDNEY DISEASE) STAGE 3, GFR 30-59 ML/MIN (H): Primary | ICD-10-CM

## 2019-05-24 DIAGNOSIS — I10 ESSENTIAL HYPERTENSION WITH GOAL BLOOD PRESSURE LESS THAN 140/90: ICD-10-CM

## 2019-05-24 DIAGNOSIS — E21.3 HYPERPARATHYROIDISM (H): Primary | ICD-10-CM

## 2019-05-24 DIAGNOSIS — E78.5 HYPERLIPIDEMIA LDL GOAL <130: ICD-10-CM

## 2019-05-24 DIAGNOSIS — N18.30 CKD (CHRONIC KIDNEY DISEASE) STAGE 3, GFR 30-59 ML/MIN (H): ICD-10-CM

## 2019-05-24 DIAGNOSIS — R73.02 IGT (IMPAIRED GLUCOSE TOLERANCE): ICD-10-CM

## 2019-05-24 DIAGNOSIS — E78.1 HIGH TRIGLYCERIDES: ICD-10-CM

## 2019-05-24 DIAGNOSIS — E21.3 HYPERPARATHYROIDISM (H): ICD-10-CM

## 2019-05-24 LAB
ALBUMIN SERPL-MCNC: 4.5 G/DL (ref 3.4–5)
ALP SERPL-CCNC: 83 U/L (ref 40–150)
ALT SERPL W P-5'-P-CCNC: 25 U/L (ref 0–50)
ANION GAP SERPL CALCULATED.3IONS-SCNC: 6 MMOL/L (ref 3–14)
AST SERPL W P-5'-P-CCNC: 15 U/L (ref 0–45)
BILIRUB SERPL-MCNC: 0.5 MG/DL (ref 0.2–1.3)
BUN SERPL-MCNC: 42 MG/DL (ref 7–30)
CALCIUM SERPL-MCNC: 9.8 MG/DL (ref 8.5–10.1)
CHLORIDE SERPL-SCNC: 101 MMOL/L (ref 94–109)
CHOLEST SERPL-MCNC: 176 MG/DL
CO2 SERPL-SCNC: 30 MMOL/L (ref 20–32)
CREAT SERPL-MCNC: 1.06 MG/DL (ref 0.52–1.04)
CREAT UR-MCNC: 18 MG/DL
GFR SERPL CREATININE-BSD FRML MDRD: 46 ML/MIN/{1.73_M2}
GLUCOSE SERPL-MCNC: 107 MG/DL (ref 70–99)
HDLC SERPL-MCNC: 71 MG/DL
HGB BLD-MCNC: 12.2 G/DL (ref 11.7–15.7)
LDLC SERPL CALC-MCNC: 68 MG/DL
MICROALBUMIN UR-MCNC: <5 MG/L
MICROALBUMIN/CREAT UR: NORMAL MG/G CR (ref 0–25)
NONHDLC SERPL-MCNC: 105 MG/DL
POTASSIUM SERPL-SCNC: 3.5 MMOL/L (ref 3.4–5.3)
PROT SERPL-MCNC: 8.5 G/DL (ref 6.8–8.8)
PTH-INTACT SERPL-MCNC: 89 PG/ML (ref 18–80)
SODIUM SERPL-SCNC: 137 MMOL/L (ref 133–144)
TRIGL SERPL-MCNC: 185 MG/DL

## 2019-05-24 PROCEDURE — 80061 LIPID PANEL: CPT | Performed by: FAMILY MEDICINE

## 2019-05-24 PROCEDURE — 85018 HEMOGLOBIN: CPT | Performed by: FAMILY MEDICINE

## 2019-05-24 PROCEDURE — 82306 VITAMIN D 25 HYDROXY: CPT | Performed by: FAMILY MEDICINE

## 2019-05-24 PROCEDURE — 83970 ASSAY OF PARATHORMONE: CPT | Performed by: FAMILY MEDICINE

## 2019-05-24 PROCEDURE — 82043 UR ALBUMIN QUANTITATIVE: CPT | Performed by: FAMILY MEDICINE

## 2019-05-24 PROCEDURE — 80053 COMPREHEN METABOLIC PANEL: CPT | Performed by: FAMILY MEDICINE

## 2019-05-24 PROCEDURE — 36415 COLL VENOUS BLD VENIPUNCTURE: CPT | Performed by: FAMILY MEDICINE

## 2019-05-24 NOTE — PROGRESS NOTES
Patient had blood work done for you on 5.24.2019 that included a JIC vitamin D. This patient's PTHI is elevated, would you like to order the JI vitamin D?     Please advise.     Thank you,   Carolina Blancas MLT ([facility])

## 2019-05-24 NOTE — RESULT ENCOUNTER NOTE
I have reviewed the schedule of future appointments and this patient has an appointment scheduled for 6-3-2019.    Visit date not found

## 2019-06-03 ENCOUNTER — OFFICE VISIT (OUTPATIENT)
Dept: FAMILY MEDICINE | Facility: CLINIC | Age: 84
End: 2019-06-03
Payer: MEDICARE

## 2019-06-03 VITALS
BODY MASS INDEX: 20.16 KG/M2 | SYSTOLIC BLOOD PRESSURE: 126 MMHG | TEMPERATURE: 97.6 F | OXYGEN SATURATION: 98 % | RESPIRATION RATE: 16 BRPM | HEIGHT: 59 IN | HEART RATE: 70 BPM | DIASTOLIC BLOOD PRESSURE: 66 MMHG | WEIGHT: 100 LBS

## 2019-06-03 DIAGNOSIS — M81.0 OSTEOPOROSIS, UNSPECIFIED OSTEOPOROSIS TYPE, UNSPECIFIED PATHOLOGICAL FRACTURE PRESENCE: Primary | ICD-10-CM

## 2019-06-03 DIAGNOSIS — Z12.31 ENCOUNTER FOR SCREENING MAMMOGRAM FOR BREAST CANCER: ICD-10-CM

## 2019-06-03 PROCEDURE — G0439 PPPS, SUBSEQ VISIT: HCPCS | Performed by: FAMILY MEDICINE

## 2019-06-03 ASSESSMENT — PAIN SCALES - GENERAL: PAINLEVEL: NO PAIN (0)

## 2019-06-03 ASSESSMENT — ACTIVITIES OF DAILY LIVING (ADL): CURRENT_FUNCTION: NO ASSISTANCE NEEDED

## 2019-06-03 ASSESSMENT — MIFFLIN-ST. JEOR: SCORE: 791.35

## 2019-06-03 NOTE — PATIENT INSTRUCTIONS
Patient Education   Personalized Prevention Plan  You are due for the preventive services outlined below.  Your care team is available to assist you in scheduling these services.  If you have already completed any of these items, please share that information with your care team to update in your medical record.  Health Maintenance Due   Topic Date Due     Zoster (Shingles) Vaccine (2 of 3) 02/04/2010     PHQ-2  01/01/2019     FALL RISK ASSESSMENT  05/29/2019     Annual Wellness Visit  05/29/2019         Stop taking your daily aspirin         Patient Education     Prediabetes  You have been diagnosed with prediabetes. This means that the level of sugar (glucose) in your blood is too high. If you have prediabetes, you are at risk for developing type 2 diabetes. Type 2 diabetes is diagnosed when the level of glucose in the blood reaches a certain high level. With prediabetes, it hasn t reached this point yet, but it is higher than normal. It is vital to make lifestyle changes to lower your blood sugar, improve your health, and prevent diabetes. This sheet will tell you more.      Why worry about prediabetes?  Prediabetes is a disease where the body s cells have trouble using glucose in the blood for energy. As a result, too much glucose stays in the blood and can affect how your heart and blood vessels work. Without changes in diet and lifestyle, the problem can get worse. Once you have type 2 diabetes, it is chronic (ongoing) and needs to be managed for the rest of your life. Diabetes can harm the body and your health by damaging organs, such as your eyes and kidneys. It makes you more likely to have heart disease. And it can damage nerves and blood vessels.  Who is a risk for prediabetes?  The exact cause of prediabetes is not clear. But certain risk factors make a person more likely to have it. These include:    A family history of type 2 diabetes    Being overweight    Being over age 45    Have hypertension or  elevated cholesterol     Having had gestational diabetes    Not being physically active    Being ,  American, , , , or   Diagnosing prediabetes  Prediabetes may have no symptoms or you may have some of the symptoms of diabetes. The diagnosis is made with a blood test. You may have one or more of these blood tests:     Fasting glucose test. Blood is taken and tested after you have fasted (not eaten) for at least 8 hours. A normal test result is 99 milligrams per deciliter (mg/dL) or lower. Prediabetes is 100 mg/dL to 125 mg/dL. Diabetes is 126 mg/dL or higher.    Glucose tolerance test. Your blood sugar is measured before and after you drink a very sugary liquid. A normal test result is 139 milligrams per deciliter (mg/dL) or lower. Prediabetes is 140 mg/dL to 199 mg/dL. Diabetes is 200 mg/dL or higher.    Hemoglobin A1c (HbA1c). Your HbA1c is normal if it is below 5.7%. Prediabetes is 5.7% to 6.4%. Diabetes is 6.5% or higher.   Treating prediabetes  The best way to treat prediabetes is to lose at least 5% to 7% of your current weight and be more physically active by getting at least 150 minutes a week of physical activity. When sitting for long periods of time, get up for short sessions of light activity every 30 minutes. These changes help the body s cells use blood sugar better. Even a small amount of weight loss can help. Work with your healthcare provider to make a plan to eat well and be more active. Keep in mind that small changes can add up. Other changes in your lifestyle (or even taking certain medicines, such as metformin) may make you less likely to develop diabetes. Your healthcare provider can talk with you about these.  Follow-up  If it is untreated, prediabetes can turn into diabetes. This is a serious health condition. Take steps to stop this from happening. Follow the treatment plan you have been given. You may have your blood  glucose tested again in about 12 to 18 months.  Symptoms of diabetes  Let your healthcare provider know if you have any of the following:    Always feel very tired    Feel very thirsty or hungry much of the time    Have to urinate often    Lose weight for no reason    Feel numbness or tingling in your fingers or toes    Have cuts or bruises that don t seem to heal    Have blurry vision   Date Last Reviewed: 5/1/2016 2000-2018 [employer]. [address] [place] [address], [place] [address]. All rights reserved. This information is not intended as a substitute for professional medical care. Always follow your healthcare professional's instructions.         Please call our "[employer] Imaging Scheduling Line at [phone] to schedule your:  Mammogram  Dexa Scan          Patient Education     Preventing Osteoporosis: Meeting Your Calcium Needs    Your body needs calcium to build and repair bones. But it can't make calcium on its own. That's why it's important to eat calcium-rich foods. Some foods are naturally rich in calcium. Others have calcium added (fortified). It's best to get calcium from the foods you eat. But if you can't get enough, you may want to take calcium supplements. To meet your daily calcium needs, try the foods listed below.  Dairy Fish & beans Other sources   Source   Calcium (mg) per serving   Source   Calcium (mg) per serving   Source   Calcium (mg) per serving   Low-fat yogurt, plain   415 mg/8 oz.   Sardines, Atlantic, canned, with bones   351 mg/3 oz.   Oatmeal, instant, fortified   215 mg/1 cup   Nonfat milk   302 mg/1 cup   [place], sockeye, canned, with bones   239 mg/3 oz.   Tofu made with calcium sulfate   204 mg/3 oz.   Low-fat milk   297 mg/1 cup   Soybeans, fresh, boiled   131 mg/1/2 cup   Collards   179 mg/1/2 cup   Swiss cheese   272 mg/1 oz.   White beans, cooked   81 mg/1/2 cup   English muffin, whole wheat   175 mg/1 muffin   Cheddar cheese   205 mg/1 oz.   Navy beans, cooked    79 mg/1/2 cup   Kale   90 mg/1/2 cup   Ice cream strawberry   79 mg/1/2 cup           Orange, navel   56 mg/1 medium   Note: Calcium levels may vary depending on brand and size.  Daily calcium needs  14 to 18 years old: 1,300 mg  19 to 30 years old: 1,000 mg  31 to 50 years old: 1,000 mg  51 to 70 years old, women: 1,200 mg  51 to 70 years old, men: 1,000 mg  Pregnant or nursin to 18 years old: 1,300 mg, 19 to 50 years old: 1,000 mg  Older than 70 (women and men): 1,200 mg   Date Last Reviewed: 2018-2018 [employer]. [address] [place] [address], [place] [address]. All rights reserved. This information is not intended as a substitute for professional medical care. Always follow your healthcare professional's instructions.

## 2019-06-03 NOTE — PROGRESS NOTES
"SUBJECTIVE:   China Guzman is a 89 year old female who presents for Preventive Visit.    Are you in the first 12 months of your Medicare coverage?  No    Healthy Habits:    In general, how would you rate your overall health?  Very good    Frequency of exercise:  2-3 days/week    Duration of exercise:  15-30 minutes    Do you usually eat at least 4 servings of fruit and vegetables a day, include whole grains    & fiber and avoid regularly eating high fat or \"junk\" foods?  Yes    Taking medications regularly:  Yes    Barriers to taking medications:  None    Medication side effects:  None    Ability to successfully perform activities of daily living:  No assistance needed    Home Safety:  No safety concerns identified    Hearing Impairment:  No hearing concerns (wears hearing aids)    In the past 6 months, have you been bothered by leaking of urine?  No    In general, how would you rate your overall mental or emotional health?  Very good      PHQ-2 Total Score:    Additional concerns today:  No    Do you feel safe in your environment? Yes    Do you have a Health Care Directive? Yes: Advance Directive has been received and scanned.      Fall risk  Fallen 2 or more times in the past year?: No  Any fall with injury in the past year?: No  click delete button to remove this line now  Cognitive Screening   1) Repeat 3 items (Leader, Season, Table)    2) Clock draw: NORMAL  3) 3 item recall: Recalls 2 objects   Results: NORMAL clock, 1-2 items recalled: COGNITIVE IMPAIRMENT LESS LIKELY    Mini-CogTM Copyright S [employer]. Licensed by the author for use in [facility]; reprinted with permission (ankur@.[facility]). All rights reserved.      Do you have sleep apnea, excessive snoring or daytime drowsiness?: no    Reviewed and updated as needed this visit by clinical staff  Tobacco  Meds  Med Hx  Surg Hx  Fam Hx  Soc Hx        Reviewed and updated as needed this visit by Provider  Tobacco        Social History " "    Tobacco Use     Smoking status: Never Smoker     Smokeless tobacco: Never Used   Substance Use Topics     Alcohol use: No     Alcohol/week: 0.0 oz     If you drink alcohol do you typically have >3 drinks per day or >7 drinks per week? No    No flowsheet data found.            Current providers sharing in care for this patient include:   Patient Care Team:  Colby Trimble MD as PCP - General (Family Practice)  Colby Trimble MD as Assigned PCP    The following health maintenance items are reviewed in Epic and correct as of today:  Health Maintenance   Topic Date Due     ZOSTER IMMUNIZATION (2 of 3) 02/04/2010     PHQ-2  01/01/2019     FALL RISK ASSESSMENT  05/29/2019     MEDICARE ANNUAL WELLNESS VISIT  05/29/2019     DEXA  07/05/2019     DIABETIC EYE EXAM  03/04/2020     BMP  05/24/2020     LIPID  05/24/2020     MICROALBUMIN  05/24/2020     COLONOSCOPY  08/17/2021     ADVANCED DIRECTIVE PLANNING  12/14/2022     DTAP/TDAP/TD IMMUNIZATION (3 - Td) 03/01/2023     INFLUENZA VACCINE  Completed     IPV IMMUNIZATION  Aged Out     MENINGITIS IMMUNIZATION  Aged Out           Review of Systems      OBJECTIVE:   /66   Pulse 70   Temp 97.6  F (36.4  C) (Oral)   Resp 16   Ht 1.51 m (4' 11.45\")   Wt 45.4 kg (100 lb)   LMP 12/16/1980   SpO2 98%   Breastfeeding? No   BMI 19.89 kg/m   Estimated body mass index is 19.89 kg/m  as calculated from the following:    Height as of this encounter: 1.51 m (4' 11.45\").    Weight as of this encounter: 45.4 kg (100 lb).  Physical Exam      Diagnostic Test Results:  Labs reviewed in Epic    ASSESSMENT / PLAN:       ICD-10-CM    1. Osteoporosis, unspecified osteoporosis type, unspecified pathological fracture presence M81.0 DX Hip/Pelvis/Spine   2. Encounter for screening mammogram for breast cancer Z12.31 MA Screening Digital Bilateral       End of Life Planning:  Patient currently has an advanced directive: Yes.  Practitioner is supportive of " "decision.    COUNSELING:  Reviewed preventive health counseling, as reflected in patient instructions       Regular exercise       Healthy diet/nutrition       Vision screening       Bladder control       Aspirin Prophylaxsis       Osteoporosis Prevention/Bone Health    Estimated body mass index is 19.89 kg/m  as calculated from the following:    Height as of this encounter: 1.51 m (4' 11.45\").    Weight as of this encounter: 45.4 kg (100 lb).         reports that she has never smoked. She has never used smokeless tobacco.      Appropriate preventive services were discussed with this patient, including applicable screening as appropriate for cardiovascular disease, diabetes, osteopenia/osteoporosis, and glaucoma.  As appropriate for age/gender, discussed screening for colorectal cancer, prostate cancer, breast cancer, and cervical cancer. Checklist reviewing preventive services available has been given to the patient.    Reviewed patients plan of care and provided an AVS. The Basic Care Plan (routine screening as documented in Health Maintenance) for [place] meets the Care Plan requirement. This Care Plan has been established and reviewed with the Patient.    Counseling Resources:  ATP IV Guidelines  Pooled Cohorts Equation Calculator  Breast Cancer Risk Calculator  FRAX Risk Assessment  ICSI Preventive Guidelines  Dietary Guidelines for Americans, 2010  [employer]'s MyPlate  ASA Prophylaxis  Lung CA Screening    Colby Trimble MD  [facility]    Identified Health Risks:  --------------------------------------------------------------------------------------------------------------------------------------  SUBJECTIVE:  China Guzman is a 89 year old female who presents to the clinic today for a routine physical exam.    The patient's last physical was 1 years ago.     Cholesterol   Date Value Ref Range Status   05/24/2019 176 <200 mg/dL Final   05/22/2018 185 <200 mg/dL Final     HDL Cholesterol   Date Value " Ref Range Status   05/24/2019 71 >49 mg/dL Final   05/22/2018 50 >49 mg/dL Final     LDL Cholesterol Calculated   Date Value Ref Range Status   05/24/2019 68 <100 mg/dL Final     Comment:     Desirable:       <100 mg/dl   05/22/2018 87 <100 mg/dL Final     Comment:     Desirable:       <100 mg/dl     Triglycerides   Date Value Ref Range Status   05/24/2019 185 (H) <150 mg/dL Final     Comment:     Borderline high:  150-199 mg/dl  High:             200-499 mg/dl  Very high:       >499 mg/dl  Fasting specimen     05/22/2018 241 (H) <150 mg/dL Final     Comment:     Borderline high:  150-199 mg/dl  High:             200-499 mg/dl  Very high:       >499 mg/dl  Fasting specimen       Cholesterol/HDL Ratio   Date Value Ref Range Status   03/16/2015 2.6 0.0 - 5.0 Final   03/03/2014 3.4 0.0 - 5.0 Final     The patient's last fasting lipid panel was done 1 weeks ago and the results are listed above.    The ASCVD Risk score (Miguel ELMER Jr., et al., 2013) failed to calculate for the following reasons:    The 2013 ASCVD risk score is only valid for ages 40 to 79      The patient reports that she has been treated for high blood pressure.    The patient reports that she does take a daily aspirin.        No results found for: HCVAB  The patient reports that she has not been screened for Hepatitis C    (Screen all baby boomers once per CDC-- the generation born from 1945 through 1965)  She would not like to have an Hepatitis C test today            Immunization History   Administered Date(s) Administered     Influenza (High Dose) 3 valent vaccine 08/29/2016, 09/26/2017     Influenza (IIV3) PF 10/01/2009, 10/12/2010, 10/20/2011, 10/20/2015     Pneumo Conj 13-V (2010&after) 03/18/2015     Pneumococcal 23 valent 12/28/1999, 05/10/2007, 04/06/2012     TD (ADULT, 7+) 05/10/2007     TDAP Vaccine (Adacel) 03/01/2013     Td (Adult), Adsorbed 12/28/1999     Zoster vaccine, live 12/10/2009     The patient has not started the Gardasil  vaccination series.  The patient's believes that her last tetanus shot was given 6 year(s) ago.   The patient believes that she has not had a Shingrix in the past  The patient believes that she has had a PPSV23 in the past.  The patient believes that she has had a PCV13 in the past.  The patient believes that she has had a seasonal flu vaccination this fall or winter.  The patient would like to have a Shingrix      No results found for this or any previous visit.]   The patient reports a family history of colon cancer at age 84.  The patient reports that she has had a colonoscopy. Her  last colonoscopy was in 2016 and she  report that is was normal. The patient was not told to have this repeated.    The patient denies a family history diabetes.    The patient reports that she does performs a self breast exam monthly.  The patient reports a family history of breast cancer in her daughter.  The patient does want to have a mammogram done.  The patient does not want to have a Pap smear done.  She reports that she has not had an abnormal pap smear.    The patient is not interested in hormone replacement therapy.  The patient reports that she eats or drinks 2 servings of dairy products per day. She does take a 600 calcium supplement once daily..  The patient reports that she has had a bone density scan. Her  last scan was in 2017 and she  report that is was abnormal. The patient was told to have this repeated in 2 years.    (screen women 65+ or 50+ with risk factors)     The patient reports that she does not have dental appointments as she has full dentures.  The patient reports that she  has an eye examination approximately every 0.5 year(s).        Do you currently smoke? No  How many years have you smoked? 0  How many packs per day did you smoke on average? N/A  (if more than 30 pack year history and the patient is age 55-80 consider ordering an annual low dose radiation lung CT to screen for cancer)  (Do not order if  patient has quit more than 15 years ago or has a health condition that limits life expectancy or could not tolerate curative lung surgery)  Are you interested having a lung CT to screen for lung cancer? N/A      She reports that she had her nephrology appointment(s) in April and they discussed the elevated parathyroid hormone.        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     Hearing loss      HTN (hypertension)      Hyperlipidemia      MACULAR DEGENERATION (SENILE) OF RETINA, DRY OU 2/11/2011     Macular degeneration, dry, mod, ou 8/31/2016     Osteoporosis        Past Surgical History:   Procedure Laterality Date     BLEPHAROPLASTY BILATERAL  1/2004    both eyes, upper lids     CATARACT IOL, RT/LT       COLONOSCOPY  12/2010    Q 5 years     FRACTURE TX, ANKLE RT/LT  1997    RIGHT ANKLE ORIF     LASER YAG CAPSULOTOMY  12/2005; 1/2006    right eye; left eye     PHACOEMULSIFICATION WITH STANDARD INTRAOCULAR LENS IMPLANT  8/2003; 10/2003    left eye; right eye     REPAIR PTOSIS         Family History   Problem Relation Age of Onset     Cancer - colorectal Mother      C.A.D. Brother      Cerebrovascular Disease Brother      Heart Disease Brother      Asthma Daughter      Breast Cancer Daughter      Thyroid Disease Daughter      Glaucoma No family hx of      Macular Degeneration No family hx of        Social History     Socioeconomic History     Marital status:      Spouse name: Not on file     Number of children: 6     Years of education: Not on file     Highest education level: Not on file   Occupational History     Employer: RETIRED   Social Needs     Financial resource strain: Not on file     Food insecurity:     Worry: Not on file     Inability: Not on file     Transportation needs:     Medical: Not on file     Non-medical: Not on file   Tobacco Use     Smoking status: Never Smoker     Smokeless tobacco: Never Used  "  Substance and Sexual Activity     Alcohol use: No     Alcohol/week: 0.0 oz     Drug use: No     Sexual activity: Never   Lifestyle     Physical activity:     Days per week: Not on file     Minutes per session: Not on file     Stress: Not on file   Relationships     Social connections:     Talks on phone: Not on file     Gets together: Not on file     Attends Christianity service: Not on file     Active member of club or organization: Not on file     Attends meetings of clubs or organizations: Not on file     Relationship status: Not on file     Intimate partner violence:     Fear of current or ex partner: Not on file     Emotionally abused: Not on file     Physically abused: Not on file     Forced sexual activity: Not on file   Other Topics Concern     Parent/sibling w/ CABG, MI or angioplasty before 65F 55M? Not Asked   Social History Narrative     Not on file       Current Outpatient Medications   Medication Sig Dispense Refill     atorvastatin (LIPITOR) 10 MG tablet TAKE ONE TABLET BY MOUTH AT BEDTIME 90 tablet 3     CALCIUM + D OR 1 tablets daily       diltiazem ER (DILT-XR) 240 MG 24 hr ER beaded capsule Take 1 capsule (240 mg) by mouth daily 90 capsule 3     furosemide (LASIX) 40 MG tablet Take 1 tablet (40 mg) by mouth 2 times daily 180 tablet 3     latanoprost (XALATAN) 0.005 % ophthalmic solution Place 1 drop into both eyes At Bedtime 3 Bottle 4     losartan (COZAAR) 100 MG tablet Take 1 tablet (100 mg) by mouth daily 90 tablet 3     MULTI-VITAMIN OR None Entered       timolol (TIMOPTIC) 0.5 % ophthalmic solution Place 1 drop into both eyes daily 1 Bottle 12           PHYSICAL EXAMINATION:  Blood pressure 126/66, pulse 70, temperature 97.6  F (36.4  C), temperature source Oral, resp. rate 16, height 1.51 m (4' 11.45\"), weight 45.4 kg (100 lb), last menstrual period 12/16/1980, SpO2 98 %, not currently breastfeeding.  General appearance - healthy, alert and no distress  Skin - Skin color, texture, turgor " normal. No rashes or lesions.  Head - Normocephalic. No masses, lesions, tenderness or abnormalities  Eyes - conjunctivae/corneas clear. PERRL, EOM's intact. Fundi benign  Ears - External ears normal. Canals clear. TM's normal.  Nose/Sinuses - Nares normal. Septum midline. Mucosa normal. No drainage or sinus tenderness.  Oropharynx - Lips, mucosa, and tongue normal. Teeth and gums normal.  Neck - Neck supple. No adenopathy. Thyroid symmetric, normal size,  Lungs - Percussion normal. Good diaphragmatic excursion. Lungs clear  Heart - PMI normal. No lifts, heaves, or thrills. RRR. No murmurs, clicks gallops or rub  Breasts - Breasts normal to inspection and palpation. Axillae negative  Abdomen - Abdomen soft, non-tender. BS normal. No masses, organomegaly  Extremities - Extremities normal. No deformities, edema, or skin discoloration.  Musculoskeletal - Spine ROM normal. Muscular strength intact.  Peripheral pulses - radial=4/4, femoral=4/4, popliteal=4/4, dorsalis pedis=4/4,  Neuro - Gait normal. Reflexes normal and symmetric. Sensation grossly WNL.          Orders Only on 05/24/2019   Component Date Value Ref Range Status     Parathyroid Hormone Intact 05/24/2019 89* 18 - 80 pg/mL Final     Hemoglobin 05/24/2019 12.2  11.7 - 15.7 g/dL Final     Sodium 05/24/2019 137  133 - 144 mmol/L Final     Potassium 05/24/2019 3.5  3.4 - 5.3 mmol/L Final     Chloride 05/24/2019 101  94 - 109 mmol/L Final     Carbon Dioxide 05/24/2019 30  20 - 32 mmol/L Final     Anion Gap 05/24/2019 6  3 - 14 mmol/L Final     Glucose 05/24/2019 107* 70 - 99 mg/dL Final    Fasting specimen     Urea Nitrogen 05/24/2019 42* 7 - 30 mg/dL Final     Creatinine 05/24/2019 1.06* 0.52 - 1.04 mg/dL Final     GFR Estimate 05/24/2019 46* >60 mL/min/[1.73_m2] Final    Comment: Non  GFR Calc  Starting 12/18/2018, serum creatinine based estimated GFR (eGFR) will be   calculated using the Chronic Kidney Disease Epidemiology Collaboration    (CKD-EPI) equation.       GFR Estimate If Black 05/24/2019 54* >60 mL/min/[1.73_m2] Final    Comment:  GFR Calc  Starting 12/18/2018, serum creatinine based estimated GFR (eGFR) will be   calculated using the Chronic Kidney Disease Epidemiology Collaboration   (CKD-EPI) equation.       Calcium 05/24/2019 9.8  8.5 - 10.1 mg/dL Final     Bilirubin Total 05/24/2019 0.5  0.2 - 1.3 mg/dL Final     Albumin 05/24/2019 4.5  3.4 - 5.0 g/dL Final     Protein Total 05/24/2019 8.5  6.8 - 8.8 g/dL Final     Alkaline Phosphatase 05/24/2019 83  40 - 150 U/L Final     ALT 05/24/2019 25  0 - 50 U/L Final     AST 05/24/2019 15  0 - 45 U/L Final     Creatinine Urine 05/24/2019 18  mg/dL Final     Albumin Urine mg/L 05/24/2019 <5  mg/L Final     Albumin Urine mg/g Cr 05/24/2019 Unable to calculate due to low value  0 - 25 mg/g Cr Final     Cholesterol 05/24/2019 176  <200 mg/dL Final     Triglycerides 05/24/2019 185* <150 mg/dL Final    Comment: Borderline high:  150-199 mg/dl  High:             200-499 mg/dl  Very high:       >499 mg/dl  Fasting specimen       HDL Cholesterol 05/24/2019 71  >49 mg/dL Final     LDL Cholesterol Calculated 05/24/2019 68  <100 mg/dL Final    Desirable:       <100 mg/dl     Non HDL Cholesterol 05/24/2019 105  <130 mg/dL Final       ASSESSMENT:  No diagnosis found.    Well-Adult Physical Exam.  Health Maintenance Due   Topic Date Due     ZOSTER IMMUNIZATION (2 of 3) 02/04/2010     PHQ-2  01/01/2019     FALL RISK ASSESSMENT  05/29/2019     MEDICARE ANNUAL WELLNESS VISIT  05/29/2019     Health Maintenance   Topic Date Due     ZOSTER IMMUNIZATION (2 of 3) 02/04/2010     PHQ-2  01/01/2019     FALL RISK ASSESSMENT  05/29/2019     MEDICARE ANNUAL WELLNESS VISIT  05/29/2019     DEXA  07/05/2019     DIABETIC EYE EXAM  03/04/2020     BMP  05/24/2020     LIPID  05/24/2020     MICROALBUMIN  05/24/2020     COLONOSCOPY  08/17/2021     ADVANCED DIRECTIVE PLANNING  12/14/2022     DTAP/TDAP/TD  IMMUNIZATION (3 - Td) 03/01/2023     INFLUENZA VACCINE  Completed     IPV IMMUNIZATION  Aged Out     MENINGITIS IMMUNIZATION  Aged Out         HEALTH CARE MAINTENENCE: The recommended screening tests and vaccinatons for this patient have been discussed as above.  The appropriate tests and vaccinations  have been ordered or declined by the patient. Please see the orders in EPIC.The patient specifically declines: n/a     Immunization Status:  up to date and documented except for Shingrix    Patient Active Problem List   Diagnosis     Osteoporosis     HL (hearing loss)     Gallstones     Malignant basal cell neoplasm of skin     Hyperlipidemia LDL goal <130     Pseudophakia, Yag Caps, ou     Advanced directives, counseling/discussion     Borderline glaucoma with ocular hypertension, bilateral - treated     Family history of colon cancer     Posterior vitreous detachment, bilateral     Essential hypertension with goal blood pressure less than 140/90     10 year risk of MI or stroke 7.5% or greater, 39.7% in September 2017 on atorvastatin 10 mg     CKD (chronic kidney disease) stage 3, GFR 30-59 ml/min (H)     IGT (impaired glucose tolerance)     High triglycerides     Advanced atrophic nonexudative age-related macular degeneration of both eyes with subfoveal involvement        ATP III Guidelines  ICSI Preventive Guidelines    PLAN:  I recommended discontinuing to take a daily aspirin (81 to 325 mg)  Shingrix recommended  Breast self exam demonstrated and recommended  Mammogram recommended  Pap smear was not recommended per the ACOG guidelines  Discussed calcium intake, vitamins and supplements. Recommended 1800 mg of calcium daily  Bone density scan (DEXA) recommended  Sunscreen use was recommended especially in the area of tatoos  Recommended eye exam every 1-2 years  Follow up in 1 year for the next preventative medical visit      Osteoporosis TX indications:  Post menopausal women with a hip or vertebral  fracture  Any T score less than or equal to -2.5  Any T score between -1.0 and -2.5 and a 10 year hip fracture probability > or = to 3%  Any T score between -1.0 and -2.5 and a 10 year probability or a major osteoporosis-related fracture  > or = 20% based on FRAX score  [email or web address]            Body mass index is 19.89 kg/m .

## 2019-06-06 ENCOUNTER — TELEPHONE (OUTPATIENT)
Dept: FAMILY MEDICINE | Facility: CLINIC | Age: 84
End: 2019-06-06

## 2019-06-06 NOTE — LETTER
June 7, 2019    China Guzman  [address] [facility] [address]            Dear China,    As you requested, here are your recent tests that we discussed in the clinic.  Below is a copy of the results.  It was a pleasure to see you at your last appointment.    Contact the clinic if you want to sign up for MyChart for future needs.  Your results can be sent to you directly through your MyChart.    If you have any questions or concerns, please call myself or my nurse at [phone].    Sincerely,    Colby Trimble MD/michi    Results for orders placed or performed in visit on 05/24/19   Parathyroid Hormone Intact   Result Value Ref Range    Parathyroid Hormone Intact 89 (H) 18 - 80 pg/mL   Hemoglobin   Result Value Ref Range    Hemoglobin 12.2 11.7 - 15.7 g/dL   **Comprehensive metabolic panel FUTURE anytime   Result Value Ref Range    Sodium 137 133 - 144 mmol/L    Potassium 3.5 3.4 - 5.3 mmol/L    Chloride 101 94 - 109 mmol/L    Carbon Dioxide 30 20 - 32 mmol/L    Anion Gap 6 3 - 14 mmol/L    Glucose 107 (H) 70 - 99 mg/dL    Urea Nitrogen 42 (H) 7 - 30 mg/dL    Creatinine 1.06 (H) 0.52 - 1.04 mg/dL    GFR Estimate 46 (L) >60 mL/min/[1.73_m2]    GFR Estimate If Black 54 (L) >60 mL/min/[1.73_m2]    Calcium 9.8 8.5 - 10.1 mg/dL    Bilirubin Total 0.5 0.2 - 1.3 mg/dL    Albumin 4.5 3.4 - 5.0 g/dL    Protein Total 8.5 6.8 - 8.8 g/dL    Alkaline Phosphatase 83 40 - 150 U/L    ALT 25 0 - 50 U/L    AST 15 0 - 45 U/L   Albumin Random Urine Quantitative with Creat Ratio   Result Value Ref Range    Creatinine Urine 18 mg/dL    Albumin Urine mg/L <5 mg/L    Albumin Urine mg/g Cr Unable to calculate due to low value 0 - 25 mg/g Cr   Lipid panel reflex to direct LDL Fasting   Result Value Ref Range    Cholesterol 176 <200 mg/dL    Triglycerides 185 (H) <150 mg/dL    HDL Cholesterol 71 >49 mg/dL    LDL Cholesterol Calculated 68 <100 mg/dL    Non HDL Cholesterol 105 <130 mg/dL   25 OH Vit D therapy monitoring   Result  Value Ref Range    25 OH Vit D2 <5 ug/L    25 OH Vit D3 55 ug/L    25 OH Vit D total <60 20 - 75 ug/L

## 2019-06-06 NOTE — TELEPHONE ENCOUNTER
"The patients wants her 5/24/19 lab results mailed to her.  Do you want to compose a letter to go with her labs?  Perhaps send it to \"Result Notes\" folder? And then I can print and mail it.  Please advise.  Radha Ennis,     "

## 2019-06-06 NOTE — TELEPHONE ENCOUNTER
Patient calling would like a copy of her recent lab test results mailed to her home address. Forgot to get a copy when she was in recently.

## 2019-06-06 NOTE — TELEPHONE ENCOUNTER
No we went over her labs at her appointment(s).     Ask her if she wants to be on MyChart though.   Colby Trimble MD

## 2019-06-29 DIAGNOSIS — I10 ESSENTIAL HYPERTENSION WITH GOAL BLOOD PRESSURE LESS THAN 140/90: ICD-10-CM

## 2019-07-01 RX ORDER — DILTIAZEM HYDROCHLORIDE 240 MG/1
CAPSULE, EXTENDED RELEASE ORAL
Qty: 90 CAPSULE | Refills: 1 | Status: SHIPPED | OUTPATIENT
Start: 2019-07-01 | End: 2020-01-03

## 2019-07-01 NOTE — TELEPHONE ENCOUNTER
Requested Prescriptions   Pending Prescriptions Disp Refills     DILT- MG 24 hr ER beaded capsule [Pharmacy Med Name: DILT-XR 240MG CP24] 90 capsule 1     Sig: TAKE ONE CAPSULE BY MOUTH ONCE DAILY       Calcium Channel Blockers Protocol  Failed - 6/29/2019 11:22 AM        Failed - Normal serum creatinine on file in past 12 months     Recent Labs   Lab Test 05/24/19  0900   CR 1.06*

## 2019-07-17 ENCOUNTER — ANCILLARY PROCEDURE (OUTPATIENT)
Dept: BONE DENSITY | Facility: CLINIC | Age: 84
End: 2019-07-17
Attending: FAMILY MEDICINE
Payer: MEDICARE

## 2019-07-17 DIAGNOSIS — M81.0 OSTEOPOROSIS, UNSPECIFIED OSTEOPOROSIS TYPE, UNSPECIFIED PATHOLOGICAL FRACTURE PRESENCE: ICD-10-CM

## 2019-07-17 PROCEDURE — 77085 DXA BONE DENSITY AXL VRT FX: CPT | Performed by: INTERNAL MEDICINE

## 2019-07-18 ENCOUNTER — ANCILLARY PROCEDURE (OUTPATIENT)
Dept: MAMMOGRAPHY | Facility: CLINIC | Age: 84
End: 2019-07-18
Attending: FAMILY MEDICINE
Payer: MEDICARE

## 2019-07-18 DIAGNOSIS — Z12.31 ENCOUNTER FOR SCREENING MAMMOGRAM FOR BREAST CANCER: ICD-10-CM

## 2019-07-18 PROCEDURE — 77067 SCR MAMMO BI INCL CAD: CPT | Mod: TC

## 2019-07-18 PROCEDURE — 77063 BREAST TOMOSYNTHESIS BI: CPT | Mod: TC

## 2019-07-19 ENCOUNTER — TELEPHONE (OUTPATIENT)
Dept: FAMILY MEDICINE | Facility: CLINIC | Age: 84
End: 2019-07-19

## 2019-07-20 NOTE — TELEPHONE ENCOUNTER
Please call the patient.  Her dexa scan shows worsening in the hips. I would like to see her in the clinic to discuss further. Please have her bring the bottle of all the supplements that she takes.   Colby Trimble MD

## 2019-07-22 NOTE — TELEPHONE ENCOUNTER
Called patient and gave providers message/ instructions.  Patient states (s)he understands this.   Made appointment:    Next 5 appointments (look out 90 days)    Aug 16, 2019  3:15 PM CDT  [facility] [address] [address] [facility] [address]  [phone]        Earline Mas BSN, RN

## 2019-08-16 ENCOUNTER — OFFICE VISIT (OUTPATIENT)
Dept: FAMILY MEDICINE | Facility: CLINIC | Age: 84
End: 2019-08-16
Payer: MEDICARE

## 2019-08-16 VITALS
WEIGHT: 102 LBS | BODY MASS INDEX: 20.29 KG/M2 | SYSTOLIC BLOOD PRESSURE: 133 MMHG | OXYGEN SATURATION: 99 % | HEART RATE: 75 BPM | RESPIRATION RATE: 20 BRPM | DIASTOLIC BLOOD PRESSURE: 66 MMHG | TEMPERATURE: 98.4 F

## 2019-08-16 DIAGNOSIS — M81.0 OSTEOPOROSIS, UNSPECIFIED OSTEOPOROSIS TYPE, UNSPECIFIED PATHOLOGICAL FRACTURE PRESENCE: Primary | ICD-10-CM

## 2019-08-16 PROCEDURE — 99213 OFFICE O/P EST LOW 20 MIN: CPT | Performed by: FAMILY MEDICINE

## 2019-08-16 ASSESSMENT — PAIN SCALES - GENERAL: PAINLEVEL: NO PAIN (0)

## 2019-08-16 NOTE — NURSING NOTE
"Chief Complaint   Patient presents with     Results     Dexa     Health Maintenance       Initial BP (!) 159/70   Pulse 75   Temp 98.4  F (36.9  C) (Oral)   Resp 20   Wt 46.3 kg (102 lb)   LMP 12/16/1980   SpO2 99%   BMI 20.29 kg/m   Estimated body mass index is 20.29 kg/m  as calculated from the following:    Height as of 6/3/19: 1.51 m (4' 11.45\").    Weight as of this encounter: 46.3 kg (102 lb).  Medication Reconciliation: complete  Marilu Corona, CHRIS  "

## 2019-08-16 NOTE — PROGRESS NOTES
"   (www.shef.ac.uk/FRAX or you can google \"FRAX\").    2019  BONE DENSITOMETRY  [address] [place] [address]  2019      PATIENT: China Guzman  CHART: [id number]   :  4/10/1930  AGE:  89 year old  SEX:  female   REFERRING PROVIDER:  Colby Trimble MD     PROCEDURE:  Bone density scanning was performed using DXA technology of the lumbar spine and hip.  Scanning was performed on a Lunar Prodigy scanner.  Reporting is completed in the form of a T-score.  The T-score represents the standard deviation from peak bone mass based on a young healthy adult.     REFERENCE T-SCORES:       Normal                -1.0 and greater                                 Osteopenia         Between -1.0 and -2.5                                           Osteoporosis     -2.5 and less                                      RISK FACTORS:  Post-menopausal, Fractures of ankle and wrist, follow up osteoporosis     CURRENT TREATMENT:  Calcium, Vitamin D, previous Fosamax, stopped      FINDINGS:               Lumbar Spine (L1-L4)      T-score:  -1.2, degenerative changes present               Left Femoral Neck            T-score:  -1.6               Right Femoral Neck          T-score:  -2.3                           Lumbar (L1-L4) BMD: 1.040  Previous: 1.017                          Total Hip Mean BMD: 0.852  Previous: 0.881     Comparison is made to another DXA performed on the same Lunar Prodigy  machine on 2017.       LATERAL VERTEBRAL ASSESSMENT  Procedure:  Vertebral fracture assessment was performed in the lateral decubitus position using a Lunar Prodigy  densitometer.  Indications for VFA: T-score of -1.0 or worse and age (female>69)  Confounding factors for VFA: Arthritis/degenerative disc disease and rib shadows.  The LVA scan is interpretable from T8 to L5.     VFA Findings: Using the semi-quantitative analysis of Sally there was evidence of no spinal deformity  VFA Impression: " "China Guzman has no vertebral fractures identified on the VFA.         IMPRESSION  Osteopenia., Degenerative changes of the lumbar spine which may falsely elevate results.     There has been no significant change in bone density of the lumbar spine. There has been a trend toward significant decrease in bone density of the hip(s).     Recommendations include ensuring adequate Calcium and Vitamin D.     The current NOF Guidelines recommend treatment for patients with prior hip or vertebral fracture, T-score -2.5 or below, or 10 year risk of any major osteoporotic fracture >20% or 10 year risk of hip fracture >3%, as calculated using the FRAX calculator (www.shef.ac.uk/FRAX or you can google \"FRAX\").       This patient's risks based on available information, with the use of FRAX, are 17 % for major osteoporotic fracture and 5.8 % for hip fracture.   Based on these guidelines, treatment (in addition to calcium and vitamin D) is recommended for this patient, after ruling out other causes of osteoporosis.  This is meant as an aid to clinical decision making; one must still use clinical judgement.        Follow up can be considered in 2 years.   ___________________  Maura Holm M.D.  Electronically signed  --------------------------------------------------------------------------------------------------------------------------------------  [facility]    BONE DENSITOMETRY  [address] [address]  2017      PATIENT: China Guzman  CHART: [id number]   :  4/10/1930  AGE:  87 year old  SEX:  female   REFERRING PROVIDER:  Colby Trimble MD     PROCEDURE:  Bone density scanning was performed using DXA technology of the lumbar spine and hip.  Scanning was performed on a Lunar Prodigy scanner.  Reporting is completed in the form of a T-score.  The T-score represents the standard deviation from peak bone mass based on a young healthy adult.     REFERENCE T-SCORES:       Normal "                -1.0 and greater                                 Osteopenia         Between -1.0 and -2.5                                           Osteoporosis     -2.5 and less                                       RISK FACTORS:  Post-menopausal, Fractures of wrist and ankle, follow up osteoporosis     CURRENT TREATMENT:  Calcium, Vitamin D, previous Fosamax, stopped 2012     FINDINGS:               Lumbar Spine (L1-L4) T-score:  -1.4, degenerative changes present               Left Femoral Neck T-score:  -1.4               Right Femoral Neck T-score:  -2.9                              Lumbar (L1-L4) BMD: 1.017            Previous: 0.989                                              Total Hip Mean BMD: 0.881            Previous: 0.867     Comparison is made to other DXAs performed on the same Lunar Prodigy  machine on 2/8/10 and 3/06/13.       LATERAL VERTEBRAL ASSESSMENT  Procedure:  Vertebral fracture assessment was performed in the lateral decubitus position using a Lunar Prodigy  densitometer.  Indications for VFA: T-score of -1.0 or worse and age (female>69)  Confounding factors for VFA: None.  The LVA scan is interpretable from T7 to L4.     VFA Findings: Using the semi-quantitative analysis of Sally there was evidence of no spinal deformity  VFA Impression: China Guzman has no vertebral fractures identified on the VFA.         IMPRESSION  Osteoporosis., Degenerative changes of the lumbar spine which may falsely elevate results.     There has been no significant change in bone density of the lumbar spine compared to 2013. There has been significant increase in bone density of the liumbar spine compared to 2010. There has been no significant change in bone density of the hip(s).     Recommendations include ensuring adequate Calcium and Vitamin D.     The current NOF Guidelines recommend treatment for patients with prior hip or vertebral fracture, T-score -2.5 or below, or 10 year risk of any major  "osteoporotic fracture >20% or 10 year risk of hip fracture >3%, as calculated using the FRAX calculator (www.shef.ac.uk/FRAX or you can google \"FRAX\").       Based on these guidelines, treatment (in addition to calcium and vitamin D) is recommended for this patient, after ruling out other causes of osteoporosis.  This is meant as an aid to clinical decision making; one must still use clinical judgement.        Follow up can be considered in 2 years.   ___________________  Maura Holm M.D.  Electronically signed    --------------------------------------------------------------------------------------------------------------------------------------      Subjective:  China is a 89 year old female who presents today for follow-up on a recent abnormal bone density scan. The patient has been previously told she has osteoporosis. The patient has not had a hip fracture, vertebral compression fracture or a pathological fx in other bones to her recollection.The patient reports that she eats or drinks 2-3 servings of dairy products per day and she does  takes a  a 600 calcium supplementmg calcium supplement twice daily. The patient does take a Vitamin D supplement. The patient currently does exercise regularly.   She walks in the neighborhood almost daily in the summer.   She rides a [employer] bike in the winter 3 times a weel.             Current Outpatient Medications:      atorvastatin (LIPITOR) 10 MG tablet, TAKE ONE TABLET BY MOUTH AT BEDTIME, Disp: 90 tablet, Rfl: 3     CALCIUM + D OR, 1 tablets daily, Disp: , Rfl:      DILT- MG 24 hr ER beaded capsule, TAKE ONE CAPSULE BY MOUTH ONCE DAILY, Disp: 90 capsule, Rfl: 1     furosemide (LASIX) 40 MG tablet, Take 1 tablet (40 mg) by mouth 2 times daily, Disp: 180 tablet, Rfl: 3     latanoprost (XALATAN) 0.005 % ophthalmic solution, Place 1 drop into both eyes At Bedtime, Disp: 3 Bottle, Rfl: 4     losartan (COZAAR) 100 MG tablet, Take 1 tablet (100 mg) by mouth daily, " Disp: 90 tablet, Rfl: 3     MULTI-VITAMIN OR, None Entered, Disp: , Rfl:      timolol (TIMOPTIC) 0.5 % ophthalmic solution, Place 1 drop into both eyes daily, Disp: 1 Bottle, Rfl: 12    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    ASSESSMENT:  A 89 year old female with a diagnosis of who osteopenia. Her bone density has significant(ly) improved since 2010 and she is now in the osteopenia range. Her left hip bone density is down from -1.4 to -1.6 but otherwise her spine nad right hip are better.     Plan: The patient was advised of the importance of getting 1800mg of calcium daily. We discussed the importance of getting 400-800 IU of Vitamin D daily and I did recommmend obtaining a supplement. We dissussed the importance of weight bearing exercise and how that can stimulate the boned to become stronger. We also discussed the bisphosphonate medications and how these can improve or stabilze the bone density.  We will plan on repeating the DEXA scan in 2 years to further evaluation the progression of the patient's osteopenia.   China  voiced understanding to informations discussed today.

## 2019-09-19 ENCOUNTER — OFFICE VISIT (OUTPATIENT)
Dept: OPHTHALMOLOGY | Facility: CLINIC | Age: 84
End: 2019-09-19
Payer: MEDICARE

## 2019-09-19 DIAGNOSIS — H40.053 BORDERLINE GLAUCOMA WITH OCULAR HYPERTENSION, BILATERAL: ICD-10-CM

## 2019-09-19 PROCEDURE — 92083 EXTENDED VISUAL FIELD XM: CPT | Performed by: OPHTHALMOLOGY

## 2019-09-19 PROCEDURE — 92133 CPTRZD OPH DX IMG PST SGM ON: CPT | Performed by: OPHTHALMOLOGY

## 2019-09-19 PROCEDURE — 92012 INTRM OPH EXAM EST PATIENT: CPT | Performed by: OPHTHALMOLOGY

## 2019-09-19 RX ORDER — TIMOLOL MALEATE 5 MG/ML
1 SOLUTION/ DROPS OPHTHALMIC DAILY
Qty: 3 BOTTLE | Refills: 4 | Status: SHIPPED | OUTPATIENT
Start: 2019-09-19 | End: 2020-03-05

## 2019-09-19 ASSESSMENT — EXTERNAL EXAM - LEFT EYE: OS_EXAM: NORMAL

## 2019-09-19 ASSESSMENT — EXTERNAL EXAM - RIGHT EYE: OD_EXAM: NORMAL

## 2019-09-19 ASSESSMENT — VISUAL ACUITY
CORRECTION_TYPE: GLASSES
OD_CC: 20/100
OS_CC: CF@4
OD_CC+: -1+1
METHOD: SNELLEN - LINEAR

## 2019-09-19 ASSESSMENT — SLIT LAMP EXAM - LIDS
COMMENTS: GOOD CREASE AND COSMESIS
COMMENTS: GOOD CREASE AND COSMESIS, 1+ PTOSIS

## 2019-09-19 ASSESSMENT — TONOMETRY
IOP_METHOD: APPLANATION
OS_IOP_MMHG: 18
OD_IOP_MMHG: 18

## 2019-09-19 NOTE — LETTER
9/19/2019         RE: China Guzman  [address] [facility] [[email or web address]] [id number]        Dear Colleague,    Thank you for referring your patient, China Guzman, to the [facility]. Please see a copy of my visit note below.     Current Eye Medications:  Timolol every morning both eyes, Latanoprost at bedtime both eyes     Subjective: here for HVF and OCT for glaucoma. Needs refills sent today as she would like 90 day rxs. Does not drive     Objective:  See Ophthalmology Exam.       Assessment:  Stable intraocular pressure, glaucoma OCT, and Escoto Visual Field both eyes in patient who is a treated glaucoma suspect.      Plan:  Continue Timolol every morning both eyes and Latanoprost at bedtime both eyes.  Return visit 6 months for a complete exam.    Srikanth Craig M.D.  [phone]           Again, thank you for allowing me to participate in the care of your patient.        Sincerely,        Srikanth Craig MD

## 2019-09-19 NOTE — PROGRESS NOTES
Current Eye Medications:  Timolol every morning both eyes, Latanoprost at bedtime both eyes     Subjective: here for HVF and OCT for glaucoma. Needs refills sent today as she would like 90 day rxs. Does not drive     Objective:  See Ophthalmology Exam.       Assessment:  Stable intraocular pressure, glaucoma OCT, and Escoto Visual Field both eyes in patient who is a treated glaucoma suspect.      Plan:  Continue Timolol every morning both eyes and Latanoprost at bedtime both eyes.  Return visit 6 months for a complete exam.    Srikanth Craig M.D.  [phone]

## 2019-09-19 NOTE — PATIENT INSTRUCTIONS
Continue Timolol every morning both eyes and Latanoprost at bedtime both eyes.  Return visit 6 months for a complete exam.    Srikanth Craig M.D.  [phone]

## 2019-09-24 ENCOUNTER — ALLIED HEALTH/NURSE VISIT (OUTPATIENT)
Dept: NURSING | Facility: CLINIC | Age: 84
End: 2019-09-24
Payer: MEDICARE

## 2019-09-24 DIAGNOSIS — Z23 NEED FOR PROPHYLACTIC VACCINATION AND INOCULATION AGAINST INFLUENZA: Primary | ICD-10-CM

## 2019-09-24 PROCEDURE — G0008 ADMIN INFLUENZA VIRUS VAC: HCPCS

## 2019-09-24 PROCEDURE — 90662 IIV NO PRSV INCREASED AG IM: CPT

## 2019-11-06 ENCOUNTER — DOCUMENTATION ONLY (OUTPATIENT)
Dept: OPHTHALMOLOGY | Facility: CLINIC | Age: 84
End: 2019-11-06

## 2019-11-10 ASSESSMENT — PACHYMETRY
OD_CT(UM): .541
OS_CT(UM): .539

## 2020-01-30 ENCOUNTER — ALLIED HEALTH/NURSE VISIT (OUTPATIENT)
Dept: AUDIOLOGY | Facility: CLINIC | Age: 85
End: 2020-01-30
Payer: MEDICARE

## 2020-01-30 DIAGNOSIS — H90.3 SENSORINEURAL HEARING LOSS, ASYMMETRICAL: Primary | ICD-10-CM

## 2020-01-30 PROCEDURE — V5299 HEARING SERVICE: HCPCS | Performed by: AUDIOLOGIST

## 2020-01-30 NOTE — PROGRESS NOTES
HEARING AID DROP-OFF    Patient Name:  China Guzman    Patient Age:   89 year old    :  4/10/1930    Background:   Patient dropped off their hearing aid with the report of not working.      SIDE: Right    : O[employer]    TYPE: OPN 3 RITE    S/N: [id number]    WARRANTY: 2020    Procedures:   The hearing aid could not be returned to function in the office. It was sent to [facility] for in warranty repair.     Plan:   Patient was contacted in regards to status of hearing aid/s dropped off today. A message was left on China's voicemail detailing the need to send the hearing aid to the  for repair.     NO CHARGE VISIT    Merlin Cedeño CCC-A  Licensed Audiologist  2020

## 2020-02-05 ENCOUNTER — TELEPHONE (OUTPATIENT)
Dept: AUDIOLOGY | Facility: CLINIC | Age: 85
End: 2020-02-05

## 2020-02-05 NOTE — TELEPHONE ENCOUNTER
I informed China that her right ear O[employer]on OPN 3 was back from repair and ready to be picked up from the 2nd floor . The hearing aid was replaced and has a new SN: [id number]. Warranty good through 8/24/2020.    -Merlin ChildersD CCC-A

## 2020-03-05 ENCOUNTER — OFFICE VISIT (OUTPATIENT)
Dept: OPHTHALMOLOGY | Facility: CLINIC | Age: 85
End: 2020-03-05
Payer: MEDICARE

## 2020-03-05 DIAGNOSIS — H40.053 BORDERLINE GLAUCOMA WITH OCULAR HYPERTENSION, BILATERAL: ICD-10-CM

## 2020-03-05 DIAGNOSIS — H43.813 POSTERIOR VITREOUS DETACHMENT, BILATERAL: ICD-10-CM

## 2020-03-05 DIAGNOSIS — H35.3134 ADVANCED ATROPHIC NONEXUDATIVE AGE-RELATED MACULAR DEGENERATION OF BOTH EYES WITH SUBFOVEAL INVOLVEMENT: ICD-10-CM

## 2020-03-05 DIAGNOSIS — Z96.1 PSEUDOPHAKIA: Primary | ICD-10-CM

## 2020-03-05 DIAGNOSIS — H52.4 PRESBYOPIA: ICD-10-CM

## 2020-03-05 DIAGNOSIS — Z01.00 EXAMINATION OF EYES AND VISION: ICD-10-CM

## 2020-03-05 PROCEDURE — 92015 DETERMINE REFRACTIVE STATE: CPT | Mod: GY | Performed by: OPHTHALMOLOGY

## 2020-03-05 PROCEDURE — 92014 COMPRE OPH EXAM EST PT 1/>: CPT | Performed by: OPHTHALMOLOGY

## 2020-03-05 PROCEDURE — 92134 CPTRZ OPH DX IMG PST SGM RTA: CPT | Performed by: OPHTHALMOLOGY

## 2020-03-05 RX ORDER — LATANOPROST 50 UG/ML
1 SOLUTION/ DROPS OPHTHALMIC AT BEDTIME
Qty: 3 BOTTLE | Refills: 4 | Status: SHIPPED | OUTPATIENT
Start: 2020-03-05 | End: 2020-03-31

## 2020-03-05 RX ORDER — TIMOLOL MALEATE 5 MG/ML
1 SOLUTION/ DROPS OPHTHALMIC DAILY
Qty: 3 BOTTLE | Refills: 4 | Status: SHIPPED | OUTPATIENT
Start: 2020-03-05 | End: 2021-04-13

## 2020-03-05 ASSESSMENT — REFRACTION_MANIFEST
OD_CYLINDER: +0.75
OS_SPHERE: -2.00
OS_ADD: +3.50
OD_ADD: +3.50
OS_AXIS: 145
OS_CYLINDER: +1.25
OD_AXIS: 005
OD_SPHERE: -1.50

## 2020-03-05 ASSESSMENT — CONF VISUAL FIELD
OD_SUPERIOR_NASAL_RESTRICTION: 2
OS_INFERIOR_NASAL_RESTRICTION: 2
OS_INFERIOR_TEMPORAL_RESTRICTION: 2
OD_SUPERIOR_TEMPORAL_RESTRICTION: 2
OS_SUPERIOR_NASAL_RESTRICTION: 2
OD_INFERIOR_NASAL_RESTRICTION: 2
OD_INFERIOR_TEMPORAL_RESTRICTION: 2
OS_SUPERIOR_TEMPORAL_RESTRICTION: 2

## 2020-03-05 ASSESSMENT — REFRACTION_WEARINGRX
OD_CYLINDER: +1.00
OD_SPHERE: -0.75
OD_AXIS: 170
OS_CYLINDER: SPHERE
SPECS_TYPE: PAL
OS_SPHERE: -0.50
OD_ADD: +3.50

## 2020-03-05 ASSESSMENT — EXTERNAL EXAM - RIGHT EYE: OD_EXAM: NORMAL

## 2020-03-05 ASSESSMENT — VISUAL ACUITY
OD_CC: 20/125
OD_CC+: -1
OS_CC: CF@4
METHOD: SNELLEN - LINEAR
CORRECTION_TYPE: GLASSES

## 2020-03-05 ASSESSMENT — TONOMETRY
OD_IOP_MMHG: 17
IOP_METHOD: APPLANATION
OS_IOP_MMHG: 17

## 2020-03-05 ASSESSMENT — SLIT LAMP EXAM - LIDS
COMMENTS: GOOD CREASE AND COSMESIS
COMMENTS: GOOD CREASE AND COSMESIS, 1+ PTOSIS

## 2020-03-05 ASSESSMENT — CUP TO DISC RATIO
OD_RATIO: 0.3
OS_RATIO: 0.3

## 2020-03-05 ASSESSMENT — EXTERNAL EXAM - LEFT EYE: OS_EXAM: NORMAL

## 2020-03-05 NOTE — PROGRESS NOTES
" Current Eye Medications:  Timolol every morning both eyes and Latanoprost at bedtime both eyes.     Subjective:  Here for complete today. Needs refills and new glasses. Her coating is coming off the current PG.  Harder to read over the past couple weeks. No flashes or floaters. Has a big bday coming up!     Objective:  See Ophthalmology Exam.       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      ICD-10-CM    1. Pseudophakia, Yag Caps, ou Z96.1 EYE EXAM (SIMPLE-NONBILLABLE)   2. 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 Borderline glaucoma with ocular hypertension, bilateral - treated H40.053 EYE EXAM (SIMPLE-NONBILLABLE)     latanoprost (XALATAN) 0.005 % ophthalmic solution     timolol maleate (TIMOPTIC) 0.5 % ophthalmic solution   4. Posterior vitreous detachment, bilateral H43.813    5. Examination of eyes and vision Z01.00 REFRACTIVE STATUS     EYE EXAM (SIMPLE-NONBILLABLE)   6. Presbyopia H52.4 REFRACTIVE STATUS        Plan:  Glasses Rx given - optional  Continue using Timolol (yellow top) both eyes daily in the morning,  And Latanoprost (green top) both eyes at bedtime.   Use artificial tears up to 4 times daily both eyes as needed.  (Refresh Tears, Systane Ultra/Balance, or Theratears)  Wait at least 5 minutes between drops if using more than one at a time.  Take a multiple vitamin or \"eye vitamin\" daily (AREDS2).  Protect your eyes outdoors from ultraviolet rays with sunglasses and/or brimmed hat.  Have spinach (cooked or raw), colorful fruits, walnuts, hazelnuts, almonds in your diet.  Monitor the vision in each eye weekly - call if any sudden persistent changes.  Return visit in 6 months for intraocular pressure check, glaucoma OCT and Escoto Visual Field.     Srikanth Craig M.D.  [phone]    [facility] consent signed and mailed for " evaluation.

## 2020-03-05 NOTE — PATIENT INSTRUCTIONS
"Glasses Rx given - optional  Continue using Timolol (yellow top) both eyes daily in the morning,  And Latanoprost (green top) both eyes at bedtime.   Use artificial tears up to 4 times daily both eyes as needed.  (Refresh Tears, Systane Ultra/Balance, or Theratears)  Wait at least 5 minutes between drops if using more than one at a time.  Take a multiple vitamin or \"eye vitamin\" daily (AREDS2).  Protect your eyes outdoors from ultraviolet rays with sunglasses and/or brimmed hat.  Have spinach (cooked or raw), colorful fruits, walnuts, hazelnuts, almonds in your diet.  Monitor the vision in each eye weekly - call if any sudden persistent changes.  Return visit in 6 months for intraocular pressure check, glaucoma OCT and Escoto Visual Field.     Srikanth Craig M.D.  [phone]    [facility] consent signed and mailed for evaluation.   "

## 2020-03-05 NOTE — LETTER
"    3/5/2020         RE: China Guzman  [address] [place] [address]        Dear Colleague,    Thank you for referring your patient, China Guzman, to the [facility]. Please see a copy of my visit note below.     Current Eye Medications:  Timolol every morning both eyes and Latanoprost at bedtime both eyes.     Subjective:  Here for complete today. Needs refills and new glasses. Her coating is coming off the current PG.  Harder to read over the past couple weeks. No flashes or floaters. Has a big bday coming up!     Objective:  See Ophthalmology Exam.       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      ICD-10-CM    1. Pseudophakia, Yag Caps, ou Z96.1 EYE EXAM (SIMPLE-NONBILLABLE)   2. 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 Borderline glaucoma with ocular hypertension, bilateral - treated H40.053 EYE EXAM (SIMPLE-NONBILLABLE)     latanoprost (XALATAN) 0.005 % ophthalmic solution     timolol maleate (TIMOPTIC) 0.5 % ophthalmic solution   4. Posterior vitreous detachment, bilateral H43.813    5. Examination of eyes and vision Z01.00 REFRACTIVE STATUS     EYE EXAM (SIMPLE-NONBILLABLE)   6. Presbyopia H52.4 REFRACTIVE STATUS        Plan:  Glasses Rx given - optional  Continue using Timolol (yellow top) both eyes daily in the morning,  And Latanoprost (green top) both eyes at bedtime.   Use artificial tears up to 4 times daily both eyes as needed.  (Refresh Tears, Systane Ultra/Balance, or Theratears)  Wait at least 5 minutes between drops if using more than one at a time.  Take a multiple vitamin or \"eye vitamin\" daily (AREDS2).  Protect your eyes outdoors from ultraviolet rays with sunglasses and/or brimmed hat.  Have spinach (cooked or raw), colorful fruits, walnuts, hazelnuts, almonds in your diet.  Monitor the vision in each eye weekly - call if any " sudden persistent changes.  Return visit in 6 months for intraocular pressure check, glaucoma OCT and Escoto Visual Field.     Srikanth Craig M.D.  [phone]    [facility] consent signed and mailed for evaluation.            Again, thank you for allowing me to participate in the care of your patient.        Sincerely,        Srikanth Craig MD

## 2020-03-07 ASSESSMENT — PACHYMETRY
OD_CT(UM): .541
OS_CT(UM): .539

## 2020-03-19 ENCOUNTER — NURSE TRIAGE (OUTPATIENT)
Dept: FAMILY MEDICINE | Facility: CLINIC | Age: 85
End: 2020-03-19

## 2020-03-19 NOTE — TELEPHONE ENCOUNTER
Reason for Call:  Other prescription Kamila flu     Detailed comments: two adults that live with patient have been diagonisis with influenza and wondering if pcp can send in a script for patient to be pro active if she should get any symptoms     Please call to advice  Thank you     Phone Number Patient can be reached at: Home number on file [phone] (home)    Best Time: any    Can we leave a detailed message on this number? YES    Call taken on 3/19/2020 at 10:57 AM by Adore Cardona

## 2020-03-19 NOTE — TELEPHONE ENCOUNTER
"Routed to provider to review and advise. Pt does not meet RN Influenza Protocol criteria for influenza prophylaxis with Tamiflu, based on having received influenza vaccination this season (9/24/19).    Please review and advise if you would like pt treated with Tamiflu prophylactically based on in-home exposure to influenza.  RN did advise that current CDC guidelines do not advise routine prophylaxis in pt's who received seasonal influenza vaccination, but request will be reviewed by provider.    RN returned call to pt's daughter, Miladis. See Consent to Communicate form giving authorization to discuss PHI with Miladis, scanned on 9/30/2015.  Pt reports has no influenza sx, only exposure, pt has \"a drippy nose only.\" Pt denies cough, fever, or sore throat.    Brii Del Rosario, ALESSIAN, RN    "

## 2020-03-20 NOTE — TELEPHONE ENCOUNTER
Attempted to reach Miladis regarding provider message below regarding pt. There was no answer. LM to return call to RN at [phone].    See Consent to Communicate form giving authorization to discuss PHI with Miladis, scanned on 9/30/2015.    Brii Del Rosario, ALESSIAN, RN

## 2020-03-20 NOTE — TELEPHONE ENCOUNTER
She will need to contact us if she has symptoms and she will be considered for treatment at that time.    Needs to isolate from those in her home.

## 2020-03-20 NOTE — TELEPHONE ENCOUNTER
Miladis returned call to RN and was notified of information in provider note below as written. Miladis indicates understanding of issues and agrees with the plan. She states the 2 individuals with influenza have already been isolated to the lower level of the home, and China is staying in her room. Miladis states she has stressed with China that she should notify her if she develops any new sx.    ALESSIA JamesN, RN

## 2020-03-31 DIAGNOSIS — H40.053 BORDERLINE GLAUCOMA WITH OCULAR HYPERTENSION, BILATERAL: ICD-10-CM

## 2020-03-31 RX ORDER — LATANOPROST 50 UG/ML
1 SOLUTION/ DROPS OPHTHALMIC AT BEDTIME
Qty: 9 ML | Refills: 4 | Status: SHIPPED | OUTPATIENT
Start: 2020-03-31 | End: 2020-04-06

## 2020-04-06 ENCOUNTER — VIRTUAL VISIT (OUTPATIENT)
Dept: NEPHROLOGY | Facility: CLINIC | Age: 85
End: 2020-04-06
Payer: MEDICARE

## 2020-04-06 VITALS
DIASTOLIC BLOOD PRESSURE: 76 MMHG | WEIGHT: 104 LBS | BODY MASS INDEX: 20.69 KG/M2 | HEART RATE: 68 BPM | SYSTOLIC BLOOD PRESSURE: 140 MMHG

## 2020-04-06 DIAGNOSIS — N25.81 SECONDARY RENAL HYPERPARATHYROIDISM (H): ICD-10-CM

## 2020-04-06 DIAGNOSIS — E83.52 HYPERCALCEMIA: ICD-10-CM

## 2020-04-06 DIAGNOSIS — M81.0 OSTEOPOROSIS, UNSPECIFIED OSTEOPOROSIS TYPE, UNSPECIFIED PATHOLOGICAL FRACTURE PRESENCE: ICD-10-CM

## 2020-04-06 DIAGNOSIS — E78.5 HYPERLIPIDEMIA LDL GOAL <130: Primary | ICD-10-CM

## 2020-04-06 DIAGNOSIS — H40.053 BORDERLINE GLAUCOMA WITH OCULAR HYPERTENSION, BILATERAL: ICD-10-CM

## 2020-04-06 DIAGNOSIS — N18.30 CKD (CHRONIC KIDNEY DISEASE) STAGE 3, GFR 30-59 ML/MIN (H): ICD-10-CM

## 2020-04-06 DIAGNOSIS — I10 ESSENTIAL HYPERTENSION WITH GOAL BLOOD PRESSURE LESS THAN 140/90: ICD-10-CM

## 2020-04-06 RX ORDER — LOSARTAN POTASSIUM 100 MG/1
100 TABLET ORAL DAILY
Qty: 90 TABLET | Refills: 3 | Status: SHIPPED | OUTPATIENT
Start: 2020-04-06 | End: 2020-09-02

## 2020-04-06 RX ORDER — DILTIAZEM HYDROCHLORIDE 240 MG/1
CAPSULE, EXTENDED RELEASE ORAL
Qty: 90 CAPSULE | Refills: 3 | Status: SHIPPED | OUTPATIENT
Start: 2020-04-06 | End: 2020-09-02

## 2020-04-06 RX ORDER — LATANOPROST 50 UG/ML
1 SOLUTION/ DROPS OPHTHALMIC AT BEDTIME
Qty: 9 ML | Refills: 4 | Status: SHIPPED | OUTPATIENT
Start: 2020-04-06 | End: 2021-04-13

## 2020-04-06 RX ORDER — ATORVASTATIN CALCIUM 10 MG/1
TABLET, FILM COATED ORAL
Qty: 90 TABLET | Refills: 3 | Status: SHIPPED | OUTPATIENT
Start: 2020-04-06 | End: 2020-09-02

## 2020-04-06 RX ORDER — DILTIAZEM HYDROCHLORIDE 240 MG/1
CAPSULE, EXTENDED RELEASE ORAL
Qty: 90 CAPSULE | Refills: 0 | Status: CANCELLED | OUTPATIENT
Start: 2020-04-06

## 2020-04-06 RX ORDER — FUROSEMIDE 40 MG
40 TABLET ORAL 2 TIMES DAILY
Qty: 180 TABLET | Refills: 3 | Status: SHIPPED | OUTPATIENT
Start: 2020-04-06 | End: 2020-09-02

## 2020-04-06 NOTE — PROGRESS NOTES
"Subjective     China Guzman is a 89 year old female who is being evaluated via a billable telephone visit.      The patient has been notified of following:     \"This telephone visit will be conducted via a call between you and your physician/provider. We have found that certain health care needs can be provided without the need for a physical exam.  This service lets us provide the care you need with a short phone conversation.  If a prescription is necessary we can send it directly to your pharmacy.  If lab work is needed we can place an order for that and you can then stop by our lab to have the test done at a later time.    If during the course of the call the physician/provider feels a telephone visit is not appropriate, you will not be charged for this service.\"     Patient has given verbal consent for Telephone visit?  Yes    China Guzman complains of   Chief Complaint   Patient presents with     RECHECK     Hypertension     has been doing checks at home     Kidney Problem     CKD        ALLERGIES  Patient has no known allergies.    Chronic Kidney Disease Follow-up    She is a delightful female with history of hypertension, hyperlipidemia, osteoporosis, who presented in July 2018 for evaluation of high calcium and worsened kidney function.  Her calcium was noted to be elevated on multiple occasions, up to 11 mg/dL but usually between 10.4-10.9- though overall asymptomatic.    Her PTH was within normal, which is mildly inappropriately high in setting of hypercalcemia. She had been on hydrochlorothiazide which at first we lowered, but with hyponatremia noted on recheck, I switched her to furosemide in September 2018    She is also on losartan and diltiazem for her blood pressure. She takes calcium and vitamin D  twice a day  On followup today, she feels well. Her blood pressure is typically in 130 range. It is occasionally higher but on recheck is in 130s range.  She denies dizziness.  She denies acute illness " or change in health. She is trying to walk outside regularly when the weather is nice.  She hydrates well with water.  She lives with her daughter who is a  and has a special needs daughter (her granddaughter). She has not needed to go out for groceries and has been pretty much on lockdown at home except for walks outside.  She denies urinary issues.        Reviewed and updated as needed this visit by Provider         Review of Systems   A 4 point ROS was reviewed with patient and negative except as noted above       Objective   Reported vitals:  BP (!) 140/76   Pulse 68   Wt 47.2 kg (104 lb)   LMP 12/16/1980   BMI 20.69 kg/m     General: no distress,engaged and appropriate speech  Psych: Alert and oriented times 3; coherent speech, normal   rate and volume, able to articulate logical thoughts     Last labs 5/2019 - within normal    Assessment/Plan:  89 year old female with history of hypertension, osteoporosis, anemia, who presents for followup of hypercalcemia and increased creatinine. Her Scr is typically 0.9mg/dL but was up to 1.3 in setting of hypercalcemia, calcium of 10.4-10.9 in 2018  1. Hypercalcemia- improved, after better hydration and stopping hydrochlorothiazide and starting furosemide  - given hypercalcemia and hyponatremia, I elected to stop her hydrochlorothiazide   - tolerating furosemide and calcium in normal range on last check  - labs when able, June or so  2. CKD stage 3- Her Scr was 0.9 up until the last year or so, with Scr up to 1.3 but now ranging in 1-1.1 range  Monitor every 6 months   3. Hypertension- now on furosemide (stopped hydrochlorothiazide), losartan and diltiazem  - refilled meds today  -  she will monitor at home  -goal 130-140/80 is fine  4. Anemia- mild anemia, hgb 10.9, iron sat 16% and ferritin 78 last year  - recheck   - immunofixation- negative     5. Bone- PTH 60 , in setting of hypercalcemia, but some CKD thus ?appropriate.    - continue vitamin D and calcium  600mg for now  - recheck when able     No follow-ups on file.      Phone call duration:  9 minutes    Romi Corrigan MD

## 2020-04-06 NOTE — TELEPHONE ENCOUNTER
Received another faxed request from [facility] to refill Latanoprost.    30-Nov-2018 23:05

## 2020-04-07 DIAGNOSIS — N18.30 CKD (CHRONIC KIDNEY DISEASE) STAGE 3, GFR 30-59 ML/MIN (H): Primary | ICD-10-CM

## 2020-06-18 ENCOUNTER — TELEPHONE (OUTPATIENT)
Dept: AUDIOLOGY | Facility: CLINIC | Age: 85
End: 2020-06-18

## 2020-06-18 DIAGNOSIS — H90.3 SENSORINEURAL HEARING LOSS, BILATERAL: Primary | ICD-10-CM

## 2020-06-18 PROCEDURE — V5264 EAR MOLD/INSERT: HCPCS | Performed by: AUDIOLOGIST

## 2020-06-18 PROCEDURE — 99207 ZZC NO CHARGE LOS: CPT | Performed by: AUDIOLOGIST

## 2020-06-18 PROCEDURE — V5014 HEARING AID REPAIR/MODIFYING: HCPCS | Performed by: AUDIOLOGIST

## 2020-06-18 NOTE — TELEPHONE ENCOUNTER
I informed China that her replacement hearing aid and earmold were in, programmed, and ready to be picked up at the 2nd floor . The hearing aid and earmold both have new serial numbers. GOFF: [id number] EM: [id number].     CHARGES:    Replacement hearing aid &  Earmold.     -Merlin Cedeño CCC-A  Licensed Audiologist #[id number]  6/18/2020

## 2020-07-04 NOTE — NURSING NOTE
"Chief Complaint   Patient presents with     Hypertension       Initial /70  Pulse 95  Temp 98.2  F (36.8  C) (Oral)  Wt 118 lb (53.5 kg)  LMP 12/16/1980  SpO2 96%  BMI 23.83 kg/m2 Estimated body mass index is 23.83 kg/(m^2) as calculated from the following:    Height as of 8/11/17: 4' 11\" (1.499 m).    Weight as of this encounter: 118 lb (53.5 kg).  Medication Reconciliation: complete     Marilu Corona, olga    "
present

## 2020-07-31 ENCOUNTER — DOCUMENTATION ONLY (OUTPATIENT)
Dept: FAMILY MEDICINE | Facility: CLINIC | Age: 85
End: 2020-07-31

## 2020-07-31 DIAGNOSIS — I10 ESSENTIAL HYPERTENSION WITH GOAL BLOOD PRESSURE LESS THAN 140/90: ICD-10-CM

## 2020-07-31 DIAGNOSIS — E78.5 HYPERLIPIDEMIA LDL GOAL <130: Primary | ICD-10-CM

## 2020-07-31 DIAGNOSIS — E78.1 HIGH TRIGLYCERIDES: ICD-10-CM

## 2020-07-31 DIAGNOSIS — N18.30 CKD (CHRONIC KIDNEY DISEASE) STAGE 3, GFR 30-59 ML/MIN (H): ICD-10-CM

## 2020-07-31 NOTE — PROGRESS NOTES
This patient has a lab only appointment on 8/4/2020 but does not have future orders. Please review, associate diagnosis and sign pending lab orders for the upcoming appointment.  She has an appointment with Dr. Trimble on 9/2/2020.    Thank you,    Bekah Vasquez MLT ([facility])  [facility]

## 2020-08-04 DIAGNOSIS — I10 ESSENTIAL HYPERTENSION WITH GOAL BLOOD PRESSURE LESS THAN 140/90: ICD-10-CM

## 2020-08-04 DIAGNOSIS — E78.5 HYPERLIPIDEMIA LDL GOAL <130: ICD-10-CM

## 2020-08-04 DIAGNOSIS — N18.30 CKD (CHRONIC KIDNEY DISEASE) STAGE 3, GFR 30-59 ML/MIN (H): ICD-10-CM

## 2020-08-04 DIAGNOSIS — E78.1 HIGH TRIGLYCERIDES: ICD-10-CM

## 2020-08-04 LAB
ALBUMIN SERPL-MCNC: 4.3 G/DL (ref 3.4–5)
ANION GAP SERPL CALCULATED.3IONS-SCNC: 5 MMOL/L (ref 3–14)
BUN SERPL-MCNC: 53 MG/DL (ref 7–30)
CALCIUM SERPL-MCNC: 10 MG/DL (ref 8.5–10.1)
CHLORIDE SERPL-SCNC: 103 MMOL/L (ref 94–109)
CO2 SERPL-SCNC: 30 MMOL/L (ref 20–32)
CREAT SERPL-MCNC: 1.24 MG/DL (ref 0.52–1.04)
GFR SERPL CREATININE-BSD FRML MDRD: 38 ML/MIN/{1.73_M2}
GLUCOSE SERPL-MCNC: 118 MG/DL (ref 70–99)
HGB BLD-MCNC: 11.9 G/DL (ref 11.7–15.7)
PHOSPHATE SERPL-MCNC: 4.2 MG/DL (ref 2.5–4.5)
POTASSIUM SERPL-SCNC: 4 MMOL/L (ref 3.4–5.3)
PTH-INTACT SERPL-MCNC: 56 PG/ML (ref 18–80)
SODIUM SERPL-SCNC: 138 MMOL/L (ref 133–144)

## 2020-08-04 PROCEDURE — 80061 LIPID PANEL: CPT | Performed by: FAMILY MEDICINE

## 2020-08-04 PROCEDURE — 85018 HEMOGLOBIN: CPT | Performed by: FAMILY MEDICINE

## 2020-08-04 PROCEDURE — 82043 UR ALBUMIN QUANTITATIVE: CPT | Performed by: FAMILY MEDICINE

## 2020-08-04 PROCEDURE — 36415 COLL VENOUS BLD VENIPUNCTURE: CPT | Performed by: FAMILY MEDICINE

## 2020-08-04 PROCEDURE — 83970 ASSAY OF PARATHORMONE: CPT | Performed by: FAMILY MEDICINE

## 2020-08-04 PROCEDURE — 80069 RENAL FUNCTION PANEL: CPT | Performed by: INTERNAL MEDICINE

## 2020-08-04 NOTE — LETTER
August 7, 2020      China Guzman  [address] [facility] [address]        Dear ,    We are writing to inform you of your test results. Creatinine is a little higher but close to range.  Continue hydrating well and let's see you back in 6 months or so.      Resulted Orders   Renal panel   Result Value Ref Range    Sodium 138 133 - 144 mmol/L    Potassium 4.0 3.4 - 5.3 mmol/L    Chloride 103 94 - 109 mmol/L    Carbon Dioxide 30 20 - 32 mmol/L    Anion Gap 5 3 - 14 mmol/L    Glucose 118 (H) 70 - 99 mg/dL      Comment:      Fasting specimen    Urea Nitrogen 53 (H) 7 - 30 mg/dL    Creatinine 1.24 (H) 0.52 - 1.04 mg/dL    GFR Estimate 38 (L) >60 mL/min/[1.73_m2]      Comment:      Non  GFR Calc  Starting 12/18/2018, serum creatinine based estimated GFR (eGFR) will be   calculated using the Chronic Kidney Disease Epidemiology Collaboration   (CKD-EPI) equation.      GFR Estimate If Black 44 (L) >60 mL/min/[1.73_m2]      Comment:       GFR Calc  Starting 12/18/2018, serum creatinine based estimated GFR (eGFR) will be   calculated using the Chronic Kidney Disease Epidemiology Collaboration   (CKD-EPI) equation.      Calcium 10.0 8.5 - 10.1 mg/dL    Phosphorus 4.2 2.5 - 4.5 mg/dL    Albumin 4.3 3.4 - 5.0 g/dL       It was a pleasure to see you at your recent visit. Please contact us at [phone] if you have any questions or concerns. Thank you, we look forward to seeing you at your next visit.       Sincerely,    Romi Corrigan MD.   of Medicine  Division of Kidney Disease and Hypertension  [address] [address]

## 2020-08-05 LAB
CHOLEST SERPL-MCNC: 169 MG/DL
CREAT UR-MCNC: 23 MG/DL
HDLC SERPL-MCNC: 65 MG/DL
LDLC SERPL CALC-MCNC: 66 MG/DL
MICROALBUMIN UR-MCNC: 18 MG/L
MICROALBUMIN/CREAT UR: 78.76 MG/G CR (ref 0–25)
NONHDLC SERPL-MCNC: 104 MG/DL
TRIGL SERPL-MCNC: 191 MG/DL

## 2020-08-05 NOTE — RESULT ENCOUNTER NOTE
I have reviewed the schedule of future appointments and this patient has an appointment scheduled for 9-2-2020.    Visit date not found

## 2020-09-02 ENCOUNTER — OFFICE VISIT (OUTPATIENT)
Dept: FAMILY MEDICINE | Facility: CLINIC | Age: 85
End: 2020-09-02
Payer: MEDICARE

## 2020-09-02 VITALS
WEIGHT: 107 LBS | TEMPERATURE: 97.1 F | HEIGHT: 59 IN | DIASTOLIC BLOOD PRESSURE: 89 MMHG | OXYGEN SATURATION: 98 % | SYSTOLIC BLOOD PRESSURE: 162 MMHG | HEART RATE: 82 BPM | BODY MASS INDEX: 21.57 KG/M2

## 2020-09-02 DIAGNOSIS — E78.5 HYPERLIPIDEMIA LDL GOAL <130: ICD-10-CM

## 2020-09-02 DIAGNOSIS — E78.1 HIGH TRIGLYCERIDES: ICD-10-CM

## 2020-09-02 DIAGNOSIS — E83.52 HYPERCALCEMIA: ICD-10-CM

## 2020-09-02 DIAGNOSIS — N18.30 CKD (CHRONIC KIDNEY DISEASE) STAGE 3, GFR 30-59 ML/MIN (H): ICD-10-CM

## 2020-09-02 DIAGNOSIS — Z00.00 ENCOUNTER FOR MEDICARE ANNUAL WELLNESS EXAM: Primary | ICD-10-CM

## 2020-09-02 DIAGNOSIS — I10 ESSENTIAL HYPERTENSION WITH GOAL BLOOD PRESSURE LESS THAN 140/90: ICD-10-CM

## 2020-09-02 DIAGNOSIS — Z23 NEED FOR PROPHYLACTIC VACCINATION AND INOCULATION AGAINST INFLUENZA: ICD-10-CM

## 2020-09-02 DIAGNOSIS — R73.02 IGT (IMPAIRED GLUCOSE TOLERANCE): ICD-10-CM

## 2020-09-02 PROCEDURE — G0008 ADMIN INFLUENZA VIRUS VAC: HCPCS | Performed by: FAMILY MEDICINE

## 2020-09-02 PROCEDURE — G0439 PPPS, SUBSEQ VISIT: HCPCS | Performed by: FAMILY MEDICINE

## 2020-09-02 PROCEDURE — 90662 IIV NO PRSV INCREASED AG IM: CPT | Performed by: FAMILY MEDICINE

## 2020-09-02 RX ORDER — LOSARTAN POTASSIUM 100 MG/1
100 TABLET ORAL DAILY
Qty: 90 TABLET | Refills: 3 | Status: SHIPPED | OUTPATIENT
Start: 2020-09-02 | End: 2021-09-03

## 2020-09-02 RX ORDER — FUROSEMIDE 40 MG
40 TABLET ORAL 2 TIMES DAILY
Qty: 180 TABLET | Refills: 3 | Status: SHIPPED | OUTPATIENT
Start: 2020-09-02 | End: 2021-09-03

## 2020-09-02 RX ORDER — ATORVASTATIN CALCIUM 10 MG/1
TABLET, FILM COATED ORAL
Qty: 90 TABLET | Refills: 3 | Status: SHIPPED | OUTPATIENT
Start: 2020-09-02 | End: 2021-09-03

## 2020-09-02 RX ORDER — DILTIAZEM HYDROCHLORIDE 240 MG/1
CAPSULE, EXTENDED RELEASE ORAL
Qty: 90 CAPSULE | Refills: 3 | Status: SHIPPED | OUTPATIENT
Start: 2020-09-02 | End: 2021-04-12

## 2020-09-02 ASSESSMENT — MIFFLIN-ST. JEOR: SCORE: 810.98

## 2020-09-02 ASSESSMENT — PAIN SCALES - GENERAL: PAINLEVEL: NO PAIN (0)

## 2020-09-02 NOTE — PROGRESS NOTES
"  SUBJECTIVE:   China Guzman is a 89 year old female who presents for Preventive Visit.      Are you in the first 12 months of your Medicare Part B coverage?  No    Physical Health:    In general, how would you rate your overall physical health? good    Outside of work, how many days during the week do you exercise? 4-5 days/week    Outside of work, approximately how many minutes a day do you exercise?30-45 minutes    If you drink alcohol do you typically have >3 drinks per day or >7 drinks per week? No    Do you usually eat at least 4 servings of fruit and vegetables a day, include whole grains & fiber and avoid regularly eating high fat or \"junk\" foods? Yes    Do you have any problems taking medications regularly?  No    Do you have any side effects from medications? none    Needs assistance for the following daily activities: daughter helps    Which of the following safety concerns are present in your home?  none identified     Hearing impairment: Yes, Hearing aids    In the past 6 months, have you been bothered by leaking of urine? no    Mental Health:    In general, how would you rate your overall mental or emotional health? excellent  PHQ-2 Score:      Do you feel safe in your environment? Yes    Have you ever done Advance Care Planning? (For example, a Health Directive, POLST, or a discussion with a medical provider or your loved ones about your wishes): Yes, advance care planning is on file.    Additional concerns to address?  No    Fall risk:  Fallen 2 or more times in the past year?: No  Any fall with injury in the past year?: No    Cognitive Screenin) Repeat 3 items (Leader, Season, Table)    2) Clock draw: NORMAL  3) 3 item recall: Recalls 3 objects  Results: 3 items recalled: COGNITIVE IMPAIRMENT LESS LIKELY    Mini-CogTM Copyright WILLIAM Godfrey. Licensed by the author for use in [facility]; reprinted with permission (ankur@.[email or web address]). All rights reserved.      Do you have sleep apnea, " "excessive snoring or daytime drowsiness?: no            Reviewed and updated as needed this visit by clinical staff  Tobacco  Allergies  Meds  Med Hx  Surg Hx  Fam Hx  Soc Hx        Reviewed and updated as needed this visit by Provider        Social History     Tobacco Use     Smoking status: Never Smoker     Smokeless tobacco: Never Used   Substance Use Topics     Alcohol use: No     Alcohol/week: 0.0 standard drinks                           Current providers sharing in care for this patient include:   Patient Care Team:  Colby Trimble MD as PCP - General (Family Practice)  Colby Trimble MD as Assigned PCP    The following health maintenance items are reviewed in Epic and correct as of today:  Health Maintenance   Topic Date Due     PHQ-2  01/01/2020     MEDICARE ANNUAL WELLNESS VISIT  06/03/2020     FALL RISK ASSESSMENT  06/03/2020     INFLUENZA VACCINE (1) 09/01/2020     EYE EXAM  03/05/2021     DEXA  07/17/2021     BMP  08/04/2021     LIPID  08/04/2021     MICROALBUMIN  08/04/2021     COLORECTAL CANCER SCREENING  08/17/2021     ADVANCE CARE PLANNING  12/14/2022     DTAP/TDAP/TD IMMUNIZATION (3 - Td) 03/01/2023     PNEUMOCOCCAL IMMUNIZATION 65+ LOW/MEDIUM RISK  Completed     ZOSTER IMMUNIZATION  Completed     IPV IMMUNIZATION  Aged Out     MENINGITIS IMMUNIZATION  Aged Out     HEPATITIS B IMMUNIZATION  Aged Out           ROS:      OBJECTIVE:   BP (!) 162/89   Pulse 82   Temp 97.1  F (36.2  C) (Tympanic)   Ht 1.499 m (4' 11\")   Wt 48.5 kg (107 lb)   LMP 12/16/1980   SpO2 98%   BMI 21.61 kg/m   Estimated body mass index is 21.61 kg/m  as calculated from the following:    Height as of this encounter: 1.499 m (4' 11\").    Weight as of this encounter: 48.5 kg (107 lb).  EXAM:       Diagnostic Test Results:  Labs reviewed in Epic    ASSESSMENT / PLAN:       ICD-10-CM    1. Encounter for Medicare annual wellness exam  Z00.00    2. Need for prophylactic vaccination and inoculation against " "influenza  Z23 FLUZONE HIGH DOSE 65+  [23423]     Vaccine Administration, Initial [64379]   3. Hyperlipidemia LDL goal <130  E78.5 atorvastatin (LIPITOR) 10 MG tablet   4. Essential hypertension with goal blood pressure less than 140/90  I10 diltiazem ER (DILT-XR) 240 MG 24 hr ER beaded capsule     furosemide (LASIX) 40 MG tablet     losartan (COZAAR) 100 MG tablet   5. CKD (chronic kidney disease) stage 3, GFR 30-59 ml/min (H)  N18.3 furosemide (LASIX) 40 MG tablet   6. Hypercalcemia  E83.52 furosemide (LASIX) 40 MG tablet   7. IGT (impaired glucose tolerance)  R73.02    8. High triglycerides  E78.1        COUNSELING:  Reviewed preventive health counseling, as reflected in patient instructions       Regular exercise       Healthy diet/nutrition       Vision screening       Dental care       Osteoporosis Prevention/Bone Health    Estimated body mass index is 21.61 kg/m  as calculated from the following:    Height as of this encounter: 1.499 m (4' 11\").    Weight as of this encounter: 48.5 kg (107 lb).        She reports that she has never smoked. She has never used smokeless tobacco.    Appropriate preventive services were discussed with this patient, including applicable screening as appropriate for cardiovascular disease, diabetes, osteopenia/osteoporosis, and glaucoma.  As appropriate for age/gender, discussed screening for colorectal cancer, prostate cancer, breast cancer, and cervical cancer. Checklist reviewing preventive services available has been given to the patient.    Reviewed patients plan of care and provided an AVS. The Basic Care Plan (routine screening as documented in Health Maintenance) for [place] meets the Care Plan requirement. This Care Plan has been established and reviewed with the Patient.    Counseling Resources:  ATP IV Guidelines  Pooled Cohorts Equation Calculator  Breast Cancer Risk Calculator  BRCA-Related Cancer Risk Assessment: FHS-7 Tool  FRAX Risk Assessment  ICSI Preventive " Guidelines  Dietary Guidelines for Americans, 2010  [employer] MyPlate  ASA Prophylaxis  Lung CA Screening    Colby Trimble MD  [facility]  --------------------------------------------------------------------------------------------------------------------------------------  SUBJECTIVE:  [place] Guzman is a 89 year old female who presents to the clinic today for a routine physical exam.    The patient's last physical was 6-3-19    Cholesterol   Date Value Ref Range Status   08/04/2020 169 <200 mg/dL Final   05/24/2019 176 <200 mg/dL Final     HDL Cholesterol   Date Value Ref Range Status   08/04/2020 65 >49 mg/dL Final   05/24/2019 71 >49 mg/dL Final     LDL Cholesterol Calculated   Date Value Ref Range Status   08/04/2020 66 <100 mg/dL Final     Comment:     Desirable:       <100 mg/dl   05/24/2019 68 <100 mg/dL Final     Comment:     Desirable:       <100 mg/dl     Triglycerides   Date Value Ref Range Status   08/04/2020 191 (H) <150 mg/dL Final     Comment:     Borderline high:  150-199 mg/dl  High:             200-499 mg/dl  Very high:       >499 mg/dl  Fasting specimen     05/24/2019 185 (H) <150 mg/dL Final     Comment:     Borderline high:  150-199 mg/dl  High:             200-499 mg/dl  Very high:       >499 mg/dl  Fasting specimen       Cholesterol/HDL Ratio   Date Value Ref Range Status   03/16/2015 2.6 0.0 - 5.0 Final   03/03/2014 3.4 0.0 - 5.0 Final     The patient's last fasting lipid panel was done 1 months ago and the results are listed above.    The ASCVD Risk score (Miguel PAYNE Jr., et al., 2013) failed to calculate for the following reasons:    The 2013 ASCVD risk score is only valid for ages 40 to 79            The patient reports that she has been treated for high blood pressure.    The patient reports that she does not take a daily aspirin.        No results found for: HCVAB   The patient reports that she has not been screened for Hepatitis C    (Screen all baby boomers once per CDC--  the generation born from 1945 through 1965 and per USPTF screen age 19 to 79 especially younger people who have used IV drugs)  She would not like to have an Hepatitis C test today          Immunization History   Administered Date(s) Administered     Influenza (High Dose) 3 valent vaccine 08/29/2016, 09/26/2017, 09/24/2019     Influenza (IIV3) PF 10/01/2009, 10/12/2010, 10/20/2011, 10/20/2015     Pneumo Conj 13-V (2010&after) 03/18/2015     Pneumococcal 23 valent 12/28/1999, 05/10/2007, 04/06/2012     TD (ADULT, 7+) 05/10/2007     TDAP Vaccine (Adacel) 03/01/2013     Td (Adult), Adsorbed 12/28/1999     Zoster vaccine recombinant adjuvanted (SHINGRIX) 06/04/2019, 09/24/2019     Zoster vaccine, live 12/10/2009     The patient has not started the Gardasil vaccination series.  The patient's believes that her last tetanus shot was given 7 year(s) ago.   The patient believes that she has had a Shingrix in the past  The patient believes that she has had a PPSV23 in the past.  The patient believes that she has had a PCV13 in the past.  The patient believes that she has not had a seasonal flu vaccination this fall or winter.  The patient would like to have a Influenza      No results found for this or any previous visit.]   The patient reports a family history of colon cancer in her mother at age 84.  The patient reports that she has had a colonoscopy. Her  last colonoscopy was in 2016 and she  report that is was normal. The patient was not told to have this repeated.    The patient denies a family history diabetes.    The patient reports that she does performs a self breast exam monthly.  The patient reports a family history of breast cancer in her daughter at age 47.  The patient does not want to have a mammogram done.  The patient does not want to have a Pap smear done.  She reports that she has not had an abnormal pap smear.  .    The patient is not interested in hormone replacement therapy.  The patient reports that she  eats or drinks 2 servings of dairy products per day. She does take a 600 calcium supplement 2 times daily..  The patient reports that she has had a bone density scan. Her  last scan was in 2019 and she  report that is was abnormal. The patient was told to have this repeated in 2 years.    (screen women 65+ or 50+ with risk factors)     The patient reports that she does not have  dental appointments as she has full dentures  The patient reports that she  has an eye examination approximately every 0.5 year(s).        Do you currently smoke? No  How many years have you smoked? 0  How many packs per day did you smoke on average? N/A  (if more than 30 pack year history and the patient is age 55-80 consider ordering an annual low dose radiation lung CT to screen for cancer)  (Do not order if patient has quit more than 15 years ago or has a health condition that limits life expectancy or could not tolerate curative lung surgery)  Are you interested having a lung CT to screen for lung cancer? 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     Hearing loss      HTN (hypertension)      Hyperlipidemia      MACULAR DEGENERATION (SENILE) OF RETINA, DRY OU 2/11/2011     Macular degeneration, dry, mod, ou 8/31/2016     Osteoporosis        Past Surgical History:   Procedure Laterality Date     BLEPHAROPLASTY BILATERAL  1/2004    both eyes, upper lids     CATARACT IOL, RT/LT       COLONOSCOPY  12/2010    Q 5 years     FRACTURE TX, ANKLE RT/LT  1997    RIGHT ANKLE ORIF     LASER YAG CAPSULOTOMY  12/2005; 1/2006    right eye; left eye     PHACOEMULSIFICATION WITH STANDARD INTRAOCULAR LENS IMPLANT  8/2003; 10/2003    left eye; right eye     REPAIR PTOSIS         Family History   Problem Relation Age of Onset     Cancer - colorectal Mother      C.A.D. Brother      Cerebrovascular Disease Brother      Heart Disease Brother      Asthma  Daughter      Breast Cancer Daughter      Thyroid Disease Daughter      Glaucoma No family hx of      Macular Degeneration No family hx of        Social History     Socioeconomic History     Marital status:      Spouse name: Not on file     Number of children: 6     Years of education: Not on file     Highest education level: Not on file   Occupational History     Employer: RETIRED   Social Needs     Financial resource strain: Not on file     Food insecurity     Worry: Not on file     Inability: Not on file     Transportation needs     Medical: Not on file     Non-medical: Not on file   Tobacco Use     Smoking status: Never Smoker     Smokeless tobacco: Never Used   Substance and Sexual Activity     Alcohol use: No     Alcohol/week: 0.0 standard drinks     Drug use: No     Sexual activity: Never   Lifestyle     Physical activity     Days per week: Not on file     Minutes per session: Not on file     Stress: Not on file   Relationships     Social connections     Talks on phone: Not on file     Gets together: Not on file     Attends Religion service: Not on file     Active member of club or organization: Not on file     Attends meetings of clubs or organizations: Not on file     Relationship status: Not on file     Intimate partner violence     Fear of current or ex partner: Not on file     Emotionally abused: Not on file     Physically abused: Not on file     Forced sexual activity: Not on file   Other Topics Concern     Parent/sibling w/ CABG, MI or angioplasty before 65F 55M? Not Asked   Social History Narrative     Not on file       Current Outpatient Medications   Medication Sig Dispense Refill     atorvastatin (LIPITOR) 10 MG tablet TAKE ONE TABLET BY MOUTH AT BEDTIME 90 tablet 3     calcium carbonate 600 mg-vitamin D 400 units (CALCIUM 600 + D) 600-400 MG-UNIT per tablet Take 1 tablet by mouth 2 times daily 180 tablet 3     diltiazem ER (DILT-XR) 240 MG 24 hr ER beaded capsule TAKE ONE CAPSULE BY MOUTH  "ONCE DAILY. Needs Physical for future refills. 90 capsule 3     furosemide (LASIX) 40 MG tablet Take 1 tablet (40 mg) by mouth 2 times daily 180 tablet 3     latanoprost (XALATAN) 0.005 % ophthalmic solution Place 1 drop into both eyes At Bedtime 9 mL 4     losartan (COZAAR) 100 MG tablet Take 1 tablet (100 mg) by mouth daily 90 tablet 3     MULTI-VITAMIN OR None Entered       timolol maleate (TIMOPTIC) 0.5 % ophthalmic solution Place 1 drop into both eyes daily 3 Bottle 4       PHYSICAL EXAMINATION:  Blood pressure (!) 162/89, pulse 82, temperature 97.1  F (36.2  C), temperature source Tympanic, height 1.499 m (4' 11\"), weight 48.5 kg (107 lb), last menstrual period 12/16/1980, SpO2 98 %, not currently breastfeeding.  General appearance - healthy, alert and no distress  Skin - Skin color, texture, turgor normal. No rashes or lesions.  Head - Normocephalic. No masses, lesions, tenderness or abnormalities  Eyes - conjunctivae/corneas clear. PERRL, EOM's intact. Fundi benign  Ears - External ears normal. Canals clear. TM's normal.  Nose/Sinuses - Nares normal. Septum midline. Mucosa normal. No drainage or sinus tenderness.  Oropharynx - Lips, mucosa, and tongue normal. Teeth and gums normal.  Neck - Neck supple. No adenopathy. Thyroid symmetric, normal size,  Lungs - Percussion normal. Good diaphragmatic excursion. Lungs clear  Heart - PMI normal. No lifts, heaves, or thrills. RRR. No murmurs, clicks gallops or rub  Breasts - Breasts normal to inspection and palpation. Axillae negative  Abdomen - Abdomen soft, non-tender. BS normal. No masses, organomegaly  Extremities - Extremities normal. No deformities, edema, or skin discoloration.  Musculoskeletal - Spine ROM normal. Muscular strength intact.  Peripheral pulses - radial=4/4, femoral=4/4, popliteal=4/4, dorsalis pedis=4/4,  Neuro - Gait normal. Reflexes normal and symmetric. Sensation grossly WNL.        Orders Only on 08/04/2020   Component Date Value Ref Range " Status     Parathyroid Hormone Intact 08/04/2020 56  18 - 80 pg/mL Final     Creatinine Urine 08/04/2020 23  mg/dL Final     Albumin Urine mg/L 08/04/2020 18  mg/L Final     Albumin Urine mg/g Cr 08/04/2020 78.76* 0 - 25 mg/g Cr Final     Hemoglobin 08/04/2020 11.9  11.7 - 15.7 g/dL Final     Cholesterol 08/04/2020 169  <200 mg/dL Final     Triglycerides 08/04/2020 191* <150 mg/dL Final    Comment: Borderline high:  150-199 mg/dl  High:             200-499 mg/dl  Very high:       >499 mg/dl  Fasting specimen       HDL Cholesterol 08/04/2020 65  >49 mg/dL Final     LDL Cholesterol Calculated 08/04/2020 66  <100 mg/dL Final    Desirable:       <100 mg/dl     Non HDL Cholesterol 08/04/2020 104  <130 mg/dL Final     Sodium 08/04/2020 138  133 - 144 mmol/L Final     Potassium 08/04/2020 4.0  3.4 - 5.3 mmol/L Final     Chloride 08/04/2020 103  94 - 109 mmol/L Final     Carbon Dioxide 08/04/2020 30  20 - 32 mmol/L Final     Anion Gap 08/04/2020 5  3 - 14 mmol/L Final     Glucose 08/04/2020 118* 70 - 99 mg/dL Final    Fasting specimen     Urea Nitrogen 08/04/2020 53* 7 - 30 mg/dL Final     Creatinine 08/04/2020 1.24* 0.52 - 1.04 mg/dL Final     GFR Estimate 08/04/2020 38* >60 mL/min/[1.73_m2] Final    Comment: Non  GFR Calc  Starting 12/18/2018, serum creatinine based estimated GFR (eGFR) will be   calculated using the Chronic Kidney Disease Epidemiology Collaboration   (CKD-EPI) equation.       GFR Estimate If Black 08/04/2020 44* >60 mL/min/[1.73_m2] Final    Comment:  GFR Calc  Starting 12/18/2018, serum creatinine based estimated GFR (eGFR) will be   calculated using the Chronic Kidney Disease Epidemiology Collaboration   (CKD-EPI) equation.       Calcium 08/04/2020 10.0  8.5 - 10.1 mg/dL Final     Phosphorus 08/04/2020 4.2  2.5 - 4.5 mg/dL Final     Albumin 08/04/2020 4.3  3.4 - 5.0 g/dL Final       ASSESSMENT:    ICD-10-CM    1. Encounter for Medicare annual wellness exam  Z00.00    2.  Need for prophylactic vaccination and inoculation against influenza  Z23 FLUZONE HIGH DOSE 65+  [59793]     Vaccine Administration, Initial [[id number]]   3. Hyperlipidemia LDL goal <130  E78.5 atorvastatin (LIPITOR) 10 MG tablet   4. Essential hypertension with goal blood pressure less than 140/90  I10 diltiazem ER (DILT-XR) 240 MG 24 hr ER beaded capsule     furosemide (LASIX) 40 MG tablet     losartan (COZAAR) 100 MG tablet   5. CKD (chronic kidney disease) stage 3, GFR 30-59 ml/min (H)  N18.3 furosemide (LASIX) 40 MG tablet   6. Hypercalcemia  E83.52 furosemide (LASIX) 40 MG tablet   7. IGT (impaired glucose tolerance)  R73.02    8. High triglycerides  E78.1        Well-Adult Physical Exam.  Health Maintenance Due   Topic Date Due     PHQ-2  01/01/2020     MEDICARE ANNUAL WELLNESS VISIT  06/03/2020     FALL RISK ASSESSMENT  06/03/2020     INFLUENZA VACCINE (1) 09/01/2020     Health Maintenance   Topic Date Due     PHQ-2  01/01/2020     MEDICARE ANNUAL WELLNESS VISIT  06/03/2020     FALL RISK ASSESSMENT  06/03/2020     INFLUENZA VACCINE (1) 09/01/2020     EYE EXAM  03/05/2021     DEXA  07/17/2021     BMP  08/04/2021     LIPID  08/04/2021     MICROALBUMIN  08/04/2021     COLORECTAL CANCER SCREENING  08/17/2021     ADVANCE CARE PLANNING  12/14/2022     DTAP/TDAP/TD IMMUNIZATION (3 - Td) 03/01/2023     PNEUMOCOCCAL IMMUNIZATION 65+ LOW/MEDIUM RISK  Completed     ZOSTER IMMUNIZATION  Completed     IPV IMMUNIZATION  Aged Out     MENINGITIS IMMUNIZATION  Aged Out     HEPATITIS B IMMUNIZATION  Aged Out         HEALTH CARE MAINTENENCE: The recommended screening tests and vaccinatons for this patient have been discussed as above.  The appropriate tests and vaccinations  have been ordered or declined by the patient. Please see the orders in EPIC.The patient specifically declines: mammogram    Immunization Status:  up to date and documented except for influenza     Patient Active Problem List   Diagnosis     Osteoporosis, now in  osteopenia range repeat bone density scan q 2 yrs     HL (hearing loss)     Gallstones     Malignant basal cell neoplasm of skin     Hyperlipidemia LDL goal <130     Pseudophakia, Yag Caps, ou     Advanced directives, counseling/discussion     Borderline glaucoma with ocular hypertension, bilateral - treated     Family history of colon cancer     Posterior vitreous detachment, bilateral     Essential hypertension with goal blood pressure less than 140/90     10 year risk of MI or stroke 7.5% or greater, 39.7% in September 2017 on atorvastatin 10 mg     CKD (chronic kidney disease) stage 3, GFR 30-59 ml/min (H)     IGT (impaired glucose tolerance)     High triglycerides     Advanced atrophic nonexudative age-related macular degeneration of both eyes with subfoveal involvement        ATP III Guidelines  ICSI Preventive Guidelines    PLAN:  Follow up with Dr Corrigan in 6 month(s)   Flu shot recommended  Breast self exam demonstrated and recommended  Mammogram discussed and the patient declines this year  Pap smear was not recommended per the ACOG guidelines  Discussed calcium intake, vitamins and supplements. Recommended 1800 mg of calcium daily  Bone density scan (DEXA) recommended next year   Sunscreen use was recommended especially in the area of tatoos  Refills on chronic medication given  Recommended dental exams every 6 months  Recommended eye exam every 1-2 years  Follow up in 1 year for the next preventative medical visit      Osteoporosis TX indications:  Post menopausal women with a hip or vertebral fracture  Any T score less than or equal to -2.5  Any T score between -1.0 and -2.5 and a 10 year hip fracture probability > or = to 3%  Any T score between -1.0 and -2.5 and a 10 year probability or a major osteoporosis-related fracture  > or = 20% based on FRAX score  [email or web address]            The patients chronic medication was filled for 12 months.

## 2020-09-02 NOTE — PATIENT INSTRUCTIONS
"  Patient Education   Personalized Prevention Plan  You are due for the preventive services outlined below.  Your care team is available to assist you in scheduling these services.  If you have already completed any of these items, please share that information with your care team to update in your medical record.  Health Maintenance Due   Topic Date Due     PHQ-2  01/01/2020     Annual Wellness Visit  06/03/2020     FALL RISK ASSESSMENT  06/03/2020     Flu Vaccine (1) 09/01/2020           Patient Education     Triglycerides  Does this test have other names?  Lipid panel, fasting lipoprotein panel  What is this test?  This test measures the amount of triglycerides in your blood.  Triglycerides are one of several types of fats in your blood. Other kinds are LDL (\"bad\") cholesterol and HDL (\"good\") cholesterol.  Knowing your triglyceride level is important, especially if you have diabetes, are overweight or a smoker, or are mostly inactive. High triglyceride levels may put you at greater risk for a heart attack or stroke.    This test is part of a group of cholesterol and blood fat tests called a fasting lipoprotein panel, or lipid panel. This panel is recommended for all adults at least once every 5 years, or as recommended by your healthcare provider.  Knowing your triglyceride level helps your healthcare provider suggest healthy changes to your diet or lifestyle. If you have triglycerides that are high to very high, your provider is more likely to prescribe medicines to lower your triglycerides or your LDL cholesterol.  Why do I need this test?  You may need this test as part of a routine checkup. You may also need this test if you're overweight, drink too much alcohol, rarely exercise, or have other conditions like high blood pressure or diabetes.  If you are on cholesterol-lowering medicines, you may have this test to see how well your treatment is working.  What other tests might I have along with this " test?  Your healthcare provider will order screening tests for LDL, HDL, and total cholesterol.  What do my test results mean?  Test results may vary depending on your age, gender, health history, the method used for the test, and other things. Your test results may not mean you have a problem. Ask your healthcare provider what your test results mean for you.   Results are given in milligrams per deciliter (mg/dL). Normal levels of triglycerides are less than 150 mg/dL.  Here are how higher numbers are classified:    150 to 199 mg/dL: Borderline high    200 to 499 mg/dL: High    500 mg/dL and above: Very high  If you have a high triglyceride level, you have a greater risk for heart attack and stroke.  A triglyceride level above 150 mg/dL also means that you may have an increased risk for metabolic syndrome. This is a cluster of symptoms including high blood pressure, high blood sugar, and high body fat around the waist. These symptoms have been linked to increased risk for diabetes, heart disease, and stroke.  High triglycerides levels can also be caused by certain diseases or inherited conditions.  If your triglyceride level is above 200 mg/dL, your healthcare provider may recommend that you:    Lose weight    Limit high-fat foods containing saturated fats. These are animal fats found in meat, butter, and whole milk.    Limit trans fats, which are found in many processed foods like chips and store-bought cookies    Cut back on drinks with added sugars, such as soda    Limit your alcohol intake    Stop smoking    Control your blood pressure    Exercise for at least 30 minutes a day, 5 days a week    Limit the calories from fat in your diet to 25% to 35% of your total intake  If your triglycerides are extremely high--above 500 mg/dL--you may have an added risk for problems with your pancreas. You will likely need medicine to lower your levels along with recommended changes in diet and lifestyle.  How is this test  done?  The test is done with a blood sample. A needle is used to draw blood from a vein in your arm or hand.   Does this test pose any risks?  Having a blood test with a needle carries some risks. These include bleeding, infection, bruising, and feeling lightheaded. When the needle pricks your arm or hand, you may feel a slight sting or pain. Afterward, the site may be sore.   What might affect my test results?  Not fasting for the required length of time before the test can affect your results. Certain medicines can affect your results, as can drinking alcohol.  How do I prepare for the test?  If you have this test as part of a cholesterol screening, you will need to not eat or drink anything but water for 9 to 14 hours before the test. Be sure your healthcare provider knows about all medicines, herbs, vitamins, and supplements you are taking. This includes medicines that don't need a prescription and any illicit drugs you may use.    1461-7715 [employer]. [address] [place] [address], [place] [address]. All rights reserved. This information is not intended as a substitute for professional medical care. Always follow your healthcare professional's instructions.

## 2020-09-03 ENCOUNTER — OFFICE VISIT (OUTPATIENT)
Dept: OPHTHALMOLOGY | Facility: CLINIC | Age: 85
End: 2020-09-03
Payer: MEDICARE

## 2020-09-03 DIAGNOSIS — H35.3134 ADVANCED ATROPHIC NONEXUDATIVE AGE-RELATED MACULAR DEGENERATION OF BOTH EYES WITH SUBFOVEAL INVOLVEMENT: ICD-10-CM

## 2020-09-03 DIAGNOSIS — H40.053 BORDERLINE GLAUCOMA WITH OCULAR HYPERTENSION, BILATERAL: Primary | ICD-10-CM

## 2020-09-03 PROCEDURE — 92012 INTRM OPH EXAM EST PATIENT: CPT | Performed by: OPHTHALMOLOGY

## 2020-09-03 PROCEDURE — 92083 EXTENDED VISUAL FIELD XM: CPT | Performed by: OPHTHALMOLOGY

## 2020-09-03 PROCEDURE — 92133 CPTRZD OPH DX IMG PST SGM ON: CPT | Performed by: OPHTHALMOLOGY

## 2020-09-03 ASSESSMENT — VISUAL ACUITY
CORRECTION_TYPE: GLASSES
METHOD: SNELLEN - LINEAR
OD_CC: 20/400
OS_CC: CF

## 2020-09-03 ASSESSMENT — TONOMETRY
IOP_METHOD: APPLANATION
OS_IOP_MMHG: 16
OD_IOP_MMHG: 16

## 2020-09-03 NOTE — LETTER
"    9/3/2020         RE: China Guzman  [address] [facility] [id number]        Dear Colleague,    Thank you for referring your patient, China Guzman, to the [facility]. Please see a copy of my visit note below.     Current Eye Medications:    Latanoprost at bedtime both eyes,Timolol every morning both eyes, last drops at 7am     Subjective:  6 mo iop with  Oct and hvf.  Pt reports that her reading vision is not as good.     Objective:  See Ophthalmology Exam.       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  Escoto Visual Field shows larger central scotoma both eyes likely due to worsening of macular atrophy.      Plan:  Continue using Timolol (yellow top) both eyes daily in the morning,  And Latanoprost (green top) both eyes at bedtime.   Use artificial tears up to 4 times daily both eyes.  (Refresh Tears, Systane Ultra/Balance, or Theratears)  Take a multiple vitamin or \"eye vitamin\" daily (AREDS2).  Protect your eyes outdoors from ultraviolet rays with sunglasses and/or brimmed hat.  Have spinach (cooked or raw), colorful fruits, walnuts, hazelnuts, almonds in your diet.  Monitor the vision in each eye weekly - call if any sudden persistent changes.  Will get retina opinion to make sure no treatable disease.  Return Visit in 6 months for Complete Exam.     Srikanth Craig M.D.  [phone]           Again, thank you for allowing me to participate in the care of your patient.        Sincerely,        Srikanth Craig MD    "

## 2020-09-03 NOTE — PROGRESS NOTES
" Current Eye Medications:    Latanoprost at bedtime both eyes,Timolol every morning both eyes, last drops at 7am  Vitamins twice daily.     Subjective:  6 mo iop with  Oct and hvf.  Pt reports that her reading vision is not as good.    Large print is now difficult.     Objective:  See Ophthalmology Exam.       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  Escoto Visual Field shows larger central scotoma both eyes likely due to worsening of macular atrophy.      Plan:  Continue using Timolol (yellow top) both eyes daily in the morning,  And Latanoprost (green top) both eyes at bedtime.   Use artificial tears up to 4 times daily both eyes.  (Refresh Tears, Systane Ultra/Balance, or Theratears)  Take a multiple vitamin or \"eye vitamin\" daily (AREDS2).  Protect your eyes outdoors from ultraviolet rays with sunglasses and/or brimmed hat.  Have spinach (cooked or raw), colorful fruits, walnuts, hazelnuts, almonds in your diet.  Monitor the vision in each eye weekly - call if any sudden persistent changes.  Will get retina opinion to make sure no treatable disease.  Return Visit in 6 months for Complete Exam.     Srikanth Craig M.D.  [phone]         "

## 2020-09-03 NOTE — PATIENT INSTRUCTIONS
"Continue using Timolol (yellow top) both eyes daily in the morning,  And Latanoprost (green top) both eyes at bedtime.   Use artificial tears up to 4 times daily both eyes.  (Refresh Tears, Systane Ultra/Balance, or Theratears)  Take a multiple vitamin or \"eye vitamin\" daily (AREDS2).  Protect your eyes outdoors from ultraviolet rays with sunglasses and/or brimmed hat.  Have spinach (cooked or raw), colorful fruits, walnuts, hazelnuts, almonds in your diet.  Monitor the vision in each eye weekly - call if any sudden persistent changes.  Will get retina opinion to make sure no treatable disease.  Return Visit in 6 months for Complete Exam.     Srikanth Craig M.D.  [phone]    "

## 2020-09-06 ASSESSMENT — SLIT LAMP EXAM - LIDS
COMMENTS: GOOD CREASE AND COSMESIS, 1+ PTOSIS
COMMENTS: GOOD CREASE AND COSMESIS

## 2020-09-06 ASSESSMENT — EXTERNAL EXAM - RIGHT EYE: OD_EXAM: NORMAL

## 2020-09-06 ASSESSMENT — EXTERNAL EXAM - LEFT EYE: OS_EXAM: NORMAL

## 2020-09-15 ENCOUNTER — TRANSFERRED RECORDS (OUTPATIENT)
Dept: HEALTH INFORMATION MANAGEMENT | Facility: CLINIC | Age: 85
End: 2020-09-15

## 2020-11-10 ENCOUNTER — TELEPHONE (OUTPATIENT)
Dept: AUDIOLOGY | Facility: CLINIC | Age: 85
End: 2020-11-10

## 2020-11-10 DIAGNOSIS — H90.3 SENSORINEURAL HEARING LOSS, BILATERAL: Primary | ICD-10-CM

## 2020-11-10 PROCEDURE — V5267 HEARING AID SUP/ACCESS/DEV: HCPCS | Performed by: AUDIOLOGIST

## 2020-11-10 PROCEDURE — 99207 PR NO CHARGE LOS: CPT | Performed by: AUDIOLOGIST

## 2020-11-10 NOTE — TELEPHONE ENCOUNTER
Patient requests 2 packs of Oticon ProWax waxguards be mailed to the address on file.     CHARGES:    Supplies     Merlin Cedeño CCC-A  Licensed Audiologist #[id number]  11/10/2020

## 2020-11-10 NOTE — TELEPHONE ENCOUNTER
Patient is requesting cleaning tools for her hearing aids sent to her home. Taty Chavarria TC/Pt Rep

## 2021-01-10 ENCOUNTER — TELEPHONE (OUTPATIENT)
Dept: FAMILY MEDICINE | Facility: CLINIC | Age: 86
End: 2021-01-10

## 2021-01-10 NOTE — TELEPHONE ENCOUNTER
Reason for call:  Other   Patient called regarding (reason for call):   Covid Vaccine interest    Additional comments:   If there is a waiting list please put patient on it.  Patient inquiring into the Covid Vaccine.    Phone number to reach patient:  Home number on file [phone] (home)    Best Time:  any    Can we leave a detailed message on this number?  YES    Travel screening: Not Applicable

## 2021-01-12 NOTE — TELEPHONE ENCOUNTER
Called and informed patient that we have not been given any information on when or where she can get a vaccine.Jennifer Jaimes MA/ASHLI

## 2021-02-03 ENCOUNTER — IMMUNIZATION (OUTPATIENT)
Dept: PEDIATRICS | Facility: CLINIC | Age: 86
End: 2021-02-03
Payer: MEDICARE

## 2021-02-03 PROCEDURE — 91300 PR COVID VAC PFIZER DIL RECON 30 MCG/0.3 ML IM: CPT

## 2021-02-03 PROCEDURE — 0001A PR COVID VAC PFIZER DIL RECON 30 MCG/0.3 ML IM: CPT

## 2021-02-06 ENCOUNTER — VIRTUAL VISIT (OUTPATIENT)
Dept: URGENT CARE | Facility: CLINIC | Age: 86
End: 2021-02-06
Payer: MEDICARE

## 2021-02-06 DIAGNOSIS — Z20.822 EXPOSURE TO COVID-19 VIRUS: Primary | ICD-10-CM

## 2021-02-06 PROCEDURE — 99442 PR PHYSICIAN TELEPHONE EVALUATION 11-20 MIN: CPT | Mod: CS | Performed by: INTERNAL MEDICINE

## 2021-02-06 ASSESSMENT — ENCOUNTER SYMPTOMS
FEVER: 0
COUGH: 0
RHINORRHEA: 0

## 2021-02-06 NOTE — PATIENT INSTRUCTIONS
"    Discharge Instructions for COVID-19 Patients  You have--or may have--COVID-19. Please follow the instructions listed below.   If you have a weakened immune system, discuss with your doctor any other actions you need to take.  How can I protect others?  If you have symptoms (fever, cough, body aches or trouble breathing):    Stay home and away from others (self-isolate) until:  ? Your other symptoms have resolved (gotten better). And   ? You've had no fever--and no medicine that reduces fever--for 1 full day (24 hours). And   ? At least 10 days have passed since your symptoms started. (You may need to wait 20 days. Follow the advice of your care team.)  If you don't show symptoms, but testing showed that you have COVID-19:    Stay home and away from others (self-isolate) until at least 10 days have passed since the date of your first positive COVID-19 test.  During this time    Stay in your own room, even for meals. Use your own bathroom if you can.    Stay away from others in your home. No hugging, kissing or shaking hands. No visitors.    Don't go to work, school or anywhere else.    Clean \"high touch\" surfaces often (doorknobs, counters, handles). Use household cleaning spray or wipes.    You'll find a full list of  on the EPA website: www.epa.gov/pesticide-registration/list-n-disinfectants-use-against-sars-cov-2.    Cover your mouth and nose with a mask or other face covering to avoid spreading germs.    Wash your hands and face often. Use soap and water.    Caregivers in these groups are at risk for severe illness due to COVID-19:  ? People 65 years and older  ? People who live in a nursing home or long-term care facility  ? People with chronic disease (lung, heart, cancer, diabetes, kidney, liver, immunologic)  ? People who have a weakened immune system, including those who:    Are in cancer treatment    Take medicine that weakens the immune system, such as corticosteroids    Had a bone marrow or " organ transplant    Have an immune deficiency    Have poorly controlled HIV or AIDS    Are obese (body mass index of 40 or higher)    Smoke regularly    Caregivers should wear gloves while washing dishes, handling laundry and cleaning bedrooms and bathrooms.    Use caution when washing and drying laundry: Don't shake dirty laundry and use the warmest water setting that you can.    For more tips on managing your health at home, go to www.cdc.gov/coronavirus/2019-ncov/downloads/10Things.pdf.  How can I take care of myself at home?  1. Get lots of rest. Drink extra fluids (unless a doctor has told you not to).  2. Take Tylenol (acetaminophen) for fever or pain. If you have liver or kidney problems, ask your family doctor if it's okay to take Tylenol.   Adults can take either:   ? 650 mg (two 325 mg pills) every 4 to 6 hours, or   ? 1,000 mg (two 500 mg pills) every 8 hours as needed.  ? Note: Don't take more than 3,000 mg in one day. Acetaminophen is found in many medicines (both prescribed and over-the-counter medicines). Read all labels to be sure you don't take too much.   For children, check the Tylenol bottle for the right dose. The dose is based on the child's age or weight.  3. If you have other health problems (like cancer, heart failure, an organ transplant or severe kidney disease): Call your specialty clinic if you don't feel better in the next 2 days.  4. Know when to call 911. Emergency warning signs include:  ? Trouble breathing or shortness of breath  ? Pain or pressure in the chest that doesn't go away  ? Feeling confused like you haven't felt before, or not being able to wake up  ? Bluish-colored lips or face  5. Your doctor may have prescribed a blood thinner medicine. Follow their instructions.  Where can I get more information?     [facility] - About COVID-19:   [email or web address]    CDC - What to Do If You're Sick:  www.cdc.gov/coronavirus/2019-ncov/about/steps-when-sick.html    CDC - Ending Home Isolation: www.cdc.gov/coronavirus/2019-ncov/hcp/disposition-in-home-patients.html    CDC - Caring for Someone: www.cdc.gov/coronavirus/2019-ncov/if-you-are-sick/care-for-someone.html    [facility] - Interim Guidance for Hospital Discharge to Home: www.health.[facility].[place]./diseases/coronavirus/hcp/hospdischarge.pdf    Below are the COVID-19 hotlines at the [place] Department of Health ([facility]). Interpreters are available.  ? For health questions: Call 828-942-3779 or 1-381.481.7402 (7 a.m. to 7 p.m.)  ? For questions about schools and childcare: Call 140-360-0482 or 1-972.438.2541 (7 a.m. to 7 p.m.)    For informational purposes only. Not to replace the advice of your health care provider. Clinically reviewed by Dr. Carlos Perkins.   Copyright   2020 [facility]. All rights reserved. codetag 150335 - REV 01/05/21.

## 2021-02-06 NOTE — PROGRESS NOTES
"China is a 89 year old who is being evaluated via a billable telephone visit.    Phone call duration: 7 minutes  Full visit including charting 11 minutes      ASSESSMENT:      ICD-10-CM    1. Exposure to COVID-19 virus  Z20.822 Asymptomatic COVID-19 Virus (Coronavirus) by PCR      Questions & concerns addressed.    Followup:    If not improving or if condition worsens, follow up with your Primary Care Provider    Patient Instructions       Discharge Instructions for COVID-19 Patients  You have--or may have--COVID-19. Please follow the instructions listed below.   If you have a weakened immune system, discuss with your doctor any other actions you need to take.  How can I protect others?  If you have symptoms (fever, cough, body aches or trouble breathing):    Stay home and away from others (self-isolate) until:  ? Your other symptoms have resolved (gotten better). And   ? You've had no fever--and no medicine that reduces fever--for 1 full day (24 hours). And   ? At least 10 days have passed since your symptoms started. (You may need to wait 20 days. Follow the advice of your care team.)  If you don't show symptoms, but testing showed that you have COVID-19:    Stay home and away from others (self-isolate) until at least 10 days have passed since the date of your first positive COVID-19 test.  During this time    Stay in your own room, even for meals. Use your own bathroom if you can.    Stay away from others in your home. No hugging, kissing or shaking hands. No visitors.    Don't go to work, school or anywhere else.    Clean \"high touch\" surfaces often (doorknobs, counters, handles). Use household cleaning spray or wipes.    You'll find a full list of  on the EPA website: www.epa.gov/pesticide-registration/list-n-disinfectants-use-against-sars-cov-2.    Cover your mouth and nose with a mask or other face covering to avoid spreading germs.    Wash your hands and face often. Use soap and water.    Caregivers " in these groups are at risk for severe illness due to COVID-19:  ? People 65 years and older  ? People who live in a nursing home or long-term care facility  ? People with chronic disease (lung, heart, cancer, diabetes, kidney, liver, immunologic)  ? People who have a weakened immune system, including those who:    Are in cancer treatment    Take medicine that weakens the immune system, such as corticosteroids    Had a bone marrow or organ transplant    Have an immune deficiency    Have poorly controlled HIV or AIDS    Are obese (body mass index of 40 or higher)    Smoke regularly    Caregivers should wear gloves while washing dishes, handling laundry and cleaning bedrooms and bathrooms.    Use caution when washing and drying laundry: Don't shake dirty laundry and use the warmest water setting that you can.    For more tips on managing your health at home, go to www.cdc.gov/coronavirus/2019-ncov/downloads/10Things.pdf.  How can I take care of myself at home?  1. Get lots of rest. Drink extra fluids (unless a doctor has told you not to).  2. Take Tylenol (acetaminophen) for fever or pain. If you have liver or kidney problems, ask your family doctor if it's okay to take Tylenol.   Adults can take either:   ? 650 mg (two 325 mg pills) every 4 to 6 hours, or   ? 1,000 mg (two 500 mg pills) every 8 hours as needed.  ? Note: Don't take more than 3,000 mg in one day. Acetaminophen is found in many medicines (both prescribed and over-the-counter medicines). Read all labels to be sure you don't take too much.   For children, check the Tylenol bottle for the right dose. The dose is based on the child's age or weight.  3. If you have other health problems (like cancer, heart failure, an organ transplant or severe kidney disease): Call your specialty clinic if you don't feel better in the next 2 days.  4. Know when to call 911. Emergency warning signs include:  ? Trouble breathing or shortness of breath  ? Pain or pressure in  the chest that doesn't go away  ? Feeling confused like you haven't felt before, or not being able to wake up  ? Bluish-colored lips or face  5. Your doctor may have prescribed a blood thinner medicine. Follow their instructions.  Where can I get more information?    [facility] - About COVID-19:   [email or web address]    CDC - What to Do If You're Sick: www.cdc.gov/coronavirus/2019-ncov/about/steps-when-sick.html    CDC - Ending Home Isolation: www.cdc.gov/coronavirus/2019-ncov/hcp/disposition-in-home-patients.html    CDC - Caring for Someone: www.cdc.gov/coronavirus/2019-ncov/if-you-are-sick/care-for-someone.html    [facility] - Interim Guidance for Hospital Discharge to Home: www.health.[facility].[place]./diseases/coronavirus/hcp/hospdischarge.pdf    Below are the COVID-19 hotlines at the [place] Department of Health ([facility]). Interpreters are available.  ? For health questions: Call 334-468-9477 or 1-914.124.3502 (7 a.m. to 7 p.m.)  ? For questions about schools and childcare: Call 387-105-7066 or 1-377.808.5011 (7 a.m. to 7 p.m.)    For informational purposes only. Not to replace the advice of your health care provider. Clinically reviewed by Dr. Carlos Perkins.   Copyright   2020 [employer]. All rights reserved. [employer] [id number] - REV 01/05/21.          SUBJECTIVE:   China Gumzan is a 89 year old female  She is an established patient of [facility].      Exposed to Covid 2/3/2020  denies symptoms     First Covid shot 2/3/2020   Later that day she was exposed.    Review of Systems   Constitutional: Negative for fever.   HENT: Negative for rhinorrhea.    Respiratory: Negative for cough.        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 Brother      Cerebrovascular Disease Brother      Heart Disease Brother      Asthma Daughter      Breast Cancer Daughter      Thyroid Disease Daughter      Glaucoma No family hx of      Macular Degeneration No family hx of      Current Outpatient Medications   Medication Sig Dispense Refill     atorvastatin (LIPITOR) 10 MG tablet TAKE ONE TABLET BY MOUTH AT BEDTIME 90 tablet 3     calcium carbonate 600 mg-vitamin D 400 units (CALCIUM 600 + D) 600-400 MG-UNIT per tablet Take 1 tablet by mouth 2 times daily 180 tablet 3     diltiazem ER (DILT-XR) 240 MG 24 hr ER beaded capsule TAKE ONE CAPSULE BY MOUTH ONCE DAILY. Needs Physical for future refills. 90 capsule 3     furosemide (LASIX) 40 MG tablet Take 1 tablet (40 mg) by mouth 2 times daily 180 tablet 3     latanoprost (XALATAN) 0.005 % ophthalmic solution Place 1 drop into both eyes At Bedtime 9 mL 4     losartan (COZAAR) 100 MG tablet Take 1 tablet (100 mg) by mouth daily 90 tablet 3     MULTI-VITAMIN OR None Entered       timolol maleate (TIMOPTIC) 0.5 % ophthalmic solution Place 1 drop into both eyes daily 3 Bottle 4     Social History     Tobacco Use     Smoking status: Never Smoker     Smokeless tobacco: Never Used   Substance Use Topics     Alcohol use: No     Alcohol/week: 0.0 standard drinks       OBJECTIVE  GENERAL: no distress noted over the phone  RESP: No audible wheeze, cough, or increased work of breathing.    NEURO:  Mentation and speech appropriate for age.  PSYCH: Mentation appears normal, affect normal/bright, judgement and insight intact, normal speech

## 2021-02-11 ENCOUNTER — OFFICE VISIT (OUTPATIENT)
Dept: LAB | Facility: CLINIC | Age: 86
End: 2021-02-11
Payer: MEDICARE

## 2021-02-11 DIAGNOSIS — Z20.822 EXPOSURE TO COVID-19 VIRUS: ICD-10-CM

## 2021-02-11 PROCEDURE — U0005 INFEC AGEN DETEC AMPLI PROBE: HCPCS | Performed by: INTERNAL MEDICINE

## 2021-02-11 PROCEDURE — 99207 PR NO CHARGE LOS: CPT

## 2021-02-11 PROCEDURE — U0003 INFECTIOUS AGENT DETECTION BY NUCLEIC ACID (DNA OR RNA); SEVERE ACUTE RESPIRATORY SYNDROME CORONAVIRUS 2 (SARS-COV-2) (CORONAVIRUS DISEASE [COVID-19]), AMPLIFIED PROBE TECHNIQUE, MAKING USE OF HIGH THROUGHPUT TECHNOLOGIES AS DESCRIBED BY CMS-2020-01-R: HCPCS | Performed by: INTERNAL MEDICINE

## 2021-02-12 LAB
LABORATORY COMMENT REPORT: NORMAL
SARS-COV-2 RNA RESP QL NAA+PROBE: NEGATIVE
SARS-COV-2 RNA RESP QL NAA+PROBE: NORMAL
SPECIMEN SOURCE: NORMAL
SPECIMEN SOURCE: NORMAL

## 2021-02-24 ENCOUNTER — IMMUNIZATION (OUTPATIENT)
Dept: PEDIATRICS | Facility: CLINIC | Age: 86
End: 2021-02-24
Attending: INTERNAL MEDICINE
Payer: MEDICARE

## 2021-02-24 PROCEDURE — 91300 PR COVID VAC PFIZER DIL RECON 30 MCG/0.3 ML IM: CPT

## 2021-02-24 PROCEDURE — 0002A PR COVID VAC PFIZER DIL RECON 30 MCG/0.3 ML IM: CPT

## 2021-03-09 ENCOUNTER — OFFICE VISIT (OUTPATIENT)
Dept: OPHTHALMOLOGY | Facility: CLINIC | Age: 86
End: 2021-03-09
Payer: MEDICARE

## 2021-03-09 DIAGNOSIS — Z01.01 ENCOUNTER FOR EXAMINATION OF EYES AND VISION WITH ABNORMAL FINDINGS: ICD-10-CM

## 2021-03-09 DIAGNOSIS — H52.4 PRESBYOPIA: ICD-10-CM

## 2021-03-09 DIAGNOSIS — Z96.1 PSEUDOPHAKIA: ICD-10-CM

## 2021-03-09 DIAGNOSIS — H43.813 POSTERIOR VITREOUS DETACHMENT, BILATERAL: ICD-10-CM

## 2021-03-09 DIAGNOSIS — H35.3134 ADVANCED ATROPHIC NONEXUDATIVE AGE-RELATED MACULAR DEGENERATION OF BOTH EYES WITH SUBFOVEAL INVOLVEMENT: Primary | ICD-10-CM

## 2021-03-09 DIAGNOSIS — H40.053 BORDERLINE GLAUCOMA WITH OCULAR HYPERTENSION, BILATERAL: ICD-10-CM

## 2021-03-09 PROCEDURE — 92014 COMPRE OPH EXAM EST PT 1/>: CPT | Performed by: OPHTHALMOLOGY

## 2021-03-09 ASSESSMENT — TONOMETRY
IOP_METHOD: APPLANATION
OD_IOP_MMHG: 16
OS_IOP_MMHG: 15

## 2021-03-09 ASSESSMENT — CONF VISUAL FIELD
OD_NORMAL: 1
OS_NORMAL: 1

## 2021-03-09 ASSESSMENT — REFRACTION_MANIFEST
OD_SPHERE: -1.50
OD_AXIS: 005
OS_SPHERE: BALANCE
OD_CYLINDER: +1.00
OD_ADD: +4.00
OS_ADD: +4.00

## 2021-03-09 ASSESSMENT — REFRACTION_WEARINGRX
OS_ADD: +3.50
OD_SPHERE: -0.50
OD_AXIS: 010
OD_ADD: +3.50
OS_CYLINDER: +1.00
OS_SPHERE: -0.75
OD_CYLINDER: +0.50
SPECS_TYPE: PAL
OS_AXIS: 180

## 2021-03-09 ASSESSMENT — VISUAL ACUITY
METHOD: SNELLEN - LINEAR
OD_CC: <J12
OS_CC: CF
OD_CC: 20/400

## 2021-03-09 ASSESSMENT — SLIT LAMP EXAM - LIDS
COMMENTS: GOOD CREASE AND COSMESIS, 1+ PTOSIS
COMMENTS: GOOD CREASE AND COSMESIS

## 2021-03-09 ASSESSMENT — CUP TO DISC RATIO
OS_RATIO: 0.3
OD_RATIO: 0.3

## 2021-03-09 ASSESSMENT — EXTERNAL EXAM - LEFT EYE: OS_EXAM: NORMAL

## 2021-03-09 ASSESSMENT — EXTERNAL EXAM - RIGHT EYE: OD_EXAM: NORMAL

## 2021-03-09 NOTE — PATIENT INSTRUCTIONS
"Glasses Rx given - optional   Continue same medication   Take a multiple vitamin or \"eye vitamin\" daily (AREDS2).  Protect your eyes outdoors from ultraviolet rays with sunglasses and/or brimmed hat.  Have spinach (cooked or raw), colorful fruits, walnuts, hazelnuts, almonds in your diet.  Monitor the vision in each eye weekly - call if any sudden persistent changes.   Use artificial tears up to 4 times daily both eyes as needed (Refresh Tears, Systane Ultra/Balance, or Theratears)   Return visit 6 months for intraocular pressure check, glaucoma OCT, retinal OCT and Escoto Visual Field.    Srikanth Craig M.D.  [phone]    "

## 2021-03-09 NOTE — PROGRESS NOTES
" Current Eye Medications: Latanoprost nightly and timolol daily,last drops at 8am     Subjective:  Comprehensive eye exam.   Pt reports that her vision does not seem as good, sees just as  well in the distance without glasses and sometimes her left eye feels dry in the morning.     Objective:  See Ophthalmology Exam.       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      ICD-10-CM    1. 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 Pseudophakia, Yag Caps, ou  Z96.1    3. Borderline glaucoma with ocular hypertension, bilateral - treated  H40.053    4. Posterior vitreous detachment, bilateral  H43.813    5. Encounter for examination of eyes and vision with abnormal findings  Z01.01    6. Presbyopia  H52.4         Plan:  Glasses Rx given - optional   Continue same medication   Take a multiple vitamin or \"eye vitamin\" daily (AREDS2).  Protect your eyes outdoors from ultraviolet rays with sunglasses and/or brimmed hat.  Have spinach (cooked or raw), colorful fruits, walnuts, hazelnuts, almonds in your diet.  Monitor the vision in each eye weekly - call if any sudden persistent changes.   Use artificial tears up to 4 times daily both eyes as needed (Refresh Tears, Systane Ultra/Balance, or Theratears)   Return visit 6 months for intraocular pressure check, glaucoma OCT, retinal OCT and Escoto Visual Field.    Srikanth Craig M.D.  [phone]       "

## 2021-03-09 NOTE — LETTER
"    3/9/2021         RE: China Guzman  [address] [facility] [address]        Dear Colleague,    Thank you for referring your patient, China Guzman, to the [facility]. Please see a copy of my visit note below.     Current Eye Medications: Latanoprost nightly and timolol daily,last drops at 8am     Subjective:  Comprehensive eye exam.   Pt reports that her vision does not seem as good, sees just as  well in the distance without glasses and sometimes her left eye feels dry in the morning.     Objective:  See Ophthalmology Exam.       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      ICD-10-CM    1. 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 Pseudophakia, Yag Caps, ou  Z96.1    3. Borderline glaucoma with ocular hypertension, bilateral - treated  H40.053    4. Posterior vitreous detachment, bilateral  H43.813    5. Encounter for examination of eyes and vision with abnormal findings  Z01.01    6. Presbyopia  H52.4         Plan:  Glasses Rx given - optional   Continue same medication   Take a multiple vitamin or \"eye vitamin\" daily (AREDS2).  Protect your eyes outdoors from ultraviolet rays with sunglasses and/or brimmed hat.  Have spinach (cooked or raw), colorful fruits, walnuts, hazelnuts, almonds in your diet.  Monitor the vision in each eye weekly - call if any sudden persistent changes.   Use artificial tears up to 4 times daily both eyes as needed (Refresh Tears, Systane Ultra/Balance, or Theratears)   Return visit 6 months for intraocular pressure check, glaucoma OCT, retinal OCT and Escoto Visual Field.    Srikanth Craig M.D.  [phone]           Again, thank you for allowing me to participate in the care of your patient.        Sincerely,        Srikanth Craig MD    "

## 2021-04-12 ENCOUNTER — VIRTUAL VISIT (OUTPATIENT)
Dept: NEPHROLOGY | Facility: CLINIC | Age: 86
End: 2021-04-12
Payer: MEDICARE

## 2021-04-12 DIAGNOSIS — N18.32 CHRONIC KIDNEY DISEASE, STAGE 3B (H): Primary | ICD-10-CM

## 2021-04-12 DIAGNOSIS — N25.81 SECONDARY RENAL HYPERPARATHYROIDISM (H): ICD-10-CM

## 2021-04-12 DIAGNOSIS — I10 HYPERTENSION, ESSENTIAL: ICD-10-CM

## 2021-04-12 DIAGNOSIS — E78.5 HYPERLIPIDEMIA, UNSPECIFIED HYPERLIPIDEMIA TYPE: ICD-10-CM

## 2021-04-12 PROCEDURE — 99442 PR PHYSICIAN TELEPHONE EVALUATION 11-20 MIN: CPT | Mod: 95 | Performed by: INTERNAL MEDICINE

## 2021-04-12 RX ORDER — DILTIAZEM HYDROCHLORIDE 240 MG/1
CAPSULE, EXTENDED RELEASE ORAL
COMMUNITY
Start: 2021-02-24 | End: 2021-09-03

## 2021-04-12 NOTE — PROGRESS NOTES
"China is a 89 year old who is being evaluated via a billable telephone visit.      What phone number would you like to be contacted at? [phone]    How would you like to obtain your AVS? Mail a copy  Phone call duration: 18 minutes      Subjective     China Guzman is a 89 year old female who is being evaluated via a billable telephone visit.      The patient has been notified of following:     \"This telephone visit will be conducted via a call between you and your physician/provider. We have found that certain health care needs can be provided without the need for a physical exam.  This service lets us provide the care you need with a short phone conversation.  If a prescription is necessary we can send it directly to your pharmacy.  If lab work is needed we can place an order for that and you can then stop by our lab to have the test done at a later time.    If during the course of the call the physician/provider feels a telephone visit is not appropriate, you will not be charged for this service.\"     Patient has given verbal consent for Telephone visit?  Yes    China Guzman complains of   Chief Complaint   Patient presents with     Follow Up     Kidney Problem     ckd 3       ALLERGIES  Patient has no known allergies.      She is a delightful female with history of hypertension, hyperlipidemia, osteoporosis, who presented in July 2018 for evaluation of high calcium and worsened kidney function.  Her calcium was noted to be elevated on multiple occasions, up to 11 mg/dL but usually between 10.4-10.9- though overall asymptomatic.    Her PTH was within normal, which is mildly inappropriately high in setting of hypercalcemia. She had been on hydrochlorothiazide which at first we lowered, but with hyponatremia noted on recheck, I switched her to furosemide in September 2018    She is also on losartan and diltiazem for her blood pressure. She takes calcium and vitamin D  twice a day  On followup today, she feels " well. Her blood pressure is typically in 130 range or higher but she has been consistently lower in recent months. She denies dizziness.  She denies acute illness or change in health. She is trying to walk outside regularly when the weather is nice.  She hydrates well with water.  She lives with her daughter who is a  and has a special needs daughter (her granddaughter). She has not needed to go out for groceries and has been pretty much on lockdown at home except for walks outside.  She has been using a stationary bike daily.    She denies urinary issues.    Her blood pressure has been 120s the last couple of times she checked it.  Her weight has been around 102 lbs and stable, she is at 99 lbs.     Reviewed and updated as needed this visit by Provider                 Review of Systems   A 4 point ROS was reviewed with patient and negative except as noted above       Objective   Reported vitals:  LMP 12/16/1980    General: no distress,engaged and appropriate speech  Psych: Alert and oriented times 3; coherent speech, normal   rate and volume, able to articulate logical thoughts     Last labs 5/2019 - within normal  Last Comprehensive Metabolic Panel:  Sodium   Date Value Ref Range Status   08/04/2020 138 133 - 144 mmol/L Final     Potassium   Date Value Ref Range Status   08/04/2020 4.0 3.4 - 5.3 mmol/L Final     Chloride   Date Value Ref Range Status   08/04/2020 103 94 - 109 mmol/L Final     Carbon Dioxide   Date Value Ref Range Status   08/04/2020 30 20 - 32 mmol/L Final     Anion Gap   Date Value Ref Range Status   08/04/2020 5 3 - 14 mmol/L Final     Glucose   Date Value Ref Range Status   08/04/2020 118 (H) 70 - 99 mg/dL Final     Comment:     Fasting specimen     Urea Nitrogen   Date Value Ref Range Status   08/04/2020 53 (H) 7 - 30 mg/dL Final     Creatinine   Date Value Ref Range Status   08/04/2020 1.24 (H) 0.52 - 1.04 mg/dL Final     GFR Estimate   Date Value Ref Range Status   08/04/2020 38 (L) >60  mL/min/[1.73_m2] Final     Comment:     Non  GFR Calc  Starting 12/18/2018, serum creatinine based estimated GFR (eGFR) will be   calculated using the Chronic Kidney Disease Epidemiology Collaboration   (CKD-EPI) equation.       Calcium   Date Value Ref Range Status   08/04/2020 10.0 8.5 - 10.1 mg/dL Final       Assessment/Plan:  89 year old female with history of hypertension, osteoporosis, anemia, who presents for followup of hypercalcemia and increased creatinine. Her Scr is typically 0.9mg/dL but was up to 1.3 in setting of hypercalcemia, calcium of 10.4-10.9 in 2018  1. Hypercalcemia- improved, after better hydration and stopping hydrochlorothiazide and starting furosemide  - given hypercalcemia and hyponatremia, I elected to stop her hydrochlorothiazide   - tolerating furosemide and calcium in normal range on last check  - labs when able, June or so  2. CKD stage 3- Her Scr was 0.9 up until the last year or so, with Scr up to 1.3 but now ranging in 1-1.1 range  Monitor every 6 months   3. Hypertension- now on furosemide (stopped hydrochlorothiazide), losartan and diltiazem  - refilled meds today  -  she will monitor at home  -goal 130-140/80 is fine- she is below this so may need to cut down on BP medications    4. Anemia- mild anemia previously but hgb now normal, iron sat 16% on previous check, and ferritin 78   - recheck   - immunofixation- negative     5. Bone- PTH 60 , in setting of hypercalcemia, but some CKD thus ?appropriate.    - continue vitamin D and calcium 600mg for now  - recheck when able     No follow-ups on file.      - labs in next 1-2 months, RTC 6-9 months    Romi Jose Corrigan MD

## 2021-04-12 NOTE — LETTER
"4/12/2021       RE: China Guzman  [address] [facility] [address]     Dear Colleague,    Thank you for referring your patient, China Guzman, to the [facility]. Please see a copy of my visit note below.    China is a 89 year old who is being evaluated via a billable telephone visit.      What phone number would you like to be contacted at? [phone]    How would you like to obtain your AVS? Mail a copy  Phone call duration: 18 minutes      Subjective     China Guzman is a 89 year old female who is being evaluated via a billable telephone visit.      The patient has been notified of following:     \"This telephone visit will be conducted via a call between you and your physician/provider. We have found that certain health care needs can be provided without the need for a physical exam.  This service lets us provide the care you need with a short phone conversation.  If a prescription is necessary we can send it directly to your pharmacy.  If lab work is needed we can place an order for that and you can then stop by our lab to have the test done at a later time.    If during the course of the call the physician/provider feels a telephone visit is not appropriate, you will not be charged for this service.\"     Patient has given verbal consent for Telephone visit?  Yes    China Guzman complains of   Chief Complaint   Patient presents with     Follow Up     Kidney Problem     ckd 3       ALLERGIES  Patient has no known allergies.      She is a delightful female with history of hypertension, hyperlipidemia, osteoporosis, who presented in July 2018 for evaluation of high calcium and worsened kidney function.  Her calcium was noted to be elevated on multiple occasions, up to 11 mg/dL but usually between 10.4-10.9- though overall asymptomatic.    Her PTH was within normal, which is mildly inappropriately high in setting of " hypercalcemia. She had been on hydrochlorothiazide which at first we lowered, but with hyponatremia noted on recheck, I switched her to furosemide in September 2018    She is also on losartan and diltiazem for her blood pressure. She takes calcium and vitamin D  twice a day  On followup today, she feels well. Her blood pressure is typically in 130 range or higher but she has been consistently lower in recent months. She denies dizziness.  She denies acute illness or change in health. She is trying to walk outside regularly when the weather is nice.  She hydrates well with water.  She lives with her daughter who is a  and has a special needs daughter (her granddaughter). She has not needed to go out for groceries and has been pretty much on lockdown at home except for walks outside.  She has been using a stationary bike daily.    She denies urinary issues.    Her blood pressure has been 120s the last couple of times she checked it.  Her weight has been around 102 lbs and stable, she is at 99 lbs.     Reviewed and updated as needed this visit by Provider                 Review of Systems   A 4 point ROS was reviewed with patient and negative except as noted above       Objective   Reported vitals:  LMP 12/16/1980    General: no distress,engaged and appropriate speech  Psych: Alert and oriented times 3; coherent speech, normal   rate and volume, able to articulate logical thoughts     Last labs 5/2019 - within normal  Last Comprehensive Metabolic Panel:  Sodium   Date Value Ref Range Status   08/04/2020 138 133 - 144 mmol/L Final     Potassium   Date Value Ref Range Status   08/04/2020 4.0 3.4 - 5.3 mmol/L Final     Chloride   Date Value Ref Range Status   08/04/2020 103 94 - 109 mmol/L Final     Carbon Dioxide   Date Value Ref Range Status   08/04/2020 30 20 - 32 mmol/L Final     Anion Gap   Date Value Ref Range Status   08/04/2020 5 3 - 14 mmol/L Final     Glucose   Date Value Ref Range Status   08/04/2020 118  (H) 70 - 99 mg/dL Final     Comment:     Fasting specimen     Urea Nitrogen   Date Value Ref Range Status   08/04/2020 53 (H) 7 - 30 mg/dL Final     Creatinine   Date Value Ref Range Status   08/04/2020 1.24 (H) 0.52 - 1.04 mg/dL Final     GFR Estimate   Date Value Ref Range Status   08/04/2020 38 (L) >60 mL/min/[1.73_m2] Final     Comment:     Non  GFR Calc  Starting 12/18/2018, serum creatinine based estimated GFR (eGFR) will be   calculated using the Chronic Kidney Disease Epidemiology Collaboration   (CKD-EPI) equation.       Calcium   Date Value Ref Range Status   08/04/2020 10.0 8.5 - 10.1 mg/dL Final       Assessment/Plan:  89 year old female with history of hypertension, osteoporosis, anemia, who presents for followup of hypercalcemia and increased creatinine. Her Scr is typically 0.9mg/dL but was up to 1.3 in setting of hypercalcemia, calcium of 10.4-10.9 in 2018  1. Hypercalcemia- improved, after better hydration and stopping hydrochlorothiazide and starting furosemide  - given hypercalcemia and hyponatremia, I elected to stop her hydrochlorothiazide   - tolerating furosemide and calcium in normal range on last check  - labs when able, June or so  2. CKD stage 3- Her Scr was 0.9 up until the last year or so, with Scr up to 1.3 but now ranging in 1-1.1 range  Monitor every 6 months   3. Hypertension- now on furosemide (stopped hydrochlorothiazide), losartan and diltiazem  - refilled meds today  -  she will monitor at home  -goal 130-140/80 is fine- she is below this so may need to cut down on BP medications    4. Anemia- mild anemia previously but hgb now normal, iron sat 16% on previous check, and ferritin 78   - recheck   - immunofixation- negative     5. Bone- PTH 60 , in setting of hypercalcemia, but some CKD thus ?appropriate.    - continue vitamin D and calcium 600mg for now  - recheck when able     No follow-ups on file.      - labs in next 1-2 months, RTC 6-9 months    Romi Garcia  MD Meenu

## 2021-04-12 NOTE — PATIENT INSTRUCTIONS
Monitor blood pressure a few times a week  Karol will call you in a month to touch base about your blood pressure    Labs by summer time, Sofia will help set up

## 2021-04-13 DIAGNOSIS — H40.053 BORDERLINE GLAUCOMA WITH OCULAR HYPERTENSION, BILATERAL: ICD-10-CM

## 2021-04-13 DIAGNOSIS — N18.32 CHRONIC KIDNEY DISEASE, STAGE 3B (H): Primary | ICD-10-CM

## 2021-04-13 RX ORDER — LATANOPROST 50 UG/ML
1 SOLUTION/ DROPS OPHTHALMIC AT BEDTIME
Qty: 9 ML | Refills: 4 | Status: SHIPPED | OUTPATIENT
Start: 2021-04-13 | End: 2022-03-18

## 2021-04-13 RX ORDER — TIMOLOL MALEATE 5 MG/ML
1 SOLUTION/ DROPS OPHTHALMIC DAILY
Qty: 15 ML | Refills: 1 | Status: SHIPPED | OUTPATIENT
Start: 2021-04-13 | End: 2021-04-13

## 2021-04-13 RX ORDER — TIMOLOL MALEATE 5 MG/ML
1 SOLUTION/ DROPS OPHTHALMIC DAILY
Qty: 15 ML | Refills: 4 | Status: SHIPPED | OUTPATIENT
Start: 2021-04-13 | End: 2022-05-12

## 2021-04-13 NOTE — TELEPHONE ENCOUNTER
Recommend refilling drops per Dr. Craig's last visit note on 3/9/21: Continue same medications , Latanoprost nightly and timolol daily

## 2021-06-14 ENCOUNTER — OFFICE VISIT (OUTPATIENT)
Dept: URGENT CARE | Facility: URGENT CARE | Age: 86
End: 2021-06-14
Payer: MEDICARE

## 2021-06-14 VITALS
DIASTOLIC BLOOD PRESSURE: 74 MMHG | WEIGHT: 100.8 LBS | SYSTOLIC BLOOD PRESSURE: 179 MMHG | TEMPERATURE: 98.1 F | BODY MASS INDEX: 20.36 KG/M2 | HEART RATE: 85 BPM | OXYGEN SATURATION: 98 %

## 2021-06-14 DIAGNOSIS — M89.9 LESION OF BONE OF LEFT HAND: Primary | ICD-10-CM

## 2021-06-14 PROCEDURE — 99214 OFFICE O/P EST MOD 30 MIN: CPT | Performed by: NURSE PRACTITIONER

## 2021-06-14 NOTE — PATIENT INSTRUCTIONS
Patient Education     Abscess (Antibiotic Treatment Only)  An abscess happens when bacteria get trapped under the skin and start to grow. Pus forms inside the abscess as the body responds to the bacteria. An abscess can happen with an insect bite, ingrown hair, blocked oil gland, pimple, cyst, or puncture wound. It is sometimes call a boil.   In the early stages, your wound may be red and tender. For this stage, you may get antibiotics. If the abscess does not get better with antibiotics, it will need to be drained with a small cut.   Home care  These tips will help you care for your abscess at home:    Soak the wound in hot water or apply hot packs (small towel soaked in hot water) to the area for 20 minutes at a time. Do this 3 to 4 times a day, or as instructed. Use a new towel each time. Wash the towels afterward because they may be contaminated with bacteria after use.    Don't cut, squeeze, or pop the boil yourself.    Put antibiotic cream or ointment on the skin 3 to 4 times a day, unless something else was prescribed. Some ointments include an antibiotic plus a pain reliever.    If your healthcare provider prescribed antibiotics, don't stop taking them until you have finished the medicine or you are told to stop.    You may use an over-the-counter pain medicine to control pain, unless another pain medicine was prescribed. Talk with your provider before taking these medicines if you have chronic liver or kidney disease or ever had a stomach ulcer or digestive bleeding.  Follow-up care  Follow up with your healthcare provider, or as advised. Check your wound each day for the signs that the infection may be getting worse (see below).   When to seek medical advice  Get prompt medical attention if any of these occur:    An increase in redness or swelling    Red streaks in the skin leading away from the abscess    An increase in local pain or swelling    Fever of 100.4 F (38 C) or higher, or as directed by your  healthcare provider    Pus or fluid coming from the abscess    Boil returns after getting better  [employer] last reviewed this educational content on 8/1/2019 2000-2021 [employer]. All rights reserved. This information is not intended as a substitute for professional medical care. Always follow your healthcare professional's instructions.

## 2021-06-14 NOTE — PROGRESS NOTES
SUBJECTIVE:  China Guzman is a 89 year old female who presents to the clinic today for a lesion on top of left hand,  Onset was 2 weeks ago.         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     Hearing loss      HTN (hypertension)      Hyperlipidemia      MACULAR DEGENERATION (SENILE) OF RETINA, DRY OU 2/11/2011     Macular degeneration, dry, mod, ou 8/31/2016     Osteoporosis      Current Outpatient Medications   Medication Sig Dispense Refill     atorvastatin (LIPITOR) 10 MG tablet TAKE ONE TABLET BY MOUTH AT BEDTIME 90 tablet 3     Calcium Carb-Cholecalciferol (CALCIUM-VITAMIN D) 600-400 MG-UNIT TABS Take 1 tablet by mouth twice daily 180 tablet 0     CARTIA  MG 24 hr capsule TAKE 1 CAPSULE BY MOUTH ONCE DAILY       furosemide (LASIX) 40 MG tablet Take 1 tablet (40 mg) by mouth 2 times daily 180 tablet 3     latanoprost (XALATAN) 0.005 % ophthalmic solution Place 1 drop into both eyes At Bedtime 9 mL 4     losartan (COZAAR) 100 MG tablet Take 1 tablet (100 mg) by mouth daily 90 tablet 3     MULTI-VITAMIN OR None Entered       timolol maleate (TIMOPTIC) 0.5 % ophthalmic solution Place 1 drop into both eyes daily 15 mL 4     Social History     Tobacco Use     Smoking status: Never Smoker     Smokeless tobacco: Never Used   Substance Use Topics     Alcohol use: No     Alcohol/week: 0.0 standard drinks       ROS:  Review of systems negative except as stated above.    EXAM:   BP (!) 179/74   Pulse 85   Temp 98.1  F (36.7  C) (Oral)   Wt 45.7 kg (100 lb 12.8 oz)   LMP 12/16/1980   SpO2 98%   BMI 20.36 kg/m    GENERAL: alert, no acute distress.  SKIN: 1X1 cm raised lesion mobile tender on top of left hand, crusted top.   RESP: lungs clear to auscultation - no rales, rhonchi or wheezes  CV: regular rates and rhythm, normal S1 S2, no murmur noted    ASSESSMENT:  (M89.9) Lesion of bone of left hand  (primary encounter  diagnosis)    Plan:   Differential diagnosis boil? Cancerous lesion?  Will treat with augmentin and referred to general surgery for removal and biopsy  Home are discussed and patient eduction given     China to follow up with Primary Care provider regarding elevated blood pressure.    IESHA Bob CNP

## 2021-06-24 DIAGNOSIS — N25.81 SECONDARY RENAL HYPERPARATHYROIDISM (H): Primary | ICD-10-CM

## 2021-06-24 DIAGNOSIS — I10 ESSENTIAL HYPERTENSION WITH GOAL BLOOD PRESSURE LESS THAN 140/90: ICD-10-CM

## 2021-06-24 RX ORDER — DILTIAZEM HYDROCHLORIDE 240 MG/1
240 CAPSULE, EXTENDED RELEASE ORAL DAILY
Qty: 30 CAPSULE | Refills: 0 | Status: SHIPPED | OUTPATIENT
Start: 2021-06-24 | End: 2021-07-29

## 2021-06-24 NOTE — TELEPHONE ENCOUNTER
Provider:  Are you willing to refill her diltiazem E.R 240 mg 1 capsule daily.  Thank you. Felicity Haywood R.N.     Patient was just in this am to [facility] to refill her glaucoma medication. Ronald reports that she was also looking for a blood pressure medication also diltiazem E.R 240 mg 1 capsule daily. Ronald stated that there was an Prescription(s) sent on 9/2/2021, but was then cancelled. I informed Ronald that looking in the chart it is on there as historical.  He would like this request sent to the provider.

## 2021-07-29 DIAGNOSIS — N18.32 CHRONIC KIDNEY DISEASE, STAGE 3B (H): ICD-10-CM

## 2021-07-29 DIAGNOSIS — I10 ESSENTIAL HYPERTENSION WITH GOAL BLOOD PRESSURE LESS THAN 140/90: ICD-10-CM

## 2021-07-29 RX ORDER — DILTIAZEM HYDROCHLORIDE 240 MG/1
CAPSULE, EXTENDED RELEASE ORAL
Qty: 30 CAPSULE | Refills: 0 | Status: SHIPPED | OUTPATIENT
Start: 2021-07-29 | End: 2021-08-31

## 2021-07-29 NOTE — TELEPHONE ENCOUNTER
DILT-XR refill request  Routing refill request to provider for review/approval because:  Labs not current:  Needs alt and creat  BP Readings from Last 3 Encounters:   06/14/21 (!) 179/74   09/02/20 (!) 162/89   04/06/20 (!) 140/76

## 2021-08-06 ENCOUNTER — TELEPHONE (OUTPATIENT)
Dept: NEPHROLOGY | Facility: CLINIC | Age: 86
End: 2021-08-06

## 2021-08-06 NOTE — TELEPHONE ENCOUNTER
[facility] Call Center    Phone Message    May a detailed message be left on voicemail: yes     Reason for Call: Order(s): Other:   Reason for requested: labs  Date needed: prior to 12/13/21  Provider name: Dr. Meenu Atkinson is needing her lab orders to be extend to 12/13/21.    Action Taken: Message routed to:  [facility] ([facility]):  MEDICINE RENAL    Travel Screening: Not Applicable

## 2021-08-27 ENCOUNTER — LAB (OUTPATIENT)
Dept: LAB | Facility: CLINIC | Age: 86
End: 2021-08-27
Payer: MEDICARE

## 2021-08-27 DIAGNOSIS — I10 HYPERTENSION, ESSENTIAL: ICD-10-CM

## 2021-08-27 DIAGNOSIS — N18.32 CHRONIC KIDNEY DISEASE, STAGE 3B (H): ICD-10-CM

## 2021-08-27 DIAGNOSIS — E78.5 HYPERLIPIDEMIA, UNSPECIFIED HYPERLIPIDEMIA TYPE: ICD-10-CM

## 2021-08-27 LAB
ALBUMIN SERPL-MCNC: 4.1 G/DL (ref 3.4–5)
ANION GAP SERPL CALCULATED.3IONS-SCNC: 6 MMOL/L (ref 3–14)
BUN SERPL-MCNC: 52 MG/DL (ref 7–30)
CALCIUM SERPL-MCNC: 9.9 MG/DL (ref 8.5–10.1)
CHLORIDE BLD-SCNC: 104 MMOL/L (ref 94–109)
CHOLEST SERPL-MCNC: 177 MG/DL
CO2 SERPL-SCNC: 28 MMOL/L (ref 20–32)
CREAT SERPL-MCNC: 1.27 MG/DL (ref 0.52–1.04)
CREAT UR-MCNC: 21 MG/DL
ERYTHROCYTE [DISTWIDTH] IN BLOOD BY AUTOMATED COUNT: 12.6 % (ref 10–15)
FASTING STATUS PATIENT QL REPORTED: YES
GFR SERPL CREATININE-BSD FRML MDRD: 37 ML/MIN/1.73M2
GLUCOSE BLD-MCNC: 117 MG/DL (ref 70–99)
HCT VFR BLD AUTO: 34.6 % (ref 35–47)
HDLC SERPL-MCNC: 61 MG/DL
HGB BLD-MCNC: 11.5 G/DL (ref 11.7–15.7)
LDLC SERPL CALC-MCNC: 79 MG/DL
MCH RBC QN AUTO: 31.3 PG (ref 26.5–33)
MCHC RBC AUTO-ENTMCNC: 33.2 G/DL (ref 31.5–36.5)
MCV RBC AUTO: 94 FL (ref 78–100)
MICROALBUMIN UR-MCNC: 12 MG/L
MICROALBUMIN/CREAT UR: 57.14 MG/G CR (ref 0–25)
NONHDLC SERPL-MCNC: 116 MG/DL
PHOSPHATE SERPL-MCNC: 3.6 MG/DL (ref 2.5–4.5)
PLATELET # BLD AUTO: 277 10E3/UL (ref 150–450)
POTASSIUM BLD-SCNC: 4.4 MMOL/L (ref 3.4–5.3)
PTH-INTACT SERPL-MCNC: 85 PG/ML (ref 18–80)
RBC # BLD AUTO: 3.68 10E6/UL (ref 3.8–5.2)
SODIUM SERPL-SCNC: 138 MMOL/L (ref 133–144)
TRIGL SERPL-MCNC: 184 MG/DL
WBC # BLD AUTO: 6.5 10E3/UL (ref 4–11)

## 2021-08-27 PROCEDURE — 80069 RENAL FUNCTION PANEL: CPT

## 2021-08-27 PROCEDURE — 83970 ASSAY OF PARATHORMONE: CPT

## 2021-08-27 PROCEDURE — 82043 UR ALBUMIN QUANTITATIVE: CPT

## 2021-08-27 PROCEDURE — 80061 LIPID PANEL: CPT

## 2021-08-27 PROCEDURE — 85027 COMPLETE CBC AUTOMATED: CPT

## 2021-08-27 PROCEDURE — 36415 COLL VENOUS BLD VENIPUNCTURE: CPT

## 2021-08-28 DIAGNOSIS — I10 ESSENTIAL HYPERTENSION WITH GOAL BLOOD PRESSURE LESS THAN 140/90: ICD-10-CM

## 2021-08-30 NOTE — TELEPHONE ENCOUNTER
Next 5 appointments (look out 90 days)      Sep 03, 2021 11:00 AM  PHYSICAL with Colby Trimble MD  [facility] [address] [facility] [address] [phone]          Routing refill request to provider for review/approval because:  Medication is reported/historical  Mechelle Solo BSN, RN        
PROBLEM DIAGNOSES  Problem: Polyp of corpus uteri  Assessment and Plan: scheduled D&C hysteroscopy with symphion on 2/12/2020  preop instructions, gi pro[hylaxis given  pt verbalized understanding  ucg on admit

## 2021-08-31 RX ORDER — DILTIAZEM HYDROCHLORIDE 240 MG/1
CAPSULE, EXTENDED RELEASE ORAL
Qty: 30 CAPSULE | Refills: 0 | Status: SHIPPED | OUTPATIENT
Start: 2021-08-31 | End: 2021-09-03

## 2021-09-02 NOTE — PATIENT INSTRUCTIONS
Patient Education   Personalized Prevention Plan  You are due for the preventive services outlined below.  Your care team is available to assist you in scheduling these services.  If you have already completed any of these items, please share that information with your care team to update in your medical record.  Health Maintenance Due   Topic Date Due     ANNUAL REVIEW OF HM ORDERS  Never done     PHQ-2  01/01/2021     Osteoporosis Screening  07/17/2021     Flu Vaccine (1) 09/01/2021     Colorectal Cancer Screening  08/17/2021     FALL RISK ASSESSMENT  09/02/2021             Start taking fish oil dailiy to help with the triglycerides   1,000 to 1,200 mh a day(s)       Please call our [facility] Scheduling Line at [phone] to schedule your:  Dexa Scan (bone density scan)          Patient Education     Prediabetes  You have been diagnosed with prediabetes. This means that the level of sugar (glucose) in your blood is too high. If you have prediabetes, you are at risk for developing type 2 diabetes. Type 2 diabetes is diagnosed when the level of glucose in the blood reaches a certain high level. With prediabetes, it hasn t reached this point yet. But it's higher than normal. It is vital to make lifestyle changes to lower your blood sugar, improve your health, and prevent diabetes.       Why worry about prediabetes?  Prediabetes is a condition where the body s cells have trouble using glucose in the blood for energy. As a result, too much glucose stays in the blood. This can affect how your heart and blood vessels work. Without changes in diet and lifestyle, the problem can get worse. Once you have type 2 diabetes, it's ongoing (chronic). It needs to be managed for the rest of your life. Diabetes can harm the body and your health by damaging organs, such as your eyes and kidneys. It makes you more likely to have heart disease. And it can damage nerves and blood vessels.   Who is a risk for prediabetes?  The  exact cause of prediabetes is not clear. But certain risk factors make a person more likely to have it. These include:     A family history of type 2 diabetes    Being overweight    Being older than age 45    Have high blood pressure or high cholesterol     Having had gestational diabetes    Not being physically active    Being ,  American, , , , or   Diagnosing prediabetes  Prediabetes may have no symptoms. Or you may have some of the symptoms of diabetes (see below). The diagnosis is made with a blood test. You may have 1 or more of these blood tests:      Fasting glucose test. Blood is taken and tested after you have fasted (not eaten) for at least 8 hours. A normal test result is 99 milligrams per deciliter (mg/dL) or lower. Prediabetes is 100 mg/dL to 125 mg/dL. Diabetes is 126 mg/dL or higher.    Glucose tolerance test. Your blood sugar is measured before and after you drink a very sugary liquid. A normal test result is 139 milligrams per deciliter (mg/dL) or lower. Prediabetes is 140 mg/dL to 199 mg/dL. Diabetes is 200 mg/dL or higher.    Hemoglobin A1c (HbA1c). Your HbA1c is normal if it is below 5.7%. Prediabetes is 5.7% to 6.4%. Diabetes is 6.5% or higher.   Treating prediabetes  The best way to treat prediabetes is to lose at least 5% to 7% of your current weight and be more physically active by getting at least 150 minutes a week of physical activity (at least 30 minutes daily.) When sitting for long periods of time, get up for short sessions of light activity every 30 minutes. These changes help the body s cells use blood sugar better. Even a small amount of weight loss can help. Work with your healthcare provider to make a plan to eat well and be more active. Keep in mind that small changes can add up. Other changes in your lifestyle (or even taking certain medicines, such as metformin) may make you less likely to develop  diabetes. Your provider can talk with you about these. Stopping smoking will decrease your risk of developing diabetes. Don't use e-cigarettes or vaping products. But you may gain some weight if you are not careful.   Ask your healthcare provider for a referral to a lifestyle intervention program. This program will help you get to and stay at a 7% weight loss and increase physical activity.   Follow-up  If not treated, prediabetes can turn into diabetes. This is a serious health condition. Take steps to stop this from happening. Follow the treatment plan you have been given. You may have your blood glucose tested again in about 12 to 18 months.   Diabetes symptoms   Let your healthcare provider know if you have any of the following:    Always feel very tired    Feel very thirsty or hungry much of the time    Have to urinate often    Lose weight for no reason    Feel numbness or tingling in your fingers or toes    Have cuts or bruises that don t seem to heal    Have blurry vision  [employer] last reviewed this educational content on 6/1/2019 2000-2021 [employer]. All rights reserved. This information is not intended as a substitute for professional medical care. Always follow your healthcare professional's instructions.

## 2021-09-02 NOTE — PROGRESS NOTES
"  SUBJECTIVE:   [place] Guzman is a 89 year old female who presents for Preventive Visit.    {Split Bill scripting  The purpose of this visit is to discuss your medical history and prevent health problems before you are sick. You may be responsible for a co-pay, coinsurance, or deductible if your visit today includes services such as checking on a sore throat, having an x-ray or lab test, or treating and evaluating a new or existing condition :310180}  Patient has been advised of split billing requirements and indicates understanding: {Yes and No:725231}  Are you in the first 12 months of your Medicare Part B coverage?  { :145778::\"No\"}    Physical Health:    In general, how would you rate your overall physical health? { :597151}    Outside of work, how many days during the week do you exercise? { :597565}    Outside of work, approximately how many minutes a day do you exercise?{ :098421}    If you drink alcohol do you typically have >3 drinks per day or >7 drinks per week? { :022343}    Do you usually eat at least 4 servings of fruit and vegetables a day, include whole grains & fiber and avoid regularly eating high fat or \"junk\" foods? { :313576::\"Yes\"}    Do you have any problems taking medications regularly?  { :095053::\"No\"}    Do you have any side effects from medications? { :252938}    Needs assistance for the following daily activities: { :365966}    Which of the following safety concerns are present in your home?  { :057180::\"none identified\"}     Hearing impairment: { :377458}    In the past 6 months, have you been bothered by leaking of urine? { :795815}    Mental Health:    In general, how would you rate your overall mental or emotional health? { :195845}  PHQ-2 Score:      Do you feel safe in your environment? { :722895}    Have you ever done Advance Care Planning? (For example, a Health Directive, POLST, or a discussion with a medical provider or your loved ones about your wishes): { " ":169621}    Additional concerns to address?  {If YES, notify patient they may be responsible for a copay, coinsurance or deductible if the visit today includes services such as checking on a sore throat, having an x-ray or lab test, or treating and evaluating a new or existing condition :483704::\"No\"}    Fall risk:  { :179333}  {If any of the above assessments are answered yes, consider ordering appropriate referrals (Optional):557030::\"click delete button to remove this line now\"}  Cognitive Screening: { :903559}    {Do you have sleep apnea, excessive snoring or daytime drowsiness? (Optional):253187}    {Outside tests to abstract? :996730}    {additional problems to add (Optional):494469}    Reviewed and updated as needed this visit by clinical staff                 Reviewed and updated as needed this visit by Provider                Social History     Tobacco Use     Smoking status: Never Smoker     Smokeless tobacco: Never Used   Substance Use Topics     Alcohol use: No     Alcohol/week: 0.0 standard drinks                           Current providers sharing in care for this patient include: {Rooming staff:  Please update Care Team in Rooming Activity, refresh this note and then delete this statement}  Patient Care Team:  Colby Trimble MD as PCP - General (Family Practice)  Colby Trimble MD as Assigned PCP  Srikanth Craig MD as Assigned Surgical Provider  Romi Corrigan MD as Assigned Nephrology Provider  Romi Corrigan MD as MD (Nephrology)    The following health maintenance items are reviewed in Epic and correct as of today:  Health Maintenance   Topic Date Due     ANNUAL REVIEW OF HM ORDERS  Never done     PHQ-2  01/01/2021     DEXA  07/17/2021     INFLUENZA VACCINE (1) 09/01/2021     COLORECTAL CANCER SCREENING  08/17/2021     FALL RISK ASSESSMENT  09/02/2021     EYE EXAM  03/09/2022     BMP  08/27/2022     LIPID  08/27/2022     MICROALBUMIN  08/27/2022     MEDICARE ANNUAL " "WELLNESS VISIT  2022     DTAP/TDAP/TD IMMUNIZATION (3 - Td or Tdap) 2023     ADVANCE CARE PLANNING  2025     Pneumococcal Vaccine: 65+ Years  Completed     ZOSTER IMMUNIZATION  Completed     COVID-19 Vaccine  Completed     IPV IMMUNIZATION  Aged Out     MENINGITIS IMMUNIZATION  Aged Out     HEPATITIS B IMMUNIZATION  Aged Out     {Chronicprobdata (Optional):105056}  {Decision Support (Optional):698620}    ROS:  {ROS COMP:864733}    OBJECTIVE:   LMP 1980  Estimated body mass index is 20.36 kg/m  as calculated from the following:    Height as of 20: 1.499 m (4' 11\").    Weight as of 21: 45.7 kg (100 lb 12.8 oz).  EXAM:   {Exam :179967}    {Diagnostic Test Results (Optional):134603::\"Diagnostic Test Results:\",\"Labs reviewed in Epic\"}    ASSESSMENT / PLAN:   {Diag Picklist:891787}    Patient has been advised of split billing requirements and indicates understanding: {YES / NO:942299::\"Yes\"}    COUNSELING:  {Medicare Counselin}    Estimated body mass index is 20.36 kg/m  as calculated from the following:    Height as of 20: 1.499 m (4' 11\").    Weight as of 21: 45.7 kg (100 lb 12.8 oz).    {Weight Management Plan (ACO) Complete if BMI is abnormal-  Ages 18-64  BMI >24.9.  Age 65+ with BMI <23 or >30 (Optional):873240}    She reports that she has never smoked. She has never used smokeless tobacco.    Appropriate preventive services were discussed with this patient, including applicable screening as appropriate for cardiovascular disease, diabetes, osteopenia/osteoporosis, and glaucoma.  As appropriate for age/gender, discussed screening for colorectal cancer, prostate cancer, breast cancer, and cervical cancer. Checklist reviewing preventive services available has been given to the patient.    Reviewed patients plan of care and provided an AVS. The {CarePlan:039977} for [place] meets the Care Plan requirement. This Care Plan has been established and reviewed with the " {PATIENT, FAMILY MEMBER, CAREGIVER:551379}.    Counseling Resources:  ATP IV Guidelines  Pooled Cohorts Equation Calculator  Breast Cancer Risk Calculator  BRCA-Related Cancer Risk Assessment: FHS-7 Tool  FRAX Risk Assessment  ICSI Preventive Guidelines  Dietary Guidelines for Americans, 2010  USDA's MyPlate  ASA Prophylaxis  Lung CA Screening    Colby Trimble MD  [facility]

## 2021-09-03 ENCOUNTER — OFFICE VISIT (OUTPATIENT)
Dept: FAMILY MEDICINE | Facility: CLINIC | Age: 86
End: 2021-09-03
Payer: MEDICARE

## 2021-09-03 VITALS
HEIGHT: 59 IN | DIASTOLIC BLOOD PRESSURE: 75 MMHG | WEIGHT: 105 LBS | BODY MASS INDEX: 21.17 KG/M2 | HEART RATE: 78 BPM | TEMPERATURE: 98.6 F | SYSTOLIC BLOOD PRESSURE: 127 MMHG | OXYGEN SATURATION: 99 %

## 2021-09-03 DIAGNOSIS — Z12.11 SCREEN FOR COLON CANCER: Primary | ICD-10-CM

## 2021-09-03 DIAGNOSIS — I10 ESSENTIAL HYPERTENSION WITH GOAL BLOOD PRESSURE LESS THAN 140/90: ICD-10-CM

## 2021-09-03 DIAGNOSIS — Z78.0 ASYMPTOMATIC MENOPAUSAL STATE: ICD-10-CM

## 2021-09-03 DIAGNOSIS — Z00.00 ENCOUNTER FOR MEDICARE ANNUAL WELLNESS EXAM: ICD-10-CM

## 2021-09-03 DIAGNOSIS — M85.80 OSTEOPENIA, UNSPECIFIED LOCATION: ICD-10-CM

## 2021-09-03 DIAGNOSIS — R73.02 IGT (IMPAIRED GLUCOSE TOLERANCE): ICD-10-CM

## 2021-09-03 DIAGNOSIS — E83.52 HYPERCALCEMIA: ICD-10-CM

## 2021-09-03 DIAGNOSIS — N18.30 STAGE 3 CHRONIC KIDNEY DISEASE, UNSPECIFIED WHETHER STAGE 3A OR 3B CKD (H): ICD-10-CM

## 2021-09-03 DIAGNOSIS — N25.81 SECONDARY RENAL HYPERPARATHYROIDISM (H): ICD-10-CM

## 2021-09-03 DIAGNOSIS — E78.5 HYPERLIPIDEMIA LDL GOAL <130: ICD-10-CM

## 2021-09-03 PROCEDURE — G0439 PPPS, SUBSEQ VISIT: HCPCS | Performed by: FAMILY MEDICINE

## 2021-09-03 RX ORDER — VERAPAMIL HYDROCHLORIDE 240 MG/1
240 TABLET, FILM COATED, EXTENDED RELEASE ORAL DAILY
Qty: 30 TABLET | Refills: 1 | Status: SHIPPED | OUTPATIENT
Start: 2021-09-03 | End: 2021-10-28

## 2021-09-03 RX ORDER — FUROSEMIDE 40 MG
40 TABLET ORAL 2 TIMES DAILY
Qty: 180 TABLET | Refills: 3 | Status: SHIPPED | OUTPATIENT
Start: 2021-09-03 | End: 2022-09-07

## 2021-09-03 RX ORDER — ATORVASTATIN CALCIUM 10 MG/1
TABLET, FILM COATED ORAL
Qty: 90 TABLET | Refills: 3 | Status: SHIPPED | OUTPATIENT
Start: 2021-09-03 | End: 2021-11-04

## 2021-09-03 RX ORDER — LOSARTAN POTASSIUM 100 MG/1
100 TABLET ORAL DAILY
Qty: 90 TABLET | Refills: 3 | Status: SHIPPED | OUTPATIENT
Start: 2021-09-03 | End: 2021-11-04

## 2021-09-03 ASSESSMENT — ACTIVITIES OF DAILY LIVING (ADL)
CURRENT_FUNCTION: TELEPHONE REQUIRES ASSISTANCE
CURRENT_FUNCTION: TRANSPORTATION REQUIRES ASSISTANCE

## 2021-09-03 ASSESSMENT — ENCOUNTER SYMPTOMS
ABDOMINAL PAIN: 0
FREQUENCY: 1
DYSURIA: 0
PARESTHESIAS: 0
HEARTBURN: 0
JOINT SWELLING: 0
WEAKNESS: 0
SORE THROAT: 0
ARTHRALGIAS: 0
DIZZINESS: 0
COUGH: 0
DIARRHEA: 0
HEADACHES: 0
CONSTIPATION: 0
CHILLS: 0
NAUSEA: 0
FEVER: 0
EYE PAIN: 0
HEMATOCHEZIA: 0
MYALGIAS: 0
PALPITATIONS: 0
BREAST MASS: 0
HEMATURIA: 0
NERVOUS/ANXIOUS: 0
SHORTNESS OF BREATH: 0

## 2021-09-03 ASSESSMENT — MIFFLIN-ST. JEOR: SCORE: 796.91

## 2021-09-03 ASSESSMENT — PAIN SCALES - GENERAL: PAINLEVEL: NO PAIN (0)

## 2021-09-03 NOTE — PROGRESS NOTES
"SUBJECTIVE:   [place] Guzman is a 89 year old female who presents for Preventive Visit.      Patient has been advised of split billing requirements and indicates understanding:    Are you in the first 12 months of your Medicare coverage?      Healthy Habits:     In general, how would you rate your overall health?  Good    Frequency of exercise:  6-7 days/week    Duration of exercise:  30-45 minutes    Do you usually eat at least 4 servings of fruit and vegetables a day, include whole grains    & fiber and avoid regularly eating high fat or \"junk\" foods?  Yes    Taking medications regularly:  Yes    Medication side effects:  Not applicable    Ability to successfully perform activities of daily living:  Telephone requires assistance and transportation requires assistance    Home Safety:  No safety concerns identified    Hearing Impairment:  Difficulty following a conversation in a noisy restaurant or crowded room, feel that people are mumbling or not speaking clearly and need to ask people to speak up or repeat themselves    In the past 6 months, have you been bothered by leaking of urine?  No    In general, how would you rate your overall mental or emotional health?  Good      PHQ-2 Total Score: 0    Additional concerns today:  No    Do you feel safe in your environment? Yes    Have you ever done Advance Care Planning? (For example, a Health Directive, POLST, or a discussion with a medical provider or your loved ones about your wishes): Yes, advance care planning is on file.       Fall risk  Fallen 2 or more times in the past year?: No  Any fall with injury in the past year?: No    Cognitive Screening   1) Repeat 3 items (Leader, Season, Table)    2) Clock draw: NORMAL  3) 3 item recall: Recalls 3 objects  Results: 0 items recalled: PROBABLE COGNITIVE IMPAIRMENT, **INFORM PROVIDER**    Mini-CogTM Copyright WILLIAM Godfrey. Licensed by the author for use in [facility] [employer]; reprinted with permission (ankur@.[facility]). " All rights reserved.      Do you have sleep apnea, excessive snoring or daytime drowsiness?: no    Reviewed and updated as needed this visit by clinical staff  Tobacco  Allergies  Meds   Med Hx  Surg Hx  Fam Hx  Soc Hx        Reviewed and updated as needed this visit by Provider  Tobacco               Social History     Tobacco Use     Smoking status: Never Smoker     Smokeless tobacco: Never Used   Substance Use Topics     Alcohol use: No     Alcohol/week: 0.0 standard drinks     If you drink alcohol do you typically have >3 drinks per day or >7 drinks per week? No    Alcohol Use 9/3/2021   Prescreen: >3 drinks/day or >7 drinks/week? No   Prescreen: >3 drinks/day or >7 drinks/week? -               Current providers sharing in care for this patient include:   Patient Care Team:  Colby Trimble MD as PCP - General (Family Practice)  Colby Trimble MD as Assigned PCP  Srikanth Craig MD as Assigned Surgical Provider  Romi Corrigan MD as Assigned Nephrology Provider  Romi Corrigan MD as MD (Nephrology)    The following health maintenance items are reviewed in Epic and correct as of today:  Health Maintenance Due   Topic Date Due     ANNUAL REVIEW OF HM ORDERS  Never done     DEXA  07/17/2021     INFLUENZA VACCINE (1) 09/01/2021     FALL RISK ASSESSMENT  09/02/2021     COLORECTAL CANCER SCREENING  08/17/2021             Pertinent mammograms are reviewed under the imaging tab.    Review of Systems   Constitutional: Negative for chills and fever.   HENT: Positive for hearing loss. Negative for congestion, ear pain and sore throat.    Eyes: Positive for visual disturbance. Negative for pain.   Respiratory: Negative for cough and shortness of breath.    Cardiovascular: Negative for chest pain, palpitations and peripheral edema.   Gastrointestinal: Negative for abdominal pain, constipation, diarrhea, heartburn, hematochezia and nausea.   Breasts:  Negative for tenderness, breast mass  "and discharge.   Genitourinary: Positive for frequency and urgency. Negative for dysuria, genital sores, hematuria, pelvic pain, vaginal bleeding and vaginal discharge.   Musculoskeletal: Negative for arthralgias, joint swelling and myalgias.   Skin: Negative for rash.   Neurological: Negative for dizziness, weakness, headaches and paresthesias.   Psychiatric/Behavioral: Negative for mood changes. The patient is not nervous/anxious.          OBJECTIVE:   /75   Pulse 78   Temp 98.6  F (37  C) (Tympanic)   Ht 1.499 m (4' 11\")   Wt 47.6 kg (105 lb)   LMP 12/16/1980   SpO2 99%   BMI 21.21 kg/m   Estimated body mass index is 21.21 kg/m  as calculated from the following:    Height as of this encounter: 1.499 m (4' 11\").    Weight as of this encounter: 47.6 kg (105 lb).  Physical Exam          ASSESSMENT / PLAN:       Patient has been advised of split billing requirements and indicates understanding: Yes  COUNSELING:  Reviewed preventive health counseling, as reflected in patient instructions       Regular exercise       Healthy diet/nutrition       Vision screening       Dental care       Osteoporosis prevention/bone health       Colon cancer screening       Advanced Planning     Estimated body mass index is 21.21 kg/m  as calculated from the following:    Height as of this encounter: 1.499 m (4' 11\").    Weight as of this encounter: 47.6 kg (105 lb).        She reports that she has never smoked. She has never used smokeless tobacco.      Appropriate preventive services were discussed with this patient, including applicable screening as appropriate for cardiovascular disease, diabetes, osteopenia/osteoporosis, and glaucoma.  As appropriate for age/gender, discussed screening for colorectal cancer, prostate cancer, breast cancer, and cervical cancer. Checklist reviewing preventive services available has been given to the patient.    Reviewed patients plan of care and provided an AVS. The Basic Care Plan " (routine screening as documented in Health Maintenance) for [place] meets the Care Plan requirement. This Care Plan has been established and reviewed with the Patient.    Counseling Resources:  ATP IV Guidelines  Pooled Cohorts Equation Calculator  Breast Cancer Risk Calculator  Breast Cancer: Medication to Reduce Risk  FRAX Risk Assessment  ICSI Preventive Guidelines  Dietary Guidelines for Americans, 2010  USDA's MyPlate  ASA Prophylaxis  Lung CA Screening    Colby Trimble MD  [facility]    Identified Health Risks:  --------------------------------------------------------------------------------------------------------------------------------------  SUBJECTIVE:  [place] Guzman is a 89 year old female who presents to the clinic today for a routine physical exam.    The patient's last physical was 9-20-20    Cholesterol   Date Value Ref Range Status   08/27/2021 177 <200 mg/dL Final     Comment:     Age 0-19 years  Desirable: <170 mg/dL  Borderline high:  170-199 mg/dl  High:            >199 mg/dl    Age 20 years and older  Desirable: <200 mg/dL   08/04/2020 169 <200 mg/dL Final   05/24/2019 176 <200 mg/dL Final     HDL Cholesterol   Date Value Ref Range Status   08/04/2020 65 >49 mg/dL Final   05/24/2019 71 >49 mg/dL Final     Direct Measure HDL   Date Value Ref Range Status   08/27/2021 61 >=50 mg/dL Final     Comment:     0-19 years:       Greater than or equal to 45 mg/dL   Low: Less than 40 mg/dL   Borderline low: 40-44 mg/dL     20 years and older:   Female: Greater than or equal to 50 mg/dL   Male:   Greater than or equal to 40 mg/dL          LDL Cholesterol Calculated   Date Value Ref Range Status   08/27/2021 79 <=100 mg/dL Final     Comment:     Age 0-19 years:  Desirable: 0-110 mg/dL   Borderline high: 110-129 mg/dL   High: >= 130 mg/dL    Age 20 years and older:  Desirable: <100mg/dL  Above desirable: 100-129 mg/dL   Borderline high: 130-159 mg/dL   High: 160-189 mg/dL   Very  high: >= 190 mg/dL   08/04/2020 66 <100 mg/dL Final     Comment:     Desirable:       <100 mg/dl   05/24/2019 68 <100 mg/dL Final     Comment:     Desirable:       <100 mg/dl     Triglycerides   Date Value Ref Range Status   08/27/2021 184 (H) <150 mg/dL Final     Comment:     0-9 years:  Normal:    Less than 75 mg/dL  Borderline high:  75-99 mg/dL  High:             Greater than or equal to 100 mg/dL    0-19 years:  Normal:    Less than 90 mg/dL  Borderline high:   mg/dL  High:             Greater than or equal to 130 mg/dL    20 years and older:  Normal:    Less than 150 mg/dL  Borderline high:  150-199 mg/dL  High:             200-499 mg/dL  Very high:   Greater than or equal to 500 mg/dL   08/04/2020 191 (H) <150 mg/dL Final     Comment:     Borderline high:  150-199 mg/dl  High:             200-499 mg/dl  Very high:       >499 mg/dl  Fasting specimen     05/24/2019 185 (H) <150 mg/dL Final     Comment:     Borderline high:  150-199 mg/dl  High:             200-499 mg/dl  Very high:       >499 mg/dl  Fasting specimen       Cholesterol/HDL Ratio   Date Value Ref Range Status   03/16/2015 2.6 0.0 - 5.0 Final   03/03/2014 3.4 0.0 - 5.0 Final     The patient's last fasting lipid panel was done 1 weeks ago and the results are listed above.    The ASCVD Risk score (Miguel ELMER Jr., et al., 2013) failed to calculate for the following reasons:    The 2013 ASCVD risk score is only valid for ages 40 to 79            The patient reports that she has been treated for high blood pressure.    The patient reports that she does not take a daily aspirin.        No results found for: HCVAB   The patient reports that she has not been screened for Hepatitis C    (Screen all baby boomers once per CDC-- the generation born from 1945 through 1965 and per USPTF screen age 19 to 79 especially younger people who have used IV drugs)  She would not like to have an Hepatitis C test today    No results found for: HIAGAB  The patient  reports that she has not been screened for HIV   (Screen all 15 to 64 years old)  She would not like to have an HIV test today                Immunization History   Administered Date(s) Administered     COVID-19,PF,Pfizer 02/03/2021, 02/24/2021     Influenza (High Dose) 3 valent vaccine 08/29/2016, 09/26/2017, 09/24/2019     Influenza (IIV3) PF 10/01/2009, 10/12/2010, 10/20/2011, 10/20/2015     Influenza, Quad, High Dose, Pf, 65yr + 09/02/2020     Pneumo Conj 13-V (2010&after) 03/18/2015     Pneumococcal 23 valent 12/28/1999, 05/10/2007, 04/06/2012     TD (ADULT, 7+) 05/10/2007     TDAP Vaccine (Adacel) 03/01/2013     Td (Adult), Adsorbed 12/28/1999     Zoster vaccine recombinant adjuvanted (SHINGRIX) 06/04/2019, 09/24/2019     Zoster vaccine, live 12/10/2009     The patient has not started the Gardasil vaccination series.  The patient's believes that her last tetanus shot was given 8 year(s) ago.   The patient believes that she has had a Shingrix in the past  The patient believes that she has had a PPSV23 in the past.  The patient believes that she has had a PCV13 in the past.  The patient believes that she has not had a seasonal flu vaccination this fall or winter.  The patient would like to have a no vaccinations today      No results found for this or any previous visit.]   The patient reports a family history of colon cancer in her mother at age 84.  The patient reports that she has had a colonoscopy.  The patient denies a family history diabetes.      The patient reports that she does performs a self breast exam monthly.  The patient reports a family history of breast cancer in a daughter.  The patient does not want to have a mammogram done.  The patient does not want to have a Pap smear done.  She reports that she has not had an abnormal pap smear.    The patient reports that she eats or drinks 2 servings of dairy products per day. She does take a 600 calcium supplement 2 times daily..  The patient reports  that she has had a bone density scan. Her  last scan was in 2019 and she  report that is was abnormal. The patient was told to have this repeated in 2 years.    (screen women 65+ or 50+ with risk factors)     The patient reports that she does not have dental appointments as she does not have any teeth left.    The patient reports that she  has an eye examination approximately every 0.5 year(s).        Do you currently smoke? No  How many years have you smoked? 0  How many packs per day did you smoke on average? N/A  (if more than 30 pack year history and the patient is age 55-80 consider ordering an annual low dose radiation lung CT to screen for cancer)  (Do not order if patient has quit more than 15 years ago or has a health condition that limits life expectancy or could not tolerate curative lung surgery)  Are you interested having a lung CT to screen for lung cancer? 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     Hearing loss      HTN (hypertension)      Hyperlipidemia      MACULAR DEGENERATION (SENILE) OF RETINA, DRY OU 2/11/2011     Macular degeneration, dry, mod, ou 8/31/2016     Osteoporosis        Past Surgical History:   Procedure Laterality Date     BLEPHAROPLASTY BILATERAL  1/2004    both eyes, upper lids     CATARACT IOL, RT/LT       COLONOSCOPY  12/2010    Q 5 years     FRACTURE TX, ANKLE RT/LT  1997    RIGHT ANKLE ORIF     LASER YAG CAPSULOTOMY  12/2005; 1/2006    right eye; left eye     PHACOEMULSIFICATION WITH STANDARD INTRAOCULAR LENS IMPLANT  8/2003; 10/2003    left eye; right eye     REPAIR PTOSIS         Family History   Problem Relation Age of Onset     Cancer - colorectal Mother      C.A.D. Brother      Cerebrovascular Disease Brother      Heart Disease Brother      Asthma Daughter      Breast Cancer Daughter      Thyroid Disease Daughter      Glaucoma No family hx of      Macular  Degeneration No family hx of        Social History     Socioeconomic History     Marital status:      Spouse name: Not on file     Number of children: 6     Years of education: Not on file     Highest education level: Not on file   Occupational History     Employer: RETIRED   Tobacco Use     Smoking status: Never Smoker     Smokeless tobacco: Never Used   Substance and Sexual Activity     Alcohol use: No     Alcohol/week: 0.0 standard drinks     Drug use: No     Sexual activity: Never   Other Topics Concern     Parent/sibling w/ CABG, MI or angioplasty before 65F 55M? Not Asked   Social History Narrative     Not on file     Social Determinants of Health     Financial Resource Strain:      Difficulty of Paying Living Expenses:    Food Insecurity:      Worried About Running Out of Food in the Last Year:      Ran Out of Food in the Last Year:    Transportation Needs:      Lack of Transportation (Medical):      Lack of Transportation (Non-Medical):    Physical Activity:      Days of Exercise per Week:      Minutes of Exercise per Session:    Stress:      Feeling of Stress :    Social Connections:      Frequency of Communication with Friends and Family:      Frequency of Social Gatherings with Friends and Family:      Attends [employer] Services:      Active Member of Clubs or Organizations:      Attends Club or Organization Meetings:      Marital Status:    Intimate Partner Violence:      Fear of Current or Ex-Partner:      Emotionally Abused:      Physically Abused:      Sexually Abused:        Current Outpatient Medications   Medication Sig Dispense Refill     atorvastatin (LIPITOR) 10 MG tablet TAKE ONE TABLET BY MOUTH AT BEDTIME 90 tablet 3     Calcium Carb-Cholecalciferol (CALCIUM-VITAMIN D) 600-400 MG-UNIT TABS Take 1 tablet by mouth twice daily 180 tablet 0     CARTIA  MG 24 hr capsule TAKE 1 CAPSULE BY MOUTH ONCE DAILY       DILT- MG 24 hr ER beaded capsule Take 1 capsule by mouth once daily 30  "capsule 0     furosemide (LASIX) 40 MG tablet Take 1 tablet (40 mg) by mouth 2 times daily 180 tablet 3     latanoprost (XALATAN) 0.005 % ophthalmic solution Place 1 drop into both eyes At Bedtime 9 mL 4     losartan (COZAAR) 100 MG tablet Take 1 tablet (100 mg) by mouth daily 90 tablet 3     MULTI-VITAMIN OR None Entered       timolol maleate (TIMOPTIC) 0.5 % ophthalmic solution Place 1 drop into both eyes daily 15 mL 4       PHYSICAL EXAMINATION:  Blood pressure 127/75, pulse 78, temperature 98.6  F (37  C), temperature source Tympanic, height 1.499 m (4' 11\"), weight 47.6 kg (105 lb), last menstrual period 12/16/1980, SpO2 99 %, not currently breastfeeding.  General appearance - healthy, alert and no distress  Skin - Skin color, texture, turgor normal. No rashes or lesions.  Head - Normocephalic. No masses, lesions, tenderness or abnormalities  Eyes - conjunctivae/corneas clear. PERRL, EOM's intact. Fundi benign  Ears - External ears normal. Canals clear. TM's normal.  Nose/Sinuses - Nares normal. Septum midline. Mucosa normal. No drainage or sinus tenderness.  Oropharynx - Lips, mucosa, and tongue normal. Teeth and gums normal.  Neck - Neck supple. No adenopathy. Thyroid symmetric, normal size,  Lungs - Percussion normal. Good diaphragmatic excursion. Lungs clear  Heart - PMI normal. No lifts, heaves, or thrills. RRR. No murmurs, clicks gallops or rub  Breasts - Breasts normal to inspection and palpation. Axillae negative  Abdomen - Abdomen soft, non-tender. BS normal. No masses, organomegaly  Extremities - Extremities normal. No deformities, edema, or skin discoloration.  Musculoskeletal - Spine ROM normal. Muscular strength intact.  Peripheral pulses - radial=4/4, femoral=4/4, popliteal=4/4, dorsalis pedis=4/4,  Neuro - Gait normal. Reflexes normal and symmetric. Sensation grossly WNL.        Lab on 08/27/2021   Component Date Value Ref Range Status     Cholesterol 08/27/2021 177  <200 mg/dL Final    Age 0-19 " years  Desirable: <170 mg/dL  Borderline high:  170-199 mg/dl  High:            >199 mg/dl    Age 20 years and older  Desirable: <200 mg/dL     Triglycerides 08/27/2021 184* <150 mg/dL Final    0-9 years:  Normal:    Less than 75 mg/dL  Borderline high:  75-99 mg/dL  High:             Greater than or equal to 100 mg/dL    0-19 years:  Normal:    Less than 90 mg/dL  Borderline high:   mg/dL  High:             Greater than or equal to 130 mg/dL    20 years and older:  Normal:    Less than 150 mg/dL  Borderline high:  150-199 mg/dL  High:             200-499 mg/dL  Very high:   Greater than or equal to 500 mg/dL     Direct Measure HDL 08/27/2021 61  >=50 mg/dL Final    0-19 years:       Greater than or equal to 45 mg/dL   Low: Less than 40 mg/dL   Borderline low: 40-44 mg/dL     20 years and older:   Female: Greater than or equal to 50 mg/dL   Male:   Greater than or equal to 40 mg/dL          LDL Cholesterol Calculated 08/27/2021 79  <=100 mg/dL Final    Age 0-19 years:  Desirable: 0-110 mg/dL   Borderline high: 110-129 mg/dL   High: >= 130 mg/dL    Age 20 years and older:  Desirable: <100mg/dL  Above desirable: 100-129 mg/dL   Borderline high: 130-159 mg/dL   High: 160-189 mg/dL   Very high: >= 190 mg/dL     Non HDL Cholesterol 08/27/2021 116  <130 mg/dL Final    0-19 years:  Desirable:          Less than 120 mg/dL  Borderline high:   120-144 mg/dL  High:                   Greater than or equal to 145 mg/dL    20 years and older:  Desirable:          130 mg/dL  Above Desirable: 130-159 mg/dL  Borderline high:   160-189 mg/dL  High:               190-219 mg/dL  Very high:     Greater than or equal to 220 mg/dL     Patient Fasting > 8hrs? 08/27/2021 Yes   Final     Creatinine Urine mg/dL 08/27/2021 21  mg/dL Final     Albumin Urine mg/L 08/27/2021 12  mg/L Final     Albumin Urine mg/g Cr 08/27/2021 57.14* 0.00 - 25.00 mg/g Cr Final     WBC Count 08/27/2021 6.5  4.0 - 11.0 10e3/uL Final     RBC Count 08/27/2021  3.68* 3.80 - 5.20 10e6/uL Final     Hemoglobin 08/27/2021 11.5* 11.7 - 15.7 g/dL Final     Hematocrit 08/27/2021 34.6* 35.0 - 47.0 % Final     MCV 08/27/2021 94  78 - 100 fL Final     MCH 08/27/2021 31.3  26.5 - 33.0 pg Final     MCHC 08/27/2021 33.2  31.5 - 36.5 g/dL Final     RDW 08/27/2021 12.6  10.0 - 15.0 % Final     Platelet Count 08/27/2021 277  150 - 450 10e3/uL Final     Parathyroid Hormone Intact 08/27/2021 85* 18 - 80 pg/mL Final     Sodium 08/27/2021 138  133 - 144 mmol/L Final     Potassium 08/27/2021 4.4  3.4 - 5.3 mmol/L Final     Chloride 08/27/2021 104  94 - 109 mmol/L Final     Carbon Dioxide (CO2) 08/27/2021 28  20 - 32 mmol/L Final     Anion Gap 08/27/2021 6  3 - 14 mmol/L Final     Urea Nitrogen 08/27/2021 52* 7 - 30 mg/dL Final     Creatinine 08/27/2021 1.27* 0.52 - 1.04 mg/dL Final     Calcium 08/27/2021 9.9  8.5 - 10.1 mg/dL Final     Glucose 08/27/2021 117* 70 - 99 mg/dL Final     Albumin 08/27/2021 4.1  3.4 - 5.0 g/dL Final     Phosphorus 08/27/2021 3.6  2.5 - 4.5 mg/dL Final     GFR Estimate 08/27/2021 37* >60 mL/min/1.73m2 Final    As of July 11, 2021, eGFR is calculated by the CKD-EPI creatinine equation, without race adjustment. eGFR can be influenced by muscle mass, exercise, and diet. The reported eGFR is an estimation only and is only applicable if the renal function is stable.       ASSESSMENT:    ICD-10-CM    1. Screen for colon cancer  Z12.11 Adult Gastro Ref - Procedure Only   2. Encounter for Medicare annual wellness exam  Z00.00        Well-Adult Physical Exam.  Health Maintenance Due   Topic Date Due     ANNUAL REVIEW OF  ORDERS  Never done     DEXA  07/17/2021     INFLUENZA VACCINE (1) 09/01/2021     FALL RISK ASSESSMENT  09/02/2021     COLORECTAL CANCER SCREENING  08/17/2021     Health Maintenance   Topic Date Due     ANNUAL REVIEW OF HM ORDERS  Never done     DEXA  07/17/2021     INFLUENZA VACCINE (1) 09/01/2021     FALL RISK ASSESSMENT  09/02/2021     COLORECTAL CANCER  SCREENING  08/17/2021     EYE EXAM  03/09/2022     BMP  08/27/2022     LIPID  08/27/2022     MICROALBUMIN  08/27/2022     MEDICARE ANNUAL WELLNESS VISIT  09/03/2022     DTAP/TDAP/TD IMMUNIZATION (3 - Td or Tdap) 03/01/2023     ADVANCE CARE PLANNING  09/03/2026     PHQ-2  Completed     Pneumococcal Vaccine: 65+ Years  Completed     ZOSTER IMMUNIZATION  Completed     COVID-19 Vaccine  Completed     IPV IMMUNIZATION  Aged Out     MENINGITIS IMMUNIZATION  Aged Out     HEPATITIS B IMMUNIZATION  Aged Out         HEALTH CARE MAINTENENCE: The recommended screening tests and vaccinatons for this patient have been discussed as above.  The appropriate tests and vaccinations  have been ordered or declined by the patient. Please see the orders in EPIC.The patient specifically declines: n/a     Immunization Status:  up to date and documented    Patient Active Problem List   Diagnosis     Osteoporosis, now in osteopenia range repeat bone density scan q 2 yrs     HL (hearing loss)     Gallstones     Malignant basal cell neoplasm of skin     Hyperlipidemia LDL goal <130     Pseudophakia, Yag Caps, ou     Advanced directives, counseling/discussion     Borderline glaucoma with ocular hypertension, bilateral - treated     Family history of colon cancer     Posterior vitreous detachment, bilateral     Essential hypertension with goal blood pressure less than 140/90     10 year risk of MI or stroke 7.5% or greater, 39.7% in September 2017 on atorvastatin 10 mg     CKD (chronic kidney disease) stage 3, GFR 30-59 ml/min     IGT (impaired glucose tolerance)     High triglycerides     Advanced atrophic nonexudative age-related macular degeneration of both eyes with subfoveal involvement     Hip pain     Pure hypercholesterolemia     Tendonitis     Secondary renal hyperparathyroidism (H)        ATP III Guidelines  ICSI Preventive Guidelines    PLAN:  Change diltiazem to verapamil as her diltiazem went to a more expensive tier  Follow up in 1  month(s) for a blood pressure recheck and vitamin D check    Breast self exam demonstrated and recommended  Pap smear was not recommended per the ACOG guidelines  Discussed calcium intake, vitamins and supplements. Recommended 1800 mg of calcium daily  Bone density scan (DEXA) recommended  Sunscreen use was recommended especially in the area of tatoos  Refills on chronic medication given  Recommended dental exams every 6 months  Recommended eye exam every 1-2 years  Follow up in 1 year for the next preventative medical visit      Osteoporosis [place] indications:  Post menopausal women with a hip or vertebral fracture  Any T score less than or equal to -2.5  Any T score between -1.0 and -2.5 and a 10 year hip fracture probability > or = to 3%  Any T score between -1.0 and -2.5 and a 10 year probability or a major osteoporosis-related fracture  > or = 20% based on FRAX score  [email or web address].ac.[email or web address]            Body mass index is 21.21 kg/m .      Follow up in 4 week for a blood pressure recheck   We will recheck the bmp at that time and the Vitamin D

## 2021-09-03 NOTE — NURSING NOTE
"Chief Complaint   Patient presents with     Wellness Visit       Initial BP (!) 186/66   Pulse 78   Temp 98.6  F (37  C) (Tympanic)   Ht 1.499 m (4' 11\")   Wt 47.6 kg (105 lb)   LMP 12/16/1980   SpO2 99%   BMI 21.21 kg/m   Estimated body mass index is 21.21 kg/m  as calculated from the following:    Height as of this encounter: 1.499 m (4' 11\").    Weight as of this encounter: 47.6 kg (105 lb).  Medication Reconciliation: complete  Marilu Corona, [facility]  "

## 2021-09-16 ENCOUNTER — OFFICE VISIT (OUTPATIENT)
Dept: OPHTHALMOLOGY | Facility: CLINIC | Age: 86
End: 2021-09-16
Payer: MEDICARE

## 2021-09-16 DIAGNOSIS — H40.053 BORDERLINE GLAUCOMA WITH OCULAR HYPERTENSION, BILATERAL: Primary | ICD-10-CM

## 2021-09-16 PROCEDURE — 92133 CPTRZD OPH DX IMG PST SGM ON: CPT | Performed by: OPHTHALMOLOGY

## 2021-09-16 PROCEDURE — 92012 INTRM OPH EXAM EST PATIENT: CPT | Performed by: OPHTHALMOLOGY

## 2021-09-16 PROCEDURE — 92083 EXTENDED VISUAL FIELD XM: CPT | Performed by: OPHTHALMOLOGY

## 2021-09-16 ASSESSMENT — TONOMETRY
IOP_METHOD: APPLANATION
OS_IOP_MMHG: 17
OD_IOP_MMHG: 17

## 2021-09-16 ASSESSMENT — VISUAL ACUITY
CORRECTION_TYPE: GLASSES
METHOD: SNELLEN - LINEAR
OS_CC: CF @ 2'
OD_CC: 20/300 ECC

## 2021-09-16 ASSESSMENT — SLIT LAMP EXAM - LIDS
COMMENTS: GOOD CREASE AND COSMESIS, 1+ PTOSIS
COMMENTS: GOOD CREASE AND COSMESIS

## 2021-09-16 ASSESSMENT — EXTERNAL EXAM - RIGHT EYE: OD_EXAM: NORMAL

## 2021-09-16 ASSESSMENT — EXTERNAL EXAM - LEFT EYE: OS_EXAM: NORMAL

## 2021-09-16 NOTE — PATIENT INSTRUCTIONS
Continue same medication.  Return visit 6 months for a complete exam.    Srikanth Craig M.D.  [phone]

## 2021-09-16 NOTE — LETTER
9/16/2021         RE: China Guzman  [address] [facility] [address]        Dear Colleague,    Thank you for referring your patient, China Guzman, to the [facility]. Please see a copy of my visit note below.     Current Eye Medications:  Latanoprost nightly and timolol daily,last drops 8am this morning.      Subjective:  Here for glaucoma follow up. Overall vision is stable. Pt states that she never misses a drop.      Objective:  See Ophthalmology Exam.       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      Plan:  Continue same medication.  Return visit 6 months for a complete exam.    Srikanth Craig M.D.  [phone]             Again, thank you for allowing me to participate in the care of your patient.        Sincerely,        Srikanth Craig MD

## 2021-09-16 NOTE — PROGRESS NOTES
Current Eye Medications:  Latanoprost nightly and timolol daily,last drops 8am this morning.      Subjective:  Here for glaucoma follow up. Overall vision is stable. Pt states that she never misses a drop.      Objective:  See Ophthalmology Exam.       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      Plan:  Continue same medication.  Return visit 6 months for a complete exam.    Srikanth Craig M.D.  [phone]

## 2021-09-26 ENCOUNTER — HEALTH MAINTENANCE LETTER (OUTPATIENT)
Age: 86
End: 2021-09-26

## 2021-10-01 ENCOUNTER — ALLIED HEALTH/NURSE VISIT (OUTPATIENT)
Dept: FAMILY MEDICINE | Facility: CLINIC | Age: 86
End: 2021-10-01
Payer: MEDICARE

## 2021-10-01 VITALS — HEART RATE: 77 BPM | SYSTOLIC BLOOD PRESSURE: 133 MMHG | DIASTOLIC BLOOD PRESSURE: 74 MMHG

## 2021-10-01 DIAGNOSIS — Z23 HIGH PRIORITY FOR 2019-NCOV VACCINE: ICD-10-CM

## 2021-10-01 DIAGNOSIS — Z01.30 BP CHECK: Primary | ICD-10-CM

## 2021-10-01 DIAGNOSIS — Z23 NEED FOR PROPHYLACTIC VACCINATION AND INOCULATION AGAINST INFLUENZA: ICD-10-CM

## 2021-10-01 PROCEDURE — 91300 COVID-19,PF,PFIZER (12+ YRS): CPT

## 2021-10-01 PROCEDURE — 99207 PR NO CHARGE NURSE ONLY: CPT

## 2021-10-01 PROCEDURE — G0008 ADMIN INFLUENZA VIRUS VAC: HCPCS

## 2021-10-01 PROCEDURE — 90662 IIV NO PRSV INCREASED AG IM: CPT

## 2021-10-01 PROCEDURE — 0003A COVID-19,PF,PFIZER (12+ YRS): CPT

## 2021-10-01 NOTE — PROGRESS NOTES
SUBJECTIVE:  China Guzman is an 89 year old female who presents for a blood pressure check.    The reason for the visit is:  a recent medication change    The patient reports that she IS taking the medication as prescribed.     Current Outpatient Medications   Medication     atorvastatin (LIPITOR) 10 MG tablet     Calcium Carb-Cholecalciferol (CALCIUM-VITAMIN D) 600-400 MG-UNIT TABS     furosemide (LASIX) 40 MG tablet     latanoprost (XALATAN) 0.005 % ophthalmic solution     losartan (COZAAR) 100 MG tablet     MULTI-VITAMIN OR     timolol maleate (TIMOPTIC) 0.5 % ophthalmic solution     verapamil ER (CALAN-SR) 240 MG CR tablet     No current facility-administered medications for this visit.       No Known Allergies      OBJECTIVE:  Please get a blood pressure AND a pulse.  A height is also needed if has not been done in the past year.    /74   Pulse 77   LMP 12/16/1980         If patient has diagnosis of HYPERTENSION, is there a goal on the problem list? Yes  Patient Active Problem List   Diagnosis     Osteoporosis, now in osteopenia range repeat bone density scan q 2 yrs     HL (hearing loss)     Gallstones     Malignant basal cell neoplasm of skin     Hyperlipidemia LDL goal <130     Pseudophakia, Yag Caps, ou     Advanced directives, counseling/discussion     Borderline glaucoma with ocular hypertension, bilateral - treated     Family history of colon cancer     Posterior vitreous detachment, bilateral     Essential hypertension with goal blood pressure less than 140/90     10 year risk of MI or stroke 7.5% or greater, 39.7% in September 2017 on atorvastatin 10 mg     CKD (chronic kidney disease) stage 3, GFR 30-59 ml/min (H)     IGT (impaired glucose tolerance)     High triglycerides     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 Chart CC to Primary Care Provider  If any one of the above three parameters is not met notify RN or provider.

## 2021-10-03 ASSESSMENT — PACHYMETRY
OS_CT(UM): .539
OD_CT(UM): .541

## 2021-10-04 ENCOUNTER — ANCILLARY PROCEDURE (OUTPATIENT)
Dept: BONE DENSITY | Facility: CLINIC | Age: 86
End: 2021-10-04
Attending: FAMILY MEDICINE
Payer: MEDICARE

## 2021-10-04 DIAGNOSIS — Z78.0 ASYMPTOMATIC MENOPAUSAL STATE: ICD-10-CM

## 2021-10-04 DIAGNOSIS — E21.3 HYPERPARATHYROIDISM (H): ICD-10-CM

## 2021-10-04 DIAGNOSIS — M85.80 OSTEOPENIA, UNSPECIFIED LOCATION: ICD-10-CM

## 2021-10-04 PROCEDURE — 77081 DXA BONE DENSITY APPENDICULR: CPT | Mod: XU | Performed by: INTERNAL MEDICINE

## 2021-10-04 PROCEDURE — 77085 DXA BONE DENSITY AXL VRT FX: CPT | Performed by: INTERNAL MEDICINE

## 2021-10-28 DIAGNOSIS — I10 ESSENTIAL HYPERTENSION WITH GOAL BLOOD PRESSURE LESS THAN 140/90: ICD-10-CM

## 2021-10-28 RX ORDER — VERAPAMIL HYDROCHLORIDE 240 MG/1
TABLET, FILM COATED, EXTENDED RELEASE ORAL
Qty: 30 TABLET | Refills: 1 | Status: SHIPPED | OUTPATIENT
Start: 2021-10-28 | End: 2021-11-12

## 2021-10-28 NOTE — TELEPHONE ENCOUNTER
"Next 5 appointments (look out 90 days)      Nov 12, 2021  3:00 PM  SHORT with Colby Trimble MD  [facility] ([facility] ) [address] [facility] [address] [phone]     Dec 13, 2021 12:30 PM  Return Visit with Romi Corrigan MD  [facility] ([facility] ) [address] [place] [address] [phone]          Requested Prescriptions   Pending Prescriptions Disp Refills    verapamil ER (CALAN-SR) 240 MG CR tablet [Pharmacy Med Name: Verapamil HCl  MG Oral Tablet Extended Release] 30 tablet 0     Sig: Take 1 tablet by mouth once daily        Calcium Channel Blockers Protocol  Failed - 10/28/2021  3:17 PM        Failed - Normal ALT in past 12 months     Recent Labs   Lab Test 05/24/19  0900   ALT 25             Failed - Normal serum creatinine on file in past 12 months     Recent Labs   Lab Test 08/27/21  0831   CR 1.27*       Ok to refill medication if creatinine is low          Passed - Blood pressure under 140/90 in past 12 months       BP Readings from Last 3 Encounters:   10/01/21 133/74   09/03/21 127/75   06/14/21 (!) 179/74                 Passed - Recent (12 mo) or future (30 days) visit within the authorizing provider's specialty     Patient has had an office visit with the authorizing provider or a provider within the authorizing providers department within the previous 12 mos or has a future within next 30 days. See \"Patient Info\" tab in inbasket, or \"Choose Columns\" in Meds & Orders section of the refill encounter.              Passed - Medication is active on med list        Passed - Patient is age 18 or older        Passed - No active pregnancy on record        Passed - No positive pregnancy test in past 12 months              "

## 2021-11-03 DIAGNOSIS — N18.30 STAGE 3 CHRONIC KIDNEY DISEASE, UNSPECIFIED WHETHER STAGE 3A OR 3B CKD (H): ICD-10-CM

## 2021-11-03 DIAGNOSIS — N18.32 CHRONIC KIDNEY DISEASE, STAGE 3B (H): ICD-10-CM

## 2021-11-03 DIAGNOSIS — I10 ESSENTIAL HYPERTENSION WITH GOAL BLOOD PRESSURE LESS THAN 140/90: ICD-10-CM

## 2021-11-03 DIAGNOSIS — E78.5 HYPERLIPIDEMIA LDL GOAL <130: ICD-10-CM

## 2021-11-04 RX ORDER — LOSARTAN POTASSIUM 100 MG/1
TABLET ORAL
Qty: 90 TABLET | Refills: 0 | Status: SHIPPED | OUTPATIENT
Start: 2021-11-04 | End: 2022-05-02

## 2021-11-04 RX ORDER — ATORVASTATIN CALCIUM 10 MG/1
TABLET, FILM COATED ORAL
Qty: 90 TABLET | Refills: 0 | Status: SHIPPED | OUTPATIENT
Start: 2021-11-04 | End: 2022-09-07

## 2021-11-04 NOTE — TELEPHONE ENCOUNTER
Routing refill request to provider for review/approval because:  Labs out of range:  Creatinine    Next 5 appointments (look out 90 days)    Nov 12, 2021  3:00 PM  SHORT with Colby Trimble MD  [facility] ([facility] ) [address] [facility] [address]  [phone]            Earline Mas BSN, RN

## 2021-11-12 ENCOUNTER — OFFICE VISIT (OUTPATIENT)
Dept: FAMILY MEDICINE | Facility: CLINIC | Age: 86
End: 2021-11-12
Payer: MEDICARE

## 2021-11-12 VITALS
BODY MASS INDEX: 21.21 KG/M2 | TEMPERATURE: 97.1 F | SYSTOLIC BLOOD PRESSURE: 118 MMHG | RESPIRATION RATE: 14 BRPM | WEIGHT: 105 LBS | HEART RATE: 75 BPM | DIASTOLIC BLOOD PRESSURE: 70 MMHG | OXYGEN SATURATION: 97 %

## 2021-11-12 DIAGNOSIS — M81.0 OSTEOPOROSIS WITHOUT CURRENT PATHOLOGICAL FRACTURE, UNSPECIFIED OSTEOPOROSIS TYPE: ICD-10-CM

## 2021-11-12 DIAGNOSIS — I10 ESSENTIAL HYPERTENSION WITH GOAL BLOOD PRESSURE LESS THAN 140/90: ICD-10-CM

## 2021-11-12 DIAGNOSIS — Z12.11 SCREEN FOR COLON CANCER: Primary | ICD-10-CM

## 2021-11-12 PROCEDURE — 99213 OFFICE O/P EST LOW 20 MIN: CPT | Performed by: FAMILY MEDICINE

## 2021-11-12 RX ORDER — RISEDRONATE SODIUM 35 MG/1
35 TABLET, FILM COATED ORAL
Qty: 12 TABLET | Refills: 3 | Status: SHIPPED | OUTPATIENT
Start: 2021-11-12 | End: 2022-09-07

## 2021-11-12 RX ORDER — VERAPAMIL HYDROCHLORIDE 240 MG/1
240 TABLET, FILM COATED, EXTENDED RELEASE ORAL DAILY
Qty: 90 TABLET | Refills: 3 | Status: SHIPPED | OUTPATIENT
Start: 2021-11-12 | End: 2022-08-19

## 2021-11-12 ASSESSMENT — PAIN SCALES - GENERAL: PAINLEVEL: NO PAIN (0)

## 2021-11-12 NOTE — PROGRESS NOTES
Subjective:  China is a 89 year old female who presents today for follow-up on a recent abnormal bone density scan. The patient has been previously told she has osteopenia. The patient has not had a hip fracture, vertebral compression fracture or a pathological fx in other bones to her recollection. 23 years ago she brolke her left  ankle after she fell on the ice.   The patient reports osteoporosis in a family member. The family member affected is er daughters . The patient reports that she eats or drinks 2 servings of dairy products per day and she does  takes a  a 600 calcium supplementmg calcium supplement twice daily. The patient does take a Vitamin D supplement 400 international unit(s) twice a day(s) . The patient currently does exercise regularly. She rides the staionary bike daily   She also goes to the [facility] [employer] and occasionally does some exercises involving the upper body.      She reports that she get 30 minutes of exercise a day(s) .    She was on Actonel many years ago but was taken off of it.  She denies having any side effects from it.          Current Outpatient Medications:      atorvastatin (LIPITOR) 10 MG tablet, TAKE 1 TABLET BY MOUTH AT BEDTIME, Disp: 90 tablet, Rfl: 0     Calcium Carb-Cholecalciferol (CALCIUM-VITAMIN D) 600-400 MG-UNIT TABS, Take 1 tablet by mouth twice daily, Disp: 180 tablet, Rfl: 0     furosemide (LASIX) 40 MG tablet, Take 1 tablet (40 mg) by mouth 2 times daily, Disp: 180 tablet, Rfl: 3     latanoprost (XALATAN) 0.005 % ophthalmic solution, Place 1 drop into both eyes At Bedtime, Disp: 9 mL, Rfl: 4     losartan (COZAAR) 100 MG tablet, Take 1 tablet by mouth once daily, Disp: 90 tablet, Rfl: 0     MULTI-VITAMIN OR, None Entered, Disp: , Rfl:      timolol maleate (TIMOPTIC) 0.5 % ophthalmic solution, Place 1 drop into both eyes daily, Disp: 15 mL, Rfl: 4     verapamil ER (CALAN-SR) 240 MG CR tablet, Take 1 tablet by mouth once daily, Disp: 30 tablet, Rfl: 1    Past  Medical History:   Diagnosis Date     Basal cell cancer     NOSE     Borderline glaucoma with ocular hypertension 2011     Hearing loss      HTN (hypertension)      Hyperlipidemia      MACULAR DEGENERATION (SENILE) OF RETINA, DRY OU 2011     Macular degeneration, dry, mod, ou 2016     Osteoporosis        OBJECTIVE:  BP (!) 181/66   Pulse 75   Temp 97.1  F (36.2  C) (Tympanic)   Resp 14   Wt 47.6 kg (105 lb)   LMP 1980   SpO2 97%   BMI 21.21 kg/m      General:  China is awake, alert, cooperative and in no apparent distress.      Narrative & Impression   BONE DENSITOMETRY  [address] [address]  10/4/2021      PATIENT: China Guzman  CHART: [id number]   :  4/10/1930  AGE:  89 year old  SEX:  female   REFERRING PROVIDER:  Colby Trimble MD     PROCEDURE:  Bone density scanning was performed using DXA technology of the lumbar spine and hip.  Scanning was performed on a Lunar Prodigy scanner.  Reporting is completed in the form of a T-score.  The T-score represents the standard deviation from peak bone mass based on a young healthy adult.     REFERENCE T-SCORES:       Normal                -1.0 and greater                                 Osteopenia         Between -1.0 and -2.5                                           Osteoporosis     -2.5 and less                                       RISK FACTORS:  Post-menopausal, Fractures of ankle and wrist, Condition related to bone loss: hyperparathyroidism, follow up osteopenia     CURRENT TREATMENT:  None listed     FINDINGS:               Lumbar Spine (L1-L4)      T-score:  -1.6, degenerative changes present               Left Femoral Neck            T-score:  -2.2               Right Femoral Neck          T-score:  -2.9               Forearm (radius 33%)      T-score:  -2.3                  Lumbar (L1-L4) BMD: 0.987  Previous: 1.040                          Total Hip Mean BMD: 0.783  Previous:  "0.852               Forarm (radius 33%) BMD: 0.549   Previous: NA     Comparison is made to another DXA performed on the same Lunar Prodigy  machine on 7/17/2019.       LATERAL VERTEBRAL ASSESSMENT  Procedure:  Vertebral fracture assessment was performed in the lateral decubitus position using a MCE-5 Development Prodigy  densitometer.  Indications for VFA: T-score of -1.0 or worse and age (female>69)  Confounding factors for VFA: None.  The LVA scan is interpretable from T8 to L4.     VFA Findings: Using the semi-quantitative analysis of Sally there was evidence of no spinal deformity  VFA Impression: China Guzman has no vertebral fractures identified on the VFA.         IMPRESSION  Osteoporosis., Degenerative changes of the lumbar spine which may falsely elevate results.     There has been significant decrease in bone density of the lumbar spine. There has been significant decrease in bone density of the hip(s). The forearm was not included on the previous study so comparison is not possible.       Recommendations include ensuring adequate Calcium and Vitamin D.     The current NOF Guidelines recommend treatment for patients with prior hip or vertebral fracture, T-score -2.5 or below, or 10 year risk of any major osteoporotic fracture >20% or 10 year risk of hip fracture >3%, as calculated using the FRAX calculator (www.shef.ac.uk/FRAX or you can google \"FRAX\").       Based on these guidelines, treatment (in addition to calcium and vitamin D) is recommended for this patient, after ruling out other causes of osteoporosis.  This is meant as an aid to clinical decision making; one must still use clinical judgement.        Follow up can be considered in 2 years.   ___________________  Maura Holm M.D.  Electronically signed               ASSESSMENT:  A 89 year old female with a diagnosis of who osteoporosis.     Plan: The patient was advised of the importance of getting 1800mg of calcium daily. A patient education pamphlet " was printed up for her to take home. We discussed the importance of getting 400-800 IU of Vitamin D daily and I did recommmend continuing her supplement. We dissussed the importance of weight bearing exercise and how that can stimulate the bones to become stronger. We also discussed the bisphosphonate medications and how these can improve or stabilze the bone density. At this time the patient is interested in starting a medication. We will plan on repeating the DEXA scan in 2 years to further evaluation the progression of the patient's osteoporosis.   China  voiced understanding to informations discussed today.

## 2021-11-12 NOTE — NURSING NOTE
"Chief Complaint   Patient presents with     Results     Dexa       Initial BP (!) 181/66   Pulse 75   Temp 97.1  F (36.2  C) (Tympanic)   Resp 14   Wt 47.6 kg (105 lb)   LMP 12/16/1980   SpO2 97%   BMI 21.21 kg/m   Estimated body mass index is 21.21 kg/m  as calculated from the following:    Height as of 9/3/21: 1.499 m (4' 11\").    Weight as of this encounter: 47.6 kg (105 lb).  Medication Reconciliation: complete  Marilu Corona CMA  "

## 2021-11-12 NOTE — PATIENT INSTRUCTIONS
Patient Education     Understanding Strength Training   Strength training is an activity that builds up your muscles. It is also called resistance training. It can be done with weights, resistance bands, or your own body weight.    Benefits of strength training   Exercise or physical activity --strength training included--can help you feel better and live longer. It can help protect you against many conditions, including cancer, depression, diabetes, osteoporosis, and heart disease. It can help you stay at a healthy weight or lose weight. It can also ease the symptoms of health problems such as arthritis.    Strength training builds muscle. That s vital as you grow older because you slowly lose muscle mass. As a result, daily activities like carrying a bag of groceries can become hard to do. Strength training can make such tasks easier. It can also keep your bones strong. Plus, it can boost your balance.    Always talk with your healthcare provider before starting any physical activity or strength training.   Tips for strength training    Talk with your healthcare provider first before starting any new physical activity. That s especially true if you have a chronic health problem such as heart disease or if you haven t exercised for a long time. He or she can help you pick activities that are best for you.    Do strength training at least 2 times a week. Skip a day between sessions to give your muscles time to recover. Or change the major muscle groups on back-to-back days. Those are your legs, hips, back, chest, stomach, shoulders, and arms.    Build up your strength over time. That will help you prevent an injury. Start with a weight that challenges you but that isn t too heavy for you to lift 8 times (repetitions) in a row.    Control your movements. Lift and lower weights slowly. Don t jerk.    Remember to breathe. Breathe in as you lift and breathe out as you relax.    Stop if you feel any pain. It s normal  to feel some soreness a day or 2 after exercising.    See a  if you haven t exercised before, or have been inactive for a long period of time.  A  can teach you correct form, keep you motivated, and help you prevent injury.    [employer] last reviewed this educational content on 6/1/2020 2000-2021 [employer]. All rights reserved. This information is not intended as a substitute for professional medical care. Always follow your healthcare professional's instructions.           Patient Education     Living with Osteoporosis: Regular Exercise  If you have osteoporosis, exercise is vital for your health. It can prevent bone fractures and spine changes. It will slow bone loss. Exercise will strengthen your body. It can also be fun. A variety of exercises is best. See below for exercises that can help you. But before you start, talk with your healthcare provider to be sure these exercises are right for you.     Resistance exercises. These build muscle strength and maintain bone mass. They also make you less prone to injury. Exercises include lifting small weights, doing push-ups and sit-ups, using elastic exercise bands, and using weight machines.   Weight-bearing activities. These help your whole body. They also help you maintain bone mass. Activities include walking, dancing, and housework.   Non-weight-bearing exercises. These help prevent back strain and pain. They do this by building the trunk and leg muscles. Exercises that help with flexibility can prevent falls. Examples include swimming, water exercise, and stretching.   Staying safe  Here are tips to stay safe:     Always check with your healthcare provider before starting any new exercise program.    Use weights only as instructed.    Stop any exercise that causes pain.  Jeanne last reviewed this educational content on 5/1/2018 2000-2021 [employer]. All rights reserved. This information is not intended  as a substitute for professional medical care. Always follow your healthcare professional's instructions.

## 2021-12-13 ENCOUNTER — OFFICE VISIT (OUTPATIENT)
Dept: NEPHROLOGY | Facility: CLINIC | Age: 86
End: 2021-12-13
Payer: MEDICARE

## 2021-12-13 ENCOUNTER — LAB (OUTPATIENT)
Dept: LAB | Facility: CLINIC | Age: 86
End: 2021-12-13

## 2021-12-13 VITALS
HEART RATE: 80 BPM | SYSTOLIC BLOOD PRESSURE: 140 MMHG | WEIGHT: 109 LBS | BODY MASS INDEX: 22.02 KG/M2 | DIASTOLIC BLOOD PRESSURE: 70 MMHG

## 2021-12-13 DIAGNOSIS — N18.31 CHRONIC KIDNEY DISEASE, STAGE 3A (H): ICD-10-CM

## 2021-12-13 DIAGNOSIS — E83.52 HYPERCALCEMIA: Primary | ICD-10-CM

## 2021-12-13 DIAGNOSIS — N18.32 STAGE 3B CHRONIC KIDNEY DISEASE (H): ICD-10-CM

## 2021-12-13 DIAGNOSIS — I10 ESSENTIAL HYPERTENSION: ICD-10-CM

## 2021-12-13 LAB
ALBUMIN SERPL-MCNC: 3.4 G/DL (ref 3.4–5)
ANION GAP SERPL CALCULATED.3IONS-SCNC: 12 MMOL/L (ref 3–14)
BUN SERPL-MCNC: 87 MG/DL (ref 7–30)
CALCIUM SERPL-MCNC: 8.4 MG/DL (ref 8.5–10.1)
CHLORIDE BLD-SCNC: 96 MMOL/L (ref 94–109)
CO2 SERPL-SCNC: 23 MMOL/L (ref 20–32)
CREAT SERPL-MCNC: 1.71 MG/DL (ref 0.52–1.04)
GFR SERPL CREATININE-BSD FRML MDRD: 26 ML/MIN/1.73M2
GLUCOSE BLD-MCNC: 126 MG/DL (ref 70–99)
PHOSPHATE SERPL-MCNC: 3.9 MG/DL (ref 2.5–4.5)
POTASSIUM BLD-SCNC: 3.2 MMOL/L (ref 3.4–5.3)
SODIUM SERPL-SCNC: 131 MMOL/L (ref 133–144)

## 2021-12-13 PROCEDURE — 36415 COLL VENOUS BLD VENIPUNCTURE: CPT

## 2021-12-13 PROCEDURE — 99214 OFFICE O/P EST MOD 30 MIN: CPT | Performed by: INTERNAL MEDICINE

## 2021-12-13 PROCEDURE — 80069 RENAL FUNCTION PANEL: CPT

## 2021-12-13 NOTE — PATIENT INSTRUCTIONS
Blood pressure 120-130  If blood pressure is lower than 110-115, let me know  Hydrate well  See you in the Fall

## 2021-12-13 NOTE — LETTER
12/13/2021     RE: China Guzman  [address] [facility] [address]     Dear Colleague,    Thank you for referring your patient, China Guzman, to the [facility]. Please see a copy of my visit note below.    HPI:     She is a delightful female with history of hypertension, hyperlipidemia, osteoporosis, who presented in July 2018 for evaluation of high calcium and worsened kidney function.  Her calcium was noted to be elevated on multiple occasions, up to 11 mg/dL but usually between 10.4-10.9- though overall asymptomatic.    Her PTH was within normal, which is mildly inappropriately high in setting of hypercalcemia. She had been on hydrochlorothiazide which at first we lowered, but with hyponatremia noted on recheck, I switched her to furosemide in September 2018    She is also on losartan and diltiazem for her blood pressure. She takes calcium and vitamin D  twice a day and now on actonel.  On followup today, she feels well.   She hydrates well with water, 4-5 glasses a day  She lives with her daughter who is a  and has sarcoidosis, and has a special needs daughter (her granddaughter).     She denies urinary issues. She wakes up once a night to urinate typically    Her blood pressure at home are in 120-130s mostly. It is rarely any higher.  Her weight has increased a few pounds. [facility]        Review of Systems   A 4 point ROS was reviewed with patient and negative except as noted above       Objective   Reported vitals:  BP (!) 150/70   Pulse 80   Wt 49.4 kg (109 lb)   LMP 12/16/1980   BMI 22.02 kg/m     General: no distress,engaged and appropriate speech  Psych: Alert and oriented times 3; coherent speech, normal   rate and volume, able to articulate logical thoughts   Lungs: decreased bilaterally  CV: RR  EXT: no edema    Last labs 5/2019 - within normal  Last Comprehensive Metabolic  Panel:  Sodium   Date Value Ref Range Status   08/27/2021 138 133 - 144 mmol/L Final   08/04/2020 138 133 - 144 mmol/L Final     Potassium   Date Value Ref Range Status   08/27/2021 4.4 3.4 - 5.3 mmol/L Final   08/04/2020 4.0 3.4 - 5.3 mmol/L Final     Chloride   Date Value Ref Range Status   08/27/2021 104 94 - 109 mmol/L Final   08/04/2020 103 94 - 109 mmol/L Final     Carbon Dioxide   Date Value Ref Range Status   08/04/2020 30 20 - 32 mmol/L Final     Carbon Dioxide (CO2)   Date Value Ref Range Status   08/27/2021 28 20 - 32 mmol/L Final     Anion Gap   Date Value Ref Range Status   08/27/2021 6 3 - 14 mmol/L Final   08/04/2020 5 3 - 14 mmol/L Final     Glucose   Date Value Ref Range Status   08/27/2021 117 (H) 70 - 99 mg/dL Final   08/04/2020 118 (H) 70 - 99 mg/dL Final     Comment:     Fasting specimen     Urea Nitrogen   Date Value Ref Range Status   08/27/2021 52 (H) 7 - 30 mg/dL Final   08/04/2020 53 (H) 7 - 30 mg/dL Final     Creatinine   Date Value Ref Range Status   08/27/2021 1.27 (H) 0.52 - 1.04 mg/dL Final   08/04/2020 1.24 (H) 0.52 - 1.04 mg/dL Final     GFR Estimate   Date Value Ref Range Status   08/27/2021 37 (L) >60 mL/min/1.73m2 Final     Comment:     As of July 11, 2021, eGFR is calculated by the CKD-EPI creatinine equation, without race adjustment. eGFR can be influenced by muscle mass, exercise, and diet. The reported eGFR is an estimation only and is only applicable if the renal function is stable.   08/04/2020 38 (L) >60 mL/min/[1.73_m2] Final     Comment:     Non  GFR Calc  Starting 12/18/2018, serum creatinine based estimated GFR (eGFR) will be   calculated using the Chronic Kidney Disease Epidemiology Collaboration   (CKD-EPI) equation.       Calcium   Date Value Ref Range Status   08/27/2021 9.9 8.5 - 10.1 mg/dL Final   08/04/2020 10.0 8.5 - 10.1 mg/dL Final       Assessment/Plan:  89 year old female with history of hypertension, osteoporosis, anemia, who presents for  followup of hypercalcemia and increased creatinine. Her Scr is typically 0.9mg/dL but was up to 1.3 in setting of hypercalcemia, calcium of 10.4-10.9 in 2018  1. Hypercalcemia- improved, after better hydration and stopping hydrochlorothiazide and starting furosemide  - given hypercalcemia and hyponatremia, I elected to stop her hydrochlorothiazide   - tolerating furosemide and calcium in normal range on last check  - labs when able, June or so  2. CKD stage 3- Her Scr was 0.9 up until the last year or so, with Scr up to 1.3 but now ranging in 1-1.1 range  Monitor every 6 months   3. Hypertension- now on furosemide ,continue same (stopped hydrochlorothiazide), losartan and diltiazem  -  she will monitor at home  -goal 130-140/80 is fine- she is at goal, would cut down on BP medications if <115 systolic.    4. Anemia- mild anemia previously but hgb now normal, iron sat 16% on previous check, and ferritin 78   - immunofixation- negative 2018     5. Bone- PTH 60 , in setting of hypercalcemia, but some CKD thus ?appropriate.    Last calcium 9.9 thus high normal, and PTH stable.  - continue vitamin D and calcium 600mg for now  - recheck today     No follow-ups on file.    - labs today, RTC 9-10 months    Romi Jose Corrigan MD

## 2021-12-13 NOTE — PROGRESS NOTES
HPI:     She is a delightful female with history of hypertension, hyperlipidemia, osteoporosis, who presented in July 2018 for evaluation of high calcium and worsened kidney function.  Her calcium was noted to be elevated on multiple occasions, up to 11 mg/dL but usually between 10.4-10.9- though overall asymptomatic.    Her PTH was within normal, which is mildly inappropriately high in setting of hypercalcemia. She had been on hydrochlorothiazide which at first we lowered, but with hyponatremia noted on recheck, I switched her to furosemide in September 2018    She is also on losartan and diltiazem for her blood pressure. She takes calcium and vitamin D  twice a day and now on actonel.  On followup today, she feels well.   She hydrates well with water, 4-5 glasses a day  She lives with her daughter who is a  and has sarcoidosis, and has a special needs daughter (her granddaughter).     She denies urinary issues. She wakes up once a night to urinate typically    Her blood pressure at home are in 120-130s mostly. It is rarely any higher.  Her weight has increased a few pounds. She is back at the  and exercising.        Review of Systems   A 4 point ROS was reviewed with patient and negative except as noted above       Objective   Reported vitals:  BP (!) 150/70   Pulse 80   Wt 49.4 kg (109 lb)   LMP 12/16/1980   BMI 22.02 kg/m     General: no distress,engaged and appropriate speech  Psych: Alert and oriented times 3; coherent speech, normal   rate and volume, able to articulate logical thoughts   Lungs: decreased bilaterally  CV: RR  EXT: no edema    Last labs 5/2019 - within normal  Last Comprehensive Metabolic Panel:  Sodium   Date Value Ref Range Status   08/27/2021 138 133 - 144 mmol/L Final   08/04/2020 138 133 - 144 mmol/L Final     Potassium   Date Value Ref Range Status   08/27/2021 4.4 3.4 - 5.3 mmol/L Final   08/04/2020 4.0 3.4 - 5.3 mmol/L Final     Chloride   Date Value Ref Range Status    08/27/2021 104 94 - 109 mmol/L Final   08/04/2020 103 94 - 109 mmol/L Final     Carbon Dioxide   Date Value Ref Range Status   08/04/2020 30 20 - 32 mmol/L Final     Carbon Dioxide (CO2)   Date Value Ref Range Status   08/27/2021 28 20 - 32 mmol/L Final     Anion Gap   Date Value Ref Range Status   08/27/2021 6 3 - 14 mmol/L Final   08/04/2020 5 3 - 14 mmol/L Final     Glucose   Date Value Ref Range Status   08/27/2021 117 (H) 70 - 99 mg/dL Final   08/04/2020 118 (H) 70 - 99 mg/dL Final     Comment:     Fasting specimen     Urea Nitrogen   Date Value Ref Range Status   08/27/2021 52 (H) 7 - 30 mg/dL Final   08/04/2020 53 (H) 7 - 30 mg/dL Final     Creatinine   Date Value Ref Range Status   08/27/2021 1.27 (H) 0.52 - 1.04 mg/dL Final   08/04/2020 1.24 (H) 0.52 - 1.04 mg/dL Final     GFR Estimate   Date Value Ref Range Status   08/27/2021 37 (L) >60 mL/min/1.73m2 Final     Comment:     As of July 11, 2021, eGFR is calculated by the CKD-EPI creatinine equation, without race adjustment. eGFR can be influenced by muscle mass, exercise, and diet. The reported eGFR is an estimation only and is only applicable if the renal function is stable.   08/04/2020 38 (L) >60 mL/min/[1.73_m2] Final     Comment:     Non  GFR Calc  Starting 12/18/2018, serum creatinine based estimated GFR (eGFR) will be   calculated using the Chronic Kidney Disease Epidemiology Collaboration   (CKD-EPI) equation.       Calcium   Date Value Ref Range Status   08/27/2021 9.9 8.5 - 10.1 mg/dL Final   08/04/2020 10.0 8.5 - 10.1 mg/dL Final       Assessment/Plan:  89 year old female with history of hypertension, osteoporosis, anemia, who presents for followup of hypercalcemia and increased creatinine. Her Scr is typically 0.9mg/dL but was up to 1.3 in setting of hypercalcemia, calcium of 10.4-10.9 in 2018  1. Hypercalcemia- improved, after better hydration and stopping hydrochlorothiazide and starting furosemide  - given hypercalcemia and  hyponatremia, I elected to stop her hydrochlorothiazide   - tolerating furosemide and calcium in normal range on last check  - labs when able, June or so  2. CKD stage 3- Her Scr was 0.9 up until the last year or so, with Scr up to 1.3 but now ranging in 1-1.1 range  Monitor every 6 months   3. Hypertension- now on furosemide ,continue same (stopped hydrochlorothiazide), losartan and diltiazem  -  she will monitor at home  -goal 130-140/80 is fine- she is at goal, would cut down on BP medications if <115 systolic.    4. Anemia- mild anemia previously but hgb now normal, iron sat 16% on previous check, and ferritin 78   - immunofixation- negative 2018     5. Bone- PTH 60 , in setting of hypercalcemia, but some CKD thus ?appropriate.    Last calcium 9.9 thus high normal, and PTH stable.  - continue vitamin D and calcium 600mg for now  - recheck today     No follow-ups on file.      - labs today, RTC 9-10 months    Romi Jose Corrigan MD

## 2022-01-07 ENCOUNTER — LAB (OUTPATIENT)
Dept: LAB | Facility: CLINIC | Age: 87
End: 2022-01-07
Payer: MEDICARE

## 2022-01-07 LAB
ALBUMIN SERPL-MCNC: 3.1 G/DL (ref 3.4–5)
ANION GAP SERPL CALCULATED.3IONS-SCNC: 8 MMOL/L (ref 3–14)
BUN SERPL-MCNC: 46 MG/DL (ref 7–30)
CALCIUM SERPL-MCNC: 9.4 MG/DL (ref 8.5–10.1)
CHLORIDE BLD-SCNC: 104 MMOL/L (ref 94–109)
CO2 SERPL-SCNC: 25 MMOL/L (ref 20–32)
CREAT SERPL-MCNC: 1.32 MG/DL (ref 0.52–1.04)
GFR SERPL CREATININE-BSD FRML MDRD: 38 ML/MIN/1.73M2
GLUCOSE BLD-MCNC: 114 MG/DL (ref 70–99)
PHOSPHATE SERPL-MCNC: 3.6 MG/DL (ref 2.5–4.5)
POTASSIUM BLD-SCNC: 4.6 MMOL/L (ref 3.4–5.3)
SODIUM SERPL-SCNC: 137 MMOL/L (ref 133–144)

## 2022-01-07 PROCEDURE — 80069 RENAL FUNCTION PANEL: CPT

## 2022-01-07 PROCEDURE — 36415 COLL VENOUS BLD VENIPUNCTURE: CPT

## 2022-01-10 ENCOUNTER — OFFICE VISIT (OUTPATIENT)
Dept: NEPHROLOGY | Facility: CLINIC | Age: 87
End: 2022-01-10
Payer: MEDICARE

## 2022-01-10 VITALS
SYSTOLIC BLOOD PRESSURE: 137 MMHG | HEIGHT: 59 IN | DIASTOLIC BLOOD PRESSURE: 75 MMHG | WEIGHT: 109 LBS | HEART RATE: 107 BPM | OXYGEN SATURATION: 97 % | BODY MASS INDEX: 21.97 KG/M2

## 2022-01-10 DIAGNOSIS — E83.52 HYPERCALCEMIA: ICD-10-CM

## 2022-01-10 DIAGNOSIS — I10 ESSENTIAL HYPERTENSION: ICD-10-CM

## 2022-01-10 DIAGNOSIS — N18.32 CHRONIC KIDNEY DISEASE, STAGE 3B (H): Primary | ICD-10-CM

## 2022-01-10 PROCEDURE — 99214 OFFICE O/P EST MOD 30 MIN: CPT | Performed by: PHYSICIAN ASSISTANT

## 2022-01-10 ASSESSMENT — MIFFLIN-ST. JEOR: SCORE: 815.05

## 2022-01-10 NOTE — PATIENT INSTRUCTIONS
1. No medication changes today.   2. Continue checking BP at home and keep a log.   3. Good hydration and low sodium diet, less than 2 grams per day.   4. Labs in a month.   5. Follow up in 2 months with me. Keep appt with Dr. Corrigan as scheduled.

## 2022-01-10 NOTE — PROGRESS NOTES
"HPI:     She is a delightful female with history of hypertension, hyperlipidemia, osteoporosis, who presented in July 2018 for evaluation of high calcium and worsened kidney function.  Her calcium was noted to be elevated on multiple occasions, up to 11 mg/dL but usually between 10.4-10.9- though overall asymptomatic.    Her PTH was within normal, which is mildly inappropriately high in setting of hypercalcemia. She had been on hydrochlorothiazide which at first we lowered, but with hyponatremia noted on recheck, I switched her to furosemide in September 2018    She is also on losartan and diltiazem for her blood pressure. She takes calcium and vitamin D  twice a day and now on actonel.  On followup today, she feels well.   She hydrates well with water, 4-5 glasses a day  She lives with her daughter who is a  and has sarcoidosis, and has a special needs daughter (her granddaughter).     She denies urinary issues. She wakes up once a night to urinate typically    Her blood pressure at home are in 130-140s mostly. It is rarely any higher.  Her weight has increased a few pounds. She is back at the  and exercising.    She had possible food poisoning one week before her last appt in December and creatinine was increased from 1.2 to 1.7  She cut back lasix 20 mg to once a day and creatinine is now improved. She does note some light headedness when sitting from a laying position but denies light headedness when standing up or when bending over.  She has no LE swelling, no shortness of breath. She seems to be walking at a slower pace but overall feeling okay.         Review of Systems   A 4 point ROS was reviewed with patient and negative except as noted above       Objective   Reported vitals:  /75 (BP Location: Right arm, Patient Position: Sitting, Cuff Size: Adult Regular)   Pulse 107   Ht 1.499 m (4' 11\")   Wt 49.4 kg (109 lb)   LMP 12/16/1980   SpO2 97%   BMI 22.02 kg/m     General: no " distress,engaged and appropriate speech  Psych: Alert and oriented times 3; coherent speech, normal   rate and volume, able to articulate logical thoughts   Lungs: decreased bilaterally  CV: RR  EXT: no edema    Last labs 5/2019 - within normal  Last Comprehensive Metabolic Panel:  Sodium   Date Value Ref Range Status   01/07/2022 137 133 - 144 mmol/L Final   08/04/2020 138 133 - 144 mmol/L Final     Potassium   Date Value Ref Range Status   01/07/2022 4.6 3.4 - 5.3 mmol/L Final   08/04/2020 4.0 3.4 - 5.3 mmol/L Final     Chloride   Date Value Ref Range Status   01/07/2022 104 94 - 109 mmol/L Final   08/04/2020 103 94 - 109 mmol/L Final     Carbon Dioxide   Date Value Ref Range Status   08/04/2020 30 20 - 32 mmol/L Final     Carbon Dioxide (CO2)   Date Value Ref Range Status   01/07/2022 25 20 - 32 mmol/L Final     Anion Gap   Date Value Ref Range Status   01/07/2022 8 3 - 14 mmol/L Final   08/04/2020 5 3 - 14 mmol/L Final     Glucose   Date Value Ref Range Status   01/07/2022 114 (H) 70 - 99 mg/dL Final   08/04/2020 118 (H) 70 - 99 mg/dL Final     Comment:     Fasting specimen     Urea Nitrogen   Date Value Ref Range Status   01/07/2022 46 (H) 7 - 30 mg/dL Final   08/04/2020 53 (H) 7 - 30 mg/dL Final     Creatinine   Date Value Ref Range Status   01/07/2022 1.32 (H) 0.52 - 1.04 mg/dL Final   08/04/2020 1.24 (H) 0.52 - 1.04 mg/dL Final     GFR Estimate   Date Value Ref Range Status   01/07/2022 38 (L) >60 mL/min/1.73m2 Final     Comment:     Effective December 21, 2021 eGFRcr in adults is calculated using the 2021 CKD-EPI creatinine equation which includes age and gender (Javan lord al., NEJM, DOI: 10.1056/BKSHgq7768232)   08/04/2020 38 (L) >60 mL/min/[1.73_m2] Final     Comment:     Non  GFR Calc  Starting 12/18/2018, serum creatinine based estimated GFR (eGFR) will be   calculated using the Chronic Kidney Disease Epidemiology Collaboration   (CKD-EPI) equation.       Calcium   Date Value Ref Range  Status   01/07/2022 9.4 8.5 - 10.1 mg/dL Final   08/04/2020 10.0 8.5 - 10.1 mg/dL Final       Assessment/Plan:  89 year old female with history of hypertension, osteoporosis, anemia, who presents for followup of hypercalcemia and increased creatinine. Her Scr is typically 0.9mg/dL but was up to 1.3 in setting of hypercalcemia, calcium of 10.4-10.9 in 2018  1. Hypercalcemia- improved, after better hydration and stopping hydrochlorothiazide and starting furosemide  - given hypercalcemia and hyponatremia, hydrochlorothiazide was stopped   - tolerating furosemide and calcium 10.12 last check    2. CKD stage 3- Her Scr was 0.9 up until the last year or so, with Scr up to 1.3 but now ranging in 1-1.1 range  - however her creatinine was increased to 1.7 last month. she decreased lasix to 20 mg daily. On recheck creatinine improving 1.3  - labs in one month.     3. Hypertension- now on furosemide ,continue same (stopped hydrochlorothiazide), losartan and diltiazem  -  she will monitor at home  -goal 130-140/80 is fine- she is at goal, would cut down on BP medications if <115 systolic.    4. Anemia- mild anemia previously but hgb now normal, iron sat 16% on previous check, and ferritin 78   - immunofixation- negative 2018     5. Bone- PTH 60 , in setting of hypercalcemia, but some CKD thus ?appropriate.    Last calcium 9.9 thus high normal, and PTH stable.  - continue vitamin D and calcium 600mg for now  - check Vit D with next labs     - labs in one month. Follow up with me in 2 months and with Dr. Corrigan as scheduled.        Marilu Suazo PA-C      Visit length 15 minutes. An additional 15 minutes was spent on date of service in chart review, documentation and other activities as noted.

## 2022-02-14 ENCOUNTER — LAB (OUTPATIENT)
Dept: LAB | Facility: CLINIC | Age: 87
End: 2022-02-14
Payer: MEDICARE

## 2022-02-14 DIAGNOSIS — N25.81 SECONDARY RENAL HYPERPARATHYROIDISM (H): ICD-10-CM

## 2022-02-14 DIAGNOSIS — N18.30 STAGE 3 CHRONIC KIDNEY DISEASE, UNSPECIFIED WHETHER STAGE 3A OR 3B CKD (H): ICD-10-CM

## 2022-02-14 DIAGNOSIS — M85.80 OSTEOPENIA, UNSPECIFIED LOCATION: ICD-10-CM

## 2022-02-14 DIAGNOSIS — N18.32 CHRONIC KIDNEY DISEASE, STAGE 3B (H): ICD-10-CM

## 2022-02-14 DIAGNOSIS — I10 ESSENTIAL HYPERTENSION WITH GOAL BLOOD PRESSURE LESS THAN 140/90: ICD-10-CM

## 2022-02-14 LAB
ALBUMIN SERPL-MCNC: 3.5 G/DL (ref 3.4–5)
ANION GAP SERPL CALCULATED.3IONS-SCNC: 6 MMOL/L (ref 3–14)
BUN SERPL-MCNC: 36 MG/DL (ref 7–30)
CALCIUM SERPL-MCNC: 9.5 MG/DL (ref 8.5–10.1)
CHLORIDE BLD-SCNC: 103 MMOL/L (ref 94–109)
CO2 SERPL-SCNC: 26 MMOL/L (ref 20–32)
CREAT SERPL-MCNC: 1.2 MG/DL (ref 0.52–1.04)
GFR SERPL CREATININE-BSD FRML MDRD: 43 ML/MIN/1.73M2
GLUCOSE BLD-MCNC: 102 MG/DL (ref 70–99)
PHOSPHATE SERPL-MCNC: 3.3 MG/DL (ref 2.5–4.5)
POTASSIUM BLD-SCNC: 4 MMOL/L (ref 3.4–5.3)
SODIUM SERPL-SCNC: 135 MMOL/L (ref 133–144)

## 2022-02-14 PROCEDURE — 82306 VITAMIN D 25 HYDROXY: CPT

## 2022-02-14 PROCEDURE — 36415 COLL VENOUS BLD VENIPUNCTURE: CPT

## 2022-02-14 PROCEDURE — 80069 RENAL FUNCTION PANEL: CPT

## 2022-02-15 LAB — DEPRECATED CALCIDIOL+CALCIFEROL SERPL-MC: 44 UG/L (ref 20–75)

## 2022-02-16 DIAGNOSIS — N18.32 CHRONIC KIDNEY DISEASE, STAGE 3B (H): ICD-10-CM

## 2022-02-17 ENCOUNTER — TELEPHONE (OUTPATIENT)
Dept: NEPHROLOGY | Facility: CLINIC | Age: 87
End: 2022-02-17
Payer: MEDICARE

## 2022-02-17 NOTE — TELEPHONE ENCOUNTER
LMTC(left mess to call)  CKD Consuelo  Needs to set up apt in April/May to Follow-up with HORTENCIA Croft then with  Dr. Corrigan in Oct    Sofia Braun CMA

## 2022-02-17 NOTE — TELEPHONE ENCOUNTER
----- Message from Marilu Silva sent at 2/16/2022  3:28 PM CST -----  [facility] patient  ----- Message -----  From: Marilu Suazo PA-C  Sent: 2/15/2022   1:59 PM CST  To: Marilu Michel,     Pt needs follow up in a couple of months and should keep her follow up with Dr. Corrigan as scheduled in October.     Thanks

## 2022-03-02 ENCOUNTER — TELEPHONE (OUTPATIENT)
Dept: AUDIOLOGY | Facility: CLINIC | Age: 87
End: 2022-03-02
Payer: MEDICARE

## 2022-03-02 NOTE — TELEPHONE ENCOUNTER
.Reason for Call:  Appointment April 4th     Detailed comments: Patient called in with hearing aide issues, last hearing eval was 2017; Patient was scheduled as a hearing eval due to Patient unsure if her hearing has worsened / has medicare, has seen Dr Salazar before, wondering if referral is needed.   Please advise  Phone Number Patient can be reached at: Cell number on file:    Telephone Information:   Mobile [phone]. Pat, daughter       Best Time: anytime    Can we leave a detailed message on this number? YES    Call taken on 3/2/2022 at 1:41 PM by Kimberly Schilling

## 2022-03-02 NOTE — TELEPHONE ENCOUNTER
I advised them to check with their insurance if a referral is required. I did report to them that having a referral is never a bad idea.    -Merlin Cedeño CCC-A

## 2022-03-04 ENCOUNTER — MYC MEDICAL ADVICE (OUTPATIENT)
Dept: FAMILY MEDICINE | Facility: CLINIC | Age: 87
End: 2022-03-04
Payer: MEDICARE

## 2022-03-04 DIAGNOSIS — H91.93 BILATERAL HEARING LOSS, UNSPECIFIED HEARING LOSS TYPE: ICD-10-CM

## 2022-03-04 DIAGNOSIS — H91.90 HEARING LOSS: Primary | ICD-10-CM

## 2022-03-09 ENCOUNTER — MYC MEDICAL ADVICE (OUTPATIENT)
Dept: FAMILY MEDICINE | Facility: CLINIC | Age: 87
End: 2022-03-09
Payer: MEDICARE

## 2022-03-09 ENCOUNTER — TELEPHONE (OUTPATIENT)
Dept: NEPHROLOGY | Facility: CLINIC | Age: 87
End: 2022-03-09
Payer: MEDICARE

## 2022-03-09 DIAGNOSIS — H91.93 HEARING DIFFICULTY OF BOTH EARS: Primary | ICD-10-CM

## 2022-03-09 NOTE — TELEPHONE ENCOUNTER
[facility] Call Center    Phone Message    May a detailed message be left on voicemail: yes     Reason for Call: Other: China Stewart's daughter is calling stating that the visit on 1/7 was billed incorrectly for her renal panel and it said preventitive visit and Medicare will not cover it. Pat would like a call back to [facility], thank you!     Action Taken: Other: FK Neph    Travel Screening: Not Applicable

## 2022-03-17 ENCOUNTER — OFFICE VISIT (OUTPATIENT)
Dept: OPHTHALMOLOGY | Facility: CLINIC | Age: 87
End: 2022-03-17
Payer: MEDICARE

## 2022-03-17 DIAGNOSIS — H35.3134 ADVANCED ATROPHIC NONEXUDATIVE AGE-RELATED MACULAR DEGENERATION OF BOTH EYES WITH SUBFOVEAL INVOLVEMENT: Primary | ICD-10-CM

## 2022-03-17 DIAGNOSIS — H43.813 POSTERIOR VITREOUS DETACHMENT, BILATERAL: ICD-10-CM

## 2022-03-17 DIAGNOSIS — H52.4 PRESBYOPIA: ICD-10-CM

## 2022-03-17 DIAGNOSIS — Z96.1 PSEUDOPHAKIA: ICD-10-CM

## 2022-03-17 DIAGNOSIS — H40.053 BORDERLINE GLAUCOMA WITH OCULAR HYPERTENSION, BILATERAL: ICD-10-CM

## 2022-03-17 DIAGNOSIS — Z01.01 ENCOUNTER FOR EXAMINATION OF EYES AND VISION WITH ABNORMAL FINDINGS: ICD-10-CM

## 2022-03-17 PROCEDURE — 92014 COMPRE OPH EXAM EST PT 1/>: CPT | Performed by: OPHTHALMOLOGY

## 2022-03-17 ASSESSMENT — TONOMETRY
IOP_METHOD: APPLANATION
OD_IOP_MMHG: 16
OS_IOP_MMHG: 15

## 2022-03-17 ASSESSMENT — SLIT LAMP EXAM - LIDS
COMMENTS: GOOD CREASE AND COSMESIS
COMMENTS: GOOD CREASE AND COSMESIS, 1+ PTOSIS

## 2022-03-17 ASSESSMENT — REFRACTION_MANIFEST
OS_ADD: +3.00
OS_SPHERE: BALANCE
OD_ADD: +3.00
OD_SPHERE: -1.75
OD_AXIS: 017
OD_CYLINDER: +1.50

## 2022-03-17 ASSESSMENT — VISUAL ACUITY
OD_CC: 20/250
CORRECTION_TYPE: GLASSES
OS_CC: CF@3'
METHOD: SNELLEN - LINEAR

## 2022-03-17 ASSESSMENT — REFRACTION_WEARINGRX
SPECS_TYPE: PAL
OS_CYLINDER: +1.00
OS_AXIS: 180
OS_SPHERE: -0.75
OD_AXIS: 010
OS_ADD: +3.50
OD_SPHERE: -0.50
OD_ADD: +3.50
OD_CYLINDER: +0.50

## 2022-03-17 ASSESSMENT — CONF VISUAL FIELD
OS_NORMAL: 1
OD_NORMAL: 1
METHOD: COUNTING FINGERS

## 2022-03-17 ASSESSMENT — EXTERNAL EXAM - RIGHT EYE: OD_EXAM: NORMAL

## 2022-03-17 ASSESSMENT — CUP TO DISC RATIO
OS_RATIO: 0.3
OD_RATIO: 0.3

## 2022-03-17 ASSESSMENT — EXTERNAL EXAM - LEFT EYE: OS_EXAM: NORMAL

## 2022-03-17 NOTE — LETTER
3/17/2022         RE: China Guzman  C/o Pat Whaley  [address]  [place] [address]        Dear Colleague,    Thank you for referring your patient, China Guzman, to the [facility]. Please see a copy of my visit note below.     Current Eye Medications:  Latanoprost nightly and timolol daily both eyes, last drops at 8am     Subjective:  Complete eye exam. Vision poor and blurry distance and near left eye > right eye. No eye pain or discomfort in either eye.   Patient told she is pre-diabetic    Lab Results   Component Value Date    A1C 5.4 08/04/2016    A1C 5.7 04/06/2012        Objective:  See Ophthalmology Exam.       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  No diabetic retinopathy.      ICD-10-CM    1. 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 Pseudophakia, Yag Caps, ou  Z96.1    3. Borderline glaucoma with ocular hypertension, bilateral - treated  H40.053    4. Posterior vitreous detachment, bilateral  H43.813    5. Encounter for examination of eyes and vision with abnormal findings  Z01.01    6. Presbyopia  H52.4         Plan:  Glasses Rx given - optional  Continue Latanoprost drop both eyes every evening.  Continue Timolol drop both eyes daily.  May use artificial tears up to 4 times daily both eyes. (Refresh Tears, Systane Ultra/Balance, or Theratears)   Could visit [facility] in [place].  Call in November 2022 for an appointment in March 2023 for Complete Exam (Okay yearly unless changes)    Dr. Craig [phone]                 Again, thank you for allowing me to participate in the care of your patient.        Sincerely,        Srikanth Craig MD

## 2022-03-17 NOTE — PROGRESS NOTES
Current Eye Medications:  Latanoprost nightly and timolol daily both eyes, last drops at 8am     Subjective:  Complete eye exam. Vision poor and blurry distance and near left eye > right eye. No eye pain or discomfort in either eye.   Patient told she is pre-diabetic    Lab Results   Component Value Date    A1C 5.4 08/04/2016    A1C 5.7 04/06/2012        Objective:  See Ophthalmology Exam.       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  No diabetic retinopathy.      ICD-10-CM    1. 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 Pseudophakia, Yag Caps, ou  Z96.1    3. Borderline glaucoma with ocular hypertension, bilateral - treated  H40.053    4. Posterior vitreous detachment, bilateral  H43.813    5. Encounter for examination of eyes and vision with abnormal findings  Z01.01    6. Presbyopia  H52.4         Plan:  Continue Latanoprost drop both eyes every evening.  Continue Timolol drop both eyes daily.  May use artificial tears up to 4 times daily both eyes. (Refresh Tears, Systane Ultra/Balance, or Theratears)   Could visit [facility] in [place].  Call in November 2022 for an appointment in March 2023 for Complete Exam (Okay yearly unless changes)    Dr. Craig [phone]

## 2022-03-17 NOTE — PATIENT INSTRUCTIONS
Continue Latanoprost drop both eyes every evening.  Continue Timolol drop both eyes daily.  May use artificial tears up to 4 times daily both eyes. (Refresh Tears, Systane Ultra/Balance, or Theratears)   Call in November 2022 for an appointment in March 2023 for Complete Exam    Dr. Craig [phone]      Patient Education   Diabetes weakens the blood vessels all over the body, including the eyes. Damage to the blood vessels in the eyes can cause swelling or bleeding into part of the eye (called the retina). This is called diabetic retinopathy (JUDD-tin--puh-thee). If not treated, this disease can cause vision loss or blindness.   Symptoms may include blurred or distorted vision, but many people have no symptoms. It's important to see your eye doctor regularly to check for problems.   Early treatment and good control can help protect your vision. Here are the things you can do to help prevent vision loss:      1. Keep your blood sugar levels under tight control.      2. Bring high blood pressure under control.      3. No smoking.      4. Have yearly dilated eye exams.

## 2022-03-18 DIAGNOSIS — H40.053 BORDERLINE GLAUCOMA WITH OCULAR HYPERTENSION, BILATERAL: ICD-10-CM

## 2022-03-18 RX ORDER — LATANOPROST 50 UG/ML
1 SOLUTION/ DROPS OPHTHALMIC AT BEDTIME
Qty: 9 ML | Refills: 4 | Status: SHIPPED | OUTPATIENT
Start: 2022-03-18 | End: 2023-03-20

## 2022-03-31 NOTE — MR AVS SNAPSHOT
"              After Visit Summary   9/1/2017    China Guzman    MRN: [id number]           Patient Information     Date Of Birth          4/10/1930        Visit Information        Provider Department      9/1/2017 9:15 AM Srikanth Craig MD [facility]        Today's Diagnoses     Borderline glaucoma with ocular hypertension, bilateral - treated    -  1      Care Instructions    Continue same medication  Return visit 6 months for a complete exam      Srikanth Craig M.D.            Follow-ups after your visit        Your next 10 appointments already scheduled     Sep 14, 2017 12:00 PM CDT   Return Visit with Zhane Ingram   [facility] ([facility])    [address] [place] [id number] [phone]              Who to contact     If you have questions or need follow up information about today's clinic visit or your schedule please contact [facility] directly at [phone].  Normal or non-critical lab and imaging results will be communicated to you by [employer], letter or phone within 4 business days after the clinic has received the results. If you do not hear from us within 7 days, please contact the clinic through [employer] or phone. If you have a critical or abnormal lab result, we will notify you by phone as soon as possible.  Submit refill requests through [employer] or call your pharmacy and they will forward the refill request to us. Please allow 3 business days for your refill to be completed.          Additional Information About Your Visit        [employer] Information     [employer] lets you send messages to your doctor, view your test results, renew your prescriptions, schedule appointments and more. To sign up, go to [email or web address] . Click on \"Log in\" on the left side of the screen, which will take you to the Welcome page. Then click on \"Sign up Now\" on the right side of the page.     You will be asked to enter the access " March 31, 2022     Patient: Kendy Garcia   YOB: 1973   Date of Visit: 3/31/2022       To Whom it May Concern:    Kendy Garcia is under my professional care  She was seen in my office on 3/31/2022  She may return to work on 4/5/22  If you have any questions or concerns, please don't hesitate to call           Sincerely,          Goldie Jeffers PA-C        CC: No Recipients code listed below, as well as some personal information. Please follow the directions to create your username and password.     Your access code is: [email or web address]  Expires: 2017  3:02 PM     Your access code will  in 90 days. If you need help or a new code, please call your [facility] clinic or [phone].        Care EveryWhere ID     This is your Care EveryWhere ID. This could be used by other organizations to access your [facility] medical records  [id number]        Your Vitals Were     Last Period                   1980            Blood Pressure from Last 3 Encounters:   17 187/83   17 138/68   17 112/70    Weight from Last 3 Encounters:   17 54.4 kg (120 lb)   17 54.4 kg (120 lb)   17 54.4 kg (120 lb)              We Performed the Following     HC COMPUTERIZED OPHTHALMIC IMAGING OPTIC NERVE     VISUAL FIELDS EXAM (EXTENDED)        Primary Care Provider Office Phone # Fax #    Colby Trimble [phone] [address] [facility] [address]        Equal Access to Services     [facility]: Hadii aad ku hadasho Soomaali, waaxda luqadaha, qaybta kaalmada adeegyada, alan tello . So [facility] [phone].    ATENCIÓN: Si habla español, tiene a mary disposición servicios gratuitos de asistencia lingüística. Llame al [phone].    We comply with applicable federal civil rights laws and [place] laws. We do not discriminate on the basis of race, color, national origin, age, disability sex, sexual orientation or gender identity.            Thank you!     Thank you for choosing [facility]  for your care. Our goal is always to provide you with excellent care. Hearing back from our patients is one way we can continue to improve our services. Please take a few minutes to complete the written survey that you may receive in the mail after your visit with us. Thank you!             Your Updated Medication  List - Protect others around you: Learn how to safely use, store and throw away your medicines at www.disposemymeds.org.          This list is accurate as of: 9/1/17 10:10 AM.  Always use your most recent med list.                   Brand Name Dispense Instructions for use Diagnosis    aspirin 325 MG EC tablet      Take 325 mg by mouth daily.        atorvastatin 10 MG tablet    LIPITOR    90 tablet    Take 1 tablet (10 mg) by mouth At Bedtime    Hyperlipidemia LDL goal <130       CALCIUM + D PO      2 tablets daily        diltiazem 180 MG 24 hr capsule    DILACOR XR    90 capsule    Take 1 capsule (180 mg) by mouth daily    Essential hypertension with goal blood pressure less than 140/90       hydrochlorothiazide 25 MG tablet    HYDRODIURIL    90 tablet    Take 1 tablet (25 mg) by mouth every morning    Essential hypertension with goal blood pressure less than 140/90       latanoprost 0.005 % ophthalmic solution    XALATAN    3 Bottle    Place 1 drop into both eyes At Bedtime    Borderline glaucoma with ocular hypertension, bilateral       MULTI-VITAMIN PO      None Entered        order for DME     1 Device    Walker. Expected duration of use: 3 months.    Baker's cyst of knee, left, Acute pain of left knee       valsartan 320 MG tablet    DIOVAN    90 tablet    Take 1 tablet (320 mg) by mouth daily    Essential hypertension with goal blood pressure less than 140/90

## 2022-04-04 ENCOUNTER — OFFICE VISIT (OUTPATIENT)
Dept: FAMILY MEDICINE | Facility: CLINIC | Age: 87
End: 2022-04-04
Payer: MEDICARE

## 2022-04-04 ENCOUNTER — OFFICE VISIT (OUTPATIENT)
Dept: AUDIOLOGY | Facility: CLINIC | Age: 87
End: 2022-04-04
Payer: MEDICARE

## 2022-04-04 VITALS
BODY MASS INDEX: 21.57 KG/M2 | HEART RATE: 88 BPM | WEIGHT: 106.8 LBS | SYSTOLIC BLOOD PRESSURE: 164 MMHG | OXYGEN SATURATION: 96 % | TEMPERATURE: 97.7 F | DIASTOLIC BLOOD PRESSURE: 75 MMHG

## 2022-04-04 DIAGNOSIS — H91.93 CHANGE IN HEARING, BILATERAL: Primary | ICD-10-CM

## 2022-04-04 DIAGNOSIS — H90.3 SENSORINEURAL HEARING LOSS, ASYMMETRICAL: Primary | ICD-10-CM

## 2022-04-04 PROCEDURE — 99207 PR NO CHARGE LOS: CPT | Performed by: AUDIOLOGIST

## 2022-04-04 PROCEDURE — V5299 HEARING SERVICE: HCPCS | Performed by: AUDIOLOGIST

## 2022-04-04 PROCEDURE — 92557 COMPREHENSIVE HEARING TEST: CPT | Performed by: AUDIOLOGIST

## 2022-04-04 PROCEDURE — 92567 TYMPANOMETRY: CPT | Performed by: AUDIOLOGIST

## 2022-04-04 PROCEDURE — 99212 OFFICE O/P EST SF 10 MIN: CPT | Performed by: FAMILY MEDICINE

## 2022-04-04 NOTE — PROGRESS NOTES
Assessment & Plan     Change in hearing, bilateral    Uses hearing aids, but had noticed that not hearing as good  Ear exam does not show any build up of wax in the ears.      Anirudh Gan MD  [facility] JENNIFER Atkinson is a 89 year old who presents for the following health issues    Has appoint with audiology for hearing check    History of Present Illness       Reason for visit:  Ear cleaning before audiology appointment  Symptom onset:  More than a month  Symptoms include:  Change in hearing. Concerned that hearing aids aren't working properly.  Symptom intensity:  Moderate  Symptom progression:  Staying the same  Had these symptoms before:  No    She eats 4 or more servings of fruits and vegetables daily.She consumes 0 sweetened beverage(s) daily.She exercises with enough effort to increase her heart rate 30 to 60 minutes per day.  She exercises with enough effort to increase her heart rate 7 days per week.   She is taking medications regularly.     Review of Systems   Constitutional, tcardiovascular, pulmonary, gi and gu systems are negative, except as otherwise noted.      Objective    BP (!) 180/84   Pulse 88   Temp 97.7  F (36.5  C) (Oral)   Wt 48.4 kg (106 lb 12.8 oz)   LMP 12/16/1980   SpO2 96%   BMI 21.57 kg/m    Body mass index is 21.57 kg/m .  Physical Exam   GENERAL: healthy, alert and no distress  HENT: ear canals and TM's normal, nose and mouth without ulcers or lesions  RESP: lungs clear to auscultation - no rales, rhonchi or wheezes  CV: regular rate and rhythm, no murmur, click or rub, no peripheral edema

## 2022-04-04 NOTE — PROGRESS NOTES
AUDIOLOGY REPORT    SUBJECTIVE:  China Guzman is a 89 year old female who was seen in the Audiology Clinic [facility] on 4/04/22 for audiologic evaluation, referred by Dr. Trimble. The patient has been seen previously in this clinic on 6/23/2017 for assessment and results indicated bilateral asymmetric sensorineural hearing loss. The patient reports a decline in hearing. The patient denies  bilateral tinnitus, bilateral otalgia, bilateral drainage, bilateral aural fullness, family history of hearing loss, history of noise exposure, and dizziness. The patient notes difficulty with communication in a variety of listening situations. Patient was unaccompanied to today's visit.     Abuse Screening:  Do you feel unsafe at home or work/school? No  Do you feel threatened by someone? No  Does anyone try to keep you from having contact with others, or doing things outside of your home? No  Physical signs of abuse present? No     OBJECTIVE:    Otoscopic exam indicates ears are clear of cerumen bilaterally     Pure Tone Thresholds assessed using standard techniques  audiometry with good  reliability from 250-8000 Hz bilaterally using insert earphones and circumaural headphones     RIGHT:  moderate-severe and severe sensorineural hearing loss    LEFT:    moderate and moderate-severe sensorineural hearing loss    NOTE: Change in transducers did not merit a change in thresholds.     Tympanogram:    RIGHT: normal eardrum mobility    LEFT:   normal eardrum mobility    Reflexes (reported by stimulus ear): 1000 Hz   Could not maintain seal     Speech Reception Threshold:    RIGHT: 75 dB HL    LEFT:   60 dB HL    Word Recognition Score:     RIGHT: 32% at 95 dB HL using NU-6 recorded word list.    LEFT:   68% at 90 dB HL using NU-6 recorded word list.    BINAURAL:   84% at 80 dB HL using NU-6 recorded word list.      ASSESSMENT:   Bilateral asymmetric sensorineural hearing loss      Compared to patient's  previous audiogram dated 2017, hearing has declined bilaterally. Today s results were discussed with the patient in detail.     AUDIOLOGY REPORT: HEARING AID RECHECK    Procedures:       SIDE: Both    : [place]     TYPE: OPN 3 RITE    S/N:      R: [id number]     L: [id number]    WARRANTY:  2020    Today we performed general cleaning of the hearing aids and earmolds, replaced the waxguards bilaterally, and reprogrammed the hearing aids to this most recent audiogram.     PLAN:  Patient was counseled regarding hearing loss and impact on communication. It is recommended that the patient return as needed for hearing aid concerns.  Please call this clinic with questions regarding these results or recommendations.    Merlin Cedeño CCC-A  Licensed Audiologist #[id number]  2022    CC: Dr. Trimble

## 2022-04-28 NOTE — PROGRESS NOTES
"Nephrology Progress Note  5/2/2022    HPI:     She is a delightful female with history of hypertension, hyperlipidemia, osteoporosis, who presented in July 2018 for evaluation of high calcium and worsened kidney function.  Her calcium was noted to be elevated on multiple occasions, up to 11 mg/dL but usually between 10.4-10.9- though overall asymptomatic.    Her PTH was within normal, which is mildly inappropriately high in setting of hypercalcemia. She had been on hydrochlorothiazide which at first we lowered, but with hyponatremia noted on recheck, I switched her to furosemide in September 2018    She is also on losartan and diltiazem for her blood pressure. She takes calcium and vitamin D  twice a day and now on actonel.  On followup today, she feels well.   She hydrates well with water, 4-5 glasses a day  She lives with her daughter who is a  and has sarcoidosis, and has a special needs daughter (her granddaughter).     She denies urinary issues. She wakes up once a night to urinate typically    Her blood pressure at home are in 130-140s mostly. It is rarely any higher.  Her weight has increased a few pounds. She is back at the  and exercising.    She had possible food poisoning one week before her last appt in December and creatinine was increased from 1.2 to 1.7  She cut back lasix 20 mg to once a day and creatinine is now improved. She does note some light headedness when sitting from a laying position but denies light headedness when standing up or when bending over.  She has no LE swelling, no shortness of breath. She seems to be walking at a slower pace but overall feeling okay.         Review of Systems   A 4 point ROS was reviewed with patient and negative except as noted above       Objective   Reported vitals:  /82   Pulse 69   Ht 1.499 m (4' 11\")   Wt 48.1 kg (106 lb)   LMP 12/16/1980   SpO2 97%   BMI 21.41 kg/m     General: no distress,engaged and appropriate speech  Psych: Alert " and oriented times 3; coherent speech, normal   rate and volume, able to articulate logical thoughts   Lungs: decreased bilaterally  CV: RRR  EXT: no edema    Last labs 5/2019 - within normal  Last Comprehensive Metabolic Panel:  Sodium   Date Value Ref Range Status   02/14/2022 135 133 - 144 mmol/L Final   08/04/2020 138 133 - 144 mmol/L Final     Potassium   Date Value Ref Range Status   02/14/2022 4.0 3.4 - 5.3 mmol/L Final   08/04/2020 4.0 3.4 - 5.3 mmol/L Final     Chloride   Date Value Ref Range Status   02/14/2022 103 94 - 109 mmol/L Final   08/04/2020 103 94 - 109 mmol/L Final     Carbon Dioxide   Date Value Ref Range Status   08/04/2020 30 20 - 32 mmol/L Final     Carbon Dioxide (CO2)   Date Value Ref Range Status   02/14/2022 26 20 - 32 mmol/L Final     Anion Gap   Date Value Ref Range Status   02/14/2022 6 3 - 14 mmol/L Final   08/04/2020 5 3 - 14 mmol/L Final     Glucose   Date Value Ref Range Status   02/14/2022 102 (H) 70 - 99 mg/dL Final   08/04/2020 118 (H) 70 - 99 mg/dL Final     Comment:     Fasting specimen     Urea Nitrogen   Date Value Ref Range Status   02/14/2022 36 (H) 7 - 30 mg/dL Final   08/04/2020 53 (H) 7 - 30 mg/dL Final     Creatinine   Date Value Ref Range Status   02/14/2022 1.20 (H) 0.52 - 1.04 mg/dL Final   08/04/2020 1.24 (H) 0.52 - 1.04 mg/dL Final     GFR Estimate   Date Value Ref Range Status   02/14/2022 43 (L) >60 mL/min/1.73m2 Final     Comment:     Effective December 21, 2021 eGFRcr in adults is calculated using the 2021 CKD-EPI creatinine equation which includes age and gender (Javan lord al., NEJM, DOI: 10.1056/DWNUhk2476378)   08/04/2020 38 (L) >60 mL/min/[1.73_m2] Final     Comment:     Non  GFR Calc  Starting 12/18/2018, serum creatinine based estimated GFR (eGFR) will be   calculated using the Chronic Kidney Disease Epidemiology Collaboration   (CKD-EPI) equation.       Calcium   Date Value Ref Range Status   02/14/2022 9.5 8.5 - 10.1 mg/dL Final    08/04/2020 10.0 8.5 - 10.1 mg/dL Final       Assessment/Plan:  89 year old female with history of hypertension, osteoporosis, anemia, who presents for followup of hypercalcemia and increased creatinine. Her Scr is typically 0.9mg/dL but was up to 1.3 in setting of hypercalcemia, calcium of 10.4-10.9 in 2018  1. Hypercalcemia- improved, after better hydration and stopping hydrochlorothiazide and starting furosemide  - given hypercalcemia and hyponatremia, hydrochlorothiazide was stopped   - tolerating furosemide and calcium 9.5 albumin 3.5 last check    2. CKD stage 3- Her Scr was 0.9 up until the last year or so, with Scr up to 1.3 but now ranging in 1-1.1 range  - however her creatinine was increased to 1.7 last month. she decreased lasix to 20 mg daily. On recheck creatinine improving 1.2  - labs today    3. Hypertension- now on furosemide ,continue same (stopped hydrochlorothiazide), losartan and diltiazem  -  she will monitor at home  -goal 130-140/80 is fine- she is at goal, would cut down on BP medications if <115 systolic.    4. Anemia- mild anemia previously but hgb now normal, iron sat 16% on previous check, and ferritin 78   - immunofixation- negative 2018     5. Bone- 2/14/22 Vit D 44, 8/27/21 PTH 85 , in setting of hypercalcemia, but some CKD thus ?appropriate.    Last calcium 9.9 thus high normal, and PTH stable.  - continue vitamin D and calcium 600mg for now  - check PTH     - labs today. Follow up with Dr. Corrigan as scheduled.        Marilu Suazo PA-C      Visit length 15 minutes. An additional 15 minutes was spent on date of service in chart review, documentation and other activities as noted.

## 2022-05-02 ENCOUNTER — OFFICE VISIT (OUTPATIENT)
Dept: NEPHROLOGY | Facility: CLINIC | Age: 87
End: 2022-05-02
Payer: MEDICARE

## 2022-05-02 VITALS
WEIGHT: 106 LBS | HEIGHT: 59 IN | BODY MASS INDEX: 21.37 KG/M2 | OXYGEN SATURATION: 97 % | HEART RATE: 69 BPM | DIASTOLIC BLOOD PRESSURE: 82 MMHG | SYSTOLIC BLOOD PRESSURE: 139 MMHG

## 2022-05-02 DIAGNOSIS — I10 ESSENTIAL HYPERTENSION: ICD-10-CM

## 2022-05-02 DIAGNOSIS — N18.32 ANEMIA IN STAGE 3B CHRONIC KIDNEY DISEASE (H): ICD-10-CM

## 2022-05-02 DIAGNOSIS — N18.30 STAGE 3 CHRONIC KIDNEY DISEASE, UNSPECIFIED WHETHER STAGE 3A OR 3B CKD (H): ICD-10-CM

## 2022-05-02 DIAGNOSIS — I10 ESSENTIAL HYPERTENSION WITH GOAL BLOOD PRESSURE LESS THAN 140/90: ICD-10-CM

## 2022-05-02 DIAGNOSIS — N18.32 CHRONIC KIDNEY DISEASE, STAGE 3B (H): Primary | ICD-10-CM

## 2022-05-02 DIAGNOSIS — E83.52 HYPERCALCEMIA: ICD-10-CM

## 2022-05-02 DIAGNOSIS — N25.81 SECONDARY RENAL HYPERPARATHYROIDISM (H): ICD-10-CM

## 2022-05-02 DIAGNOSIS — D63.1 ANEMIA IN STAGE 3B CHRONIC KIDNEY DISEASE (H): ICD-10-CM

## 2022-05-02 LAB
ALBUMIN SERPL-MCNC: 4 G/DL (ref 3.4–5)
ANION GAP SERPL CALCULATED.3IONS-SCNC: 9 MMOL/L (ref 3–14)
BUN SERPL-MCNC: 53 MG/DL (ref 7–30)
CALCIUM SERPL-MCNC: 9.8 MG/DL (ref 8.5–10.1)
CHLORIDE BLD-SCNC: 104 MMOL/L (ref 94–109)
CO2 SERPL-SCNC: 25 MMOL/L (ref 20–32)
CREAT SERPL-MCNC: 1.13 MG/DL (ref 0.52–1.04)
ERYTHROCYTE [DISTWIDTH] IN BLOOD BY AUTOMATED COUNT: 13.8 % (ref 10–15)
FERRITIN SERPL-MCNC: 46 NG/ML (ref 8–252)
GFR SERPL CREATININE-BSD FRML MDRD: 45 ML/MIN/1.73M2
GLUCOSE BLD-MCNC: 98 MG/DL (ref 70–99)
HCT VFR BLD AUTO: 33.1 % (ref 35–47)
HGB BLD-MCNC: 10.9 G/DL (ref 11.7–15.7)
IRON SATN MFR SERPL: 19 % (ref 15–46)
IRON SERPL-MCNC: 73 UG/DL (ref 35–180)
MCH RBC QN AUTO: 30 PG (ref 26.5–33)
MCHC RBC AUTO-ENTMCNC: 32.9 G/DL (ref 31.5–36.5)
MCV RBC AUTO: 91 FL (ref 78–100)
PHOSPHATE SERPL-MCNC: 3.8 MG/DL (ref 2.5–4.5)
PLATELET # BLD AUTO: 303 10E3/UL (ref 150–450)
POTASSIUM BLD-SCNC: 4.4 MMOL/L (ref 3.4–5.3)
PTH-INTACT SERPL-MCNC: 85 PG/ML (ref 18–80)
RBC # BLD AUTO: 3.63 10E6/UL (ref 3.8–5.2)
SODIUM SERPL-SCNC: 138 MMOL/L (ref 133–144)
TIBC SERPL-MCNC: 382 UG/DL (ref 240–430)
WBC # BLD AUTO: 4.7 10E3/UL (ref 4–11)

## 2022-05-02 PROCEDURE — 83970 ASSAY OF PARATHORMONE: CPT | Performed by: PHYSICIAN ASSISTANT

## 2022-05-02 PROCEDURE — 82728 ASSAY OF FERRITIN: CPT | Performed by: PHYSICIAN ASSISTANT

## 2022-05-02 PROCEDURE — 99214 OFFICE O/P EST MOD 30 MIN: CPT | Performed by: PHYSICIAN ASSISTANT

## 2022-05-02 PROCEDURE — 36415 COLL VENOUS BLD VENIPUNCTURE: CPT | Performed by: PHYSICIAN ASSISTANT

## 2022-05-02 PROCEDURE — 82043 UR ALBUMIN QUANTITATIVE: CPT | Performed by: PHYSICIAN ASSISTANT

## 2022-05-02 PROCEDURE — 83550 IRON BINDING TEST: CPT | Performed by: PHYSICIAN ASSISTANT

## 2022-05-02 PROCEDURE — 80069 RENAL FUNCTION PANEL: CPT | Performed by: PHYSICIAN ASSISTANT

## 2022-05-02 PROCEDURE — 85027 COMPLETE CBC AUTOMATED: CPT | Performed by: PHYSICIAN ASSISTANT

## 2022-05-02 RX ORDER — LOSARTAN POTASSIUM 100 MG/1
100 TABLET ORAL DAILY
Qty: 90 TABLET | Refills: 3 | Status: SHIPPED | OUTPATIENT
Start: 2022-05-02 | End: 2023-03-01

## 2022-05-02 NOTE — PATIENT INSTRUCTIONS
Labs today, will contact you with results/recommendations.   No medication changes today.   Continue good hydration, low sodium diet.   Check blood pressure and weight daily, keep log.   Follow up with Dr. Corrigan as planned in October.   Call sooner with any questions or concerns.

## 2022-05-02 NOTE — LETTER
5/2/2022       RE: China Guzman  C/o Pat Whaley  [address]  [place] [facility] [place] [address]     Dear Colleague,    Thank you for referring your patient, China Guzman, to the [facility] at [facility]. Please see a copy of my visit note below.    Nephrology Progress Note  5/2/2022    HPI:     She is a delightful female with history of hypertension, hyperlipidemia, osteoporosis, who presented in July 2018 for evaluation of high calcium and worsened kidney function.  Her calcium was noted to be elevated on multiple occasions, up to 11 mg/dL but usually between 10.4-10.9- though overall asymptomatic.    Her PTH was within normal, which is mildly inappropriately high in setting of hypercalcemia. She had been on hydrochlorothiazide which at first we lowered, but with hyponatremia noted on recheck, I switched her to furosemide in September 2018    She is also on losartan and diltiazem for her blood pressure. She takes calcium and vitamin D  twice a day and now on actonel.  On followup today, she feels well.   She hydrates well with water, 4-5 glasses a day  She lives with her daughter who is a  and has sarcoidosis, and has a special needs daughter (her granddaughter).     She denies urinary issues. She wakes up once a night to urinate typically    Her blood pressure at home are in 130-140s mostly. It is rarely any higher.  Her weight has increased a few pounds. [facility].    She had possible food poisoning one week before her last appt in December and creatinine was increased from 1.2 to 1.7  She cut back lasix 20 mg to once a day and creatinine is now improved. She does note some light headedness when sitting from a laying position but denies light headedness when standing up or when bending over.  She has no LE swelling, no shortness of breath. She seems to be walking at a slower pace but overall feeling okay.         Review  "of Systems   A 4 point ROS was reviewed with patient and negative except as noted above       Objective   Reported vitals:  /82   Pulse 69   Ht 1.499 m (4' 11\")   Wt 48.1 kg (106 lb)   LMP 12/16/1980   SpO2 97%   BMI 21.41 kg/m     General: no distress,engaged and appropriate speech  Psych: Alert and oriented times 3; coherent speech, normal   rate and volume, able to articulate logical thoughts   Lungs: decreased bilaterally  CV: RRR  EXT: no edema    Last labs 5/2019 - within normal  Last Comprehensive Metabolic Panel:  Sodium   Date Value Ref Range Status   02/14/2022 135 133 - 144 mmol/L Final   08/04/2020 138 133 - 144 mmol/L Final     Potassium   Date Value Ref Range Status   02/14/2022 4.0 3.4 - 5.3 mmol/L Final   08/04/2020 4.0 3.4 - 5.3 mmol/L Final     Chloride   Date Value Ref Range Status   02/14/2022 103 94 - 109 mmol/L Final   08/04/2020 103 94 - 109 mmol/L Final     Carbon Dioxide   Date Value Ref Range Status   08/04/2020 30 20 - 32 mmol/L Final     Carbon Dioxide (CO2)   Date Value Ref Range Status   02/14/2022 26 20 - 32 mmol/L Final     Anion Gap   Date Value Ref Range Status   02/14/2022 6 3 - 14 mmol/L Final   08/04/2020 5 3 - 14 mmol/L Final     Glucose   Date Value Ref Range Status   02/14/2022 102 (H) 70 - 99 mg/dL Final   08/04/2020 118 (H) 70 - 99 mg/dL Final     Comment:     Fasting specimen     Urea Nitrogen   Date Value Ref Range Status   02/14/2022 36 (H) 7 - 30 mg/dL Final   08/04/2020 53 (H) 7 - 30 mg/dL Final     Creatinine   Date Value Ref Range Status   02/14/2022 1.20 (H) 0.52 - 1.04 mg/dL Final   08/04/2020 1.24 (H) 0.52 - 1.04 mg/dL Final     GFR Estimate   Date Value Ref Range Status   02/14/2022 43 (L) >60 mL/min/1.73m2 Final     Comment:     Effective December 21, 2021 eGFRcr in adults is calculated using the 2021 CKD-EPI creatinine equation which includes age and gender ( et al., NEJM, DOI: 10.1056/NYPUzq2377996)   08/04/2020 38 (L) >60 mL/min/[1.73_m2] " Final     Comment:     Non  GFR Calc  Starting 12/18/2018, serum creatinine based estimated GFR (eGFR) will be   calculated using the Chronic Kidney Disease Epidemiology Collaboration   (CKD-EPI) equation.       Calcium   Date Value Ref Range Status   02/14/2022 9.5 8.5 - 10.1 mg/dL Final   08/04/2020 10.0 8.5 - 10.1 mg/dL Final       Assessment/Plan:  89 year old female with history of hypertension, osteoporosis, anemia, who presents for followup of hypercalcemia and increased creatinine. Her Scr is typically 0.9mg/dL but was up to 1.3 in setting of hypercalcemia, calcium of 10.4-10.9 in 2018  1. Hypercalcemia- improved, after better hydration and stopping hydrochlorothiazide and starting furosemide  - given hypercalcemia and hyponatremia, hydrochlorothiazide was stopped   - tolerating furosemide and calcium 9.5 albumin 3.5 last check    2. CKD stage 3- Her Scr was 0.9 up until the last year or so, with Scr up to 1.3 but now ranging in 1-1.1 range  - however her creatinine was increased to 1.7 last month. she decreased lasix to 20 mg daily. On recheck creatinine improving 1.2  - labs today    3. Hypertension- now on furosemide ,continue same (stopped hydrochlorothiazide), losartan and diltiazem  -  she will monitor at home  -goal 130-140/80 is fine- she is at goal, would cut down on BP medications if <115 systolic.    4. Anemia- mild anemia previously but hgb now normal, iron sat 16% on previous check, and ferritin 78   - immunofixation- negative 2018     5. Bone- 2/14/22 Vit D 44, 8/27/21 PTH 85 , in setting of hypercalcemia, but some CKD thus ?appropriate.    Last calcium 9.9 thus high normal, and PTH stable.  - continue vitamin D and calcium 600mg for now  - check PTH     - labs today. Follow up with Dr. Corrigan as scheduled.        Marilu Suazo PA-C      Visit length 15 minutes. An additional 15 minutes was spent on date of service in chart review, documentation and other activities as  noted.       Again, thank you for allowing me to participate in the care of your patient.      Sincerely,    MANUEL BETHEA PA-C

## 2022-05-03 ENCOUNTER — TELEPHONE (OUTPATIENT)
Dept: NEPHROLOGY | Facility: CLINIC | Age: 87
End: 2022-05-03
Payer: MEDICARE

## 2022-05-03 LAB
CREAT UR-MCNC: 37 MG/DL
MICROALBUMIN UR-MCNC: 6 MG/L
MICROALBUMIN/CREAT UR: 16.22 MG/G CR (ref 0–25)

## 2022-05-03 NOTE — TELEPHONE ENCOUNTER
Marilu Suazo, Dena Sainz RN  Please let her know labs are all stable. Follow up with as planned.     Thanks,   Marilu       Spoke with China. She asked about specific lab results and writer gave those to her. She is happy with those results and will follow back in October with Dr. Corrigan.

## 2022-05-12 DIAGNOSIS — H40.053 BORDERLINE GLAUCOMA WITH OCULAR HYPERTENSION, BILATERAL: ICD-10-CM

## 2022-05-12 RX ORDER — TIMOLOL MALEATE 5 MG/ML
1 SOLUTION/ DROPS OPHTHALMIC DAILY
Qty: 15 ML | Refills: 4 | Status: SHIPPED | OUTPATIENT
Start: 2022-05-12 | End: 2022-11-04

## 2022-06-01 NOTE — TELEPHONE ENCOUNTER
This message was sent to management. Recommendations were to call patient and provide patient relations number and to mention billing concerns.  Call to patient and informed her of recommendations above.  No further questions at this time  Karol Whaley LPN  Nephrology  [phone]

## 2022-06-08 NOTE — PROGRESS NOTES
Need previsit labs-see orders and close encounter if nothing else needed./Terese Calderon,       Physical 5/26/17  
This patient has a lab only appointment on 5/19/2017 but does not have future orders. Please review, associate diagnosis and sign pending lab orders for the upcoming appointment.  She has an appointment with Dr. Trimble on 5/26/2017.    Thank you,    [facility]    
Mother

## 2022-07-11 ENCOUNTER — OFFICE VISIT (OUTPATIENT)
Dept: URGENT CARE | Facility: URGENT CARE | Age: 87
End: 2022-07-11
Payer: MEDICARE

## 2022-07-11 ENCOUNTER — TELEPHONE (OUTPATIENT)
Dept: FAMILY MEDICINE | Facility: CLINIC | Age: 87
End: 2022-07-11

## 2022-07-11 VITALS
BODY MASS INDEX: 21.21 KG/M2 | TEMPERATURE: 98 F | SYSTOLIC BLOOD PRESSURE: 188 MMHG | RESPIRATION RATE: 18 BRPM | WEIGHT: 105 LBS | OXYGEN SATURATION: 98 % | HEART RATE: 102 BPM | DIASTOLIC BLOOD PRESSURE: 94 MMHG

## 2022-07-11 DIAGNOSIS — I10 PRIMARY HYPERTENSION: Primary | ICD-10-CM

## 2022-07-11 PROCEDURE — 99213 OFFICE O/P EST LOW 20 MIN: CPT | Performed by: PHYSICIAN ASSISTANT

## 2022-07-11 NOTE — TELEPHONE ENCOUNTER
Patient calling stating she takes her BP on a weekly basis and yesterday when she checked it, it was elevated 180/90.  She checked it again today and it was 168/96.  She denies any cardiac symptoms.  States that she feels well.  Nothing has changed.    Takes her BP medications at night and has not missed any doses.    Losartan 100mg daily  Verapamil ER 240mg daily    BP Readings from Last 6 Encounters:   05/02/22 139/82   04/04/22 (!) 164/75   01/10/22 137/75   12/13/21 (!) 140/70   11/12/21 118/70   10/01/21 133/74     Patient scheduled tomorrow for nurse only BP check with MA.  Will have it checked and follow up from there.    Kim Lo RN

## 2022-07-12 ENCOUNTER — ALLIED HEALTH/NURSE VISIT (OUTPATIENT)
Dept: FAMILY MEDICINE | Facility: CLINIC | Age: 87
End: 2022-07-12

## 2022-07-12 VITALS — HEART RATE: 74 BPM | OXYGEN SATURATION: 98 % | SYSTOLIC BLOOD PRESSURE: 160 MMHG | DIASTOLIC BLOOD PRESSURE: 80 MMHG

## 2022-07-12 DIAGNOSIS — I10 ESSENTIAL HYPERTENSION WITH GOAL BLOOD PRESSURE LESS THAN 140/90: Primary | ICD-10-CM

## 2022-07-12 PROCEDURE — 99207 PR NO CHARGE NURSE ONLY: CPT

## 2022-07-12 NOTE — PROGRESS NOTES
I met with China Guzman at the request of Dr. Trimble to recheck her blood pressure.  Blood pressure medications on the med list were reviewed with patient.    Patient has taken all medications as per usual regimen: Yes  Patient reports tolerating them without any issues or concerns: Yes    Vitals:    07/12/22 1430 07/12/22 1439   BP: (!) 159/83 (!) 160/80   Pulse: 74    SpO2: 98%        After 5 minutes, the patient's blood pressure remained greater than or equal to 140/90.    Is the patient currently having any chest pain? No  Does the patient currently have a headache? No  Does the patient currently have any vision changes? No  Does the patient currently have any nausea? No  Does the patient currently have any abdominal pain? No    The previous encounter was reviewed.  The patient was discharged and the note will be sent to the provider for final review.  Spoke with Rowena Solis NP regarding elevated blood pressure. Advised to schedule a blood pressure medication recheck. Appointment scheduled on Friday, 7/15/2022 at 2:00 pm with Rowena Solis NP.     Yohana Shepherd [facility]

## 2022-07-15 ENCOUNTER — OFFICE VISIT (OUTPATIENT)
Dept: FAMILY MEDICINE | Facility: CLINIC | Age: 87
End: 2022-07-15
Payer: MEDICARE

## 2022-07-15 VITALS
DIASTOLIC BLOOD PRESSURE: 62 MMHG | WEIGHT: 105 LBS | HEIGHT: 59 IN | SYSTOLIC BLOOD PRESSURE: 130 MMHG | HEART RATE: 89 BPM | OXYGEN SATURATION: 96 % | BODY MASS INDEX: 21.17 KG/M2

## 2022-07-15 DIAGNOSIS — I10 ESSENTIAL HYPERTENSION WITH GOAL BLOOD PRESSURE LESS THAN 140/90: Primary | ICD-10-CM

## 2022-07-15 PROCEDURE — 99213 OFFICE O/P EST LOW 20 MIN: CPT | Performed by: NURSE PRACTITIONER

## 2022-07-15 NOTE — PATIENT INSTRUCTIONS
Blood pressure looks good today.  No changes.   Continue your medications in the morning.  Check blood at least 1 hour after taking your medicine and after at least 10-15 minutes of rest.  You only need to check it once per week.

## 2022-07-15 NOTE — PROGRESS NOTES
"  Assessment & Plan     Essential hypertension with goal blood pressure less than 140/90  Chronic, stable.  BP in clinic 130/62 on recheck.   No changes today.   Continue current medications.      Recent BMP stable.           Return in about 2 months (around 9/15/2022) for Medicare Wellness exam.    Rowena Solis CNP  [facility] GILBERTO Atkinson is a 89 year old accompanied by her son, presenting for the following health issues:  Hypertension      History of Present Illness       Hypertension: She presents for follow up of hypertension.  She does check blood pressure  regularly outside of the clinic. Outside blood pressures have been over 140/90. She follows a low salt diet.       BP check.    Was having higher BP readings at home and at an urgent care visit, but today BP is well within goal.    She is feeling well and denies any other concerns.   She is on several agents for hypertension- losartan 100 mg, verapamil  mg and furosemide 40 mg.  She reports medication compliance.       Review of Systems   Constitutional, HEENT, cardiovascular, pulmonary, gi and gu systems are negative, except as otherwise noted.      Objective    /62   Pulse 89   Ht 1.499 m (4' 11\")   Wt 47.6 kg (105 lb)   LMP 12/16/1980   SpO2 96%   BMI 21.21 kg/m    Body mass index is 21.21 kg/m .  Physical Exam   GENERAL: healthy, alert and no distress  EYES: Eyes grossly normal to inspection, PERRL and conjunctivae and sclerae normal  NECK: no adenopathy, no asymmetry, masses, or scars and thyroid normal to palpation  RESP: lungs clear to auscultation - no rales, rhonchi or wheezes  CV: regular rate and rhythm, normal S1 S2, no S3 or S4, no murmur, click or rub, no peripheral edema and peripheral pulses strong  MS: no gross musculoskeletal defects noted, no edema    Recent Labs   Lab Test 05/02/22  1443 02/14/22  1117    135   POTASSIUM 4.4 4.0   CHLORIDE 104 103   CO2 25 26   ANIONGAP 9 6 "   GLC 98 102*   BUN 53* 36*   CR 1.13* 1.20*   AB 9.8 9.5                 .  ..

## 2022-07-21 NOTE — PROGRESS NOTES
"Nephrology Progress Note  7/25/2022I:     She is a delightful female with history of hypertension, hyperlipidemia, osteoporosis, who presented in July 2018 for evaluation of high calcium and worsened kidney function.  Her calcium was noted to be elevated on multiple occasions, up to 11 mg/dL but usually between 10.4-10.9- though overall asymptomatic.    Her PTH was within normal, which is mildly inappropriately high in setting of hypercalcemia. She had been on hydrochlorothiazide which at first we lowered, but with hyponatremia noted on recheck, I switched her to furosemide in September 2018    She is also on losartan and diltiazem for her blood pressure. She takes calcium and vitamin D  twice a day and now on actonel.  On followup today, she feels well.   She hydrates well with water, 4-5 glasses a day  She lives with her daughter who is a  and has sarcoidosis, and has a special needs daughter (her granddaughter).     She denies urinary issues. She wakes up once a night to urinate typically    Her blood pressure at home are in 130-140s mostly. It is rarely any higher.  Her weight has increased a few pounds. She is back at the  and exercising.    She had possible food poisoning one week before her last appt in December and creatinine was increased from 1.2 to 1.7  She cut back lasix 20 mg to once a day and creatinine is now improved. She denies any current dizziness. She has no LE swelling, no shortness of breath. She seems to be walking at a slower pace but overall feeling okay.       Review of Systems   A 4 point ROS was reviewed with patient and negative except as noted above       Objective   Reported vitals:  BP (!) 156/87 (BP Location: Right arm, Patient Position: Sitting, Cuff Size: Adult Regular)   Pulse 86   Ht 1.499 m (4' 11\")   Wt 47.6 kg (105 lb)   LMP 12/16/1980   SpO2 97%   BMI 21.21 kg/m     General: no distress,engaged and appropriate speech  Psych: Alert and oriented times 3; coherent " speech, normal   rate and volume, able to articulate logical thoughts   Lungs: decreased bilaterally  CV: RRR  EXT: no edema    Last labs 5/2019 - within normal  Last Comprehensive Metabolic Panel:  Sodium   Date Value Ref Range Status   05/02/2022 138 133 - 144 mmol/L Final   08/04/2020 138 133 - 144 mmol/L Final     Potassium   Date Value Ref Range Status   05/02/2022 4.4 3.4 - 5.3 mmol/L Final   08/04/2020 4.0 3.4 - 5.3 mmol/L Final     Chloride   Date Value Ref Range Status   05/02/2022 104 94 - 109 mmol/L Final   08/04/2020 103 94 - 109 mmol/L Final     Carbon Dioxide   Date Value Ref Range Status   08/04/2020 30 20 - 32 mmol/L Final     Carbon Dioxide (CO2)   Date Value Ref Range Status   05/02/2022 25 20 - 32 mmol/L Final     Anion Gap   Date Value Ref Range Status   05/02/2022 9 3 - 14 mmol/L Final   08/04/2020 5 3 - 14 mmol/L Final     Glucose   Date Value Ref Range Status   05/02/2022 98 70 - 99 mg/dL Final   08/04/2020 118 (H) 70 - 99 mg/dL Final     Comment:     Fasting specimen     Urea Nitrogen   Date Value Ref Range Status   05/02/2022 53 (H) 7 - 30 mg/dL Final   08/04/2020 53 (H) 7 - 30 mg/dL Final     Creatinine   Date Value Ref Range Status   05/02/2022 1.13 (H) 0.52 - 1.04 mg/dL Final   08/04/2020 1.24 (H) 0.52 - 1.04 mg/dL Final     GFR Estimate   Date Value Ref Range Status   05/02/2022 45 (L) >60 mL/min/1.73m2 Final     Comment:     Effective December 21, 2021 eGFRcr in adults is calculated using the 2021 CKD-EPI creatinine equation which includes age and gender (Javan et al., NEJM, DOI: 10.1056/XOHFcf2579142)   08/04/2020 38 (L) >60 mL/min/[1.73_m2] Final     Comment:     Non  GFR Calc  Starting 12/18/2018, serum creatinine based estimated GFR (eGFR) will be   calculated using the Chronic Kidney Disease Epidemiology Collaboration   (CKD-EPI) equation.       Calcium   Date Value Ref Range Status   05/02/2022 9.8 8.5 - 10.1 mg/dL Final   08/04/2020 10.0 8.5 - 10.1 mg/dL Final        Assessment/Plan:  89 year old female with history of hypertension, osteoporosis, anemia, who presents for followup of hypercalcemia and increased creatinine. Her Scr is typically 0.9mg/dL but was up to 1.3 in setting of hypercalcemia, calcium of 10.4-10.9 in 2018  1. Hypercalcemia- improved, after better hydration and stopping hydrochlorothiazide and starting furosemide  - given hypercalcemia and hyponatremia, hydrochlorothiazide was stopped   - tolerating furosemide and calcium 9.5 albumin 3.5 last check    2. CKD stage 3- Her Scr was 0.9 up until the last year or so, with Scr up to 1.3 but now ranging in 1-1.1 range  - however her creatinine was increased to 1.7 last month. she decreased lasix to 20 mg daily. On recheck creatinine improving 1.2  - labs today    3. Hypertension- now on furosemide 40 mg BID ,continue same (stopped hydrochlorothiazide), losartan 100 mg daily and and diltiazem 240 mg daily  -  she will monitor at home  -goal 130-140/80 is fine- she is at goal, would cut down on BP medications if <115 systolic.    4. Anemia- mild anemia previously but hgb now normal, iron sat 16% on previous check, and ferritin 78   - immunofixation- negative 2018     5. Bone- 2/14/22 Vit D 44, 8/27/21 PTH 85 , in setting of hypercalcemia, but some CKD thus ?appropriate.    Last calcium 9.9 thus high normal, and PTH stable.  - continue vitamin D and calcium 600mg for now  - check PTH     - labs today. Follow up with Dr. Corrigan as scheduled.        Marilu Suazo PA-C      Visit length 15 minutes. An additional 12 minutes was spent on date of service in chart review, documentation and other activities as noted.

## 2022-07-25 ENCOUNTER — OFFICE VISIT (OUTPATIENT)
Dept: NEPHROLOGY | Facility: CLINIC | Age: 87
End: 2022-07-25
Payer: MEDICARE

## 2022-07-25 VITALS
DIASTOLIC BLOOD PRESSURE: 87 MMHG | BODY MASS INDEX: 21.17 KG/M2 | HEART RATE: 86 BPM | SYSTOLIC BLOOD PRESSURE: 156 MMHG | OXYGEN SATURATION: 97 % | WEIGHT: 105 LBS | HEIGHT: 59 IN

## 2022-07-25 DIAGNOSIS — E83.52 HYPERCALCEMIA: ICD-10-CM

## 2022-07-25 DIAGNOSIS — I10 ESSENTIAL HYPERTENSION: ICD-10-CM

## 2022-07-25 DIAGNOSIS — D63.1 ANEMIA IN STAGE 3B CHRONIC KIDNEY DISEASE (H): ICD-10-CM

## 2022-07-25 DIAGNOSIS — N25.81 SECONDARY RENAL HYPERPARATHYROIDISM (H): ICD-10-CM

## 2022-07-25 DIAGNOSIS — N18.32 CHRONIC KIDNEY DISEASE, STAGE 3B (H): Primary | ICD-10-CM

## 2022-07-25 DIAGNOSIS — N18.32 ANEMIA IN STAGE 3B CHRONIC KIDNEY DISEASE (H): ICD-10-CM

## 2022-07-25 LAB
ALBUMIN SERPL-MCNC: 3.9 G/DL (ref 3.4–5)
ANION GAP SERPL CALCULATED.3IONS-SCNC: 9 MMOL/L (ref 3–14)
BUN SERPL-MCNC: 56 MG/DL (ref 7–30)
CALCIUM SERPL-MCNC: 9.6 MG/DL (ref 8.5–10.1)
CHLORIDE BLD-SCNC: 103 MMOL/L (ref 94–109)
CO2 SERPL-SCNC: 26 MMOL/L (ref 20–32)
CREAT SERPL-MCNC: 1.2 MG/DL (ref 0.52–1.04)
GFR SERPL CREATININE-BSD FRML MDRD: 42 ML/MIN/1.73M2
GLUCOSE BLD-MCNC: 98 MG/DL (ref 70–99)
HGB BLD-MCNC: 10.8 G/DL (ref 11.7–15.7)
PHOSPHATE SERPL-MCNC: 3.9 MG/DL (ref 2.5–4.5)
POTASSIUM BLD-SCNC: 4 MMOL/L (ref 3.4–5.3)
SODIUM SERPL-SCNC: 138 MMOL/L (ref 133–144)

## 2022-07-25 PROCEDURE — 36415 COLL VENOUS BLD VENIPUNCTURE: CPT | Performed by: PHYSICIAN ASSISTANT

## 2022-07-25 PROCEDURE — 85018 HEMOGLOBIN: CPT | Performed by: PHYSICIAN ASSISTANT

## 2022-07-25 PROCEDURE — 80069 RENAL FUNCTION PANEL: CPT | Performed by: PHYSICIAN ASSISTANT

## 2022-07-25 PROCEDURE — 99213 OFFICE O/P EST LOW 20 MIN: CPT | Performed by: PHYSICIAN ASSISTANT

## 2022-07-25 NOTE — PATIENT INSTRUCTIONS
Check labs today.   No medication changes.   Continue good hydration, follow low sodium diet.   Check blood pressure and wt daily, keep log.   Follow up with Dr. Corrigan as planned.   Call sooner any questions or concerns.

## 2022-07-25 NOTE — LETTER
7/25/2022       RE: China Guzman  C/o Pat Whaley  [address]  [facility] [address]     Dear Colleague,    Thank you for referring your patient, China Guzman, to the [facility]. Please see a copy of my visit note below.     Nephrology Progress Note  7/25/2022I:     She is a delightful female with history of hypertension, hyperlipidemia, osteoporosis, who presented in July 2018 for evaluation of high calcium and worsened kidney function.  Her calcium was noted to be elevated on multiple occasions, up to 11 mg/dL but usually between 10.4-10.9- though overall asymptomatic.    Her PTH was within normal, which is mildly inappropriately high in setting of hypercalcemia. She had been on hydrochlorothiazide which at first we lowered, but with hyponatremia noted on recheck, I switched her to furosemide in September 2018    She is also on losartan and diltiazem for her blood pressure. She takes calcium and vitamin D  twice a day and now on actonel.  On followup today, she feels well.   She hydrates well with water, 4-5 glasses a day  She lives with her daughter who is a  and has sarcoidosis, and has a special needs daughter (her granddaughter).     She denies urinary issues. She wakes up once a night to urinate typically    Her blood pressure at home are in 130-140s mostly. It is rarely any higher.  Her weight has increased a few pounds. [facility].    She had possible food poisoning one week before her last appt in December and creatinine was increased from 1.2 to 1.7  She cut back lasix 20 mg to once a day and creatinine is now improved. She denies any current dizziness. She has no LE swelling, no shortness of breath. She seems to be walking at a slower pace but overall feeling okay.       Review of Systems   A 4 point ROS was reviewed with patient and negative except as noted above       Objective  "  Reported vitals:  BP (!) 156/87 (BP Location: Right arm, Patient Position: Sitting, Cuff Size: Adult Regular)   Pulse 86   Ht 1.499 m (4' 11\")   Wt 47.6 kg (105 lb)   LMP 12/16/1980   SpO2 97%   BMI 21.21 kg/m     General: no distress,engaged and appropriate speech  Psych: Alert and oriented times 3; coherent speech, normal   rate and volume, able to articulate logical thoughts   Lungs: decreased bilaterally  CV: RRR  EXT: no edema    Last labs 5/2019 - within normal  Last Comprehensive Metabolic Panel:  Sodium   Date Value Ref Range Status   05/02/2022 138 133 - 144 mmol/L Final   08/04/2020 138 133 - 144 mmol/L Final     Potassium   Date Value Ref Range Status   05/02/2022 4.4 3.4 - 5.3 mmol/L Final   08/04/2020 4.0 3.4 - 5.3 mmol/L Final     Chloride   Date Value Ref Range Status   05/02/2022 104 94 - 109 mmol/L Final   08/04/2020 103 94 - 109 mmol/L Final     Carbon Dioxide   Date Value Ref Range Status   08/04/2020 30 20 - 32 mmol/L Final     Carbon Dioxide (CO2)   Date Value Ref Range Status   05/02/2022 25 20 - 32 mmol/L Final     Anion Gap   Date Value Ref Range Status   05/02/2022 9 3 - 14 mmol/L Final   08/04/2020 5 3 - 14 mmol/L Final     Glucose   Date Value Ref Range Status   05/02/2022 98 70 - 99 mg/dL Final   08/04/2020 118 (H) 70 - 99 mg/dL Final     Comment:     Fasting specimen     Urea Nitrogen   Date Value Ref Range Status   05/02/2022 53 (H) 7 - 30 mg/dL Final   08/04/2020 53 (H) 7 - 30 mg/dL Final     Creatinine   Date Value Ref Range Status   05/02/2022 1.13 (H) 0.52 - 1.04 mg/dL Final   08/04/2020 1.24 (H) 0.52 - 1.04 mg/dL Final     GFR Estimate   Date Value Ref Range Status   05/02/2022 45 (L) >60 mL/min/1.73m2 Final     Comment:     Effective December 21, 2021 eGFRcr in adults is calculated using the 2021 CKD-EPI creatinine equation which includes age and gender ( et al., NEJM, DOI: 10.1056/DNZAqj9526371)   08/04/2020 38 (L) >60 mL/min/[1.73_m2] Final     Comment:     Non "  GFR Calc  Starting 12/18/2018, serum creatinine based estimated GFR (eGFR) will be   calculated using the Chronic Kidney Disease Epidemiology Collaboration   (CKD-EPI) equation.       Calcium   Date Value Ref Range Status   05/02/2022 9.8 8.5 - 10.1 mg/dL Final   08/04/2020 10.0 8.5 - 10.1 mg/dL Final       Assessment/Plan:  89 year old female with history of hypertension, osteoporosis, anemia, who presents for followup of hypercalcemia and increased creatinine. Her Scr is typically 0.9mg/dL but was up to 1.3 in setting of hypercalcemia, calcium of 10.4-10.9 in 2018  1. Hypercalcemia- improved, after better hydration and stopping hydrochlorothiazide and starting furosemide  - given hypercalcemia and hyponatremia, hydrochlorothiazide was stopped   - tolerating furosemide and calcium 9.5 albumin 3.5 last check    2. CKD stage 3- Her Scr was 0.9 up until the last year or so, with Scr up to 1.3 but now ranging in 1-1.1 range  - however her creatinine was increased to 1.7 last month. she decreased lasix to 20 mg daily. On recheck creatinine improving 1.2  - labs today    3. Hypertension- now on furosemide 40 mg BID ,continue same (stopped hydrochlorothiazide), losartan 100 mg daily and and diltiazem 240 mg daily  -  she will monitor at home  -goal 130-140/80 is fine- she is at goal, would cut down on BP medications if <115 systolic.    4. Anemia- mild anemia previously but hgb now normal, iron sat 16% on previous check, and ferritin 78   - immunofixation- negative 2018     5. Bone- 2/14/22 Vit D 44, 8/27/21 PTH 85 , in setting of hypercalcemia, but some CKD thus ?appropriate.    Last calcium 9.9 thus high normal, and PTH stable.  - continue vitamin D and calcium 600mg for now  - check PTH     - labs today. Follow up with Dr. Corrigan as scheduled.        Marilu Suazo PA-C      Visit length 15 minutes. An additional 12 minutes was spent on date of service in chart review, documentation and other  activities as noted.       Again, thank you for allowing me to participate in the care of your patient.      Sincerely,    MANUEL BETHEA PA-C

## 2022-08-11 ENCOUNTER — ALLIED HEALTH/NURSE VISIT (OUTPATIENT)
Dept: AUDIOLOGY | Facility: CLINIC | Age: 87
End: 2022-08-11
Payer: COMMERCIAL

## 2022-08-11 DIAGNOSIS — H90.3 SENSORINEURAL HEARING LOSS, ASYMMETRICAL: Primary | ICD-10-CM

## 2022-08-11 PROCEDURE — V5014 HEARING AID REPAIR/MODIFYING: HCPCS | Performed by: AUDIOLOGIST

## 2022-08-11 PROCEDURE — 99207 PR DROP WITH A PROCEDURE: CPT | Mod: 25 | Performed by: AUDIOLOGIST

## 2022-08-11 NOTE — PROGRESS NOTES
"Patient dropped off right hearing aid and cShell due to it \"not working\". Upon visual inspection, earmold was occluded with wax. Wax trap was changed, vents vacuumed. Microphones were brushed and the hearing aid cleaned. New battery was provided. Listening check revealed proper functioning.     Called patient and left a voicemail  to let them know the hearing aid is ready for pickup at the [facility], no appointment is needed. Hours were provided to the patient.     Bill $30 for out of warranty clean and check.    Maryjo Hicks, Audiology Clinic Assistant     "

## 2022-08-17 ENCOUNTER — MYC MEDICAL ADVICE (OUTPATIENT)
Dept: FAMILY MEDICINE | Facility: CLINIC | Age: 87
End: 2022-08-17

## 2022-08-18 DIAGNOSIS — I10 ESSENTIAL HYPERTENSION WITH GOAL BLOOD PRESSURE LESS THAN 140/90: ICD-10-CM

## 2022-08-19 RX ORDER — VERAPAMIL HYDROCHLORIDE 240 MG/1
TABLET, FILM COATED, EXTENDED RELEASE ORAL
Qty: 90 TABLET | Refills: 0 | Status: SHIPPED | OUTPATIENT
Start: 2022-08-19 | End: 2022-09-07

## 2022-08-23 ENCOUNTER — TELEPHONE (OUTPATIENT)
Dept: FAMILY MEDICINE | Facility: CLINIC | Age: 87
End: 2022-08-23

## 2022-08-23 NOTE — TELEPHONE ENCOUNTER
"Incoming call from pt's daughter, Pat Whaley. There is no consent to communicate on file to discuss any of pt's PHI with Pat. Pat states she will discuss that with pt and get that updated when pt is in clinic next.    Pt's daughter, Pat, states that pt lives with other family members, and pt's other household contacts are COVID-19 + currently.  Pat states pt has now also developed COVID-19 sx, and has COVID-19 at-home tests available, and inquires what to do to obtain dx and tx.    RN advised that [facility] is currently treating all COVID-19 + patients, without severe sx such as CP, SOB, dizziness, confusion, or blue discoloration of face, via COVID-19 Treatment Virtual phone or video visits when a pt qualifies for antivirals or other tx options. RN advised if any pt tests positive they can call [facility] ([phone]), when prompted use voice system and say \"COVID,\" and then press 3 for COVID tx appointments when prompted. RN advised if a pt is symptomatic, but COVID-19 negative and has any questions about sx or tx or sx, that pt will need to call and speak with a triage nurse for advice. Pat states she will help pt take a COVID-19 test and will call back to address accordingly based on the result.      Some people are at high risk for severe illness (for example if you have a weak immune system, you're 65 or older, or you have certain medical problems). If your risk it high and your symptoms started in the last 5 to 7 days, we strongly recommend for you to get COVID treatment as soon as possible before you get really sick. Paxlovid, Molnupiravir and the monoclonal antibody treatments are proven safe and effective, make you feel better faster, and prevent hospitalization and death.       You can schedule an appointment to discuss COVID treatment by requesting an appointment on [facility] by selecting \"schedule COVID-19 Treatment\" or by calling [facility] ([phone]), when prompted use " "voice system and say \"COVID,\" and then press 3 for COVID tx appointments when prompted.    ALESSIA JamesN, RN  "

## 2022-08-24 ENCOUNTER — DOCUMENTATION ONLY (OUTPATIENT)
Dept: LAB | Facility: CLINIC | Age: 87
End: 2022-08-24

## 2022-08-24 DIAGNOSIS — I10 ESSENTIAL HYPERTENSION WITH GOAL BLOOD PRESSURE LESS THAN 140/90: ICD-10-CM

## 2022-08-24 DIAGNOSIS — N18.32 STAGE 3B CHRONIC KIDNEY DISEASE (H): ICD-10-CM

## 2022-08-24 DIAGNOSIS — Z13.220 SCREENING FOR HYPERLIPIDEMIA: Primary | ICD-10-CM

## 2022-08-24 DIAGNOSIS — E78.5 HYPERLIPIDEMIA LDL GOAL <130: ICD-10-CM

## 2022-08-24 NOTE — PROGRESS NOTES
China Guzman has an upcoming lab appointment:    Future Appointments   Date Time Provider Department Center   8/31/2022  9:45 AM [facility]   9/7/2022 12:00 PM Rowena Solis CNP [facility]   9/26/2022 11:00 AM [facility]   10/3/2022 12:00 PM Romi Corrigan MD [facility]         There is no order available. Please review and place either future orders or HMPO (Review of Health Maintenance Protocol Orders), as appropriate.    Health Maintenance Due   Topic     ANNUAL REVIEW OF HM ORDERS      LIPID      Sylvia Johnson

## 2022-08-29 ENCOUNTER — TELEPHONE (OUTPATIENT)
Dept: FAMILY MEDICINE | Facility: CLINIC | Age: 87
End: 2022-08-29

## 2022-08-29 NOTE — TELEPHONE ENCOUNTER
Reason for Call:  Appointment Request    Patient requesting this type of appt:  Hospital/ED Follow-Up     Requested provider: Rowena Solis    Reason patient unable to be scheduled: Patient was discharged form the hospital on Fridat 8/26, has covid, she is doing well.    When does patient want to be seen/preferred time: Patient is scheduled fora physical with you on 9/7/22. Can you do the hospital follow up appointment at the same time? I did schedule a hospital F/U with you on 9/1. Please advise if she needs both appointments.    Comments:     Could we send this information to you in [employer] [facility] [employer] or would you prefer to receive a phone call?:   Patient would prefer a phone call   Okay to leave a detailed message?: Yes at Other phone number:  [phone]*    Call taken on 8/29/2022 at 8:38 AM by Jennifer Jaimes

## 2022-08-29 NOTE — TELEPHONE ENCOUNTER
I reviewed her hospital notes and it can wait until 9/7 as long as she is feeling okay. Rowena Solis, CNP

## 2022-08-31 ENCOUNTER — LAB (OUTPATIENT)
Dept: LAB | Facility: CLINIC | Age: 87
End: 2022-08-31
Payer: MEDICARE

## 2022-08-31 DIAGNOSIS — E78.5 HYPERLIPIDEMIA LDL GOAL <130: ICD-10-CM

## 2022-08-31 DIAGNOSIS — N18.32 STAGE 3B CHRONIC KIDNEY DISEASE (H): ICD-10-CM

## 2022-08-31 DIAGNOSIS — I10 ESSENTIAL HYPERTENSION WITH GOAL BLOOD PRESSURE LESS THAN 140/90: ICD-10-CM

## 2022-08-31 LAB
ANION GAP SERPL CALCULATED.3IONS-SCNC: 9 MMOL/L (ref 3–14)
BUN SERPL-MCNC: 41 MG/DL (ref 7–30)
CALCIUM SERPL-MCNC: 9.9 MG/DL (ref 8.5–10.1)
CHLORIDE BLD-SCNC: 95 MMOL/L (ref 94–109)
CHOLEST SERPL-MCNC: 163 MG/DL
CO2 SERPL-SCNC: 26 MMOL/L (ref 20–32)
CREAT SERPL-MCNC: 1.07 MG/DL (ref 0.52–1.04)
FASTING STATUS PATIENT QL REPORTED: YES
GFR SERPL CREATININE-BSD FRML MDRD: 48 ML/MIN/1.73M2
GLUCOSE BLD-MCNC: 122 MG/DL (ref 70–99)
HDLC SERPL-MCNC: 51 MG/DL
LDLC SERPL CALC-MCNC: 75 MG/DL
NONHDLC SERPL-MCNC: 112 MG/DL
POTASSIUM BLD-SCNC: 4.5 MMOL/L (ref 3.4–5.3)
SODIUM SERPL-SCNC: 130 MMOL/L (ref 133–144)
TRIGL SERPL-MCNC: 184 MG/DL

## 2022-08-31 PROCEDURE — 80048 BASIC METABOLIC PNL TOTAL CA: CPT

## 2022-08-31 PROCEDURE — 80061 LIPID PANEL: CPT

## 2022-08-31 PROCEDURE — 36415 COLL VENOUS BLD VENIPUNCTURE: CPT

## 2022-09-06 ASSESSMENT — ENCOUNTER SYMPTOMS
FREQUENCY: 1
DIARRHEA: 0
ABDOMINAL PAIN: 0
HEMATURIA: 0
DYSURIA: 0
ARTHRALGIAS: 0
SHORTNESS OF BREATH: 0
EYE PAIN: 0
DIZZINESS: 0
WEAKNESS: 1
HEMATOCHEZIA: 0
SORE THROAT: 0
PARESTHESIAS: 0
BREAST MASS: 0
MYALGIAS: 1
FEVER: 0
NERVOUS/ANXIOUS: 0
HEARTBURN: 0
NAUSEA: 0
JOINT SWELLING: 0
HEADACHES: 0
PALPITATIONS: 0
CONSTIPATION: 0
COUGH: 0
CHILLS: 0

## 2022-09-06 ASSESSMENT — ACTIVITIES OF DAILY LIVING (ADL)
CURRENT_FUNCTION: PREPARING MEALS REQUIRES ASSISTANCE
CURRENT_FUNCTION: TELEPHONE REQUIRES ASSISTANCE
CURRENT_FUNCTION: SHOPPING REQUIRES ASSISTANCE
CURRENT_FUNCTION: MEDICATION ADMINISTRATION REQUIRES ASSISTANCE
CURRENT_FUNCTION: TRANSPORTATION REQUIRES ASSISTANCE

## 2022-09-06 NOTE — PATIENT INSTRUCTIONS
For bowel irregularity and stool incontinence, start Metamucil 2 tsp in a glass of water every morning.       Refills sent on medications.     Flu shot today.     Covid-19 booster in near future.     Kidney function is too low for oral osteoporosis medication.  Continue calcium and vitamin D and exercise.  One option would be to see endocrinology to discuss other medication options.         Patient Education   Personalized Prevention Plan  You are due for the preventive services outlined below.  Your care team is available to assist you in scheduling these services.  If you have already completed any of these items, please share that information with your care team to update in your medical record.  Health Maintenance Due   Topic Date Due    ANNUAL REVIEW OF HM ORDERS  Never done    Colorectal Cancer Screening  08/17/2021    COVID-19 Vaccine (4 - Booster for Pfizer series) 02/01/2022    Flu Vaccine (1) 09/01/2022    Annual Wellness Visit  09/03/2022

## 2022-09-06 NOTE — PROGRESS NOTES
"SUBJECTIVE:   China Guzman is a 89 year old female who presents for Preventive Visit.    Having small amounts of fecal incontinence.  States small amounts of formed stool without warning.      Recent admission for syncope, covid-19 positive.  Had been sitting out in the sun, got up to go outside and had a syncopal episode, no injuries.  Was admitted because family could not care for her. Work-up for syncope negative.  No medication changes.     Actonel is too expensive, would like to discontinue.  Hx of osteoporosis.  Had a fracture 20+ years ago.  Was on Fosamax in the past.  Taking calcium and vitamin D.  Is active and goes to the [facility].         Patient has been advised of split billing requirements and indicates understanding: Yes  Are you in the first 12 months of your Medicare coverage?  No    Healthy Habits:     In general, how would you rate your overall health?  Good    Frequency of exercise:  6-7 days/week    Duration of exercise:  15-30 minutes    Do you usually eat at least 4 servings of fruit and vegetables a day, include whole grains    & fiber and avoid regularly eating high fat or \"junk\" foods?  Yes    Taking medications regularly:  Yes    Medication side effects:  None    Ability to successfully perform activities of daily living:  Telephone requires assistance, transportation requires assistance, shopping requires assistance, preparing meals requires assistance and medication administration requires assistance    Home Safety:  Throw rugs in the hallway    Hearing Impairment:  Difficulty following a conversation in a noisy restaurant or crowded room, need to ask people to speak up or repeat themselves, find that men's voices are easier to understand than woman's, difficulty understanding soft or whispered speech and difficulty understanding speech on the telephone    In the past 6 months, have you been bothered by leaking of urine?  No    In general, how would you rate your overall mental or " emotional health?  Good      PHQ-2 Total Score: 0    Additional concerns today:  Yes    Do you feel safe in your environment? No    Have you ever done Advance Care Planning? (For example, a Health Directive, POLST, or a discussion with a medical provider or your loved ones about your wishes): Yes, advance care planning is on file.       Fall risk  Fallen 2 or more times in the past year?: Yes  Any fall with injury in the past year?: No    Cognitive Screening   1) Repeat 3 items (Leader, Season, Table)    2) Clock draw: ABNORMAL patient unable to see  3) 3 item recall: Recalls 3 objects  Results: ABNORMAL clock, 1-2 items recalled: PROBABLE COGNITIVE IMPAIRMENT, **INFORM PROVIDER**   *patient's memory is very good, but vision is poor and therefore could not draw clock normally.       Mini-CogTM Copyright S [employer]. Licensed by the author for use in [facility]; reprinted with permission ([email or web address]). All rights reserved.      Do you have sleep apnea, excessive snoring or daytime drowsiness?: no    Reviewed and updated as needed this visit by clinical staff   Tobacco  Allergies  Meds  Problems  Med Hx  Surg Hx  Fam Hx  Soc   Hx          Reviewed and updated as needed this visit by Provider   Tobacco  Allergies  Meds  Problems  Med Hx  Surg Hx  Fam Hx           Social History     Tobacco Use     Smoking status: Never Smoker     Smokeless tobacco: Never Used   Substance Use Topics     Alcohol use: No     Alcohol/week: 0.0 standard drinks         Alcohol Use 9/6/2022   Prescreen: >3 drinks/day or >7 drinks/week? Not Applicable   Prescreen: >3 drinks/day or >7 drinks/week? -       Current providers sharing in care for this patient include:   Patient Care Team:  Colby Trimble MD as PCP - General (Family Practice)  Colby Trimble MD as Assigned PCP  Srikanth Craig MD as Assigned Surgical Provider  Romi Corrigan MD as MD (Nephrology)  Marilu Suazo PA-C as Assigned  "Nephrology Provider    The following health maintenance items are reviewed in Epic and correct as of today:  Health Maintenance Due   Topic Date Due     ANNUAL REVIEW OF HM ORDERS  Never done     COVID-19 Vaccine (4 - Booster for Pfizer series) 02/01/2022     Labs reviewed in [facility]  Mammogram Screening: Mammogram Screening - Patient over age 75, has elected to discontinue screenings.      Pertinent mammograms are reviewed under the imaging tab.    Review of Systems   Constitutional: Negative for chills and fever.   HENT: Positive for congestion and hearing loss. Negative for ear pain and sore throat.    Eyes: Positive for visual disturbance. Negative for pain.   Respiratory: Negative for cough and shortness of breath.    Cardiovascular: Negative for chest pain, palpitations and peripheral edema.   Gastrointestinal: Negative for abdominal pain, constipation, diarrhea, heartburn, hematochezia and nausea.   Breasts:  Negative for tenderness, breast mass and discharge.   Genitourinary: Positive for frequency and urgency. Negative for dysuria, genital sores, hematuria, pelvic pain, vaginal bleeding and vaginal discharge.   Musculoskeletal: Positive for myalgias. Negative for arthralgias and joint swelling.   Skin: Negative for rash.   Neurological: Positive for weakness. Negative for dizziness, headaches and paresthesias.   Psychiatric/Behavioral: Negative for mood changes. The patient is not nervous/anxious.        OBJECTIVE:   /68   Pulse 65   Temp 98.2  F (36.8  C) (Tympanic)   Resp 14   Ht 1.486 m (4' 10.5\")   Wt 48.5 kg (107 lb)   LMP 12/16/1980   SpO2 98%   BMI 21.98 kg/m   Estimated body mass index is 21.98 kg/m  as calculated from the following:    Height as of this encounter: 1.486 m (4' 10.5\").    Weight as of this encounter: 48.5 kg (107 lb).  Physical Exam  GENERAL: healthy, alert and no distress  EYES: Eyes grossly normal to inspection, PERRL and conjunctivae and sclerae normal  HENT: ear canals " and TM's normal, nose and mouth without ulcers or lesions  NECK: no adenopathy, no asymmetry, masses, or scars and thyroid normal to palpation  RESP: lungs clear to auscultation - no rales, rhonchi or wheezes  CV: regular rate and rhythm, normal S1 S2, no S3 or S4, no murmur, click or rub, no peripheral edema and peripheral pulses strong  MS: no gross musculoskeletal defects noted, no edema  SKIN: no suspicious lesions or rashes  NEURO: Normal strength and tone, mentation intact and speech normal  PSYCH: mentation appears normal, affect normal/bright    Diagnostic Test Results:  Labs reviewed in Epic    ASSESSMENT / PLAN:   (Z00.00) Encounter for Medicare annual wellness exam  (primary encounter diagnosis)  Comment:   Plan: continue annual wellness exams     (R15.9) Incontinence of feces, unspecified fecal incontinence type  Comment: Reports rare uncontrolled loss of small amounts of formed stool, also deals with constipation.   Plan: Start Metamucil daily, increase water intake, follow-up if not improving.       (K59.01) Slow transit constipation  Comment: Intermittent constipation  Plan: Start Metamucil daily, increase water intake, follow-up if not improving.       (I10) Essential hypertension with goal blood pressure less than 140/90  Comment: Chronic, stable.  No changes today.  Does not need refill on losartan today.   Plan: furosemide (LASIX) 40 MG tablet, verapamil ER         (CALAN-SR) 240 MG CR tablet          (E78.5) Hyperlipidemia LDL goal <130  Comment: Chronic, stable.  No changes today.   Plan: atorvastatin (LIPITOR) 10 MG tablet          (N18.31) Stage 3a chronic kidney disease (H)  Comment: Chronic, stable.  Follows with nephrology. Does not need refill on losartan today.   Plan: follow-up with nephrology as planned          (E83.52) Hypercalcemia  Comment: Chronic, stable.  Managed by nephrology. Lasix refilled.  No changes today.   Plan: furosemide (LASIX) 40 MG tablet          (M81.8) Other  "osteoporosis without current pathological fracture  Comment: Actonel is too expensive- asked her to discontinue, it is actually not recommended for creatinine clearance < 30, she is close to 25.  Also unable to take fosamax for this reason.    Plan: asked her to continue calcium, vitamin D, exercise.  She is not interested in endocrinology referral.        (R73.09) Elevated glucose  Comment: fasting glucose of 122, no hx of DM.  Check A1C with next lab draw.    Plan: Hemoglobin A1c          (Z23) Need for vaccination  Comment:   Plan: INFLUENZA, QUAD, HIGH DOSE, PF, 65YR + (FLUZONE        HD)            Patient has been advised of split billing requirements and indicates understanding: Yes    COUNSELING:  Reviewed preventive health counseling, as reflected in patient instructions    Estimated body mass index is 21.98 kg/m  as calculated from the following:    Height as of this encounter: 1.486 m (4' 10.5\").    Weight as of this encounter: 48.5 kg (107 lb).        She reports that she has never smoked. She has never used smokeless tobacco.      Appropriate preventive services were discussed with this patient, including applicable screening as appropriate for cardiovascular disease, diabetes, osteopenia/osteoporosis, and glaucoma.  As appropriate for age/gender, discussed screening for colorectal cancer, prostate cancer, breast cancer, and cervical cancer. Checklist reviewing preventive services available has been given to the patient.    Reviewed patients plan of care and provided an AVS. The Basic Care Plan (routine screening as documented in Health Maintenance) for [place] meets the Care Plan requirement. This Care Plan has been established and reviewed with the Patient.    Counseling Resources:  ATP IV Guidelines  Pooled Cohorts Equation Calculator  Breast Cancer Risk Calculator  Breast Cancer: Medication to Reduce Risk  FRAX Risk Assessment  ICSI Preventive Guidelines  Dietary Guidelines for Americans, 2010  USDA's " MyPlate  ASA Prophylaxis  Lung CA Screening    Rowena Solis, CNP  [facility]    Identified Health Risks:

## 2022-09-07 ENCOUNTER — OFFICE VISIT (OUTPATIENT)
Dept: FAMILY MEDICINE | Facility: CLINIC | Age: 87
End: 2022-09-07
Payer: MEDICARE

## 2022-09-07 VITALS
RESPIRATION RATE: 14 BRPM | TEMPERATURE: 98.2 F | WEIGHT: 107 LBS | SYSTOLIC BLOOD PRESSURE: 132 MMHG | BODY MASS INDEX: 21.57 KG/M2 | HEIGHT: 59 IN | OXYGEN SATURATION: 98 % | DIASTOLIC BLOOD PRESSURE: 68 MMHG | HEART RATE: 65 BPM

## 2022-09-07 DIAGNOSIS — E83.52 HYPERCALCEMIA: ICD-10-CM

## 2022-09-07 DIAGNOSIS — I10 ESSENTIAL HYPERTENSION WITH GOAL BLOOD PRESSURE LESS THAN 140/90: ICD-10-CM

## 2022-09-07 DIAGNOSIS — N18.31 STAGE 3A CHRONIC KIDNEY DISEASE (H): ICD-10-CM

## 2022-09-07 DIAGNOSIS — M81.8 OTHER OSTEOPOROSIS WITHOUT CURRENT PATHOLOGICAL FRACTURE: ICD-10-CM

## 2022-09-07 DIAGNOSIS — Z23 NEED FOR VACCINATION: ICD-10-CM

## 2022-09-07 DIAGNOSIS — E78.5 HYPERLIPIDEMIA LDL GOAL <130: ICD-10-CM

## 2022-09-07 DIAGNOSIS — Z00.00 ENCOUNTER FOR MEDICARE ANNUAL WELLNESS EXAM: Primary | ICD-10-CM

## 2022-09-07 DIAGNOSIS — R15.9 INCONTINENCE OF FECES, UNSPECIFIED FECAL INCONTINENCE TYPE: ICD-10-CM

## 2022-09-07 DIAGNOSIS — K59.01 SLOW TRANSIT CONSTIPATION: ICD-10-CM

## 2022-09-07 DIAGNOSIS — R73.09 ELEVATED GLUCOSE: ICD-10-CM

## 2022-09-07 PROCEDURE — G0008 ADMIN INFLUENZA VIRUS VAC: HCPCS | Performed by: NURSE PRACTITIONER

## 2022-09-07 PROCEDURE — 99213 OFFICE O/P EST LOW 20 MIN: CPT | Mod: 25 | Performed by: NURSE PRACTITIONER

## 2022-09-07 PROCEDURE — G0439 PPPS, SUBSEQ VISIT: HCPCS | Performed by: NURSE PRACTITIONER

## 2022-09-07 PROCEDURE — 90662 IIV NO PRSV INCREASED AG IM: CPT | Performed by: NURSE PRACTITIONER

## 2022-09-07 RX ORDER — ATORVASTATIN CALCIUM 10 MG/1
TABLET, FILM COATED ORAL
Qty: 90 TABLET | Refills: 3 | Status: SHIPPED | OUTPATIENT
Start: 2022-09-07 | End: 2023-09-12

## 2022-09-07 RX ORDER — ALENDRONATE SODIUM 35 MG/1
35 TABLET ORAL
Status: CANCELLED | OUTPATIENT
Start: 2022-09-07

## 2022-09-07 RX ORDER — FUROSEMIDE 40 MG
40 TABLET ORAL 2 TIMES DAILY
Qty: 180 TABLET | Refills: 3 | Status: SHIPPED | OUTPATIENT
Start: 2022-09-07 | End: 2022-10-03

## 2022-09-07 RX ORDER — VERAPAMIL HYDROCHLORIDE 240 MG/1
240 TABLET, FILM COATED, EXTENDED RELEASE ORAL DAILY
Qty: 90 TABLET | Refills: 3 | Status: SHIPPED | OUTPATIENT
Start: 2022-09-07 | End: 2023-09-12

## 2022-09-07 ASSESSMENT — PAIN SCALES - GENERAL: PAINLEVEL: NO PAIN (0)

## 2022-09-08 ENCOUNTER — MYC MEDICAL ADVICE (OUTPATIENT)
Dept: FAMILY MEDICINE | Facility: CLINIC | Age: 87
End: 2022-09-08

## 2022-09-22 ENCOUNTER — DOCUMENTATION ONLY (OUTPATIENT)
Dept: LAB | Facility: CLINIC | Age: 87
End: 2022-09-22

## 2022-09-23 DIAGNOSIS — N18.32 ANEMIA IN STAGE 3B CHRONIC KIDNEY DISEASE (H): Primary | ICD-10-CM

## 2022-09-23 DIAGNOSIS — D63.1 ANEMIA IN STAGE 3B CHRONIC KIDNEY DISEASE (H): Primary | ICD-10-CM

## 2022-09-23 DIAGNOSIS — N18.32 STAGE 3B CHRONIC KIDNEY DISEASE (H): ICD-10-CM

## 2022-09-23 NOTE — PROGRESS NOTES
China Guzman has an upcoming lab appointment:    Future Appointments   Date Time Provider Department Center   9[facility]26[facility]2022 11:00 AM [facility]   10/3/2022 12:00 PM Romi Corrigan MD [facility]   11/30/2022 11:00 AM [facility]   3/1/2023 12:00 PM Rowena Solis CNP [facility]     Patient is scheduled for the following lab(s): pre-visit labs     There is no order available. Please review and place either future orders or HMPO (Review of Health Maintenance Protocol Orders), as appropriate.    Health Maintenance Due   Topic     ANNUAL REVIEW OF HM ORDERS      Teena Avila

## 2022-09-26 ENCOUNTER — LAB (OUTPATIENT)
Dept: LAB | Facility: CLINIC | Age: 87
End: 2022-09-26
Payer: MEDICARE

## 2022-09-26 DIAGNOSIS — R73.09 ELEVATED GLUCOSE: ICD-10-CM

## 2022-09-26 DIAGNOSIS — N18.32 STAGE 3B CHRONIC KIDNEY DISEASE (H): ICD-10-CM

## 2022-09-26 DIAGNOSIS — N18.32 ANEMIA IN STAGE 3B CHRONIC KIDNEY DISEASE (H): ICD-10-CM

## 2022-09-26 DIAGNOSIS — D63.1 ANEMIA IN STAGE 3B CHRONIC KIDNEY DISEASE (H): ICD-10-CM

## 2022-09-26 LAB
ALBUMIN SERPL-MCNC: 3.9 G/DL (ref 3.4–5)
ANION GAP SERPL CALCULATED.3IONS-SCNC: 8 MMOL/L (ref 3–14)
BUN SERPL-MCNC: 53 MG/DL (ref 7–30)
CALCIUM SERPL-MCNC: 10.2 MG/DL (ref 8.5–10.1)
CHLORIDE BLD-SCNC: 98 MMOL/L (ref 94–109)
CO2 SERPL-SCNC: 27 MMOL/L (ref 20–32)
CREAT SERPL-MCNC: 1.19 MG/DL (ref 0.52–1.04)
DEPRECATED CALCIDIOL+CALCIFEROL SERPL-MC: 50 UG/L (ref 20–75)
FERRITIN SERPL-MCNC: 97 NG/ML (ref 8–252)
GFR SERPL CREATININE-BSD FRML MDRD: 43 ML/MIN/1.73M2
GLUCOSE BLD-MCNC: 119 MG/DL (ref 70–99)
HBA1C MFR BLD: 5.4 % (ref 0–5.6)
HGB BLD-MCNC: 11.2 G/DL (ref 11.7–15.7)
IRON SATN MFR SERPL: 17 % (ref 15–46)
IRON SERPL-MCNC: 61 UG/DL (ref 35–180)
PHOSPHATE SERPL-MCNC: 3.8 MG/DL (ref 2.5–4.5)
POTASSIUM BLD-SCNC: 4 MMOL/L (ref 3.4–5.3)
PTH-INTACT SERPL-MCNC: 55 PG/ML (ref 15–65)
SODIUM SERPL-SCNC: 133 MMOL/L (ref 133–144)
TIBC SERPL-MCNC: 350 UG/DL (ref 240–430)

## 2022-09-26 PROCEDURE — 82043 UR ALBUMIN QUANTITATIVE: CPT

## 2022-09-26 PROCEDURE — 82306 VITAMIN D 25 HYDROXY: CPT

## 2022-09-26 PROCEDURE — 83970 ASSAY OF PARATHORMONE: CPT

## 2022-09-26 PROCEDURE — 83036 HEMOGLOBIN GLYCOSYLATED A1C: CPT

## 2022-09-26 PROCEDURE — 80069 RENAL FUNCTION PANEL: CPT

## 2022-09-26 PROCEDURE — 83550 IRON BINDING TEST: CPT

## 2022-09-26 PROCEDURE — 36415 COLL VENOUS BLD VENIPUNCTURE: CPT

## 2022-09-26 PROCEDURE — 82728 ASSAY OF FERRITIN: CPT

## 2022-09-26 PROCEDURE — 85018 HEMOGLOBIN: CPT

## 2022-09-27 LAB
CREAT UR-MCNC: 12 MG/DL
MICROALBUMIN UR-MCNC: 7 MG/L
MICROALBUMIN/CREAT UR: 58.33 MG/G CR (ref 0–25)

## 2022-10-03 ENCOUNTER — OFFICE VISIT (OUTPATIENT)
Dept: NEPHROLOGY | Facility: CLINIC | Age: 87
End: 2022-10-03
Payer: MEDICARE

## 2022-10-03 VITALS
DIASTOLIC BLOOD PRESSURE: 72 MMHG | SYSTOLIC BLOOD PRESSURE: 130 MMHG | BODY MASS INDEX: 22.6 KG/M2 | WEIGHT: 110 LBS | HEART RATE: 86 BPM

## 2022-10-03 DIAGNOSIS — E83.52 HYPERCALCEMIA: ICD-10-CM

## 2022-10-03 DIAGNOSIS — I10 ESSENTIAL HYPERTENSION WITH GOAL BLOOD PRESSURE LESS THAN 140/90: ICD-10-CM

## 2022-10-03 DIAGNOSIS — N18.32 CHRONIC KIDNEY DISEASE, STAGE 3B (H): ICD-10-CM

## 2022-10-03 PROBLEM — U07.1 COVID-19 VIRUS INFECTION: Status: ACTIVE | Noted: 2022-08-23

## 2022-10-03 PROBLEM — D64.9 ANEMIA: Status: ACTIVE | Noted: 2022-08-23

## 2022-10-03 PROCEDURE — 99214 OFFICE O/P EST MOD 30 MIN: CPT | Performed by: INTERNAL MEDICINE

## 2022-10-03 RX ORDER — FUROSEMIDE 40 MG
40 TABLET ORAL DAILY
Qty: 90 TABLET | Refills: 3 | Status: SHIPPED | OUTPATIENT
Start: 2022-10-03 | End: 2023-07-10

## 2022-10-03 NOTE — LETTER
10/3/2022       RE: China Guzman  C/o Pat Whaley  [id number]  [place] [place] [address]     Dear Colleague,    Thank you for referring your patient, China Guzman, to the [facility] [place] [facility]. Please see a copy of my visit note below.    Nephrology Progress Note      Assessment/Plan:  89 year old female with history of hypertension, osteoporosis, anemia, who presents for followup of hypercalcemia and increased creatinine. Her Scr is typically 0.9mg/dL but was up to 1.3 in setting of hypercalcemia, calcium of 10.4-10.9 in 2018  1. Hypercalcemia- improved, after better hydration and stopping hydrochlorothiazide and starting furosemide  - given hypercalcemia and hyponatremia, hydrochlorothiazide was stopped   - tolerating furosemide and calcium high normal at 10.2, taking two calcium / vit D tablets, will have her decrease to once a day   - repeat labs in March at time of PCP followup.    - return in 8-9 months    2. CKD stage 3- Her Scr was 0.9 up until the last year or so, with Scr up to 1.3 but now ranging in 1-1.1 range  - however her creatinine was increased to 1.7 thus we decreased lasix to once daily. On recheck creatinine improved back to 1.1- 1.2  - stable, continue once a day furosemide    3. Hypertension- now on furosemide 40 mg daily,continue same (stopped hydrochlorothiazide), losartan 100 mg daily and and diltiazem 240 mg daily  -  she will monitor at home  -goal 130-140/80 is fine- she is at goal, would cut down on BP medications if <115 systolic.    4. Anemia- mild anemia previously but hgb now normal, iron sat normal  - immunofixation- negative 2018     5. Bone- 2/14/22 Vit D 50, 8/27/21 PTH low normal , in setting of mild hypercalcemia, but some CKD thus ?appropriate.    Last calcium 10.2 thus high normal, and PTH stable.  - lower vitamin D and calcium 600mg to once a day      Reason for visit: CKD    HPI:  She is a delightful  female with history of hypertension, hyperlipidemia, osteoporosis, who presented in July 2018 for evaluation of high calcium and worsened kidney function.  Her calcium was noted to be elevated on multiple occasions, up to 11 mg/dL but usually between 10.4-10.9- though overall asymptomatic.    Her PTH was within normal, which is mildly inappropriately high in setting of hypercalcemia. She had been on hydrochlorothiazide which at first we lowered, but with hyponatremia noted on recheck, I switched her to furosemide in September 2018    She is also on losartan and diltiazem for her blood pressure. She takes calcium and vitamin D  twice a day and now on actonel.  On followup today, she feels well.   She hydrates well with water, 4-5 glasses a day  She lives with her daughter who is a  and has sarcoidosis, and has a special needs daughter (her granddaughter).     She denies urinary issues. She wakes up once a night to urinate typically    Her blood pressure at home are in 130-140s mostly. It is rarely any higher.  Her weight has increased a few pounds. She is back at the  and exercising.    She had possible food poisoning one week before her last appt in December and creatinine was increased from 1.2 to 1.7  She cut back lasix 20 mg to once a day and creatinine is now improved. She denies any current dizziness. She has no LE swelling, no shortness of breath.   She has mildly elevated calcium, taking two calcium/ vit D daily.  She is accompanied by daughter Pat.   She feels OK, did have COVID in August but had mild symptoms and will get booster in November.    Review of Systems   A 4 point ROS was reviewed with patient and negative except as noted above       Objective   Reported vitals:  /72   Pulse 86   Wt 49.9 kg (110 lb)   LMP 12/16/1980   BMI 22.60 kg/m     General: no distress,engaged and appropriate speech  Psych: Alert and oriented times 3; coherent speech, normal   rate and volume, able to  articulate logical thoughts   Lungs: decreased bilaterally  CV: RRR  EXT: no edema    Last labs 5/2019 - within normal  Last Comprehensive Metabolic Panel:  Sodium   Date Value Ref Range Status   09/26/2022 133 133 - 144 mmol/L Final   08/04/2020 138 133 - 144 mmol/L Final     Potassium   Date Value Ref Range Status   09/26/2022 4.0 3.4 - 5.3 mmol/L Final   08/04/2020 4.0 3.4 - 5.3 mmol/L Final     Chloride   Date Value Ref Range Status   09/26/2022 98 94 - 109 mmol/L Final   08/04/2020 103 94 - 109 mmol/L Final     Carbon Dioxide   Date Value Ref Range Status   08/04/2020 30 20 - 32 mmol/L Final     Carbon Dioxide (CO2)   Date Value Ref Range Status   09/26/2022 27 20 - 32 mmol/L Final     Anion Gap   Date Value Ref Range Status   09/26/2022 8 3 - 14 mmol/L Final   08/04/2020 5 3 - 14 mmol/L Final     Glucose   Date Value Ref Range Status   09/26/2022 119 (H) 70 - 99 mg/dL Final   08/04/2020 118 (H) 70 - 99 mg/dL Final     Comment:     Fasting specimen     Urea Nitrogen   Date Value Ref Range Status   09/26/2022 53 (H) 7 - 30 mg/dL Final   08/04/2020 53 (H) 7 - 30 mg/dL Final     Creatinine   Date Value Ref Range Status   09/26/2022 1.19 (H) 0.52 - 1.04 mg/dL Final   08/04/2020 1.24 (H) 0.52 - 1.04 mg/dL Final     GFR Estimate   Date Value Ref Range Status   09/26/2022 43 (L) >60 mL/min/1.73m2 Final     Comment:     Effective December 21, 2021 eGFRcr in adults is calculated using the 2021 CKD-EPI creatinine equation which includes age and gender (Javan et al., NEJM, DOI: 10.1056/LRUWgk8990377)   08/04/2020 38 (L) >60 mL/min/[1.73_m2] Final     Comment:     Non  GFR Calc  Starting 12/18/2018, serum creatinine based estimated GFR (eGFR) will be   calculated using the Chronic Kidney Disease Epidemiology Collaboration   (CKD-EPI) equation.       Calcium   Date Value Ref Range Status   09/26/2022 10.2 (H) 8.5 - 10.1 mg/dL Final   08/04/2020 10.0 8.5 - 10.1 mg/dL Final       Romi MD Meenu  Assistant  Professor of Medicine  Department of Nephrology  [facility]

## 2022-10-03 NOTE — PROGRESS NOTES
Nephrology Progress Note      Assessment/Plan:  89 year old female with history of hypertension, osteoporosis, anemia, who presents for followup of hypercalcemia and increased creatinine. Her Scr is typically 0.9mg/dL but was up to 1.3 in setting of hypercalcemia, calcium of 10.4-10.9 in 2018  1. Hypercalcemia- improved, after better hydration and stopping hydrochlorothiazide and starting furosemide  - given hypercalcemia and hyponatremia, hydrochlorothiazide was stopped   - tolerating furosemide and calcium high normal at 10.2, taking two calcium / vit D tablets, will have her decrease to once a day   - repeat labs in March at time of PCP followup.    - return in 8-9 months    2. CKD stage 3- Her Scr was 0.9 up until the last year or so, with Scr up to 1.3 but now ranging in 1-1.1 range  - however her creatinine was increased to 1.7 thus we decreased lasix to once daily. On recheck creatinine improved back to 1.1- 1.2  - stable, continue once a day furosemide    3. Hypertension- now on furosemide 40 mg daily,continue same (stopped hydrochlorothiazide), losartan 100 mg daily and and diltiazem 240 mg daily  -  she will monitor at home  -goal 130-140/80 is fine- she is at goal, would cut down on BP medications if <115 systolic.    4. Anemia- mild anemia previously but hgb now normal, iron sat normal  - immunofixation- negative 2018     5. Bone- 2/14/22 Vit D 50, 8/27/21 PTH low normal , in setting of mild hypercalcemia, but some CKD thus ?appropriate.    Last calcium 10.2 thus high normal, and PTH stable.  - lower vitamin D and calcium 600mg to once a day      Reason for visit: CKD    HPI:  She is a delightful female with history of hypertension, hyperlipidemia, osteoporosis, who presented in July 2018 for evaluation of high calcium and worsened kidney function.  Her calcium was noted to be elevated on multiple occasions, up to 11 mg/dL but usually between 10.4-10.9- though overall asymptomatic.    Her PTH was within  normal, which is mildly inappropriately high in setting of hypercalcemia. She had been on hydrochlorothiazide which at first we lowered, but with hyponatremia noted on recheck, I switched her to furosemide in September 2018    She is also on losartan and diltiazem for her blood pressure. She takes calcium and vitamin D  twice a day and now on actonel.  On followup today, she feels well.   She hydrates well with water, 4-5 glasses a day  She lives with her daughter who is a  and has sarcoidosis, and has a special needs daughter (her granddaughter).     She denies urinary issues. She wakes up once a night to urinate typically    Her blood pressure at home are in 130-140s mostly. It is rarely any higher.  Her weight has increased a few pounds. She is back at the  and exercising.    She had possible food poisoning one week before her last appt in December and creatinine was increased from 1.2 to 1.7  She cut back lasix 20 mg to once a day and creatinine is now improved. She denies any current dizziness. She has no LE swelling, no shortness of breath.   She has mildly elevated calcium, taking two calcium/ vit D daily.  She is accompanied by daughter Pat.   She feels OK, did have COVID in August but had mild symptoms and will get booster in November.    Review of Systems   A 4 point ROS was reviewed with patient and negative except as noted above       Objective   Reported vitals:  /72   Pulse 86   Wt 49.9 kg (110 lb)   LMP 12/16/1980   BMI 22.60 kg/m     General: no distress,engaged and appropriate speech  Psych: Alert and oriented times 3; coherent speech, normal   rate and volume, able to articulate logical thoughts   Lungs: decreased bilaterally  CV: RRR  EXT: no edema    Last labs 5/2019 - within normal  Last Comprehensive Metabolic Panel:  Sodium   Date Value Ref Range Status   09/26/2022 133 133 - 144 mmol/L Final   08/04/2020 138 133 - 144 mmol/L Final     Potassium   Date Value Ref Range Status    09/26/2022 4.0 3.4 - 5.3 mmol/L Final   08/04/2020 4.0 3.4 - 5.3 mmol/L Final     Chloride   Date Value Ref Range Status   09/26/2022 98 94 - 109 mmol/L Final   08/04/2020 103 94 - 109 mmol/L Final     Carbon Dioxide   Date Value Ref Range Status   08/04/2020 30 20 - 32 mmol/L Final     Carbon Dioxide (CO2)   Date Value Ref Range Status   09/26/2022 27 20 - 32 mmol/L Final     Anion Gap   Date Value Ref Range Status   09/26/2022 8 3 - 14 mmol/L Final   08/04/2020 5 3 - 14 mmol/L Final     Glucose   Date Value Ref Range Status   09/26/2022 119 (H) 70 - 99 mg/dL Final   08/04/2020 118 (H) 70 - 99 mg/dL Final     Comment:     Fasting specimen     Urea Nitrogen   Date Value Ref Range Status   09/26/2022 53 (H) 7 - 30 mg/dL Final   08/04/2020 53 (H) 7 - 30 mg/dL Final     Creatinine   Date Value Ref Range Status   09/26/2022 1.19 (H) 0.52 - 1.04 mg/dL Final   08/04/2020 1.24 (H) 0.52 - 1.04 mg/dL Final     GFR Estimate   Date Value Ref Range Status   09/26/2022 43 (L) >60 mL/min/1.73m2 Final     Comment:     Effective December 21, 2021 eGFRcr in adults is calculated using the 2021 CKD-EPI creatinine equation which includes age and gender (Javan et al., NEJ, DOI: 10.1056/TINBox0783910)   08/04/2020 38 (L) >60 mL/min/[1.73_m2] Final     Comment:     Non  GFR Calc  Starting 12/18/2018, serum creatinine based estimated GFR (eGFR) will be   calculated using the Chronic Kidney Disease Epidemiology Collaboration   (CKD-EPI) equation.       Calcium   Date Value Ref Range Status   09/26/2022 10.2 (H) 8.5 - 10.1 mg/dL Final   08/04/2020 10.0 8.5 - 10.1 mg/dL Final       Romi MD Meenu   of Medicine  Department of Nephrology  [facility]

## 2022-10-10 RX ORDER — RISEDRONATE SODIUM 35 MG/1
TABLET, FILM COATED ORAL
Qty: 12 TABLET | OUTPATIENT
Start: 2022-10-10

## 2022-11-04 ENCOUNTER — TELEPHONE (OUTPATIENT)
Dept: OPHTHALMOLOGY | Facility: CLINIC | Age: 87
End: 2022-11-04

## 2022-11-04 DIAGNOSIS — H40.053 BORDERLINE GLAUCOMA WITH OCULAR HYPERTENSION, BILATERAL: ICD-10-CM

## 2022-11-04 RX ORDER — TIMOLOL MALEATE 5 MG/ML
1 SOLUTION/ DROPS OPHTHALMIC DAILY
Qty: 15 ML | Refills: 4 | Status: SHIPPED | OUTPATIENT
Start: 2022-11-04 | End: 2023-03-20

## 2022-11-04 NOTE — TELEPHONE ENCOUNTER
Health Call Center    Phone Message    May a detailed message be left on voicemail: yes     Reason for Call: Medication Refill Request    Has the patient contacted the pharmacy for the refill? Yes   Name of medication being requested: timolol maleate (TIMOPTIC) 0.5 % ophthalmic solution  Provider who prescribed the medication: Dr. Craig  Pharmacy: [id number] [id number] [address]  Date medication is needed: ASAP    Pt switched pharmacies from [facility] to [facility], and [facility] never transferred this Rx over to [facility].       Action Taken: Message routed to:  Other: [facility]    Travel Screening: Not Applicable

## 2022-11-04 NOTE — TELEPHONE ENCOUNTER
Will send the Timolol over to [facility] for patient instead of [facility] for patient. Has appointment set up in March for CE. Sent to Dr. Craig to sign.

## 2022-11-29 ENCOUNTER — IMMUNIZATION (OUTPATIENT)
Dept: FAMILY MEDICINE | Facility: CLINIC | Age: 87
End: 2022-11-29
Payer: MEDICARE

## 2022-11-29 DIAGNOSIS — Z23 HIGH PRIORITY FOR 2019-NCOV VACCINE: Primary | ICD-10-CM

## 2022-11-29 PROCEDURE — 99207 PR NO CHARGE NURSE ONLY: CPT

## 2022-11-29 PROCEDURE — 0124A COVID-19 VACCINE BIVALENT BOOSTER 12+ (PFIZER): CPT

## 2022-11-29 PROCEDURE — 91312 COVID-19 VACCINE BIVALENT BOOSTER 12+ (PFIZER): CPT

## 2023-01-09 NOTE — PROGRESS NOTES
INJECTION POST CALL    Procedure: Epidural TL L5-S1  Radiologist(s): Dr. Yassine Pierce  Date of Procedure:  12/27/22    Relief of pain from this injection    A = 90%  A- = 85%  B = 80%  B- =75%  C = 70%  C- = 65%  D = 60%  D- = 50%  F = less than 50%    Do you feel this injection was beneficial? No, 0% of relief.     If yes, how long did it last? The first three days it hurt worse.    Instruct patient to follow up with their provider for any further care they may need. (as Dr. Pierce will no longer be making recommendations nor addended the exam with recommmendations)    Darcie Stearns        Please review lab orders sign and close encounter. Jennifer Jaimes MA/ASHLI    Physical 9/2/20

## 2023-03-01 ENCOUNTER — OFFICE VISIT (OUTPATIENT)
Dept: FAMILY MEDICINE | Facility: CLINIC | Age: 88
End: 2023-03-01
Payer: MEDICARE

## 2023-03-01 VITALS
DIASTOLIC BLOOD PRESSURE: 64 MMHG | OXYGEN SATURATION: 97 % | HEIGHT: 59 IN | HEART RATE: 81 BPM | WEIGHT: 107.8 LBS | BODY MASS INDEX: 21.73 KG/M2 | SYSTOLIC BLOOD PRESSURE: 136 MMHG

## 2023-03-01 DIAGNOSIS — I10 ESSENTIAL HYPERTENSION WITH GOAL BLOOD PRESSURE LESS THAN 140/90: ICD-10-CM

## 2023-03-01 DIAGNOSIS — N18.30 STAGE 3 CHRONIC KIDNEY DISEASE, UNSPECIFIED WHETHER STAGE 3A OR 3B CKD (H): ICD-10-CM

## 2023-03-01 DIAGNOSIS — N25.81 SECONDARY RENAL HYPERPARATHYROIDISM (H): ICD-10-CM

## 2023-03-01 PROCEDURE — 99214 OFFICE O/P EST MOD 30 MIN: CPT | Performed by: NURSE PRACTITIONER

## 2023-03-01 RX ORDER — LOSARTAN POTASSIUM 100 MG/1
100 TABLET ORAL DAILY
Qty: 90 TABLET | Refills: 3 | Status: SHIPPED | OUTPATIENT
Start: 2023-03-01 | End: 2024-03-12

## 2023-03-01 NOTE — PROGRESS NOTES
Assessment & Plan       Stage 3 chronic kidney disease, unspecified whether stage 3a or 3b CKD (H)  Chronic, stable.  Follows with nephrology. Continue losartan. Next labs scheduled for June 2023 per nephrology, defer today.      - losartan (COZAAR) 100 MG tablet; Take 1 tablet (100 mg) by mouth daily    Essential hypertension with goal blood pressure less than 140/90  Chronic, stable.    Continue losartan and verapamil- does not need refill on verapamil yet.      - losartan (COZAAR) 100 MG tablet; Take 1 tablet (100 mg) by mouth daily    Secondary renal hyperparathyroidism (H)  Chronic, stable.  Following with nephrology.  Mildly elevated calcium per last labs, patient states she was told to reduce her calcium supplement to 1 daily.  She is also on lasix to help with hypercalcemia.         Return in about 6 months (around 9/1/2023).    Rowena Solis [facility] GILBERTO Atkinson is a 89 year old, presenting for the following health issues:  Hypertension      History of Present Illness       CKD: She is not using over the counter pain medicine.     Hypertension: She presents for follow up of hypertension.  She does check blood pressure  regularly outside of the clinic. Outpatient blood pressures have not been over 140/90. She does not follow a low salt diet.     She eats 4 or more servings of fruits and vegetables daily.She consumes 1 sweetened beverage(s) daily.She exercises with enough effort to increase her heart rate 30 to 60 minutes per day.  She exercises with enough effort to increase her heart rate 6 days per week.   She is taking medications regularly.         Here to follow-up on hypertension.    She is feeling well and denies any other concerns.   She is on several agents for hypertension- losartan 100 mg, verapamil  mg and furosemide 40 mg.  She reports medication compliance.  Denies medication side effects.         Review of Systems   Constitutional, HEENT,  "cardiovascular, pulmonary, gi and gu systems are negative, except as otherwise noted.      Objective    /64   Pulse 81   Ht 1.486 m (4' 10.5\")   Wt 48.9 kg (107 lb 12.8 oz)   LMP 12/16/1980   SpO2 97%   BMI 22.15 kg/m    Body mass index is 22.15 kg/m .  Physical Exam   GENERAL: healthy, alert and no distress  EYES: Eyes grossly normal to inspection, PERRL and conjunctivae and sclerae normal  RESP: lungs clear to auscultation - no rales, rhonchi or wheezes  CV: regular rate and rhythm, normal S1 S2, no S3 or S4, no murmur, click or rub, no peripheral edema and peripheral pulses strong  MS: no gross musculoskeletal defects noted, no edema  SKIN: no suspicious lesions or rashes  NEURO: Normal strength and tone, mentation intact and speech normal    Recent Labs   Lab Test 09/26/22  1018 08/31/22  0935    130*   POTASSIUM 4.0 4.5   CHLORIDE 98 95   CO2 27 26   ANIONGAP 8 9   * 122*   BUN 53* 41*   CR 1.19* 1.07*   AB 10.2* 9.9                     "

## 2023-03-01 NOTE — PATIENT INSTRUCTIONS
Continue current medications.       Labs per kidney doctor in June.        Medicare wellness in September.

## 2023-03-20 ENCOUNTER — OFFICE VISIT (OUTPATIENT)
Dept: OPHTHALMOLOGY | Facility: CLINIC | Age: 88
End: 2023-03-20
Payer: MEDICARE

## 2023-03-20 DIAGNOSIS — H43.813 POSTERIOR VITREOUS DETACHMENT, BILATERAL: ICD-10-CM

## 2023-03-20 DIAGNOSIS — H35.3134 ADVANCED ATROPHIC NONEXUDATIVE AGE-RELATED MACULAR DEGENERATION OF BOTH EYES WITH SUBFOVEAL INVOLVEMENT: ICD-10-CM

## 2023-03-20 DIAGNOSIS — H40.053 BORDERLINE GLAUCOMA WITH OCULAR HYPERTENSION, BILATERAL: ICD-10-CM

## 2023-03-20 DIAGNOSIS — Z01.01 ENCOUNTER FOR EXAMINATION OF EYES AND VISION WITH ABNORMAL FINDINGS: ICD-10-CM

## 2023-03-20 DIAGNOSIS — H52.4 PRESBYOPIA: ICD-10-CM

## 2023-03-20 DIAGNOSIS — Z96.1 PSEUDOPHAKIA: Primary | ICD-10-CM

## 2023-03-20 PROCEDURE — 92014 COMPRE OPH EXAM EST PT 1/>: CPT | Performed by: OPHTHALMOLOGY

## 2023-03-20 PROCEDURE — 92015 DETERMINE REFRACTIVE STATE: CPT | Mod: GY | Performed by: OPHTHALMOLOGY

## 2023-03-20 RX ORDER — TIMOLOL MALEATE 5 MG/ML
1 SOLUTION/ DROPS OPHTHALMIC DAILY
Qty: 15 ML | Refills: 4 | Status: SHIPPED | OUTPATIENT
Start: 2023-03-20 | End: 2024-04-18

## 2023-03-20 RX ORDER — LATANOPROST 50 UG/ML
1 SOLUTION/ DROPS OPHTHALMIC AT BEDTIME
Qty: 9 ML | Refills: 4 | Status: SHIPPED | OUTPATIENT
Start: 2023-03-20 | End: 2024-06-19

## 2023-03-20 ASSESSMENT — CONF VISUAL FIELD
OS_INFERIOR_TEMPORAL_RESTRICTION: 3
OD_SUPERIOR_NASAL_RESTRICTION: 2
OD_SUPERIOR_TEMPORAL_RESTRICTION: 2
OD_INFERIOR_TEMPORAL_RESTRICTION: 2
OD_INFERIOR_NASAL_RESTRICTION: 2

## 2023-03-20 ASSESSMENT — REFRACTION_WEARINGRX
OD_SPHERE: -0.50
SPECS_TYPE: PAL
OD_AXIS: 010
OS_CYLINDER: +1.00
OD_ADD: +3.50
OD_CYLINDER: +0.50
OS_ADD: +3.50
OS_SPHERE: -0.75
OS_AXIS: 180

## 2023-03-20 ASSESSMENT — SLIT LAMP EXAM - LIDS
COMMENTS: GOOD CREASE AND COSMESIS
COMMENTS: GOOD CREASE AND COSMESIS, 1+ PTOSIS

## 2023-03-20 ASSESSMENT — REFRACTION_MANIFEST
OS_ADD: +3.50
OD_AXIS: 175
OD_CYLINDER: +0.50
OD_ADD: +3.50
OS_AXIS: 175
OS_SPHERE: -1.75
OS_CYLINDER: +1.00
OD_SPHERE: -1.00

## 2023-03-20 ASSESSMENT — EXTERNAL EXAM - RIGHT EYE: OD_EXAM: NORMAL

## 2023-03-20 ASSESSMENT — VISUAL ACUITY
OD_SC: 20/300 ECC
OS_SC: CF@5
METHOD: SNELLEN - LINEAR

## 2023-03-20 ASSESSMENT — TONOMETRY
OD_IOP_MMHG: 17
OS_IOP_MMHG: 16
IOP_METHOD: APPLANATION

## 2023-03-20 ASSESSMENT — CUP TO DISC RATIO
OD_RATIO: 0.3
OS_RATIO: 0.3

## 2023-03-20 ASSESSMENT — EXTERNAL EXAM - LEFT EYE: OS_EXAM: NORMAL

## 2023-03-20 NOTE — PATIENT INSTRUCTIONS
"Continue:   Latanoprost (green top) every evening both eyes.  Timolol (yellow top) every morning both eyes.    Wait at least 5 minutes between drops if using more than one at a time.     Glasses prescription given - optional  May use artificial tears up to four times a day (like Refresh Optive, Systane Balance, TheraTears, or generic artificial tears are ok. Avoid \"get the red out\" drops).     Call in November 2023 for an appointment in March 2024 for Complete Exam     Dr. Craig [phone]       "

## 2023-03-20 NOTE — PROGRESS NOTES
" Current Eye Medications:  Timolol daily both eyes and Xalatan at bedtime both eyes     Subjective:  Here for complete eye exam today. Doesn't wear glasses, thinks things are more clear without them. No new vision changes, feels things are the same.   Script talk reads her meds to her. Also has a camera that reads text to her now from the [employer] store; got Schneider light there also.     Objective:  See Ophthalmology Exam.       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      ICD-10-CM    1. Pseudophakia, Yag Caps, ou  Z96.1       2. Borderline glaucoma with ocular hypertension, bilateral - treated  H40.053 latanoprost (XALATAN) 0.005 % ophthalmic solution     timolol maleate (TIMOPTIC) 0.5 % ophthalmic solution      3. Advanced atrophic nonexudative age-related macular degeneration of both eyes with subfoveal involvement  H35.3134       4. Posterior vitreous detachment, bilateral  H43.813       5. Encounter for examination of eyes and vision with abnormal findings  Z01.01 EYE EXAM (SIMPLE-NONBILLABLE)      6. Presbyopia  H52.4 REFRACTIVE STATUS           Plan:  Continue:   Latanoprost (green top) every evening both eyes.  Timolol (yellow top) every morning both eyes.    Wait at least 5 minutes between drops if using more than one at a time.     Glasses prescription given - optional  May use artificial tears up to four times a day (like Refresh Optive, Systane Balance, TheraTears, or generic artificial tears are ok. Avoid \"get the red out\" drops).     Call in November 2023 for an appointment in March 2024 for Complete Exam     Dr. Craig [phone]          "

## 2023-03-20 NOTE — LETTER
"    3/20/2023         RE: China Guzman  C/o Pat Whaley  [address]  [place] [facility] [address]        Dear Colleague,    Thank you for referring your patient, China Guzman, to the [facility]. Please see a copy of my visit note below.     Current Eye Medications:  Timolol daily both eyes and Xalatan at bedtime both eyes     Subjective:  Here for complete eye exam today. Doesn't wear glasses, thinks things are more clear without them. No new vision changes, feels things are the same.   Script talk reads her meds to her. Also has a camera that reads text to her now from the [employer]; got Schneider light there also.     Objective:  See Ophthalmology Exam.       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      ICD-10-CM    1. Pseudophakia, Yag Caps, ou  Z96.1       2. Borderline glaucoma with ocular hypertension, bilateral - treated  H40.053 latanoprost (XALATAN) 0.005 % ophthalmic solution     timolol maleate (TIMOPTIC) 0.5 % ophthalmic solution      3. Advanced atrophic nonexudative age-related macular degeneration of both eyes with subfoveal involvement  H35.3134       4. Posterior vitreous detachment, bilateral  H43.813       5. Encounter for examination of eyes and vision with abnormal findings  Z01.01 EYE EXAM (SIMPLE-NONBILLABLE)      6. Presbyopia  H52.4 REFRACTIVE STATUS           Plan:  Continue:   Latanoprost (green top) every evening both eyes.  Timolol (yellow top) every morning both eyes.    Wait at least 5 minutes between drops if using more than one at a time.     Glasses prescription given - optional  May use artificial tears up to four times a day (like Refresh Optive, Systane Balance, TheraTears, or generic artificial tears are ok. Avoid \"get the red out\" drops).     Call in November 2023 for an appointment in March 2024 for Complete Exam     Dr. Craig [phone]      "         Again, thank you for allowing me to participate in the care of your patient.        Sincerely,        Srikanth Craig MD

## 2023-03-29 ENCOUNTER — TRANSFERRED RECORDS (OUTPATIENT)
Dept: MULTI SPECIALTY CLINIC | Facility: CLINIC | Age: 88
End: 2023-03-29

## 2023-03-29 LAB — RETINOPATHY: NORMAL

## 2023-05-16 ENCOUNTER — TELEPHONE (OUTPATIENT)
Dept: INTERNAL MEDICINE | Facility: CLINIC | Age: 88
End: 2023-05-16
Payer: MEDICARE

## 2023-05-16 DIAGNOSIS — N18.32 STAGE 3B CHRONIC KIDNEY DISEASE (H): Primary | ICD-10-CM

## 2023-05-16 DIAGNOSIS — E78.5 HYPERLIPIDEMIA LDL GOAL <130: ICD-10-CM

## 2023-05-16 NOTE — TELEPHONE ENCOUNTER
Please let patient know that I only entered a lipid panel to check cholesterol.  The other labs that I would want are already being ordered and managed by her kidney doctor (has standing orders for Dr. Corrigan).  Thanks, Rowena Solis, CNP

## 2023-05-16 NOTE — TELEPHONE ENCOUNTER
Order/Referral Request    Who is requesting: patient     Orders being requested: annual lab work     Reason service is needed/diagnosis: upcoming annual wellness visit     When are orders needed by: 9/8/2023    Has this been discussed with Provider: Yes    Does patient have a preference on a Group/Provider/Facility? no    Does patient have an appointment scheduled?: Yes: lab appointment on 9/8/2023 and annual wellness visit on 9/12/2023    Where to send orders: Place orders within Epic    Could we send this information to you in [place] or would you prefer to receive a phone call?:   Patient would prefer a phone call   Okay to leave a detailed message?: Yes at Home number on file [phone] (home)

## 2023-06-05 NOTE — LETTER
1/10/2022     RE: China Guzman  [address] [place] [facility] [address]     Dear Colleague,    Thank you for referring your patient, China Guzman, to the [facility]. Please see a copy of my visit note below.    HPI:     She is a delightful female with history of hypertension, hyperlipidemia, osteoporosis, who presented in July 2018 for evaluation of high calcium and worsened kidney function.  Her calcium was noted to be elevated on multiple occasions, up to 11 mg/dL but usually between 10.4-10.9- though overall asymptomatic.    Her PTH was within normal, which is mildly inappropriately high in setting of hypercalcemia. She had been on hydrochlorothiazide which at first we lowered, but with hyponatremia noted on recheck, I switched her to furosemide in September 2018    She is also on losartan and diltiazem for her blood pressure. She takes calcium and vitamin D  twice a day and now on actonel.  On followup today, she feels well.   She hydrates well with water, 4-5 glasses a day  She lives with her daughter who is a  and has sarcoidosis, and has a special needs daughter (her granddaughter).     She denies urinary issues. She wakes up once a night to urinate typically    Her blood pressure at home are in 130-140s mostly. It is rarely any higher.  Her weight has increased a few pounds. [facility].    She had possible food poisoning one week before her last appt in December and creatinine was increased from 1.2 to 1.7  She cut back lasix 20 mg to once a day and creatinine is now improved. She does note some light headedness when sitting from a laying position but denies light headedness when standing up or when bending over.  She has no LE swelling, no shortness of breath. She seems to be walking at a slower pace but overall feeling okay.         Review of Systems   A 4 point ROS was reviewed with patient  "and negative except as noted above       Objective   Reported vitals:  /75 (BP Location: Right arm, Patient Position: Sitting, Cuff Size: Adult Regular)   Pulse 107   Ht 1.499 m (4' 11\")   Wt 49.4 kg (109 lb)   LMP 12/16/1980   SpO2 97%   BMI 22.02 kg/m     General: no distress,engaged and appropriate speech  Psych: Alert and oriented times 3; coherent speech, normal   rate and volume, able to articulate logical thoughts   Lungs: decreased bilaterally  CV: RR  EXT: no edema    Last labs 5/2019 - within normal  Last Comprehensive Metabolic Panel:  Sodium   Date Value Ref Range Status   01/07/2022 137 133 - 144 mmol/L Final   08/04/2020 138 133 - 144 mmol/L Final     Potassium   Date Value Ref Range Status   01/07/2022 4.6 3.4 - 5.3 mmol/L Final   08/04/2020 4.0 3.4 - 5.3 mmol/L Final     Chloride   Date Value Ref Range Status   01/07/2022 104 94 - 109 mmol/L Final   08/04/2020 103 94 - 109 mmol/L Final     Carbon Dioxide   Date Value Ref Range Status   08/04/2020 30 20 - 32 mmol/L Final     Carbon Dioxide (CO2)   Date Value Ref Range Status   01/07/2022 25 20 - 32 mmol/L Final     Anion Gap   Date Value Ref Range Status   01/07/2022 8 3 - 14 mmol/L Final   08/04/2020 5 3 - 14 mmol/L Final     Glucose   Date Value Ref Range Status   01/07/2022 114 (H) 70 - 99 mg/dL Final   08/04/2020 118 (H) 70 - 99 mg/dL Final     Comment:     Fasting specimen     Urea Nitrogen   Date Value Ref Range Status   01/07/2022 46 (H) 7 - 30 mg/dL Final   08/04/2020 53 (H) 7 - 30 mg/dL Final     Creatinine   Date Value Ref Range Status   01/07/2022 1.32 (H) 0.52 - 1.04 mg/dL Final   08/04/2020 1.24 (H) 0.52 - 1.04 mg/dL Final     GFR Estimate   Date Value Ref Range Status   01/07/2022 38 (L) >60 mL/min/1.73m2 Final     Comment:     Effective December 21, 2021 eGFRcr in adults is calculated using the 2021 CKD-EPI creatinine equation which includes age and gender (Javan lord al., NEJM, DOI: 10.1056/NZIIcl0515847)   08/04/2020 38 (L) " >60 mL/min/[1.73_m2] Final     Comment:     Non  GFR Calc  Starting 12/18/2018, serum creatinine based estimated GFR (eGFR) will be   calculated using the Chronic Kidney Disease Epidemiology Collaboration   (CKD-EPI) equation.       Calcium   Date Value Ref Range Status   01/07/2022 9.4 8.5 - 10.1 mg/dL Final   08/04/2020 10.0 8.5 - 10.1 mg/dL Final       Assessment/Plan:  89 year old female with history of hypertension, osteoporosis, anemia, who presents for followup of hypercalcemia and increased creatinine. Her Scr is typically 0.9mg/dL but was up to 1.3 in setting of hypercalcemia, calcium of 10.4-10.9 in 2018  1. Hypercalcemia- improved, after better hydration and stopping hydrochlorothiazide and starting furosemide  - given hypercalcemia and hyponatremia, hydrochlorothiazide was stopped   - tolerating furosemide and calcium 10.12 last check    2. CKD stage 3- Her Scr was 0.9 up until the last year or so, with Scr up to 1.3 but now ranging in 1-1.1 range  - however her creatinine was increased to 1.7 last month. she decreased lasix to 20 mg daily. On recheck creatinine improving 1.3  - labs in one month.     3. Hypertension- now on furosemide ,continue same (stopped hydrochlorothiazide), losartan and diltiazem  -  she will monitor at home  -goal 130-140/80 is fine- she is at goal, would cut down on BP medications if <115 systolic.    4. Anemia- mild anemia previously but hgb now normal, iron sat 16% on previous check, and ferritin 78   - immunofixation- negative 2018     5. Bone- PTH 60 , in setting of hypercalcemia, but some CKD thus ?appropriate.    Last calcium 9.9 thus high normal, and PTH stable.  - continue vitamin D and calcium 600mg for now  - check Vit D with next labs     - labs in one month. Follow up with me in 2 months and with Dr. Corrigan as scheduled.        Marilu Suazo PA-C    Visit length 15 minutes. An additional 15 minutes was spent on date of service in chart review,  documentation and other activities as noted.      no

## 2023-06-26 ENCOUNTER — LAB (OUTPATIENT)
Dept: LAB | Facility: CLINIC | Age: 88
End: 2023-06-26
Payer: MEDICARE

## 2023-06-26 DIAGNOSIS — D63.1 ANEMIA IN STAGE 3B CHRONIC KIDNEY DISEASE (H): ICD-10-CM

## 2023-06-26 DIAGNOSIS — N18.32 ANEMIA IN STAGE 3B CHRONIC KIDNEY DISEASE (H): ICD-10-CM

## 2023-06-26 DIAGNOSIS — N18.32 STAGE 3B CHRONIC KIDNEY DISEASE (H): ICD-10-CM

## 2023-06-26 LAB
ALBUMIN SERPL BCG-MCNC: 4.6 G/DL (ref 3.5–5.2)
ANION GAP SERPL CALCULATED.3IONS-SCNC: 12 MMOL/L (ref 7–15)
BUN SERPL-MCNC: 29 MG/DL (ref 8–23)
CALCIUM SERPL-MCNC: 10.3 MG/DL (ref 8.2–9.6)
CHLORIDE SERPL-SCNC: 97 MMOL/L (ref 98–107)
CREAT SERPL-MCNC: 1.26 MG/DL (ref 0.51–0.95)
CREAT UR-MCNC: 38.3 MG/DL
DEPRECATED HCO3 PLAS-SCNC: 24 MMOL/L (ref 22–29)
GFR SERPL CREATININE-BSD FRML MDRD: 40 ML/MIN/1.73M2
GLUCOSE SERPL-MCNC: 94 MG/DL (ref 70–99)
HGB BLD-MCNC: 11.2 G/DL (ref 11.7–15.7)
MICROALBUMIN UR-MCNC: 23.5 MG/L
MICROALBUMIN/CREAT UR: 61.36 MG/G CR (ref 0–25)
PHOSPHATE SERPL-MCNC: 3.6 MG/DL (ref 2.5–4.5)
POTASSIUM SERPL-SCNC: 4.6 MMOL/L (ref 3.4–5.3)
SODIUM SERPL-SCNC: 133 MMOL/L (ref 136–145)

## 2023-06-26 PROCEDURE — 80069 RENAL FUNCTION PANEL: CPT

## 2023-06-26 PROCEDURE — 82043 UR ALBUMIN QUANTITATIVE: CPT

## 2023-06-26 PROCEDURE — 36415 COLL VENOUS BLD VENIPUNCTURE: CPT

## 2023-06-26 PROCEDURE — 85018 HEMOGLOBIN: CPT

## 2023-06-26 PROCEDURE — 82570 ASSAY OF URINE CREATININE: CPT

## 2023-07-10 ENCOUNTER — OFFICE VISIT (OUTPATIENT)
Dept: NEPHROLOGY | Facility: CLINIC | Age: 88
End: 2023-07-10
Payer: MEDICARE

## 2023-07-10 VITALS
BODY MASS INDEX: 22.6 KG/M2 | SYSTOLIC BLOOD PRESSURE: 132 MMHG | DIASTOLIC BLOOD PRESSURE: 80 MMHG | HEART RATE: 82 BPM | WEIGHT: 110 LBS

## 2023-07-10 DIAGNOSIS — E83.52 HYPERCALCEMIA: ICD-10-CM

## 2023-07-10 DIAGNOSIS — N18.32 STAGE 3B CHRONIC KIDNEY DISEASE (H): ICD-10-CM

## 2023-07-10 DIAGNOSIS — D63.1 ANEMIA OF CHRONIC RENAL FAILURE, STAGE 3A (H): Primary | ICD-10-CM

## 2023-07-10 DIAGNOSIS — I10 ESSENTIAL HYPERTENSION WITH GOAL BLOOD PRESSURE LESS THAN 140/90: ICD-10-CM

## 2023-07-10 DIAGNOSIS — N18.31 ANEMIA OF CHRONIC RENAL FAILURE, STAGE 3A (H): Primary | ICD-10-CM

## 2023-07-10 PROCEDURE — 99214 OFFICE O/P EST MOD 30 MIN: CPT | Performed by: INTERNAL MEDICINE

## 2023-07-10 RX ORDER — FUROSEMIDE 40 MG
40 TABLET ORAL DAILY
Qty: 90 TABLET | Refills: 3 | Status: SHIPPED | OUTPATIENT
Start: 2023-07-10 | End: 2024-09-09

## 2023-07-10 NOTE — LETTER
7/10/2023       RE: China Guzman  C/o Pat Whaley  [address]  [place] [address]     Dear Colleague,    Thank you for referring your patient, China Guzman, to the [facility]. Please see a copy of my visit note below.    Nephrology Progress Note      Assessment/Plan:  89 year old female with history of hypertension, osteoporosis, anemia, who presents for followup of hypercalcemia and increased creatinine. Her Scr is typically 0.9mg/dL but was up to 1.3 in setting of hypercalcemia, calcium of 10.4-10.9 in 2018  1. Hypercalcemia- improved, after better hydration and stopping hydrochlorothiazide and starting furosemide  - given hypercalcemia and hyponatremia, hydrochlorothiazide was stopped   - tolerating furosemide and calcium high normal at 10.2, taking two calcium / vit D tablets, will have her decrease to once a day   - repeat labs in March at time of PCP followup.    - return in 8-9 months    2. CKD stage 3- Her Scr was 0.9 up until the last year or so, with Scr up to 1.3 but now ranging in 1-1.1 range  - however her creatinine was increased to 1.7 thus we decreased lasix to once daily. On recheck creatinine improved back to 1.1- 1.2  - stable, continue once a day furosemide  - her BUN is also improved to 29    3. Hypertension- now on furosemide 40 mg daily,continue same (stopped hydrochlorothiazide), losartan 100 mg daily and and verapamil 240 mg daily  -  she will monitor at home  -goal 130-140/80 is fine- she is at goal, would cut down on BP medications if <115 systolic.    4. Anemia- mild anemia previously but hgb now normal, iron sat normal Fall 2022, recheck next year.  - immunofixation- negative 2018     5. Bone- 2/14/22 Vit D 50, 8/27/21 PTH low normal , in setting of mild hypercalcemia, but some CKD thus ?appropriate.    Last calcium 10.3 thus high normal, and PTH stable/ low nomral  - lowered vitamin D and  calcium 600mg to once a day      Reason for visit: CKD    HPI:  She is a delightful female with history of hypertension, hyperlipidemia, osteoporosis, who presented in July 2018 for evaluation of high calcium and worsened kidney function.  Her calcium was noted to be elevated on multiple occasions, up to 11 mg/dL but usually between 10.4-10.9- though overall asymptomatic.    Her PTH was within normal, which is mildly inappropriately high in setting of hypercalcemia. She had been on hydrochlorothiazide which at first we lowered, but with hyponatremia noted on recheck, I switched her to furosemide in September 2018    She is also on losartan and diltiazem for her blood pressure. She takes calcium and vitamin D  twice a day and now on actonel.  On followup today, she feels well.   She hydrates well with water, 4-5 glasses a day  She lives with her daughter who is a  and has sarcoidosis, and has a special needs daughter (her granddaughter).     She denies urinary issues. She wakes up once a night to urinate typically    She had possible food poisoning one week before her last appt in December and creatinine was increased from 1.2 to 1.7  She cut back lasix 20 mg to once a day and creatinine is now improved. She denies any current dizziness. She has no LE swelling, no shortness of breath.   She has mildly elevated calcium, taking two calcium/ vit D daily.  She is accompanied by daughter Pat.   She feels OK, did have COVID in August 2022.  She has been well, her weight is stable, blood pressure is 140--150 but settles down to  130s on recheck typically.  Her labs show calcium of 10.3 which is relatively stable, sodium is 133. She is drinking 5-6 glasses of fluids, discussed 4-5 glasses given the sodium.      Review of Systems   A 4 point ROS was reviewed with patient and negative except as noted above      Objective   Reported vitals:  BP (!) 150/80   Pulse 100   Wt 49.9 kg (110 lb)   LMP 12/16/1980   BMI 22.60  kg/m     General: no distress,engaged and appropriate speech  Psych: Alert and oriented times 3; coherent speech, normal   rate and volume, able to articulate logical thoughts   Lungs: decreased bilaterally  CV: RRR  EXT: no edema    Last labs 5/2019 - within normal  Last Comprehensive Metabolic Panel:  Sodium   Date Value Ref Range Status   06/26/2023 133 (L) 136 - 145 mmol/L Final   08/04/2020 138 133 - 144 mmol/L Final     Potassium   Date Value Ref Range Status   06/26/2023 4.6 3.4 - 5.3 mmol/L Final   09/26/2022 4.0 3.4 - 5.3 mmol/L Final   08/04/2020 4.0 3.4 - 5.3 mmol/L Final     Chloride   Date Value Ref Range Status   06/26/2023 97 (L) 98 - 107 mmol/L Final   09/26/2022 98 94 - 109 mmol/L Final   08/04/2020 103 94 - 109 mmol/L Final     Carbon Dioxide   Date Value Ref Range Status   08/04/2020 30 20 - 32 mmol/L Final     Carbon Dioxide (CO2)   Date Value Ref Range Status   06/26/2023 24 22 - 29 mmol/L Final   09/26/2022 27 20 - 32 mmol/L Final     Anion Gap   Date Value Ref Range Status   06/26/2023 12 7 - 15 mmol/L Final   09/26/2022 8 3 - 14 mmol/L Final   08/04/2020 5 3 - 14 mmol/L Final     Glucose   Date Value Ref Range Status   06/26/2023 94 70 - 99 mg/dL Final   09/26/2022 119 (H) 70 - 99 mg/dL Final   08/04/2020 118 (H) 70 - 99 mg/dL Final     Comment:     Fasting specimen     Urea Nitrogen   Date Value Ref Range Status   06/26/2023 29.0 (H) 8.0 - 23.0 mg/dL Final   09/26/2022 53 (H) 7 - 30 mg/dL Final   08/04/2020 53 (H) 7 - 30 mg/dL Final     Creatinine   Date Value Ref Range Status   06/26/2023 1.26 (H) 0.51 - 0.95 mg/dL Final   08/04/2020 1.24 (H) 0.52 - 1.04 mg/dL Final     GFR Estimate   Date Value Ref Range Status   06/26/2023 40 (L) >60 mL/min/1.73m2 Final   08/04/2020 38 (L) >60 mL/min/[1.73_m2] Final     Comment:     Non  GFR Calc  Starting 12/18/2018, serum creatinine based estimated GFR (eGFR) will be   calculated using the Chronic Kidney Disease Epidemiology  Collaboration   (CKD-EPI) equation.       Calcium   Date Value Ref Range Status   06/26/2023 10.3 (H) 8.2 - 9.6 mg/dL Final   08/04/2020 10.0 8.5 - 10.1 mg/dL Final       Romi MD [facility]   of Medicine  Department of Nephrology  [facility]

## 2023-07-10 NOTE — PROGRESS NOTES
Nephrology Progress Note      Assessment/Plan:  89 year old female with history of hypertension, osteoporosis, anemia, who presents for followup of hypercalcemia and increased creatinine. Her Scr is typically 0.9mg/dL but was up to 1.3 in setting of hypercalcemia, calcium of 10.4-10.9 in 2018  1. Hypercalcemia- improved, after better hydration and stopping hydrochlorothiazide and starting furosemide  - given hypercalcemia and hyponatremia, hydrochlorothiazide was stopped   - tolerating furosemide and calcium high normal at 10.2, taking two calcium / vit D tablets, will have her decrease to once a day   - repeat labs in March at time of PCP followup.    - return in 8-9 months    2. CKD stage 3- Her Scr was 0.9 up until the last year or so, with Scr up to 1.3 but now ranging in 1-1.1 range  - however her creatinine was increased to 1.7 thus we decreased lasix to once daily. On recheck creatinine improved back to 1.1- 1.2  - stable, continue once a day furosemide  - her BUN is also improved to 29    3. Hypertension- now on furosemide 40 mg daily,continue same (stopped hydrochlorothiazide), losartan 100 mg daily and and verapamil 240 mg daily  -  she will monitor at home  -goal 130-140/80 is fine- she is at goal, would cut down on BP medications if <115 systolic.    4. Anemia- mild anemia previously but hgb now normal, iron sat normal Fall 2022, recheck next year.  - immunofixation- negative 2018     5. Bone- 2/14/22 Vit D 50, 8/27/21 PTH low normal , in setting of mild hypercalcemia, but some CKD thus ?appropriate.    Last calcium 10.3 thus high normal, and PTH stable/ low nomral  - lowered vitamin D and calcium 600mg to once a day      Reason for visit: CKD    HPI:  She is a delightful female with history of hypertension, hyperlipidemia, osteoporosis, who presented in July 2018 for evaluation of high calcium and worsened kidney function.  Her calcium was noted to be elevated on multiple occasions, up to 11 mg/dL but  usually between 10.4-10.9- though overall asymptomatic.    Her PTH was within normal, which is mildly inappropriately high in setting of hypercalcemia. She had been on hydrochlorothiazide which at first we lowered, but with hyponatremia noted on recheck, I switched her to furosemide in September 2018    She is also on losartan and diltiazem for her blood pressure. She takes calcium and vitamin D  twice a day and now on actonel.  On followup today, she feels well.   She hydrates well with water, 4-5 glasses a day  She lives with her daughter who is a  and has sarcoidosis, and has a special needs daughter (her granddaughter).     She denies urinary issues. She wakes up once a night to urinate typically    She had possible food poisoning one week before her last appt in December and creatinine was increased from 1.2 to 1.7  She cut back lasix 20 mg to once a day and creatinine is now improved. She denies any current dizziness. She has no LE swelling, no shortness of breath.   She has mildly elevated calcium, taking two calcium/ vit D daily.  She is accompanied by daughter Pat.   She feels OK, did have COVID in August 2022.  She has been well, her weight is stable, blood pressure is 140--150 but settles down to  130s on recheck typically.  Her labs show calcium of 10.3 which is relatively stable, sodium is 133. She is drinking 5-6 glasses of fluids, discussed 4-5 glasses given the sodium.      Review of Systems   A 4 point ROS was reviewed with patient and negative except as noted above       Objective   Reported vitals:  BP (!) 150/80   Pulse 100   Wt 49.9 kg (110 lb)   LMP 12/16/1980   BMI 22.60 kg/m     General: no distress,engaged and appropriate speech  Psych: Alert and oriented times 3; coherent speech, normal   rate and volume, able to articulate logical thoughts   Lungs: decreased bilaterally  CV: RRR  EXT: no edema    Last labs 5/2019 - within normal  Last Comprehensive Metabolic Panel:  Sodium    Date Value Ref Range Status   06/26/2023 133 (L) 136 - 145 mmol/L Final   08/04/2020 138 133 - 144 mmol/L Final     Potassium   Date Value Ref Range Status   06/26/2023 4.6 3.4 - 5.3 mmol/L Final   09/26/2022 4.0 3.4 - 5.3 mmol/L Final   08/04/2020 4.0 3.4 - 5.3 mmol/L Final     Chloride   Date Value Ref Range Status   06/26/2023 97 (L) 98 - 107 mmol/L Final   09/26/2022 98 94 - 109 mmol/L Final   08/04/2020 103 94 - 109 mmol/L Final     Carbon Dioxide   Date Value Ref Range Status   08/04/2020 30 20 - 32 mmol/L Final     Carbon Dioxide (CO2)   Date Value Ref Range Status   06/26/2023 24 22 - 29 mmol/L Final   09/26/2022 27 20 - 32 mmol/L Final     Anion Gap   Date Value Ref Range Status   06/26/2023 12 7 - 15 mmol/L Final   09/26/2022 8 3 - 14 mmol/L Final   08/04/2020 5 3 - 14 mmol/L Final     Glucose   Date Value Ref Range Status   06/26/2023 94 70 - 99 mg/dL Final   09/26/2022 119 (H) 70 - 99 mg/dL Final   08/04/2020 118 (H) 70 - 99 mg/dL Final     Comment:     Fasting specimen     Urea Nitrogen   Date Value Ref Range Status   06/26/2023 29.0 (H) 8.0 - 23.0 mg/dL Final   09/26/2022 53 (H) 7 - 30 mg/dL Final   08/04/2020 53 (H) 7 - 30 mg/dL Final     Creatinine   Date Value Ref Range Status   06/26/2023 1.26 (H) 0.51 - 0.95 mg/dL Final   08/04/2020 1.24 (H) 0.52 - 1.04 mg/dL Final     GFR Estimate   Date Value Ref Range Status   06/26/2023 40 (L) >60 mL/min/1.73m2 Final   08/04/2020 38 (L) >60 mL/min/[1.73_m2] Final     Comment:     Non  GFR Calc  Starting 12/18/2018, serum creatinine based estimated GFR (eGFR) will be   calculated using the Chronic Kidney Disease Epidemiology Collaboration   (CKD-EPI) equation.       Calcium   Date Value Ref Range Status   06/26/2023 10.3 (H) 8.2 - 9.6 mg/dL Final   08/04/2020 10.0 8.5 - 10.1 mg/dL Final       Romi MD [facility]   of Medicine  Department of Nephrology  [facility]

## 2023-08-27 ENCOUNTER — MYC MEDICAL ADVICE (OUTPATIENT)
Dept: FAMILY MEDICINE | Facility: CLINIC | Age: 88
End: 2023-08-27
Payer: MEDICARE

## 2023-09-06 ENCOUNTER — LAB (OUTPATIENT)
Dept: LAB | Facility: CLINIC | Age: 88
End: 2023-09-06
Payer: MEDICARE

## 2023-09-06 ENCOUNTER — DOCUMENTATION ONLY (OUTPATIENT)
Dept: INTERNAL MEDICINE | Facility: CLINIC | Age: 88
End: 2023-09-06

## 2023-09-06 DIAGNOSIS — E78.5 HYPERLIPIDEMIA LDL GOAL <130: ICD-10-CM

## 2023-09-06 LAB
CHOLEST SERPL-MCNC: 184 MG/DL
HDLC SERPL-MCNC: 53 MG/DL
HOLD SPECIMEN: NORMAL
HOLD SPECIMEN: NORMAL
LDLC SERPL CALC-MCNC: 89 MG/DL
NONHDLC SERPL-MCNC: 131 MG/DL
TRIGL SERPL-MCNC: 211 MG/DL

## 2023-09-06 PROCEDURE — 80061 LIPID PANEL: CPT

## 2023-09-06 PROCEDURE — 36415 COLL VENOUS BLD VENIPUNCTURE: CPT

## 2023-09-06 ASSESSMENT — ENCOUNTER SYMPTOMS
SHORTNESS OF BREATH: 0
ABDOMINAL PAIN: 0
HEMATOCHEZIA: 0
SORE THROAT: 0
MYALGIAS: 1
DYSURIA: 0
FEVER: 0
PALPITATIONS: 0
DIZZINESS: 0
WEAKNESS: 0
PARESTHESIAS: 0
ARTHRALGIAS: 0
COUGH: 0
DIARRHEA: 0
NERVOUS/ANXIOUS: 0
CHILLS: 0
FREQUENCY: 0
NAUSEA: 0
CONSTIPATION: 0
HEARTBURN: 0
BREAST MASS: 0
JOINT SWELLING: 0
HEADACHES: 0
EYE PAIN: 0
HEMATURIA: 0

## 2023-09-06 ASSESSMENT — ACTIVITIES OF DAILY LIVING (ADL)
CURRENT_FUNCTION: HOUSEWORK REQUIRES ASSISTANCE
CURRENT_FUNCTION: TRANSPORTATION REQUIRES ASSISTANCE

## 2023-09-06 NOTE — PROGRESS NOTES
Nima China was just here to do her pre visit labs. There was an older order for just Lipid. I drew extra tubes and she is fasting. Blood is in lab. Please place orders as needed. Thank you.      Abeba RUIZ

## 2023-09-12 ENCOUNTER — OFFICE VISIT (OUTPATIENT)
Dept: FAMILY MEDICINE | Facility: CLINIC | Age: 88
End: 2023-09-12
Payer: MEDICARE

## 2023-09-12 VITALS
HEART RATE: 99 BPM | HEIGHT: 59 IN | OXYGEN SATURATION: 96 % | SYSTOLIC BLOOD PRESSURE: 128 MMHG | DIASTOLIC BLOOD PRESSURE: 84 MMHG | BODY MASS INDEX: 22.18 KG/M2 | WEIGHT: 110 LBS

## 2023-09-12 DIAGNOSIS — E78.5 HYPERLIPIDEMIA LDL GOAL <130: ICD-10-CM

## 2023-09-12 DIAGNOSIS — Z00.00 ENCOUNTER FOR MEDICARE ANNUAL WELLNESS EXAM: Primary | ICD-10-CM

## 2023-09-12 DIAGNOSIS — I10 ESSENTIAL HYPERTENSION WITH GOAL BLOOD PRESSURE LESS THAN 140/90: ICD-10-CM

## 2023-09-12 PROCEDURE — 90662 IIV NO PRSV INCREASED AG IM: CPT | Performed by: NURSE PRACTITIONER

## 2023-09-12 PROCEDURE — G0008 ADMIN INFLUENZA VIRUS VAC: HCPCS | Performed by: NURSE PRACTITIONER

## 2023-09-12 PROCEDURE — G0439 PPPS, SUBSEQ VISIT: HCPCS | Performed by: NURSE PRACTITIONER

## 2023-09-12 PROCEDURE — 99214 OFFICE O/P EST MOD 30 MIN: CPT | Mod: 25 | Performed by: NURSE PRACTITIONER

## 2023-09-12 RX ORDER — VERAPAMIL HYDROCHLORIDE 240 MG/1
240 TABLET, FILM COATED, EXTENDED RELEASE ORAL DAILY
Qty: 90 TABLET | Refills: 3 | Status: SHIPPED | OUTPATIENT
Start: 2023-09-12 | End: 2024-09-10

## 2023-09-12 RX ORDER — ATORVASTATIN CALCIUM 10 MG/1
TABLET, FILM COATED ORAL
Qty: 90 TABLET | Refills: 3 | Status: SHIPPED | OUTPATIENT
Start: 2023-09-12 | End: 2024-09-10

## 2023-09-12 ASSESSMENT — ENCOUNTER SYMPTOMS
FEVER: 0
HEMATOCHEZIA: 0
DIZZINESS: 0
CHILLS: 0
HEMATURIA: 0
HEADACHES: 0
FREQUENCY: 0
SORE THROAT: 0
NERVOUS/ANXIOUS: 0
SHORTNESS OF BREATH: 0
EYE PAIN: 0
DIARRHEA: 0
HEARTBURN: 0
PALPITATIONS: 0
CONSTIPATION: 0
WEAKNESS: 0
MYALGIAS: 1
BREAST MASS: 0
ABDOMINAL PAIN: 0
COUGH: 0
JOINT SWELLING: 0
ARTHRALGIAS: 0
NAUSEA: 0
DYSURIA: 0
PARESTHESIAS: 0

## 2023-09-12 NOTE — PROGRESS NOTES
The patient reports that she has difficulty with activities of daily living. I have asked that the patient make a follow up appointment in *** weeks where this issue will be further evaluated and addressed.

## 2023-09-12 NOTE — PATIENT INSTRUCTIONS
Patient Education   Personalized Prevention Plan  You are due for the preventive services outlined below.  Your care team is available to assist you in scheduling these services.  If you have already completed any of these items, please share that information with your care team to update in your medical record.  Health Maintenance Due   Topic Date Due     ANNUAL REVIEW OF HM ORDERS  Never done     Diptheria Tetanus Pertussis (DTAP/TDAP/TD) Vaccine (2 - Td or Tdap) 03/01/2023     COVID-19 Vaccine (5 - Pfizer series) 03/29/2023     Flu Vaccine (1) 09/01/2023     Osteoporosis Screening  10/04/2023     Activities of Daily Living    Your Health Risk Assessment indicates you have difficulties with activities of daily living such as housework, bathing, preparing meals, taking medication, etc. Please make a follow up appointment for us to address this issue in more detail.

## 2023-09-12 NOTE — PROGRESS NOTES
"SUBJECTIVE:   China is a 89 year old who presents for Preventive Visit.      Lives with daughter Miladis.   Daughter Pat at visit today.   Health has been pretty good.  States she can't complain.    No recent falls.   States appetite is good.  Weight stable.   No CP, SOB or edema.  No dizziness.   Sleeping good.  Blood pressure has been good, 132/83 today.    Follows with nephrology for CKD, has been stable.    Right sciatic pain that comes and goes, chronic.  States not too bothersome.           9/12/2023    11:53 AM   Additional Questions   Roomed by margaret   Accompanied by daughter Reynold Stewart       Are you in the first 12 months of your Medicare coverage?  No    Healthy Habits:     In general, how would you rate your overall health?  Good    Frequency of exercise:  6-7 days/week    Duration of exercise:  N/A    Do you usually eat at least 4 servings of fruit and vegetables a day, include whole grains    & fiber and avoid regularly eating high fat or \"junk\" foods?  Yes    Taking medications regularly:  Yes    Medication side effects:  None    Ability to successfully perform activities of daily living:  Transportation requires assistance and housework requires assistance    Home Safety:  No safety concerns identified    Hearing Impairment:  No hearing concerns    In the past 6 months, have you been bothered by leaking of urine?  No    In general, how would you rate your overall mental or emotional health?  Excellent    Additional concerns today:  No        Have you ever done Advance Care Planning? (For example, a Health Directive, POLST, or a discussion with a medical provider or your loved ones about your wishes): Yes, patient states has an Advance Care Planning document and will bring a copy to the clinic.       Fall risk  Fallen 2 or more times in the past year?: No  Any fall with injury in the past year?: No    Cognitive Screening Normal cognition based on my direct observation during interview and exam.        Do " you have sleep apnea, excessive snoring or daytime drowsiness? : no    Reviewed and updated as needed this visit by clinical staff   Tobacco  Allergies  Meds  Problems  Med Hx  Surg Hx  Fam Hx          Reviewed and updated as needed this visit by Provider   Tobacco  Allergies  Meds  Problems  Med Hx  Surg Hx  Fam Hx         Social History     Tobacco Use    Smoking status: Never    Smokeless tobacco: Never   Substance Use Topics    Alcohol use: No     Alcohol/week: 0.0 standard drinks of alcohol           9/6/2023     4:09 PM   Alcohol Use   Prescreen: >3 drinks/day or >7 drinks/week? Not Applicable     Do you have a current opioid prescription? No  Do you use any other controlled substances or medications that are not prescribed by a provider? None      Current providers sharing in care for this patient include:   Patient Care Team:  Rowena Solis CNP as PCP - General  Srikanth Craig MD as Assigned Surgical Provider  Romi Corrigan MD as MD (Nephrology)  Rowena Solis CNP as Assigned PCP  Romi Corrigan MD as Assigned Nephrology Provider    The following health maintenance items are reviewed in Epic and correct as of today:  Health Maintenance   Topic Date Due    ANNUAL REVIEW OF HM ORDERS  Never done    DTAP/TDAP/TD IMMUNIZATION (2 - Td or Tdap) 03/01/2023    COVID-19 Vaccine (5 - Pfizer series) 03/29/2023    DEXA  10/04/2023    EYE EXAM  03/20/2024    BMP  06/26/2024    MICROALBUMIN  06/26/2024    HEMOGLOBIN  06/26/2024    LIPID  09/06/2024    MEDICARE ANNUAL WELLNESS VISIT  09/12/2024    FALL RISK ASSESSMENT  09/12/2024    ADVANCE CARE PLANNING  09/12/2028    PHQ-2 (once per calendar year)  Completed    INFLUENZA VACCINE  Completed    Pneumococcal Vaccine: 65+ Years  Completed    URINALYSIS  Completed    ZOSTER IMMUNIZATION  Completed    IPV IMMUNIZATION  Aged Out    HPV IMMUNIZATION  Aged Out    MENINGITIS IMMUNIZATION  Aged Out    MAMMO SCREENING   "Discontinued    COLORECTAL CANCER SCREENING  Discontinued       Mammogram Screening - Patient over age 75, has elected to discontinue screenings.  Pertinent mammograms are reviewed under the imaging tab.    Review of Systems   Constitutional:  Negative for chills and fever.   HENT:  Negative for congestion, ear pain, hearing loss and sore throat.    Eyes:  Negative for pain and visual disturbance.   Respiratory:  Negative for cough and shortness of breath.    Cardiovascular:  Negative for chest pain, palpitations and peripheral edema.   Gastrointestinal:  Negative for abdominal pain, constipation, diarrhea, heartburn, hematochezia and nausea.   Breasts:  Negative for tenderness, breast mass and discharge.   Genitourinary:  Negative for dysuria, frequency, genital sores, hematuria, pelvic pain, urgency, vaginal bleeding and vaginal discharge.   Musculoskeletal:  Positive for myalgias. Negative for arthralgias and joint swelling.   Skin:  Negative for rash.   Neurological:  Negative for dizziness, weakness, headaches and paresthesias.   Psychiatric/Behavioral:  Negative for mood changes. The patient is not nervous/anxious.          OBJECTIVE:   /84   Pulse 99   Ht 1.486 m (4' 10.5\")   Wt 49.9 kg (110 lb)   LMP 12/16/1980   SpO2 96%   BMI 22.60 kg/m   Estimated body mass index is 22.6 kg/m  as calculated from the following:    Height as of this encounter: 1.486 m (4' 10.5\").    Weight as of this encounter: 49.9 kg (110 lb).  Physical Exam  GENERAL: healthy, alert and no distress  EYES: Eyes grossly normal to inspection, PERRL and conjunctivae and sclerae normal  HENT: ear canals and TM's normal, nose and mouth without ulcers or lesions  NECK: no adenopathy, no asymmetry, masses, or scars and thyroid normal to palpation  RESP: lungs clear to auscultation - no rales, rhonchi or wheezes  CV: regular rate and rhythm, normal S1 S2, no S3 or S4, no murmur, click or rub, no peripheral edema and peripheral pulses " strong  ABDOMEN: soft, nontender, no hepatosplenomegaly, no masses and bowel sounds normal  MS: no gross musculoskeletal defects noted, no edema  SKIN: no suspicious lesions or rashes  NEURO: Normal strength and tone, mentation intact and speech normal  PSYCH: mentation appears normal, affect normal/bright    Diagnostic Test Results:  Labs reviewed in [facility]    Recent Labs   Lab Test 09/06/23  1053 08/31/22  0935   CHOL 184 163   HDL 53 51   LDL 89 75   TRIG 211* 184*     Recent Labs   Lab Test 06/26/23  1301 09/26/22  1018   * 133   POTASSIUM 4.6 4.0   CHLORIDE 97* 98   CO2 24 27   ANIONGAP 12 8   GLC 94 119*   BUN 29.0* 53*   CR 1.26* 1.19*   AB 10.3* 10.2*          ASSESSMENT / PLAN:   (Z00.00) Encounter for Medicare annual wellness exam  (primary encounter diagnosis)  Comment:   Plan: continue annual wellness exams    (E78.5) Hyperlipidemia LDL goal <130  Comment: Chronic, stable.  Continue atorvastatin.   Plan: atorvastatin (LIPITOR) 10 MG tablet          (I10) Essential hypertension with goal blood pressure less than 140/90  Comment: Chronic, stable.  Continue verapamil, losartan and furosemide.   Plan: verapamil ER (CALAN-SR) 240 MG CR tablet          Patient has been advised of split billing requirements and indicates understanding: Yes      COUNSELING:  Reviewed preventive health counseling, as reflected in patient instructions        She reports that she has never smoked. She has never used smokeless tobacco.      Appropriate preventive services were discussed with this patient, including applicable screening as appropriate for cardiovascular disease, diabetes, osteopenia/osteoporosis, and glaucoma.  As appropriate for age/gender, discussed screening for colorectal cancer, prostate cancer, breast cancer, and cervical cancer. Checklist reviewing preventive services available has been given to the patient.    Reviewed patients plan of care and provided an AVS. The Basic Care Plan (routine screening as  documented in Health Maintenance) for [place] meets the Care Plan requirement. This Care Plan has been established and reviewed with the Patient.          Rowena Solis [facility]    Identified Health Risks:  I have reviewed Opioid Use Disorder and Substance Use Disorder risk factors and made any needed referrals.

## 2023-10-20 NOTE — TELEPHONE ENCOUNTER
PAT Call Date: 10/20   Surgery Date: 10/27    Surgeon: Garima   Surgery: total thyroid    Is patient from a nursing home? No   Any Isolation Precautions? No   Any Pacemaker or ICD? No If YES, has it been checked recently and where? Has the rep been notified? No     On Snapboard?  No  KIRAN     Hard Copy on Chart  In EPIC Pending/Notes   Consent -   Within 30 days; signed, dated & timed by patient and physician  In tracker   [] On Arrival     [] Blood    Additional Consent Needs:     H&P - Within 30 days    [] Physician To Do     [] H&P Update - If H&P is older then 24 hours    Clearance -  Medical, Cardiac, Pulmonary, etc.   9/13 BAKI-BRILINTA, ASA 5D   Orders - Signed and Dated    Copy Sent to Pharm []    [] Physician To Do    Labs - Within 3 months   10/19  [x] CBC -ok   [x] BMP-ok   [x] GFR-ok   [] INR    [] PTT    [] Urine    [] Liver Enzymes    [] Kidney Function    [] MRSA Nasal   [] MSSA      Others:    Radiology Studies-   Within 1 year  N/A  [] Chest X-Ray   [] MRI    [] CT    EKG -   Within 1 year, unless hx of HTN  7/12 nsr   Cardiac Workup -   Stress Test, Echo, Cath within 18 months  7/12 1/27  [x] Cath                                [] Stress Test                      [x] Echo 63%   [] Holter Monitor    [] OJ Pt stated she is returning a call to [facility] and would like to speak to her, thank you

## 2023-12-13 ENCOUNTER — IMMUNIZATION (OUTPATIENT)
Dept: NURSING | Facility: CLINIC | Age: 88
End: 2023-12-13
Payer: MEDICARE

## 2023-12-13 PROCEDURE — 90480 ADMN SARSCOV2 VAC 1/ONLY CMP: CPT

## 2023-12-13 PROCEDURE — 91320 SARSCV2 VAC 30MCG TRS-SUC IM: CPT

## 2024-02-22 ENCOUNTER — DOCUMENTATION ONLY (OUTPATIENT)
Dept: INTERNAL MEDICINE | Facility: CLINIC | Age: 89
End: 2024-02-22
Payer: MEDICARE

## 2024-02-22 NOTE — PROGRESS NOTES
Please run the labs ordered by her nephrologist, Dr. Corrigan, that is what she is coming in for. Rowena Solis, CNP

## 2024-02-22 NOTE — PROGRESS NOTES
China Guzman has an upcoming lab appointment:    Future Appointments   Date Time Provider Department Center   3/5/2024 10:30 [facility]   3/12/2024 11:30 AM Rowena Solis, CNP [facility]   3/22/2024 12:50 PM Srikanth Craig MD [facility]   7/15/2024 12:00 PM Romi Corrigan MD [facility]       There is no order available. Please review and place either future orders or HMPO (Review of Health Maintenance Protocol Orders), as appropriate.    Health Maintenance Due   Topic    ANNUAL REVIEW OF HM ORDERS      Abeba CALLET

## 2024-03-05 ENCOUNTER — LAB (OUTPATIENT)
Dept: LAB | Facility: CLINIC | Age: 89
End: 2024-03-05
Payer: MEDICARE

## 2024-03-05 DIAGNOSIS — E83.52 HYPERCALCEMIA: ICD-10-CM

## 2024-03-05 DIAGNOSIS — I10 ESSENTIAL HYPERTENSION WITH GOAL BLOOD PRESSURE LESS THAN 140/90: ICD-10-CM

## 2024-03-05 DIAGNOSIS — N18.31 ANEMIA OF CHRONIC RENAL FAILURE, STAGE 3A (H): ICD-10-CM

## 2024-03-05 DIAGNOSIS — D63.1 ANEMIA OF CHRONIC RENAL FAILURE, STAGE 3A (H): ICD-10-CM

## 2024-03-05 LAB
ALBUMIN SERPL BCG-MCNC: 4.7 G/DL (ref 3.5–5.2)
ANION GAP SERPL CALCULATED.3IONS-SCNC: 13 MMOL/L (ref 7–15)
BUN SERPL-MCNC: 41.3 MG/DL (ref 8–23)
CALCIUM SERPL-MCNC: 10.1 MG/DL (ref 8.2–9.6)
CHLORIDE SERPL-SCNC: 100 MMOL/L (ref 98–107)
CREAT SERPL-MCNC: 1.16 MG/DL (ref 0.51–0.95)
CREAT UR-MCNC: 21.7 MG/DL
DEPRECATED HCO3 PLAS-SCNC: 24 MMOL/L (ref 22–29)
EGFRCR SERPLBLD CKD-EPI 2021: 44 ML/MIN/1.73M2
FERRITIN SERPL-MCNC: 90 NG/ML (ref 11–328)
GLUCOSE SERPL-MCNC: 113 MG/DL (ref 70–99)
HGB BLD-MCNC: 11.5 G/DL (ref 11.7–15.7)
IRON BINDING CAPACITY (ROCHE): 337 UG/DL (ref 240–430)
IRON SATN MFR SERPL: 25 % (ref 15–46)
IRON SERPL-MCNC: 83 UG/DL (ref 37–145)
MICROALBUMIN UR-MCNC: <12 MG/L
MICROALBUMIN/CREAT UR: NORMAL MG/G{CREAT}
PHOSPHATE SERPL-MCNC: 3.5 MG/DL (ref 2.5–4.5)
POTASSIUM SERPL-SCNC: 4.4 MMOL/L (ref 3.4–5.3)
PTH-INTACT SERPL-MCNC: 90 PG/ML (ref 15–65)
SODIUM SERPL-SCNC: 137 MMOL/L (ref 135–145)
VIT D+METAB SERPL-MCNC: 41 NG/ML (ref 20–50)

## 2024-03-05 PROCEDURE — 82570 ASSAY OF URINE CREATININE: CPT

## 2024-03-05 PROCEDURE — 83540 ASSAY OF IRON: CPT

## 2024-03-05 PROCEDURE — 80069 RENAL FUNCTION PANEL: CPT

## 2024-03-05 PROCEDURE — 85018 HEMOGLOBIN: CPT

## 2024-03-05 PROCEDURE — 36415 COLL VENOUS BLD VENIPUNCTURE: CPT

## 2024-03-05 PROCEDURE — 83970 ASSAY OF PARATHORMONE: CPT

## 2024-03-05 PROCEDURE — 82728 ASSAY OF FERRITIN: CPT

## 2024-03-05 PROCEDURE — 82043 UR ALBUMIN QUANTITATIVE: CPT

## 2024-03-05 PROCEDURE — 82306 VITAMIN D 25 HYDROXY: CPT

## 2024-03-05 PROCEDURE — 83550 IRON BINDING TEST: CPT

## 2024-03-12 ENCOUNTER — VIRTUAL VISIT (OUTPATIENT)
Dept: FAMILY MEDICINE | Facility: CLINIC | Age: 89
End: 2024-03-12
Payer: MEDICARE

## 2024-03-12 DIAGNOSIS — N25.81 SECONDARY RENAL HYPERPARATHYROIDISM (H): ICD-10-CM

## 2024-03-12 DIAGNOSIS — I10 ESSENTIAL HYPERTENSION WITH GOAL BLOOD PRESSURE LESS THAN 140/90: Primary | ICD-10-CM

## 2024-03-12 DIAGNOSIS — N18.32 STAGE 3B CHRONIC KIDNEY DISEASE (H): ICD-10-CM

## 2024-03-12 PROCEDURE — 99441 PR PHYSICIAN TELEPHONE EVALUATION 5-10 MIN: CPT | Mod: 93 | Performed by: NURSE PRACTITIONER

## 2024-03-12 RX ORDER — LOSARTAN POTASSIUM 100 MG/1
100 TABLET ORAL DAILY
Qty: 90 TABLET | Refills: 3 | Status: SHIPPED | OUTPATIENT
Start: 2024-03-12

## 2024-03-12 NOTE — PROGRESS NOTES
"China is a 89 year old who is being evaluated via a billable telephone visit.      What phone number would you like to be contacted at? [phone]  How would you like to obtain your AVS? Mail a copy  Originating Location (pt. Location): Home    Distant Location (provider location):  On-site    Instructions Relayed to Patient by Virtual Roomer:       Reminded patient to ensure they were logged on to virtual visit by arrival time listed. Documented in appointment notes if patient had flexibility to initiate visit sooner than arrival time. If pediatric virtual visit, ensured pediatric patient along with parent/guardian will be present for video visit.     Patient offered the website [email or web address] and/or phone number to [employer] Help line: [phone]   Assessment & Plan     Essential hypertension with goal blood pressure less than 140/90  Chronic, stable.  BP at home 134/75.  Needs refill on losartan.  She is also on furosemide and verapamil.  She will follow-up with me for her Medicare wellness visit in 6 months.     - losartan (COZAAR) 100 MG tablet; Take 1 tablet (100 mg) by mouth daily    Secondary renal hyperparathyroidism (H24)  Following with nephrology.  Calcium stable at 10.1, previously 10.2.  Patient states she was told to reduce her calcium supplement to 1 daily. She is also on lasix to help with hypercalcemia.     Stage 3b chronic kidney disease (H)  Chronic, stable.  Continue losartan.  Follow-up with nephrology in July as planned.     - losartan (COZAAR) 100 MG tablet; Take 1 tablet (100 mg) by mouth daily      Delaney Atkinson is a 89 year old, presenting for the following health issues:  Follow Up (General health follow up, \"kidneys/BP/everything\")      3/12/2024    10:42 AM   Additional Questions   Roomed by Yumiko         3/12/2024    10:42 AM   Patient Reported Additional Medications   Patient reports taking the following new medications none     Via the Health Maintenance " questionnaire, the patient has reported the following services have been completed -Eye Exam, this information has been sent to the abstraction team.  History of Present Illness       CKD: She uses over the counter pain medication, including Tylenol   vitamins, a few times a week.    Reason for visit:  Follow up    She eats 2-3 servings of fruits and vegetables daily.She consumes 0 sweetened beverage(s) daily.She exercises with enough effort to increase her heart rate 30 to 60 minutes per day.  She exercises with enough effort to increase her heart rate 7 days per week.   She is taking medications regularly.       China states she is feeling good.  She has no concerns today.   Lives with daughter Miladis.   She recently had labs done, those were stable.   BP checked at home recently was 134/75. Needs refill on losartan.   Follows with nephrology for CKD, next appointment is July of 2024.          Review of Systems  Constitutional, HEENT, cardiovascular, pulmonary, gi and gu systems are negative, except as otherwise noted.      Objective           Vitals:  No vitals were obtained today due to virtual visit.    Physical Exam   General: Alert and no distress //Respiratory: No audible wheeze, cough, or shortness of breath // Psychiatric:  Appropriate affect, tone, and pace of words      Recent Labs   Lab Test 03/05/24  1020 06/26/23  1301    133*   POTASSIUM 4.4 4.6   CHLORIDE 100 97*   CO2 24 24   ANIONGAP 13 12   * 94   BUN 41.3* 29.0*   CR 1.16* 1.26*   AB 10.1* 10.3*            Phone call duration: 7 minutes  Signed Electronically by: Rowena Solis CNP

## 2024-03-22 ENCOUNTER — OFFICE VISIT (OUTPATIENT)
Dept: OPHTHALMOLOGY | Facility: CLINIC | Age: 89
End: 2024-03-22
Payer: MEDICARE

## 2024-03-22 DIAGNOSIS — Z96.1 PSEUDOPHAKIA: ICD-10-CM

## 2024-03-22 DIAGNOSIS — H43.813 POSTERIOR VITREOUS DETACHMENT, BILATERAL: ICD-10-CM

## 2024-03-22 DIAGNOSIS — Z01.01 ENCOUNTER FOR EXAMINATION OF EYES AND VISION WITH ABNORMAL FINDINGS: ICD-10-CM

## 2024-03-22 DIAGNOSIS — H35.3134 ADVANCED ATROPHIC NONEXUDATIVE AGE-RELATED MACULAR DEGENERATION OF BOTH EYES WITH SUBFOVEAL INVOLVEMENT: Primary | ICD-10-CM

## 2024-03-22 DIAGNOSIS — H52.4 PRESBYOPIA: ICD-10-CM

## 2024-03-22 DIAGNOSIS — H40.053 BORDERLINE GLAUCOMA WITH OCULAR HYPERTENSION, BILATERAL: ICD-10-CM

## 2024-03-22 PROCEDURE — 92014 COMPRE OPH EXAM EST PT 1/>: CPT | Performed by: OPHTHALMOLOGY

## 2024-03-22 ASSESSMENT — CONF VISUAL FIELD
OS_NORMAL: 1
OS_SUPERIOR_NASAL_RESTRICTION: 0
OS_SUPERIOR_TEMPORAL_RESTRICTION: 0
OS_INFERIOR_TEMPORAL_RESTRICTION: 0
OD_INFERIOR_TEMPORAL_RESTRICTION: 0
OD_SUPERIOR_TEMPORAL_RESTRICTION: 0
OD_INFERIOR_NASAL_RESTRICTION: 0
OS_INFERIOR_NASAL_RESTRICTION: 0
OD_SUPERIOR_NASAL_RESTRICTION: 0
OD_NORMAL: 1

## 2024-03-22 ASSESSMENT — VISUAL ACUITY
METHOD: SNELLEN - LINEAR
OD_SC: 20/300
OS_SC: CF @ 4
METHOD_MR: PATIENT DECLINED REFRACTION

## 2024-03-22 ASSESSMENT — TONOMETRY
OS_IOP_MMHG: 14
IOP_METHOD: APPLANATION
OD_IOP_MMHG: 14

## 2024-03-22 ASSESSMENT — CUP TO DISC RATIO
OS_RATIO: 0.3
OD_RATIO: 0.3

## 2024-03-22 ASSESSMENT — SLIT LAMP EXAM - LIDS
COMMENTS: GOOD CREASE AND COSMESIS
COMMENTS: GOOD CREASE AND COSMESIS, 1+ PTOSIS

## 2024-03-22 ASSESSMENT — EXTERNAL EXAM - LEFT EYE: OS_EXAM: NORMAL

## 2024-03-22 ASSESSMENT — EXTERNAL EXAM - RIGHT EYE: OD_EXAM: NORMAL

## 2024-03-22 NOTE — LETTER
"    3/22/2024         RE: China Guzman  C[address]o [address] Wh[address]  [address]  [place] [address]        Dear Colleague,    Thank you for referring your patient, China Guzman, to the [facility]. Please see a copy of my visit note below.     Current Eye Medications:    Latanoprost (green top) every evening both eyes.  Timolol (yellow top) every morning both eyes.  AREDS2 twice daily      Subjective: Here for complete eye exam. Vision is stable. No eye pain or discomfort. Using glaucoma drops as directed.      Objective:  See Ophthalmology Exam.       Assessment:  Stable eye exam.      ICD-10-CM    1. Advanced atrophic nonexudative age-related macular degeneration of both eyes with subfoveal involvement  H35.3134       2. Pseudophakia, Yag Caps, ou  Z96.1       3. Borderline glaucoma with ocular hypertension, bilateral - treated  H40.053       4. Posterior vitreous detachment, bilateral  H43.813       5. Encounter for examination of eyes and vision with abnormal findings  Z01.01       6. Presbyopia  H52.4            Plan:  Continue:   Latanoprost (green top) every evening both eyes.  Timolol (yellow top) every morning both eyes.    Wait at least 5 minutes between drops if using more than one at a time.   May use artificial tears up to four times a day (like Refresh Optive, Systane Balance, TheraTears, or generic artificial tears are ok. Avoid \"get the red out\" drops).     Call in November 2024 for an appointment in March 2025 for Complete Exam    Dr. Craig [phone]       Again, thank you for allowing me to participate in the care of your patient.        Sincerely,        Srikanth Craig MD  "

## 2024-03-22 NOTE — PATIENT INSTRUCTIONS
"Continue:   Latanoprost (green top) every evening both eyes.  Timolol (yellow top) every morning both eyes.    Wait at least 5 minutes between drops if using more than one at a time.   May use artificial tears up to four times a day (like Refresh Optive, Systane Balance, TheraTears, or generic artificial tears are ok. Avoid \"get the red out\" drops).     Call in November 2024 for an appointment in March 2025 for Complete Exam    Dr. Craig [phone]  "

## 2024-03-22 NOTE — PROGRESS NOTES
" Current Eye Medications:    Latanoprost (green top) every evening both eyes.  Timolol (yellow top) every morning both eyes.  AREDS2 twice daily      Subjective: Here for complete eye exam. Vision is stable. No eye pain or discomfort. Using glaucoma drops as directed.      Objective:  See Ophthalmology Exam.       Assessment:  Stable eye exam.      ICD-10-CM    1. Advanced atrophic nonexudative age-related macular degeneration of both eyes with subfoveal involvement  H35.3134       2. Pseudophakia, Yag Caps, ou  Z96.1       3. Borderline glaucoma with ocular hypertension, bilateral - treated  H40.053       4. Posterior vitreous detachment, bilateral  H43.813       5. Encounter for examination of eyes and vision with abnormal findings  Z01.01       6. Presbyopia  H52.4            Plan:  Continue:   Latanoprost (green top) every evening both eyes.  Timolol (yellow top) every morning both eyes.    Wait at least 5 minutes between drops if using more than one at a time.   May use artificial tears up to four times a day (like Refresh Optive, Systane Balance, TheraTears, or generic artificial tears are ok. Avoid \"get the red out\" drops).     Call in November 2024 for an appointment in March 2025 for Complete Exam    Dr. Craig [phone]     "

## 2024-04-18 ENCOUNTER — MYC REFILL (OUTPATIENT)
Dept: OPHTHALMOLOGY | Facility: CLINIC | Age: 89
End: 2024-04-18
Payer: MEDICARE

## 2024-04-18 DIAGNOSIS — H40.053 BORDERLINE GLAUCOMA WITH OCULAR HYPERTENSION, BILATERAL: ICD-10-CM

## 2024-04-18 RX ORDER — TIMOLOL MALEATE 5 MG/ML
1 SOLUTION/ DROPS OPHTHALMIC DAILY
Qty: 15 ML | Refills: 4 | Status: SHIPPED | OUTPATIENT
Start: 2024-04-18

## 2024-04-23 ENCOUNTER — ANCILLARY PROCEDURE (OUTPATIENT)
Dept: GENERAL RADIOLOGY | Facility: CLINIC | Age: 89
End: 2024-04-23
Attending: STUDENT IN AN ORGANIZED HEALTH CARE EDUCATION/TRAINING PROGRAM
Payer: MEDICARE

## 2024-04-23 ENCOUNTER — OFFICE VISIT (OUTPATIENT)
Dept: URGENT CARE | Facility: URGENT CARE | Age: 89
End: 2024-04-23
Payer: MEDICARE

## 2024-04-23 VITALS
HEART RATE: 105 BPM | SYSTOLIC BLOOD PRESSURE: 144 MMHG | BODY MASS INDEX: 23.01 KG/M2 | OXYGEN SATURATION: 98 % | WEIGHT: 112 LBS | TEMPERATURE: 97.8 F | RESPIRATION RATE: 18 BRPM | DIASTOLIC BLOOD PRESSURE: 72 MMHG

## 2024-04-23 DIAGNOSIS — S32.020A CLOSED COMPRESSION FRACTURE OF L2 LUMBAR VERTEBRA, INITIAL ENCOUNTER (H): Primary | ICD-10-CM

## 2024-04-23 DIAGNOSIS — M54.41 ACUTE RIGHT-SIDED LOW BACK PAIN WITH RIGHT-SIDED SCIATICA: ICD-10-CM

## 2024-04-23 PROCEDURE — 72100 X-RAY EXAM L-S SPINE 2/3 VWS: CPT | Mod: TC | Performed by: RADIOLOGY

## 2024-04-23 PROCEDURE — 99214 OFFICE O/P EST MOD 30 MIN: CPT | Performed by: STUDENT IN AN ORGANIZED HEALTH CARE EDUCATION/TRAINING PROGRAM

## 2024-04-23 PROCEDURE — 72220 X-RAY EXAM SACRUM TAILBONE: CPT | Mod: TC | Performed by: RADIOLOGY

## 2024-04-23 NOTE — PROGRESS NOTES
ASSESSMENT & PLAN:   Diagnoses and all orders for this visit:  Closed compression fracture of L2 lumbar vertebra, initial encounter (H)  Acute right-sided low back pain with right-sided sciatica  -     XR Lumbar Spine 2/3 Views; Future  -     XR Sacrum and Coccyx 2 Views; Future    Atraumatic right low back and buttocks pain x 1 day. No red flag symptoms or exam findings. On exam has reproducible pain with palpation of L1-L2 spinous process, right lateral lumbar muscles and upper buttocks. No neurologic deficits. XR of lumbar spine, sacrum, and coccyx shows age-indeterminate/possibly acute compression fracture of L2. Patient sent to ER for further evaluation and management. Her daughter will drive her to ER now - plans to go to [facility].     At the end of the encounter, I discussed results, diagnosis, medications. Discussed red flags for immediate return to clinic/ER, as well as indications for follow up if no improvement. Patient and/or caregiver understood and agreed to plan. Patient was stable for discharge.    Patient Instructions   Xray shows age-indeterminate/possibly acute compression fracture of L2.  Go to ER for further evaluation/imaging.     ------------------------------------------------------------------------  SUBJECTIVE  History was obtained from patient.    Patient presents with:  Urgent Care  Musculoskeletal Problem: Per patient states that she has been having right side low back pain for a couple of days has been taking tylenol     HPI  China Guzman is a(n) 89 year old female presenting to urgent care for right low back pain. Has been intermittent for awhile but worsened yesterday. No injury, denies fall. Pain started when she got out of bed. Pain located mostly in right buttocks with intermittent pain in right low back. Pain worse when going from laying to sitting or sitting to standing. Improved when she can find a comfortable position to lay. Taking tylenol with no improvement. No leg  weakness, numbness, tingling. No saddle anesthesia or incontinence. No abdominal pain. No hematuria, dysuria, urgency. Has had same pain in the past - has had PT in the past for it. Still does exercises at home    Review of Systems    Current Outpatient Medications   Medication Sig Dispense Refill    atorvastatin (LIPITOR) 10 MG tablet TAKE 1 TABLET BY MOUTH AT BEDTIME 90 tablet 3    Calcium Carb-Cholecalciferol (CALCIUM CARBONATE-VITAMIN D3) 600-400 MG-UNIT TABS Take 1 tablet by mouth daily      furosemide (LASIX) 40 MG tablet Take 1 tablet (40 mg) by mouth daily 90 tablet 3    latanoprost (XALATAN) 0.005 % ophthalmic solution Place 1 drop into both eyes At Bedtime 9 mL 4    losartan (COZAAR) 100 MG tablet Take 1 tablet (100 mg) by mouth daily 90 tablet 3    MULTI-VITAMIN OR None Entered      Multiple Vitamins-Minerals (PRESERVISION AREDS 2 PO) Take 2 capsules by mouth daily      timolol maleate (TIMOPTIC) 0.5 % ophthalmic solution Place 1 drop into both eyes daily 15 mL 4    verapamil ER (CALAN-SR) 240 MG CR tablet Take 1 tablet (240 mg) by mouth daily 90 tablet 3     Problem List:  2022-08: COVID-19 virus infection  2022-08: Anemia  2021-04: Secondary renal hyperparathyroidism (H24)  2019-03: Advanced atrophic nonexudative age-related macular   degeneration of both eyes with subfoveal involvement  2018-05: IGT (impaired glucose tolerance)  2018-05: High triglycerides  2017-06: Stage 3b chronic kidney disease (H)  2016-09: 10 year risk of MI or stroke 7.5% or greater, 39.7% in   September 2017 on atorvastatin 10 mg  2016-09: Essential hypertension with goal blood pressure less than   140/90  2016-09: Piriformis syndrome of right side  2016-08: Posterior vitreous detachment, bilateral  2016-08: Macular degeneration, dry, mod, ou  2016-06: Family history of colon cancer  2016-02: Borderline glaucoma with ocular hypertension, bilateral -   treated  2014-08: Piriformis syndrome  2012-01: Advanced directives,  counseling/discussion  2011-02: Macular degeneration (senile) of retina  2011-02: Glaucoma suspect, ocular hypertension, ou  2011-02: Borderline glaucoma with ocular hypertension  2011-02: Posterior vitreous detachment, ou  2011-02: Macular degeneration (senile) of retina  2011-02: Pseudophakia, Yag Caps, ou  2011-02: YAG CAPS OU  2010-11: Gallstones  2010-11: Hypertension goal BP (blood pressure) < 140/90  2010-11: Hyperlipidemia LDL goal <130  2009-12: Hypertension  2009-12: Other osteoporosis without current pathological fracture  2009-12: Hyperlipidemia with target LDL less than 100  2009-12: HL (hearing loss)  2009-02: Tendonitis  2009-02: Hip pain  2009-02: Pure hypercholesterolemia  HTN (hypertension)  Hyperlipidemia  Malignant basal cell neoplasm of skin    No Known Allergies      OBJECTIVE  Vitals:    04/23/24 1111 04/23/24 1137   BP: (!) 173/98 (!) 144/72   Pulse: 119 105   Resp: 18    Temp: 97.8  F (36.6  C)    TempSrc: Oral    SpO2: 98%    Weight: 50.8 kg (112 lb)      Physical Exam   GENERAL: healthy, alert, no acute distress.   PSYCH: mentation appears normal. Normal affect  MSK: no tenderness of thoracic spinous process, bilateral iliac crest, or bilateral SI joint. Mild tenderness of lumbar spinous process L1-L2. Mild tenderness of right lumbar paraspinal muscle, right lateral lumbar muscles, and slight tenderness in right upper buttocks. No other tenderness in hip, thigh, knee, calf. 5/5 knee flexion and extension bilaterally. 5/5 dorsiflexion and plantar flexion bilaterally. Sensation of lower extremities intact and symmetric.       Results for orders placed or performed in visit on 04/23/24   XR Sacrum and Coccyx 2 Views     Status: None    Narrative    XR SACRUM AND COCCYX 2 VIEWS, 4/23/2024 12:06 PM    HISTORY: right low back pain; Acute right-sided low back pain with  right-sided sciatica    COMPARISON: None      Impression    IMPRESSION: No definite pelvic fracture. Alignment of pelvic bones  is  normal. Aortic atherosclerotic calcifications.    GUILLERMINA AMBROSIO MD         SYSTEM ID:  [id number]   Results for orders placed or performed in visit on 04/23/24   XR Lumbar Spine 2/3 Views     Status: None    Narrative    XR LUMBAR SPINE 2/3 VIEWS  4/23/2024 12:05 PM     HISTORY: right low back pain; Acute right-sided low back pain with  right-sided sciatica    COMPARISON: None.       Impression    IMPRESSION: Age-indeterminate compression fracture of L2. This can be  acute. Chronic appearing posterior vertebral body height loss at L5.  Minimal retrolisthesis at L5-S1.  Multilevel degenerative changes with  loss of disc height, osteophyte formation and facet arthropathy.     GUILLERMINA AMBROSIO MD         SYSTEM ID:  [id number]

## 2024-04-23 NOTE — PATIENT INSTRUCTIONS
Xray shows age-indeterminate/possibly acute compression fracture of L2.  Go to ER for further evaluation/imaging.

## 2024-04-24 ENCOUNTER — TELEPHONE (OUTPATIENT)
Dept: AUDIOLOGY | Facility: CLINIC | Age: 89
End: 2024-04-24
Payer: MEDICARE

## 2024-04-24 NOTE — TELEPHONE ENCOUNTER
Returned daughters phone call. She is requesting more wax traps. Oticon Prowax traps will be at the first floor  for pick-up    Celestine Montes De Oca, [facility]  Licensed Audiologist  [place] #[id number]      04/24/24

## 2024-04-24 NOTE — TELEPHONE ENCOUNTER
[facility] Call Center    Phone Message    May a detailed message be left on voicemail: yes     Reason for Call: Other: Pt's daughter called in regards to pt's hearing aids. She's in need more cleaning tools for her hearing aids. Pt's daughter will be in [place] today and is able to pick them up. Please call daughterPat when items are ready for      Action Taken: Other: [facility] Audio    Travel Screening: Not Applicable

## 2024-04-26 ENCOUNTER — TELEPHONE (OUTPATIENT)
Dept: INTERNAL MEDICINE | Facility: CLINIC | Age: 89
End: 2024-04-26
Payer: MEDICARE

## 2024-04-26 NOTE — TELEPHONE ENCOUNTER
Home Care is calling regarding an established patient with [facility].       Requesting orders from: Rowena Solis  Provider is following patient: No       Amparo, physical therapist calling to request verbal order for start of care for the following:    Physical Therapy:   1 x today then 1 x a week for 6 weeks for mobility, ambulation, and balance.    Nursin x for 5 days for brace education, medication questions, and overall assessment of skin.     Occupational therapy:  1 x for 10 days for evaluation of ADL.    HHA:  1 x a week for 6 weeks for showering.    :  1 x for 30 days for community resource, health care directive update.    Amparo states patient will need OV within 30 days and advised daughter to call to schedule appointment. States visit can be in person or video visit but cannot be telephone visit due to insurance.     Routing to provider to review and advise.     Information was gathered and will be sent to provider for review.  RN will contact Home Care with information after provider review.    China Borja RN

## 2024-04-26 NOTE — TELEPHONE ENCOUNTER
Left message on Amparo, [facility]'s phone with okay for verbal orders per Rowena Solis.  Crissy VIDAL    [facility]

## 2024-04-29 ENCOUNTER — TELEPHONE (OUTPATIENT)
Dept: NEPHROLOGY | Facility: CLINIC | Age: 89
End: 2024-04-29
Payer: MEDICARE

## 2024-04-29 ENCOUNTER — TELEPHONE (OUTPATIENT)
Dept: INTERNAL MEDICINE | Facility: CLINIC | Age: 89
End: 2024-04-29
Payer: MEDICARE

## 2024-04-29 NOTE — TELEPHONE ENCOUNTER
"Spoke with Torrey regarding Dr Corrigan's response to hold Lasix.     \"Ok to hold as long as she doesn't have BP >150 or get more swelling   Also might lower losartan to 50mg if BP low and given potasium may be higher on no furosemide   thanks \"    She is requesting to fax over a hold order and recommendation from Dr Corrigan to Omar VIZCARRA  at [phone]  AYDE Macedo Care Coordinator  Nephrology    "

## 2024-04-29 NOTE — TELEPHONE ENCOUNTER
Pt's daughter, Pat (C2C on file), calling re: medication question.    Says her mom has been inpatient at [facility] since 4/27 for a closed compression fracture of the L2 lumbar vertebra.     She is in a TLSO brace and the daughter days it is very difficult to get up and go to the bathroom. Pt is currently on 40mg of Lasix daily and the daughter is wondering if it can be temporarily discontinued or reduced .    Routing a message to nephrology, who prescribes this medication for China. Daughter would like a callback at [phone].     Jennifer Stephenson RN, BSN  [facility]

## 2024-05-08 ENCOUNTER — TELEPHONE (OUTPATIENT)
Dept: AUDIOLOGY | Facility: CLINIC | Age: 89
End: 2024-05-08
Payer: MEDICARE

## 2024-05-08 NOTE — TELEPHONE ENCOUNTER
[facility] Call Center    Phone Message    May a detailed message be left on voicemail: yes     Reason for Call: Other: Pt daughter (Pat) would like a call back.   [place] location, Thanks     Action Taken: Other: AUD    Travel Screening: Not Applicable

## 2024-05-09 ENCOUNTER — ALLIED HEALTH/NURSE VISIT (OUTPATIENT)
Dept: AUDIOLOGY | Facility: CLINIC | Age: 89
End: 2024-05-09
Payer: MEDICARE

## 2024-05-09 DIAGNOSIS — H90.3 SENSORINEURAL HEARING LOSS, ASYMMETRICAL: Primary | ICD-10-CM

## 2024-05-09 PROCEDURE — V5299 HEARING SERVICE: HCPCS | Performed by: AUDIOLOGIST

## 2024-05-09 NOTE — PROGRESS NOTES
HEARING AID RECHECK    Patient Name:  China Guzman    Patient Age:   89 year old    :  4/10/1930    Background:   Left hearing aid dropped off for cleaning    SIDE: Left    : Oticon    TYPE: Opn 3    S/N: [id number]     WARRANTY:     Procedures:   Sound bore completely plugged with ear wax. I cleaned and vacuumed all ports. Listening check revealed good gain and sound quality. Patient called for pickup.    Plan:   return for service as needed      Sebas Dela Cruz MA, CCC-A  [place] Licensed Audiologist #[id number]  2024

## 2024-05-09 NOTE — TELEPHONE ENCOUNTER
Patient would like to drop off one of her older hearing aids for a cleaning and to have it checked. I described the process for hearing aid drop off at the [facility].     -Merlin Cedeño CCC-A

## 2024-05-12 NOTE — TELEPHONE ENCOUNTER
Recommend refilling drops per Dr. Craig's last visit note on 03/17/22: Timolol drop both eyes daily.     room air

## 2024-05-14 ENCOUNTER — MEDICAL CORRESPONDENCE (OUTPATIENT)
Dept: HEALTH INFORMATION MANAGEMENT | Facility: CLINIC | Age: 89
End: 2024-05-14
Payer: MEDICARE

## 2024-05-16 ENCOUNTER — MEDICAL CORRESPONDENCE (OUTPATIENT)
Dept: HEALTH INFORMATION MANAGEMENT | Facility: CLINIC | Age: 89
End: 2024-05-16

## 2024-05-16 ENCOUNTER — TELEPHONE (OUTPATIENT)
Dept: INTERNAL MEDICINE | Facility: CLINIC | Age: 89
End: 2024-05-16
Payer: MEDICARE

## 2024-05-16 NOTE — TELEPHONE ENCOUNTER
Home Care is calling regarding an established patient with [facility].       Requesting orders from: Rowena Solis  Provider is following patient: No       Orders Requested    Skilled Nursing  Request for initial certification (first set of orders)   Frequency:  1x/wk for 3 wks  1 time every other week for 3 visits      Occupational Therapy  Request for initial evaluation and treatment (one time) (first set of orders)         HHA (Home Health Aide)  Request for initial certification (first set of orders)   Frequency:  1 time weekly for weeks 2-9      Provider FYI regarding meds:  Pt discharged from rehab with orders for melatonin 1mg nightly and lidocaine 4% patch, but pt is not taking these. Home care is not adding these to med list that will be faxed to clinic.     Information was gathered and will be sent to provider for review.  RN will contact Home Care with information after provider review.  Confirmed ok to leave a detailed message with call back.  Contact information confirmed and updated as needed.    Roz Mosley RN

## 2024-05-17 NOTE — TELEPHONE ENCOUNTER
This writer attempted to contact Stephanie on 05/17/24      Reason for call nurse and left detailed message.      If patient calls back:   Registered Nurse called. Follow Triage Call workflow        Leticia Stevens RN

## 2024-05-20 ENCOUNTER — TRANSFERRED RECORDS (OUTPATIENT)
Dept: HEALTH INFORMATION MANAGEMENT | Facility: CLINIC | Age: 89
End: 2024-05-20
Payer: MEDICARE

## 2024-05-22 ENCOUNTER — TELEPHONE (OUTPATIENT)
Dept: INTERNAL MEDICINE | Facility: CLINIC | Age: 89
End: 2024-05-22
Payer: MEDICARE

## 2024-05-22 NOTE — TELEPHONE ENCOUNTER
Returned call to DARLENE Wang and left voicemail approving requested orders.  Advised to call clinic back if any questions.     Earline AGGARWALN, RN

## 2024-05-22 NOTE — TELEPHONE ENCOUNTER
Home Care is calling regarding an established patient with [facility].     Requesting orders from: Rowena Solis  Provider is following patient: Not sure. Routing to PCP to verify if she is following patient for home care orders    Orders Requested    Occupational Therapy  Request for initial certification (first set of orders)   Frequency:  1x/wk for 6 wks for fall prevention and gait training      Information was gathered and will be sent to provider for review.  RN will contact Home Care with information after provider review.  Confirmed ok to leave a detailed message with call back.  Contact information confirmed and updated as needed.    Ladi Mas RN

## 2024-05-23 ENCOUNTER — TELEPHONE (OUTPATIENT)
Dept: INTERNAL MEDICINE | Facility: CLINIC | Age: 89
End: 2024-05-23
Payer: MEDICARE

## 2024-05-23 NOTE — TELEPHONE ENCOUNTER
Forms/Letter Request    Type of form/letter: Home Health Certification      Do we have the form/letter: Yes:     Who is the form from? Home care    Where did/will the form come from? form was faxed in    When is form/letter needed by:     How would you like the form/letter returned: Fax : [phone]    Patient Notified form requests are processed in 5-7 business days:Yes    Could we send this information to you in [employer] or would you prefer to receive a phone call?:   Patient would prefer a phone call   Okay to leave a detailed message?: Yes at Other phone number:  [phone]*

## 2024-05-29 ENCOUNTER — OFFICE VISIT (OUTPATIENT)
Dept: FAMILY MEDICINE | Facility: CLINIC | Age: 89
End: 2024-05-29
Payer: MEDICARE

## 2024-05-29 ENCOUNTER — MEDICAL CORRESPONDENCE (OUTPATIENT)
Dept: HEALTH INFORMATION MANAGEMENT | Facility: CLINIC | Age: 89
End: 2024-05-29

## 2024-05-29 VITALS
DIASTOLIC BLOOD PRESSURE: 74 MMHG | BODY MASS INDEX: 22.58 KG/M2 | WEIGHT: 112 LBS | HEART RATE: 72 BPM | HEIGHT: 59 IN | SYSTOLIC BLOOD PRESSURE: 124 MMHG | OXYGEN SATURATION: 97 %

## 2024-05-29 DIAGNOSIS — Z09 HOSPITAL DISCHARGE FOLLOW-UP: Primary | ICD-10-CM

## 2024-05-29 DIAGNOSIS — Z53.9 DIAGNOSIS NOT YET DEFINED: Primary | ICD-10-CM

## 2024-05-29 DIAGNOSIS — S32.029S CLOSED FRACTURE OF SECOND LUMBAR VERTEBRA, UNSPECIFIED FRACTURE MORPHOLOGY, SEQUELA: ICD-10-CM

## 2024-05-29 PROCEDURE — 99214 OFFICE O/P EST MOD 30 MIN: CPT | Performed by: NURSE PRACTITIONER

## 2024-05-29 PROCEDURE — G0180 MD CERTIFICATION HHA PATIENT: HCPCS | Performed by: NURSE PRACTITIONER

## 2024-05-29 RX ORDER — METHOCARBAMOL 500 MG/1
250 TABLET, FILM COATED ORAL 2 TIMES DAILY PRN
Qty: 30 TABLET | Refills: 0 | Status: SHIPPED | OUTPATIENT
Start: 2024-05-29

## 2024-05-29 ASSESSMENT — PAIN SCALES - GENERAL: PAINLEVEL: MILD PAIN (2)

## 2024-05-29 NOTE — PROGRESS NOTES
Assessment & Plan     Hospital discharge follow-up    Closed fracture of second lumbar vertebra, unspecified fracture morphology, sequela  Improving.  Will be seeing neurosurgery next month.   She has been previously instructed to wear TLSO brace until then, will have repeat imaging. Continue methocarbamol prn, has been helpful.  Continue home care services as recommended.   - methocarbamol (ROBAXIN) 500 MG tablet; Take 0.5 tablets (250 mg) by mouth 2 times daily as needed for muscle spasms    MED REC REQUIRED  Post Medication Reconciliation Status:  Discharge medications reconciled, continue medications without change        Subjective   China is a 89 year old, presenting for the following health issues:  Hospital F/U        5/29/2024     1:45 PM   Additional Questions   Roomed by margaret   Accompanied by daughter nabor LAYNE     Getting home care  Seeing neurosurgery in June, brace at least until then  Using a muscle relaxant    Hospital Follow-up Visit:    Hospital/Nursing Home/IP Rehab Facility:  [facility]  Date of Admission: 4/27/2024  Date of Discharge: 4/29/2024  Reason(s) for Admission: At high risk for falls (Primary Dx);   Closed fracture of second lumbar vertebra, unspecified fracture morphology, sequela;   Back pain, unspecified back location, unspecified back pain laterality, unspecified chronicity;   Pure hypercholesterolemia;   Hypertension, unspecified type;   Osteoporosis, unspecified osteoporosis type, unspecified pathological fracture presence;   Closed compression fracture of L2 vertebra, initial encounter   Was the patient in the ICU or did the patient experience delirium during hospitalization?  No  Do you have any other stressors you would like to discuss with your provider? No    Problems taking medications regularly:  None  Medication changes since discharge: None  Problems adhering to non-medication therapy:  None    Summary of hospitalization:  CareEverywhere information obtained and  reviewed      Hospital Course   China Guzman is a(n) 89 y.o. with a history of recent L3 compression fractures, chronic kidney disease stage III and hypertension, who was admitted on 4/27/2024 with ongoing mid to low back pain. She had been previously diagnosed with compression fractures and discharged home from [facility] on April 25 with a TLSO brace. She was essentially unable to manage at home and returned to hospital for placement in a rehabilitation center.     Recommendations for Outpatient Provider PCP: Rowena Solis NP     Admission: 4/27/2024 @ [facility]    Compression fracture. To rehabilitation center.     Follow-Up     After Hospital Follow up appointment(s)   at next provider care rounds at skilled nursing facility.   When to follow up: 1 to 5 days     Discharge Medications     Your Home Medicines     START taking these medicines     Instructions   acetaminophen 325 mg tablet  For diagnoses: Closed fracture of second lumbar vertebra, unspecified fracture morphology, sequela  Commonly known as: TYLENOL  Take 2 Tablets (650 mg) by mouth every 4 hours if needed for Pain (For mild pain.). Max acetaminophen dose: 4000mg in 24 hrs.    oxyCODONE 5 mg immediate release tablet  For diagnoses: Closed fracture of second lumbar vertebra, unspecified fracture morphology, sequela  Commonly known as: ROXICODONE  Take 0.5 Tablets (2.5 mg) by mouth every 4 hours if needed for Pain (For moderate to severe pain.).    CONTINUE taking these medicines     Instructions   atorvastatin 10 mg tablet  Commonly known as: LIPITOR  Take 10 mg by mouth once daily.    CALCIUM 600 + D ORAL  Take 1 Tablet by mouth two times daily.    furosemide 40 mg tablet  Commonly known as: LASIX  Take 40 mg by mouth every morning.    latanoprost 0.005 % ophthalmic solution  Commonly known as: XALATAN  Place 1 Drop into both eyes at bedtime.    losartan 100 mg tablet  Commonly known as: COZAAR  Take 100 mg by mouth once  "daily.    METAMUCIL ORAL  Take 1 Tablet by mouth once daily.    methocarbamoL 500 mg tablet  For diagnoses: Closed compression fracture of L2 vertebra, initial encounter  Commonly known as: ROBAXIN  Take one-half Tablet (250 mg) by mouth four times daily for 7 days.    PRESERVISION AREDS-2 ORAL  Take 1 Capsule by mouth 2 times daily.    Salonpas (lidocaine) 4 % topical patch  For diagnoses: Closed compression fracture of L2 vertebra, initial encounter  Generic drug: lidocaine  Apply to intact skin to cover most painful area for max 12hr per 24hr period.    timoloL maleate 0.5 % ophthalmic solution  Commonly known as: TIMOPTIC  Place 1 Drop into both eyes once daily.    verapamil  mg extended release tablet  Commonly known as: CALAN SR  Take 240 mg by mouth once daily with a meal.            Diagnostic Tests/Treatments reviewed.  Follow up needed: none  Other Healthcare Providers Involved in Patient s Care:         Homecare  Update since discharge: improved.   Doing well at home, lives with daughter.  Getting home care services, RN/OT/PT.  Wearing TLSO brace when not in bed. Seeing neurosurgery in June, brace at least until then.   Would like refill on methocarbamol.     Plan of care communicated with patient and family        Review of Systems  Constitutional, HEENT, cardiovascular, pulmonary, gi and gu systems are negative, except as otherwise noted.      Objective    /74   Pulse 72   Ht 1.486 m (4' 10.5\")   Wt 50.8 kg (112 lb)   LMP 12/16/1980   SpO2 97%   BMI 23.01 kg/m    Body mass index is 23.01 kg/m .  Physical Exam   GENERAL: alert and no distress  EYES: Eyes grossly normal to inspection, PERRL and conjunctivae and sclerae normal  RESP: lungs clear to auscultation - no rales, rhonchi or wheezes  CV: regular rate and rhythm, normal S1 S2, no S3 or S4, no murmur, click or rub, no peripheral edema  MS: extremities normal- no gross deformities noted.  Wearing TLSO brace.     SKIN: no suspicious " lesions or rashes  NEURO: Normal strength and tone, mentation intact and speech normal            Signed Electronically by: Rowena Solis, CNP

## 2024-06-05 ENCOUNTER — DOCUMENTATION ONLY (OUTPATIENT)
Dept: OTHER | Facility: CLINIC | Age: 89
End: 2024-06-05
Payer: MEDICARE

## 2024-06-06 ENCOUNTER — MEDICAL CORRESPONDENCE (OUTPATIENT)
Dept: HEALTH INFORMATION MANAGEMENT | Facility: CLINIC | Age: 89
End: 2024-06-06

## 2024-06-13 ENCOUNTER — MEDICAL CORRESPONDENCE (OUTPATIENT)
Dept: HEALTH INFORMATION MANAGEMENT | Facility: CLINIC | Age: 89
End: 2024-06-13

## 2024-06-19 ENCOUNTER — TRANSFERRED RECORDS (OUTPATIENT)
Dept: HEALTH INFORMATION MANAGEMENT | Facility: CLINIC | Age: 89
End: 2024-06-19
Payer: MEDICARE

## 2024-06-19 DIAGNOSIS — H40.053 BORDERLINE GLAUCOMA WITH OCULAR HYPERTENSION, BILATERAL: ICD-10-CM

## 2024-06-19 RX ORDER — LATANOPROST 50 UG/ML
1 SOLUTION/ DROPS OPHTHALMIC AT BEDTIME
Qty: 9 ML | Refills: 4 | Status: SHIPPED | OUTPATIENT
Start: 2024-06-19

## 2024-06-26 ENCOUNTER — OFFICE VISIT (OUTPATIENT)
Dept: FAMILY MEDICINE | Facility: CLINIC | Age: 89
End: 2024-06-26
Payer: MEDICARE

## 2024-06-26 VITALS
HEART RATE: 67 BPM | SYSTOLIC BLOOD PRESSURE: 128 MMHG | WEIGHT: 116 LBS | BODY MASS INDEX: 23.39 KG/M2 | DIASTOLIC BLOOD PRESSURE: 62 MMHG | OXYGEN SATURATION: 98 % | HEIGHT: 59 IN

## 2024-06-26 DIAGNOSIS — K62.5 RECTAL BLEEDING: Primary | ICD-10-CM

## 2024-06-26 LAB — HGB BLD-MCNC: 11.3 G/DL (ref 11.7–15.7)

## 2024-06-26 PROCEDURE — 36415 COLL VENOUS BLD VENIPUNCTURE: CPT | Performed by: NURSE PRACTITIONER

## 2024-06-26 PROCEDURE — 99213 OFFICE O/P EST LOW 20 MIN: CPT | Performed by: NURSE PRACTITIONER

## 2024-06-26 PROCEDURE — 85018 HEMOGLOBIN: CPT | Performed by: NURSE PRACTITIONER

## 2024-06-26 ASSESSMENT — PAIN SCALES - GENERAL: PAINLEVEL: NO PAIN (0)

## 2024-06-26 NOTE — PROGRESS NOTES
"  Assessment & Plan     Rectal bleeding  One episode of rectal bleeding, none since ER visit. Reports she was having hard small stools.  Now taking Metamucil daily.  Had been taking pain pills at TCU for compression fracture, this likely contributed to constipation.  Denies any abdominal or recta pain.  Discussed adding Miralax if she develops constipation again.  Will plan to recheck hemoglobin.    - Hemoglobin        MED REC REQUIRED  Post Medication Reconciliation Status:  Discharge medications reconciled, continue medications without change        Subjective   China is a 89 year old, presenting for the following health issues:  ER F/U        6/26/2024    11:07 AM   Additional Questions   Roomed by margaret LAYNE     ED/UC Followup:    Facility:  [facility]  Date of visit: 6/15/2024  Reason for visit: Rectal bleeding  Current Status: better, no bleeding since.  No abdominal or rectal pain.        Taking Metamucil.  Stools are better.  No bleeding since ER.  Reports it was one episode but it was a lot of blood in the toilet.  Denies any pain or other symptoms.   Not taking Miralax because it gave her diarrhea in the past.       Review of Systems  Constitutional, HEENT, cardiovascular, pulmonary, gi and gu systems are negative, except as otherwise noted.      Objective    /62   Pulse 67   Ht 1.486 m (4' 10.5\")   Wt 52.6 kg (116 lb)   LMP 12/16/1980   SpO2 98%   BMI 23.83 kg/m    Body mass index is 23.83 kg/m .  Physical Exam   GENERAL: alert and no distress  RESP: lungs clear to auscultation - no rales, rhonchi or wheezes  CV: regular rate and rhythm, normal S1 S2, no S3 or S4, no murmur, click or rub, no peripheral edema  MS: no gross musculoskeletal defects noted, no edema  SKIN: no suspicious lesions or rashes  NEURO: Normal strength and tone, mentation intact and speech normal  PSYCH: mentation appears normal, affect normal/bright    Labs reviewed in Care Everywhere.  Lab from today pending.         Signed " Electronically by: Rowena Solis, CNP

## 2024-06-26 NOTE — PATIENT INSTRUCTIONS
Continue metamucil every day.  Restart Miralax once daily as needed if you get backed up.  I'll be in touch with hemoglobin results.

## 2024-07-06 ENCOUNTER — DOCUMENTATION ONLY (OUTPATIENT)
Dept: LAB | Facility: CLINIC | Age: 89
End: 2024-07-06
Payer: MEDICARE

## 2024-07-06 NOTE — PROGRESS NOTES
China has a Lab appointment on 07.13.2024.  Her appointment notes state that she is coming in for lab work for you. There are no available orders.  Please review and place future orders as needed.  Thank you  Maura Jane MLT at [facility]   July 6, 2024

## 2024-07-09 DIAGNOSIS — N18.32 STAGE 3B CHRONIC KIDNEY DISEASE (H): Primary | ICD-10-CM

## 2024-07-10 ENCOUNTER — MYC MEDICAL ADVICE (OUTPATIENT)
Dept: NEPHROLOGY | Facility: CLINIC | Age: 89
End: 2024-07-10
Payer: MEDICARE

## 2024-07-13 ENCOUNTER — LAB (OUTPATIENT)
Dept: LAB | Facility: CLINIC | Age: 89
End: 2024-07-13
Payer: MEDICARE

## 2024-07-13 DIAGNOSIS — N18.32 STAGE 3B CHRONIC KIDNEY DISEASE (H): ICD-10-CM

## 2024-07-13 LAB
ALBUMIN MFR UR ELPH: <6 MG/DL
ALBUMIN SERPL BCG-MCNC: 4.6 G/DL (ref 3.5–5.2)
ALBUMIN UR-MCNC: NEGATIVE MG/DL
ANION GAP SERPL CALCULATED.3IONS-SCNC: 13 MMOL/L (ref 7–15)
APPEARANCE UR: CLEAR
BILIRUB UR QL STRIP: NEGATIVE
BUN SERPL-MCNC: 43.5 MG/DL (ref 8–23)
CALCIUM SERPL-MCNC: 10.1 MG/DL (ref 8.2–9.6)
CHLORIDE SERPL-SCNC: 96 MMOL/L (ref 98–107)
COLOR UR AUTO: YELLOW
CREAT SERPL-MCNC: 1.3 MG/DL (ref 0.51–0.95)
CREAT UR-MCNC: 28.6 MG/DL
DEPRECATED HCO3 PLAS-SCNC: 25 MMOL/L (ref 22–29)
EGFRCR SERPLBLD CKD-EPI 2021: 38 ML/MIN/1.73M2
GLUCOSE SERPL-MCNC: 121 MG/DL (ref 70–99)
GLUCOSE UR STRIP-MCNC: NEGATIVE MG/DL
HGB BLD-MCNC: 11.6 G/DL (ref 11.7–15.7)
HGB UR QL STRIP: NEGATIVE
KETONES UR STRIP-MCNC: NEGATIVE MG/DL
LEUKOCYTE ESTERASE UR QL STRIP: NEGATIVE
NITRATE UR QL: NEGATIVE
PH UR STRIP: 7 [PH] (ref 5–7)
PHOSPHATE SERPL-MCNC: 4.2 MG/DL (ref 2.5–4.5)
POTASSIUM SERPL-SCNC: 4.2 MMOL/L (ref 3.4–5.3)
PROT/CREAT 24H UR: NORMAL MG/G{CREAT}
RBC #/AREA URNS AUTO: NORMAL /HPF
SODIUM SERPL-SCNC: 134 MMOL/L (ref 135–145)
SP GR UR STRIP: 1.01 (ref 1–1.03)
UROBILINOGEN UR STRIP-ACNC: 0.2 E.U./DL
WBC #/AREA URNS AUTO: NORMAL /HPF

## 2024-07-13 PROCEDURE — 80069 RENAL FUNCTION PANEL: CPT

## 2024-07-13 PROCEDURE — 84156 ASSAY OF PROTEIN URINE: CPT

## 2024-07-13 PROCEDURE — 36415 COLL VENOUS BLD VENIPUNCTURE: CPT

## 2024-07-13 PROCEDURE — 81001 URINALYSIS AUTO W/SCOPE: CPT

## 2024-07-13 PROCEDURE — 85018 HEMOGLOBIN: CPT

## 2024-07-13 PROCEDURE — 83036 HEMOGLOBIN GLYCOSYLATED A1C: CPT | Mod: 93 | Performed by: INTERNAL MEDICINE

## 2024-07-15 ENCOUNTER — MEDICAL CORRESPONDENCE (OUTPATIENT)
Dept: HEALTH INFORMATION MANAGEMENT | Facility: CLINIC | Age: 89
End: 2024-07-15

## 2024-07-15 ENCOUNTER — VIRTUAL VISIT (OUTPATIENT)
Dept: NEPHROLOGY | Facility: CLINIC | Age: 89
End: 2024-07-15
Payer: MEDICARE

## 2024-07-15 DIAGNOSIS — N25.81 SECONDARY RENAL HYPERPARATHYROIDISM (H): ICD-10-CM

## 2024-07-15 DIAGNOSIS — R73.9 ELEVATED BLOOD SUGAR: ICD-10-CM

## 2024-07-15 DIAGNOSIS — N18.32 STAGE 3B CHRONIC KIDNEY DISEASE (H): Primary | ICD-10-CM

## 2024-07-15 LAB — HBA1C MFR BLD: 5.6 % (ref 0–5.6)

## 2024-07-15 PROCEDURE — 99442 PR PHYSICIAN TELEPHONE EVALUATION 11-20 MIN: CPT | Mod: 93 | Performed by: INTERNAL MEDICINE

## 2024-07-15 NOTE — PROGRESS NOTES
Nephrology Progress Note  Telephone Visit: 8 minutes  Total time spent on day of service: 12 minutes    Assessment/Plan:  89 year old female with history of hypertension, osteoporosis, anemia, who presents for followup of hypercalcemia and increased creatinine. Her Scr is typically 0.9mg/dL but was up to 1.3 in setting of hypercalcemia, calcium of 10.4-10.9 in 2018. Since then, Scr 1-1.3 range, with continued borderline hypercalcemia.  1. Hypercalcemia- improved, after better hydration and stopping hydrochlorothiazide and starting furosemide  - given hypercalcemia and hyponatremia, hydrochlorothiazide was stopped   - tolerating furosemide and calcium high normal at 10.1-10.4, taking two calcium / vit D tablets, ok to continue same regimen, PTH 90, vit  D 41.  - repeat labs yearly  - return in 1 year    2. CKD stage 3- Her Scr was 0.9 but has had a couple large fluctuations monitor, can decrease losartan or furosemide if BP lower than 120/80. Scr up to 1.3  - however her creatinine was increased to 1.7 thus we decreased lasix to once daily. On recheck creatinine improved back to 1.1- 1.2  - stable, continue once a day furosemide  - drinking 4 glasses of water; sodium 134, continue same for now    3. Hypertension- now on furosemide 40 mg daily,continue same (stopped hydrochlorothiazide), losartan 100 mg daily and and verapamil 240 mg daily  -  she will monitor at home  -goal 130-140/80 is fine- she is at goal, would cut down on BP medications if <120 systolic.    4. Anemia- mild anemia previously but hgb now normal, iron sat normal Fall 2022, recheck next year.  - immunofixation- negative 2018     5. Bone- 2/14/22 Vit D 50, 8/27/21 PTH low normal , in setting of mild hypercalcemia, but some CKD thus ?appropriate.    Last calcium 10.3 thus high normal, and PTH high normal at 90  - lowered vitamin D and calcium 600mg to once a day  6. Elevated blood sugar- will add on A1C but reassured her that she is likely still  pre-diabetic.      Reason for visit: CKD    HPI:  She is a delightful female with history of hypertension, hyperlipidemia, osteoporosis, who presented in July 2018 for evaluation of high calcium and worsened kidney function.  Her calcium was noted to be elevated on multiple occasions, up to 11 mg/dL but usually between 10.4-10.9- though overall asymptomatic.    Her PTH was within normal, which is mildly inappropriately high in setting of hypercalcemia. She had been on hydrochlorothiazide which at first we lowered, but with hyponatremia noted on recheck, I switched her to furosemide in September 2018    She is also on losartan and diltiazem for her blood pressure. She takes calcium and vitamin D  twice a day and now on actonel.  On followup today, she feels well.   She hydrates well with water, 4-5 glasses a day  She lives with her daughter who is a  and has sarcoidosis, and has a special needs daughter (her granddaughter).     She denies urinary issues. She wakes up once a night to urinate typically    She had possible food poisoning one week before her last appt in December and creatinine was increased from 1.2 to 1.7  She cut back lasix 20 mg to once a day and creatinine improved. She denies any current dizziness. She has no LE swelling, no shortness of breath.   She has mildly elevated calcium, taking two calcium/ vit D daily.  She is accompanied by daughter Pat.   She feels OK, did have COVID in August 2022.  She has been well, her weight is stable, blood pressure is 140--150 but settles down to  130s on recheck typically.  Her labs show calcium of 10.3 which is relatively stable, sodium is ranging 133-135. She is drinking 4 glasses of fluids, discussed 4-5 glasses given the sodium tends to be low at times    She has a lumbar fracture she is dealing with, has worn a brace for 3 months, but otherwise doing OK, not too much pain    Review of Systems   A 4 point ROS was reviewed with patient and negative  except as noted above       Objective   Reported vitals:  LMP 12/16/1980    General: no distress,engaged and appropriate speech  Psych: Alert and oriented times 3; coherent speech, normal   rate and volume, able to articulate logical thoughts   Lungs: decreased bilaterally  CV: RRR  EXT: no edema    Last labs 5/2019 - within normal  Last Comprehensive Metabolic Panel:  Sodium   Date Value Ref Range Status   07/13/2024 134 (L) 135 - 145 mmol/L Final   08/04/2020 138 133 - 144 mmol/L Final     Potassium   Date Value Ref Range Status   07/13/2024 4.2 3.4 - 5.3 mmol/L Final   09/26/2022 4.0 3.4 - 5.3 mmol/L Final   08/04/2020 4.0 3.4 - 5.3 mmol/L Final     Chloride   Date Value Ref Range Status   07/13/2024 96 (L) 98 - 107 mmol/L Final   09/26/2022 98 94 - 109 mmol/L Final   08/04/2020 103 94 - 109 mmol/L Final     Carbon Dioxide   Date Value Ref Range Status   08/04/2020 30 20 - 32 mmol/L Final     Carbon Dioxide (CO2)   Date Value Ref Range Status   07/13/2024 25 22 - 29 mmol/L Final   09/26/2022 27 20 - 32 mmol/L Final     Anion Gap   Date Value Ref Range Status   07/13/2024 13 7 - 15 mmol/L Final   09/26/2022 8 3 - 14 mmol/L Final   08/04/2020 5 3 - 14 mmol/L Final     Glucose   Date Value Ref Range Status   07/13/2024 121 (H) 70 - 99 mg/dL Final   09/26/2022 119 (H) 70 - 99 mg/dL Final   08/04/2020 118 (H) 70 - 99 mg/dL Final     Comment:     Fasting specimen     Urea Nitrogen   Date Value Ref Range Status   07/13/2024 43.5 (H) 8.0 - 23.0 mg/dL Final   09/26/2022 53 (H) 7 - 30 mg/dL Final   08/04/2020 53 (H) 7 - 30 mg/dL Final     Creatinine   Date Value Ref Range Status   07/13/2024 1.30 (H) 0.51 - 0.95 mg/dL Final   08/04/2020 1.24 (H) 0.52 - 1.04 mg/dL Final     GFR Estimate   Date Value Ref Range Status   07/13/2024 38 (L) >60 mL/min/1.73m2 Final     Comment:     eGFR calculated using 2021 CKD-EPI equation.   08/04/2020 38 (L) >60 mL/min/[1.73_m2] Final     Comment:     Non  GFR Calc  Starting  12/18/2018, serum creatinine based estimated GFR (eGFR) will be   calculated using the Chronic Kidney Disease Epidemiology Collaboration   (CKD-EPI) equation.       Calcium   Date Value Ref Range Status   07/13/2024 10.1 (H) 8.2 - 9.6 mg/dL Final   08/04/2020 10.0 8.5 - 10.1 mg/dL Final       Romi MD [facility]   of Medicine  Department of Nephrology  [facility]

## 2024-07-17 ENCOUNTER — TRANSFERRED RECORDS (OUTPATIENT)
Dept: HEALTH INFORMATION MANAGEMENT | Facility: CLINIC | Age: 89
End: 2024-07-17
Payer: MEDICARE

## 2024-07-25 ENCOUNTER — TELEPHONE (OUTPATIENT)
Dept: INTERNAL MEDICINE | Facility: CLINIC | Age: 89
End: 2024-07-25
Payer: MEDICARE

## 2024-07-25 NOTE — TELEPHONE ENCOUNTER
AYDE Mckeon calling from [facility].    She is waiting for some signed home care orders to be faxed back.   She said she faxed them over 7/17/24 and wanted to find out if she will be getting them back soon.  RN does not see a telephone encounter in [facility] on that date for forms.     Routing to  team to assist AYDE Mckeon.    Earline RUIZ, RN

## 2024-07-25 NOTE — TELEPHONE ENCOUNTER
Called AYDE Mckeon to relay providers approval of orders. Linda verbalized understanding. No further questions at this time.    Thank you - Donna Carrillo, ALESSIAN, RN

## 2024-07-25 NOTE — TELEPHONE ENCOUNTER
Forms/Letter Request    Type of form/letter: Home Health Certification      Do we have the form/letter: Yes:     Who is the form from? Home care    Where did/will the form come from? form was faxed in    When is form/letter needed by:     How would you like the form/letter returned: Fax : [phone]    Patient Notified form requests are processed in 5-7 business days:Yes    Could we send this information to you in "[employer] or would you prefer to receive a phone call?:   No preference   Okay to leave a detailed message?: Yes at Other phone number:  [phone]*

## 2024-07-25 NOTE — TELEPHONE ENCOUNTER
Home Care is calling regarding an established patient with [facility].       Requesting orders from: Rowena Solis  Provider is following patient: No       Orders Requested    Skilled Nursing  Request for initial certification (first set of orders)   Frequency:  1x/every other week for 30-60 days for lumbar fracture.    HHA (Home Health Aide)  Request for initial certification (first set of orders)   Frequency:  1x/every other week for 30-60 days for lumbar fracture.    Information was gathered and will be sent to provider for review.  RN will contact Home Care with information after provider review.  Information was gathered and will be sent to provider to confirm provider will be following patient.  RN will contact Home Care with information after provider review.  Confirmed ok to leave a detailed message with call back.  Contact information confirmed and updated as needed.    Donna Carrillo RN

## 2024-07-25 NOTE — TELEPHONE ENCOUNTER
Called and spoke to Linda, informed this has NOT been received and asked if we could try a different number. Per Linda, this has been faxed FIVE times and there is no record of receiving it. She asked if we could move forward with verbal orders, PCP agreed, Transferred to clinic RN's to receive these verbal orders.          Jose Mcneill

## 2024-08-07 ENCOUNTER — TELEPHONE (OUTPATIENT)
Dept: INTERNAL MEDICINE | Facility: CLINIC | Age: 89
End: 2024-08-07
Payer: MEDICARE

## 2024-08-07 NOTE — TELEPHONE ENCOUNTER
Called AYDE Gonzalez to relay providers approval of orders and HR notification. Left message on AYDE Gonzalez's confidential VM.     Thank you - Donna Carrillo, BSN, RN

## 2024-08-07 NOTE — TELEPHONE ENCOUNTER
Home Care is calling regarding an established patient with [facility].       Requesting orders from: Rowena Solis  Provider is following patient: Yes  Is this a 60-day recertification request?  No    Orders Requested    Speech Therapy  Request for initial evaluation and treatment (one time)     Out of range vitals: Pulse 50 - bottom parameters of 60. A-Symptomatic. Other vitals stable, see below.  T: 96.9  R: 16  BP: 110/62  O2: 97% RA    Confirmed ok to leave a detailed message with call back.  Contact information confirmed and updated as needed.  Verbal given for Speech. Provider to review out of range vital, please.     Donna Carrillo RN

## 2024-08-07 NOTE — TELEPHONE ENCOUNTER
Okay for home care order.   HR of 50 noted.  No changes for now.  Okay to call if HR < 50 moving forward, or if having symptoms.   Rowena Solis, CNP

## 2024-08-08 ENCOUNTER — MEDICAL CORRESPONDENCE (OUTPATIENT)
Dept: HEALTH INFORMATION MANAGEMENT | Facility: CLINIC | Age: 89
End: 2024-08-08

## 2024-08-11 ENCOUNTER — MYC MEDICAL ADVICE (OUTPATIENT)
Dept: FAMILY MEDICINE | Facility: CLINIC | Age: 89
End: 2024-08-11
Payer: MEDICARE

## 2024-08-11 DIAGNOSIS — Z87.81 HISTORY OF COMPRESSION FRACTURE OF SPINE: Primary | ICD-10-CM

## 2024-08-12 RX ORDER — CYCLOBENZAPRINE HCL 5 MG
5 TABLET ORAL 2 TIMES DAILY PRN
COMMUNITY
Start: 2024-04-20 | End: 2024-08-12

## 2024-08-13 ENCOUNTER — MEDICAL CORRESPONDENCE (OUTPATIENT)
Dept: HEALTH INFORMATION MANAGEMENT | Facility: CLINIC | Age: 89
End: 2024-08-13
Payer: MEDICARE

## 2024-08-13 RX ORDER — CYCLOBENZAPRINE HCL 5 MG
2.5 TABLET ORAL
Qty: 45 TABLET | Refills: 5 | Status: SHIPPED | OUTPATIENT
Start: 2024-08-13

## 2024-08-27 ENCOUNTER — MEDICAL CORRESPONDENCE (OUTPATIENT)
Dept: HEALTH INFORMATION MANAGEMENT | Facility: CLINIC | Age: 89
End: 2024-08-27

## 2024-08-30 ENCOUNTER — DOCUMENTATION ONLY (OUTPATIENT)
Dept: INTERNAL MEDICINE | Facility: CLINIC | Age: 89
End: 2024-08-30
Payer: MEDICARE

## 2024-08-30 ENCOUNTER — MEDICAL CORRESPONDENCE (OUTPATIENT)
Dept: HEALTH INFORMATION MANAGEMENT | Facility: CLINIC | Age: 89
End: 2024-08-30

## 2024-08-30 ENCOUNTER — TELEPHONE (OUTPATIENT)
Dept: INTERNAL MEDICINE | Facility: CLINIC | Age: 89
End: 2024-08-30
Payer: MEDICARE

## 2024-08-30 NOTE — TELEPHONE ENCOUNTER
Home Care is calling regarding an established patient with  [facility].        8/30/2024     9:56 AM   Home Care Information   Date of Home Care episode start 8/7/2024   Current following provider Rowena Solis NP   Date provider agreed to follow 8/7/2024   Home Care agency [facility]     Requesting orders from: Rowena Solis  Provider is following patient: Yes  Is this a 60-day recertification request?  No    Orders Requested    HHA (Home Health Aide)  Request for discontinuation of care   Goals have been met/progressing. Pt requested they be cancelled, she no longer needs them to come to home  Frequency:  discontinue        Verbal orders given.  Home Care will send orders for provider to sign.  Confirmed ok to leave a detailed message with call back.  Contact information confirmed and updated as needed.    Mechelle Solo RN

## 2024-08-30 NOTE — PROGRESS NOTES
China called back and states lab is not needed. I cancelled lab appt.     Graciela POE,    [facility]

## 2024-08-30 NOTE — PROGRESS NOTES
China Guzman has an upcoming lab appointment:    Future Appointments   Date Time Provider Department Center   9/12/2024 10:30 AM AN LAB [facility]   9/18/2024  3:30 PM Rowena Solis CNP [facility]   3/25/2025  1:00 PM Srikanth Craig MD [facility]   7/9/2025 10:00 AM AN LAB [facility]   7/14/2025  1:00 PM Romi Corrigan MD [facility]       There is no order available. Please review and place either future orders or HMPO (Review of Health Maintenance Protocol Orders), as appropriate.    Health Maintenance Due   Topic    ANNUAL REVIEW OF HM ORDERS     MICROALBUMIN     LIPID      Sylvia Johnson

## 2024-09-09 DIAGNOSIS — E83.52 HYPERCALCEMIA: ICD-10-CM

## 2024-09-09 DIAGNOSIS — I10 ESSENTIAL HYPERTENSION WITH GOAL BLOOD PRESSURE LESS THAN 140/90: ICD-10-CM

## 2024-09-09 DIAGNOSIS — E78.5 HYPERLIPIDEMIA LDL GOAL <130: ICD-10-CM

## 2024-09-09 RX ORDER — FUROSEMIDE 40 MG
40 TABLET ORAL DAILY
Qty: 90 TABLET | Refills: 0 | Status: SHIPPED | OUTPATIENT
Start: 2024-09-09

## 2024-09-09 NOTE — TELEPHONE ENCOUNTER
Nephrology Note: Medication Refill Request    Medication Refill Request:     Medication/Dose/Frequency:   furosemide (LASIX) 40 MG tablet 90 tablet 0 9/9/2024 -- No   Sig - Route: TAKE ONE TABLET BY MOUTH ONCE DAILY - Oral     Preferred Pharmacy: [id number] [id number] [address]   Provider:  Dr Corrigan                         SITUATION/BACKROUND:                 Last office visit: 7/15/24        Future office visit: 7/14/25     ASSESSMENT:     Neph Assessments:    Recent Labs:  CBC Results:  Recent Labs   Lab Test 07/13/24  1109 07/25/22  1522 05/02/22  1443   WBC  --   --  4.7   RBC  --   --  3.63*   HGB 11.6*   < > 10.9*   HCT  --   --  33.1*   MCV  --   --  91   MCH  --   --  30.0   MCHC  --   --  32.9   RDW  --   --  13.8   PLT  --   --  303    < > = values in this interval not displayed.     Last Renal Panel:  Sodium   Date Value Ref Range Status   07/13/2024 134 (L) 135 - 145 mmol/L Final   08/04/2020 138 133 - 144 mmol/L Final     Potassium   Date Value Ref Range Status   07/13/2024 4.2 3.4 - 5.3 mmol/L Final   09/26/2022 4.0 3.4 - 5.3 mmol/L Final   08/04/2020 4.0 3.4 - 5.3 mmol/L Final     Chloride   Date Value Ref Range Status   07/13/2024 96 (L) 98 - 107 mmol/L Final   09/26/2022 98 94 - 109 mmol/L Final   08/04/2020 103 94 - 109 mmol/L Final     Carbon Dioxide   Date Value Ref Range Status   08/04/2020 30 20 - 32 mmol/L Final     Carbon Dioxide (CO2)   Date Value Ref Range Status   07/13/2024 25 22 - 29 mmol/L Final   09/26/2022 27 20 - 32 mmol/L Final     Anion Gap   Date Value Ref Range Status   07/13/2024 13 7 - 15 mmol/L Final   09/26/2022 8 3 - 14 mmol/L Final   08/04/2020 5 3 - 14 mmol/L Final     Glucose   Date Value Ref Range Status   07/13/2024 121 (H) 70 - 99 mg/dL Final   09/26/2022 119 (H) 70 - 99 mg/dL Final   08/04/2020 118 (H) 70 - 99 mg/dL Final     Comment:     Fasting specimen     Urea Nitrogen   Date Value Ref Range Status   07/13/2024 43.5 (H) 8.0 -  23.0 mg/dL Final   09/26/2022 53 (H) 7 - 30 mg/dL Final   08/04/2020 53 (H) 7 - 30 mg/dL Final     Creatinine   Date Value Ref Range Status   07/13/2024 1.30 (H) 0.51 - 0.95 mg/dL Final   08/04/2020 1.24 (H) 0.52 - 1.04 mg/dL Final     GFR Estimate   Date Value Ref Range Status   07/13/2024 38 (L) >60 mL/min/1.73m2 Final     Comment:     eGFR calculated using 2021 CKD-EPI equation.   08/04/2020 38 (L) >60 mL/min/[1.73_m2] Final     Comment:     Non  GFR Calc  Starting 12/18/2018, serum creatinine based estimated GFR (eGFR) will be   calculated using the Chronic Kidney Disease Epidemiology Collaboration   (CKD-EPI) equation.       Calcium   Date Value Ref Range Status   07/13/2024 10.1 (H) 8.2 - 9.6 mg/dL Final   08/04/2020 10.0 8.5 - 10.1 mg/dL Final     Phosphorus   Date Value Ref Range Status   07/13/2024 4.2 2.5 - 4.5 mg/dL Final   08/04/2020 4.2 2.5 - 4.5 mg/dL Final     Albumin   Date Value Ref Range Status   07/13/2024 4.6 3.5 - 5.2 g/dL Final   09/26/2022 3.9 3.4 - 5.0 g/dL Final   08/04/2020 4.3 3.4 - 5.0 g/dL Final       Current Medication List:   Current Outpatient Medications (Antihypertensive, Cardiovascular, Diuretics, Beta blockers, Calcium blockers, Anticoagulants)   Medication Sig    furosemide (LASIX) 40 MG tablet TAKE ONE TABLET BY MOUTH ONCE DAILY    losartan (COZAAR) 100 MG tablet Take 1 tablet (100 mg) by mouth daily    verapamil ER (CALAN-SR) 240 MG CR tablet Take 1 tablet (240 mg) by mouth daily     Current Outpatient Medications (Other)   Medication Sig    atorvastatin (LIPITOR) 10 MG tablet TAKE 1 TABLET BY MOUTH AT BEDTIME    Calcium Carb-Cholecalciferol (CALCIUM CARBONATE-VITAMIN D3) 600-400 MG-UNIT TABS Take 1 tablet by mouth daily    cyclobenzaprine (FLEXERIL) 5 MG tablet Take 0.5 tablets (2.5 mg) by mouth nightly as needed for muscle spasms or other (pain)    latanoprost (XALATAN) 0.005 % ophthalmic solution Place 1 drop into both eyes at bedtime    methocarbamol  (ROBAXIN) 500 MG tablet Take 0.5 tablets (250 mg) by mouth 2 times daily as needed for muscle spasms    MULTI-VITAMIN OR None Entered    Multiple Vitamins-Minerals (PRESERVISION AREDS 2 PO) Take 2 capsules by mouth daily    timolol maleate (TIMOPTIC) 0.5 % ophthalmic solution Place 1 drop into both eyes daily       Has patient had kidney transplant in the prior 1 year: no.   Has patient been seen the last 12 months: Yes.  Associated labs reviewed for medication: Yes    PLAN:     Medication refilled per protocol: Yes    MEHNAZ VAUGHAN RN

## 2024-09-10 RX ORDER — ATORVASTATIN CALCIUM 10 MG/1
TABLET, FILM COATED ORAL
Qty: 90 TABLET | Refills: 3 | Status: SHIPPED | OUTPATIENT
Start: 2024-09-10

## 2024-09-10 RX ORDER — VERAPAMIL HYDROCHLORIDE 240 MG/1
240 TABLET, FILM COATED, EXTENDED RELEASE ORAL DAILY
Qty: 90 TABLET | Refills: 3 | Status: SHIPPED | OUTPATIENT
Start: 2024-09-10

## 2024-09-12 ENCOUNTER — MEDICAL CORRESPONDENCE (OUTPATIENT)
Dept: HEALTH INFORMATION MANAGEMENT | Facility: CLINIC | Age: 89
End: 2024-09-12

## 2024-09-18 ENCOUNTER — OFFICE VISIT (OUTPATIENT)
Dept: FAMILY MEDICINE | Facility: CLINIC | Age: 89
End: 2024-09-18
Payer: MEDICARE

## 2024-09-18 VITALS
OXYGEN SATURATION: 95 % | SYSTOLIC BLOOD PRESSURE: 129 MMHG | BODY MASS INDEX: 22.38 KG/M2 | HEIGHT: 59 IN | WEIGHT: 111 LBS | DIASTOLIC BLOOD PRESSURE: 78 MMHG | HEART RATE: 70 BPM | TEMPERATURE: 97 F

## 2024-09-18 DIAGNOSIS — N18.32 STAGE 3B CHRONIC KIDNEY DISEASE (H): ICD-10-CM

## 2024-09-18 DIAGNOSIS — I10 ESSENTIAL HYPERTENSION WITH GOAL BLOOD PRESSURE LESS THAN 140/90: ICD-10-CM

## 2024-09-18 DIAGNOSIS — Z00.00 ENCOUNTER FOR MEDICARE ANNUAL WELLNESS EXAM: Primary | ICD-10-CM

## 2024-09-18 DIAGNOSIS — E78.5 HYPERLIPIDEMIA LDL GOAL <130: ICD-10-CM

## 2024-09-18 PROCEDURE — G0008 ADMIN INFLUENZA VIRUS VAC: HCPCS | Performed by: NURSE PRACTITIONER

## 2024-09-18 PROCEDURE — 91320 SARSCV2 VAC 30MCG TRS-SUC IM: CPT | Performed by: NURSE PRACTITIONER

## 2024-09-18 PROCEDURE — 90662 IIV NO PRSV INCREASED AG IM: CPT | Performed by: NURSE PRACTITIONER

## 2024-09-18 PROCEDURE — 90480 ADMN SARSCOV2 VAC 1/ONLY CMP: CPT | Performed by: NURSE PRACTITIONER

## 2024-09-18 PROCEDURE — G0439 PPPS, SUBSEQ VISIT: HCPCS | Performed by: NURSE PRACTITIONER

## 2024-09-18 ASSESSMENT — PAIN SCALES - GENERAL: PAINLEVEL: NO PAIN (0)

## 2024-09-18 NOTE — PROGRESS NOTES
Preventive Care Visit  [facility]  Rowenashelly Solsi CNP,    Sep 18, 2024      Assessment & Plan     Encounter for Medicare annual wellness exam  Continue annual wellness exams    Stage 3b chronic kidney disease (H)  Chronic, stable.  She is following with nephrology.      Essential hypertension with goal blood pressure less than 140/90  Chronic, stable.  No changes today. Asked her to follow-up in 6 months.      Hyperlipidemia LDL goal <130  Chronic, stable.  Continue atorvastatin.     Patient has been advised of split billing requirements and indicates understanding: Yes        Counseling  Appropriate preventive services were addressed with this patient via screening, questionnaire, or discussion as appropriate for fall prevention, nutrition, physical activity, Tobacco-use cessation, social engagement, weight loss and cognition.  Checklist reviewing preventive services available has been given to the patient.  Reviewed patient's diet, addressing concerns and/or questions.   The patient was instructed to see the dentist every 6 months.   She is at risk for psychosocial distress and has been provided with information to reduce risk.   Updated plan of care.  Patient reported difficulty with activities of daily living were addressed today.The patient was provided with written information regarding signs of hearing loss.   Information on urinary incontinence and treatment options given to patient.       Delaney Atkinson is a 89 year old, presenting for the following:  Medicare Visit        9/18/2024     3:04 PM   Additional Questions   Roomed by margaret     Health Care Directive  Patient has a Health Care Directive on file      HPI      No longer has to wear brace from spine fracture  Occasionally takes Tylenol for back pain, rarely will take a methocarbamol  No recent falls.  Lives with daughter.  Daughter does the cooking.  Patient uses walking when not in her house.  Patient does not drive.   Appetite has been good.  No issues with bowel movements.  Denies any medication side effects or concerns.           9/15/2024   General Health   How would you rate your overall physical health? Good   Feel stress (tense, anxious, or unable to sleep) Only a little      (!) STRESS CONCERN      9/15/2024   Nutrition   Diet: Regular (no restrictions)            9/15/2024   Exercise   Days per week of moderate/strenous exercise 7 days            9/15/2024   Social Factors   Frequency of gathering with friends or relatives Once a week   Worry food won't last until get money to buy more No   Food not last or not have enough money for food? No   Do you have housing? (Housing is defined as stable permanent housing and does not include staying ouside in a car, in a tent, in an abandoned building, in an overnight shelter, or couch-surfing.) Yes   Are you worried about losing your housing? No   Lack of transportation? Yes   Unable to get utilities (heat,electricity)? No       (!) TRANSPORTATION CONCERN PRESENT      9/18/2024   Fall Risk   Gait Speed Test (Document in seconds) 4.99   Gait Speed Test Interpretation Less than or equal to 5.00 seconds - PASS             9/15/2024   Activities of Daily Living- Home Safety   Needs help with the following daily activites Telephone use    Transportation    Shopping    Preparing meals    Bathing    Laundry   Safety concerns in the home None of the above       Multiple values from one day are sorted in reverse-chronological order         9/15/2024   Dental   Dentist two times every year? (!) NO            9/15/2024   Hearing Screening   Hearing concerns? (!) I NEED TO ASK PEOPLE TO SPEAK UP OR REPEAT THEMSELVES.            9/15/2024   Driving Risk Screening   Patient/family members have concerns about driving No            9/15/2024   General Alertness/Fatigue Screening   Have you been more tired than usual lately? No            9/15/2024   Urinary Incontinence Screening   Bothered by  leaking urine in past 6 months Yes            9/15/2024   TB Screening   Were you born outside of the [place]? No        Today's PHQ-2 Score:       3/12/2024    10:42 AM   PHQ-2 ( 1999 [employer])   Q1: Little interest or pleasure in doing things 0   Q2: Feeling down, depressed or hopeless 0   PHQ-2 Score 0   Q1: Little interest or pleasure in doing things Not at all   Q2: Feeling down, depressed or hopeless Not at all   PHQ-2 Score 0         9/15/2024   Substance Use   Alcohol more than 3/day or more than 7/wk Not Applicable   Do you have a current opioid prescription? No   How severe/bad is pain from 1 to 10? 1/10   Do you use any other substances recreationally? No        Social History     Tobacco Use    Smoking status: Never    Smokeless tobacco: Never   Vaping Use    Vaping status: Never Used   Substance Use Topics    Alcohol use: No     Alcohol/week: 0.0 standard drinks of alcohol    Drug use: No          Mammogram Screening - After age 74- determine frequency with patient based on health status, life expectancy and patient goals      Reviewed and updated as needed this visit by Provider   Tobacco  Allergies  Meds  Problems  Med Hx  Surg Hx  Fam Hx            Current providers sharing in care for this patient include:  Patient Care Team:  Rowena Solis CNP as PCP - General  Srikanth Craig MD as Assigned Surgical Provider  Romi Corrigan MD as MD (Nephrology)  Rowena Solis CNP as Assigned PCP  Romi Corrigan MD as Assigned Nephrology Provider    The following health maintenance items are reviewed in Epic and correct as of today:  Health Maintenance   Topic Date Due    ANNUAL REVIEW OF HM ORDERS  Never done    DTAP/TDAP/TD IMMUNIZATION (2 - Td or Tdap) 03/01/2023    DEXA  10/04/2023    INFLUENZA VACCINE (1) 09/01/2024    COVID-19 Vaccine (6 - 2024-25 season) 09/01/2024    MICROALBUMIN  09/05/2024    LIPID  09/06/2024    EYE EXAM  03/22/2025    BMP  07/13/2025     "HEMOGLOBIN  07/13/2025    MEDICARE ANNUAL WELLNESS VISIT  09/18/2025    FALL RISK ASSESSMENT  09/18/2025    ADVANCE CARE PLANNING  06/05/2029    PARATHYROID  Completed    PHOSPHORUS  Completed    PHQ-2 (once per calendar year)  Completed    Pneumococcal Vaccine: 65+ Years  Completed    URINALYSIS  Completed    ALK PHOS  Completed    ZOSTER IMMUNIZATION  Completed    HPV IMMUNIZATION  Aged Out    MENINGITIS IMMUNIZATION  Aged Out    RSV MONOCLONAL ANTIBODY  Aged Out    MAMMO SCREENING  Discontinued    COLORECTAL CANCER SCREENING  Discontinued         Review of Systems  Constitutional, HEENT, cardiovascular, pulmonary, gi and gu systems are negative, except as otherwise noted.     Objective    Exam  /78   Pulse 70   Temp 97  F (36.1  C)   Ht 1.486 m (4' 10.5\")   Wt 50.3 kg (111 lb)   LMP 12/16/1980   SpO2 95%   BMI 22.80 kg/m     Estimated body mass index is 22.8 kg/m  as calculated from the following:    Height as of this encounter: 1.486 m (4' 10.5\").    Weight as of this encounter: 50.3 kg (111 lb).    Physical Exam  GENERAL: alert and no distress  EYES: Eyes grossly normal to inspection, PERRL and conjunctivae and sclerae normal  HENT: ear canals and TM's normal, nose and mouth without ulcers or lesions  NECK: no adenopathy, no asymmetry, masses, or scars  RESP: lungs clear to auscultation - no rales, rhonchi or wheezes  CV: regular rate and rhythm, normal S1 S2, no S3 or S4, no murmur, click or rub, no peripheral edema  ABDOMEN: soft, nontender, no hepatosplenomegaly, no masses and bowel sounds normal  MS: no gross musculoskeletal defects noted, no edema  NEURO: Normal strength and tone, mentation intact and speech normal  PSYCH: mentation appears normal, affect normal/bright        9/18/2024   Mini Cog   Mini-Cog Not Completed (choose reason) Patient declines        Minicog- Normal cognition based on my direct observation during interview and exam.          Signed Electronically by: Rowena TORRES" Irma, CNP

## 2024-09-18 NOTE — PATIENT INSTRUCTIONS
Patient Education   Preventive Care Advice   This is general advice given by our system to help you stay healthy. However, your care team may have specific advice just for you. Please talk to your care team about your preventive care needs.  Nutrition  Eat 5 or more servings of fruits and vegetables each day.  Try wheat bread, brown rice and whole grain pasta (instead of white bread, rice, and pasta).  Get enough calcium and vitamin D. Check the label on foods and aim for 100% of the RDA (recommended daily allowance).  Lifestyle  Exercise at least 150 minutes each week  (30 minutes a day, 5 days a week).  Do muscle strengthening activities 2 days a week. These help control your weight and prevent disease.  No smoking.  Wear sunscreen to prevent skin cancer.  Have a dental exam and cleaning every 6 months.  Yearly exams  See your health care team every year to talk about:  Any changes in your health.  Any medicines your care team has prescribed.  Preventive care, family planning, and ways to prevent chronic diseases.  Shots (vaccines)   HPV shots (up to age 26), if you've never had them before.  Hepatitis B shots (up to age 59), if you've never had them before.  COVID-19 shot: Get this shot when it's due.  Flu shot: Get a flu shot every year.  Tetanus shot: Get a tetanus shot every 10 years.  Pneumococcal, hepatitis A, and RSV shots: Ask your care team if you need these based on your risk.  Shingles shot (for age 50 and up)  General health tests  Diabetes screening:  Starting at age 35, Get screened for diabetes at least every 3 years.  If you are younger than age 35, ask your care team if you should be screened for diabetes.  Cholesterol test: At age 39, start having a cholesterol test every 5 years, or more often if advised.  Bone density scan (DEXA): At age 50, ask your care team if you should have this scan for osteoporosis (brittle bones).  Hepatitis C: Get tested at least once in your life.  STIs (sexually  transmitted infections)  Before age 24: Ask your care team if you should be screened for STIs.  After age 24: Get screened for STIs if you're at risk. You are at risk for STIs (including HIV) if:  You are sexually active with more than one person.  You don't use condoms every time.  You or a partner was diagnosed with a sexually transmitted infection.  If you are at risk for HIV, ask about PrEP medicine to prevent HIV.  Get tested for HIV at least once in your life, whether you are at risk for HIV or not.  Cancer screening tests  Cervical cancer screening: If you have a cervix, begin getting regular cervical cancer screening tests starting at age 21.  Breast cancer scan (mammogram): If you've ever had breasts, begin having regular mammograms starting at age 40. This is a scan to check for breast cancer.  Colon cancer screening: It is important to start screening for colon cancer at age 45.  Have a colonoscopy test every 10 years (or more often if you're at risk) Or, ask your provider about stool tests like a FIT test every year or Cologuard test every 3 years.  To learn more about your testing options, visit:   .  For help making a decision, visit:   https://bit.ly/vb09041.  Prostate cancer screening test: If you have a prostate, ask your care team if a prostate cancer screening test (PSA) at age 55 is right for you.  Lung cancer screening: If you are a current or former smoker ages 50 to 80, ask your care team if ongoing lung cancer screenings are right for you.  For informational purposes only. Not to replace the advice of your health care provider. Copyright   2023 [facility]. All rights reserved. Clinically reviewed by the [facility]. [employer] 483738 - REV 01/24.  Learning About Activities of Daily Living  What are activities of daily living?     Activities of daily living (ADLs) are the basic self-care tasks you do every day. These include eating, bathing, dressing,  and moving around.  As you age, and if you have health problems, you may find that it's harder to do some of these tasks. If so, your doctor can suggest ideas that may help.  To measure what kind of help you may need, your doctor will ask how well you are able to do ADLs. Let your doctor know if there are any tasks that you are having trouble doing. This is an important first step to getting help. And when you have the help you need, you can stay as independent as possible.  How will a doctor assess your ADLs?  Asking about ADLs is part of a routine health checkup your doctor will likely do as you age. Your health check might be done in a doctor's office, in your home, or at a hospital. The goal is to find out if you are having any problems that could make it hard to care for yourself or that make it unsafe for you to be on your own.  To measure your ADLs, your doctor will ask how hard it is for you to do routine tasks. Your doctor may also want to know if you have changed the way you do a task because of a health problem. Your doctor may watch how you:  Walk back and forth.  Keep your balance while you stand or walk.  Move from sitting to standing or from a bed to a chair.  Button or unbutton a shirt or sweater.  Remove and put on your shoes.  It's common to feel a little worried or anxious if you find you can't do all the things you used to be able to do. Talking with your doctor about ADLs is a way to make sure you're as safe as possible and able to care for yourself as well as you can. You may want to bring a caregiver, friend, or family member to your checkup. They can help you talk to your doctor.  Follow-up care is a key part of your treatment and safety. Be sure to make and go to all appointments, and call your doctor if you are having problems. It's also a good idea to know your test results and keep a list of the medicines you take.  Current as of: October 24, 2023  Content Version: 14.1    7261-2949  Healthwise, [employer].   Care instructions adapted under license by your healthcare professional. If you have questions about a medical condition or this instruction, always ask your healthcare professional. [employer] disclaims any warranty or liability for your use of this information.    Preventing Falls: Care Instructions  Injuries and health problems such as trouble walking or poor eyesight can increase your risk of falling. So can some medicines. But there are things you can do to help prevent falls. You can exercise to get stronger. You can also arrange your home to make it safer.    Talk to your doctor about the medicines you take. Ask if any of them increase the risk of falls and whether they can be changed or stopped.   Try to exercise regularly. It can help improve your strength and balance. This can help lower your risk of falling.     Practice fall safety and prevention.    Wear low-heeled shoes that fit well and give your feet good support. Talk to your doctor if you have foot problems that make this hard.  Carry a cellphone or wear a medical alert device that you can use to call for help.  Use stepladders instead of chairs to reach high objects. Don't climb if you're at risk for falls. Ask for help, if needed.  Wear the correct eyeglasses, if you need them.    Make your home safer.    Remove rugs, cords, clutter, and furniture from walkways.  Keep your house well lit. Use night-lights in hallways and bathrooms.  Install and use sturdy handrails on stairways.  Wear nonskid footwear, even inside. Don't walk barefoot or in socks without shoes.    Be safe outside.    Use handrails, curb cuts, and ramps whenever possible.  Keep your hands free by using a shoulder bag or backpack.  Try to walk in well-lit areas. Watch out for uneven ground, changes in pavement, and debris.  Be careful in the winter. Walk on the grass or gravel when sidewalks are slippery. Use de-icer on steps and walkways.  "Add non-slip devices to shoes.    Put grab bars and nonskid mats in your shower or tub and near the toilet. Try to use a shower chair or bath bench when bathing.   Get into a tub or shower by putting in your weaker leg first. Get out with your strong side first. Have a phone or medical alert device in the bathroom with you.   Where can you learn more?  Go to https://www.[employer]er[employer].Sarbari/patiented  Enter G117 in the search box to learn more about \"Preventing Falls: Care Instructions.\"  Current as of: July 17, 2023               Content Version: 14.0    0004-9335 [employer].   Care instructions adapted under license by your healthcare professional. If you have questions about a medical condition or this instruction, always ask your healthcare professional. [employer] disclaims any warranty or liability for your use of this information.      Hearing Loss: Care Instructions  Overview     Hearing loss is a sudden or slow decrease in how well you hear. It can range from slight to profound. Permanent hearing loss can occur with aging. It also can happen when you are exposed long-term to loud noise. Examples include listening to loud music, riding motorcycles, or being around other loud machines.  Hearing loss can affect your work and home life. It can make you feel lonely or depressed. You may feel that you have lost your independence. But hearing aids and other devices can help you hear better and feel connected to others.  Follow-up care is a key part of your treatment and safety. Be sure to make and go to all appointments, and call your doctor if you are having problems. It's also a good idea to know your test results and keep a list of the medicines you take.  How can you care for yourself at home?  Avoid loud noises whenever possible. This helps keep your hearing from getting worse.  Always wear hearing protection around loud noises.  Wear a hearing aid as directed.  A professional can help " "you pick a hearing aid that will work best for you.  You can also get hearing aids over the counter for mild to moderate hearing loss.  Have hearing tests as your doctor suggests. They can show whether your hearing has changed. Your hearing aid may need to be adjusted.  Use other devices as needed. These may include:  Telephone amplifiers and hearing aids that can connect to a television, stereo, radio, or microphone.  Devices that use lights or vibrations. These alert you to the doorbell, a ringing telephone, or a baby monitor.  Television closed-captioning. This shows the words at the bottom of the screen. Most new TVs can do this.  TTY (text telephone). This lets you type messages back and forth on the telephone instead of talking or listening. These devices are also called TDD. When messages are typed on the keyboard, they are sent over the phone line to a receiving TTY. The message is shown on a monitor.  Use text messaging, social media, and email if it is hard for you to communicate by telephone.  Try to learn a listening technique called speechreading. It is not lipreading. You pay attention to people's gestures, expressions, posture, and tone of voice. These clues can help you understand what a person is saying. Face the person you are talking to, and have them face you. Make sure the lighting is good. You need to see the other person's face clearly.  Think about counseling if you need help to adjust to your hearing loss.  When should you call for help?  Watch closely for changes in your health, and be sure to contact your doctor if:    You think your hearing is getting worse.     You have new symptoms, such as dizziness or nausea.   Where can you learn more?  Go to https://www.[employer].net/patiented  Enter R798 in the search box to learn more about \"Hearing Loss: Care Instructions.\"  Current as of: September 27, 2023               Content Version: 14.0    6854-3916 [employer]wise[employer].   Care " instructions adapted under license by your healthcare professional. If you have questions about a medical condition or this instruction, always ask your healthcare professional. [employer] disclaims any warranty or liability for your use of this information.      Bladder Training: Care Instructions  Your Care Instructions     Bladder training is used to treat urge incontinence and stress incontinence. Urge incontinence means that the need to urinate comes on so fast that you can't get to a toilet in time. Stress incontinence means that you leak urine because of pressure on your bladder. For example, it may happen when you laugh, cough, or lift something heavy.  Bladder training can increase how long you can wait before you have to urinate. It can also help your bladder hold more urine. And it can give you better control over the urge to urinate.  It is important to remember that bladder training takes a few weeks to a few months to make a difference. You may not see results right away, but don't give up.  Follow-up care is a key part of your treatment and safety. Be sure to make and go to all appointments, and call your doctor if you are having problems. It's also a good idea to know your test results and keep a list of the medicines you take.  How can you care for yourself at home?  Work with your doctor to come up with a bladder training program that is right for you. You may use one or more of the following methods.  Delayed urination  In the beginning, try to keep from urinating for 5 minutes after you first feel the need to go.  While you wait, take deep, slow breaths to relax. Kegel exercises can also help you delay the need to go to the bathroom.  After some practice, when you can easily wait 5 minutes to urinate, try to wait 10 minutes before you urinate.  Slowly increase the waiting period until you are able to control when you have to urinate.  Scheduled urination  Empty your bladder when you  "first wake up in the morning.  Schedule times throughout the day when you will urinate.  Start by going to the bathroom every hour, even if you don't need to go.  Slowly increase the time between trips to the bathroom.  When you have found a schedule that works well for you, keep doing it.  If you wake up during the night and have to urinate, do it. Apply your schedule to waking hours only.  Kegel exercises  These tighten and strengthen pelvic muscles, which can help you control the flow of urine. (If doing these exercises causes pain, stop doing them and talk with your doctor.) To do Kegel exercises:  Squeeze your muscles as if you were trying not to pass gas. Or squeeze your muscles as if you were stopping the flow of urine. Your belly, legs, and buttocks shouldn't move.  Hold the squeeze for 3 seconds, then relax for 5 to 10 seconds.  Start with 3 seconds, then add 1 second each week until you are able to squeeze for 10 seconds.  Repeat the exercise 10 times a session. Do 3 to 8 sessions a day.  When should you call for help?  Watch closely for changes in your health, and be sure to contact your doctor if:    Your incontinence is getting worse.     You do not get better as expected.   Where can you learn more?  Go to https://www.[employer].net/patiented  Enter V684 in the search box to learn more about \"Bladder Training: Care Instructions.\"  Current as of: November 15, 2023               Content Version: 14.0    4870-0033 [employer].   Care instructions adapted under license by your healthcare professional. If you have questions about a medical condition or this instruction, always ask your healthcare professional. [employer] disclaims any warranty or liability for your use of this information.         "

## 2024-12-23 DIAGNOSIS — E83.52 HYPERCALCEMIA: ICD-10-CM

## 2024-12-23 DIAGNOSIS — I10 ESSENTIAL HYPERTENSION WITH GOAL BLOOD PRESSURE LESS THAN 140/90: ICD-10-CM

## 2024-12-24 RX ORDER — FUROSEMIDE 40 MG/1
40 TABLET ORAL DAILY
Qty: 90 TABLET | Refills: 0 | Status: SHIPPED | OUTPATIENT
Start: 2024-12-24

## 2025-03-10 ENCOUNTER — DOCUMENTATION ONLY (OUTPATIENT)
Dept: INTERNAL MEDICINE | Facility: CLINIC | Age: OVER 89
End: 2025-03-10
Payer: MEDICARE

## 2025-03-10 DIAGNOSIS — N18.32 STAGE 3B CHRONIC KIDNEY DISEASE (H): Primary | ICD-10-CM

## 2025-03-10 DIAGNOSIS — E78.1 HIGH TRIGLYCERIDES: ICD-10-CM

## 2025-03-10 NOTE — PROGRESS NOTES
China Guzman has an upcoming lab appointment:    Future Appointments   Date Time Provider Department Center   3/20/2025  9:00 AM AN [facility]   3/25/2025  1:00 PM Srikanth Craig MD [facility]   3/27/2025 12:00 PM Rowena Solis, CNP [facility]   7/9/2025 10:00 AM [facility]   7/14/2025  1:00 PM Romi Corrigan MD [facility]         There is no order available. Please review and place either future orders or HMPO (Review of Health Maintenance Protocol Orders), as appropriate.    Health Maintenance Due   Topic    ANNUAL REVIEW OF HM ORDERS     MICROALBUMIN     LIPID      Abeba Craven [facility]